# Patient Record
Sex: FEMALE | Race: WHITE | Employment: FULL TIME
[De-identification: names, ages, dates, MRNs, and addresses within clinical notes are randomized per-mention and may not be internally consistent; named-entity substitution may affect disease eponyms.]

---

## 2022-10-24 ENCOUNTER — TELEPHONE (OUTPATIENT)
Dept: FAMILY MEDICINE CLINIC | Facility: CLINIC | Age: 57
End: 2022-10-24

## 2022-10-25 ENCOUNTER — OFFICE VISIT (OUTPATIENT)
Dept: INTERNAL MEDICINE CLINIC | Facility: CLINIC | Age: 57
End: 2022-10-25
Payer: COMMERCIAL

## 2022-10-25 VITALS
DIASTOLIC BLOOD PRESSURE: 71 MMHG | WEIGHT: 235 LBS | RESPIRATION RATE: 18 BRPM | SYSTOLIC BLOOD PRESSURE: 134 MMHG | HEIGHT: 67 IN | HEART RATE: 86 BPM | BODY MASS INDEX: 36.88 KG/M2

## 2022-10-25 DIAGNOSIS — Z12.31 BREAST CANCER SCREENING BY MAMMOGRAM: ICD-10-CM

## 2022-10-25 DIAGNOSIS — E66.09 CLASS 2 OBESITY DUE TO EXCESS CALORIES WITHOUT SERIOUS COMORBIDITY WITH BODY MASS INDEX (BMI) OF 37.0 TO 37.9 IN ADULT: ICD-10-CM

## 2022-10-25 DIAGNOSIS — F17.200 SMOKER: ICD-10-CM

## 2022-10-25 DIAGNOSIS — N28.89 RIGHT RENAL MASS: Primary | ICD-10-CM

## 2022-10-25 LAB
APPEARANCE UR: CLEAR
BASOPHILS # BLD: 0 K/UL (ref 0–0.2)
BASOPHILS NFR BLD: 0 % (ref 0–2)
BILIRUB UR QL: NEGATIVE
COLOR UR: NORMAL
DIFFERENTIAL METHOD BLD: ABNORMAL
EOSINOPHIL # BLD: 0.2 K/UL (ref 0–0.8)
EOSINOPHIL NFR BLD: 1 % (ref 0.5–7.8)
ERYTHROCYTE [DISTWIDTH] IN BLOOD BY AUTOMATED COUNT: 14.7 % (ref 11.9–14.6)
GLUCOSE UR STRIP.AUTO-MCNC: NEGATIVE MG/DL
HCT VFR BLD AUTO: 49.8 % (ref 35.8–46.3)
HGB BLD-MCNC: 15.8 G/DL (ref 11.7–15.4)
HGB UR QL STRIP: NEGATIVE
IMM GRANULOCYTES # BLD AUTO: 0.1 K/UL (ref 0–0.5)
IMM GRANULOCYTES NFR BLD AUTO: 1 % (ref 0–5)
KETONES UR QL STRIP.AUTO: NEGATIVE MG/DL
LEUKOCYTE ESTERASE UR QL STRIP.AUTO: NEGATIVE
LYMPHOCYTES # BLD: 3.3 K/UL (ref 0.5–4.6)
LYMPHOCYTES NFR BLD: 25 % (ref 13–44)
MCH RBC QN AUTO: 27 PG (ref 26.1–32.9)
MCHC RBC AUTO-ENTMCNC: 31.7 G/DL (ref 31.4–35)
MCV RBC AUTO: 85.1 FL (ref 82–102)
MONOCYTES # BLD: 0.9 K/UL (ref 0.1–1.3)
MONOCYTES NFR BLD: 7 % (ref 4–12)
NEUTS SEG # BLD: 8.4 K/UL (ref 1.7–8.2)
NEUTS SEG NFR BLD: 66 % (ref 43–78)
NITRITE UR QL STRIP.AUTO: NEGATIVE
NRBC # BLD: 0 K/UL (ref 0–0.2)
PH UR STRIP: 5.5 [PH] (ref 5–9)
PLATELET # BLD AUTO: 296 K/UL (ref 150–450)
PMV BLD AUTO: 10.6 FL (ref 9.4–12.3)
PROT UR STRIP-MCNC: NEGATIVE MG/DL
RBC # BLD AUTO: 5.85 M/UL (ref 4.05–5.2)
SP GR UR REFRACTOMETRY: 1.02 (ref 1–1.02)
UROBILINOGEN UR QL STRIP.AUTO: 0.2 EU/DL (ref 0.2–1)
WBC # BLD AUTO: 12.8 K/UL (ref 4.3–11.1)

## 2022-10-25 PROCEDURE — 99203 OFFICE O/P NEW LOW 30 MIN: CPT | Performed by: INTERNAL MEDICINE

## 2022-10-25 RX ORDER — CYCLOBENZAPRINE HCL 10 MG
TABLET ORAL
COMMUNITY
Start: 2022-09-08

## 2022-10-25 RX ORDER — IBUPROFEN 600 MG/1
TABLET ORAL
COMMUNITY
Start: 2022-09-08

## 2022-10-25 SDOH — HEALTH STABILITY: PHYSICAL HEALTH
ON AVERAGE, HOW MANY DAYS PER WEEK DO YOU ENGAGE IN MODERATE TO STRENUOUS EXERCISE (LIKE A BRISK WALK)?: PATIENT DECLINED

## 2022-10-25 ASSESSMENT — ENCOUNTER SYMPTOMS
COUGH: 0
WHEEZING: 0
NAUSEA: 0
DIARRHEA: 0
VOMITING: 0
SHORTNESS OF BREATH: 0
BACK PAIN: 1
CONSTIPATION: 0
BLOOD IN STOOL: 0

## 2022-10-25 ASSESSMENT — SOCIAL DETERMINANTS OF HEALTH (SDOH)
WITHIN THE LAST YEAR, HAVE YOU BEEN HUMILIATED OR EMOTIONALLY ABUSED IN OTHER WAYS BY YOUR PARTNER OR EX-PARTNER?: NO
WITHIN THE LAST YEAR, HAVE YOU BEEN AFRAID OF YOUR PARTNER OR EX-PARTNER?: NO
WITHIN THE LAST YEAR, HAVE TO BEEN RAPED OR FORCED TO HAVE ANY KIND OF SEXUAL ACTIVITY BY YOUR PARTNER OR EX-PARTNER?: NO
WITHIN THE LAST YEAR, HAVE YOU BEEN KICKED, HIT, SLAPPED, OR OTHERWISE PHYSICALLY HURT BY YOUR PARTNER OR EX-PARTNER?: NO

## 2022-10-25 NOTE — PROGRESS NOTES
10/25/2022 9:08 PM  Location:[facility] [address] [facility]  [place]  Patient #:  [id number]  YOB: 1965            History of Present Illness     Chief Complaint   Patient presents with    New Patient     New Patient   Is out of work on workers comp due to back pain. Results     Had a MRI 10/15/2022 for her back pain - the MRI showed a mass on her right and left kidney. MRI report in chart. Ms. Vidhya Tang is a 61 y.o. female  who presents to establish primary care. Has a mass on her kidney that was discovered incidentally. Allergies   Allergen Reactions    Latex Hives and Itching    Bee Venom Shortness Of Breath    Penicillins Anaphylaxis    Sulfamethoxazole-Trimethoprim Hives    Sulfa Antibiotics Hives, Itching and Rash     Past Medical History:   Diagnosis Date    Anxiety 2000    Headache 1999    Sleep apnea 2002     Social History     Socioeconomic History    Marital status:      Spouse name: None    Number of children: None    Years of education: None    Highest education level: None   Occupational History    Occupation: CNA     Employer: [employer]   Tobacco Use    Smoking status: Every Day     Packs/day: 1.50     Years: 15.00     Pack years: 22.50     Types: Cigarettes    Smokeless tobacco: Never   Substance and Sexual Activity    Alcohol use:  Yes     Alcohol/week: 2.0 standard drinks     Types: 2 Shots of liquor per week    Drug use: Never    Sexual activity: Not Currently     Partners: Male     Social Determinants of Health     Physical Activity: Unknown    Days of Exercise per Week: Patient refused   Intimate Partner Violence: Not At Risk    Fear of Current or Ex-Partner: No    Emotionally Abused: No    Physically Abused: No    Sexually Abused: No     Past Surgical History:   Procedure Laterality Date    CHOLECYSTECTOMY  2003    TUBAL LIGATION  3/91     Current Outpatient Medications   Medication Sig Dispense Refill    EPINEPHrine (Yoli Isabel 2-MARY IJ) Inject as directed      cyclobenzaprine (FLEXERIL) 10 MG tablet TAKE 1 TABLET BY MOUTH AT BEDTIME FOR 7 DAYS AS NEEDED FOR SPASM      ibuprofen (ADVIL;MOTRIN) 600 MG tablet TAKE 1 TABLET BY MOUTH 3 TIMES A DAY FOR 7 DAYS       No current facility-administered medications for this visit. Health Maintenance   Topic Date Due    COVID-19 Vaccine (1) Never done    Pneumococcal 0-64 years Vaccine (1 - PCV) Never done    Depression Screen  Never done    HIV screen  Never done    Hepatitis C screen  Never done    DTaP/Tdap/Td vaccine (1 - Tdap) Never done    Cervical cancer screen  Never done    Diabetes screen  Never done    Lipids  Never done    Colorectal Cancer Screen  Never done    Breast cancer screen  Never done    Shingles vaccine (1 of 2) Never done    Low dose CT lung screening  Never done    Flu vaccine (1) Never done    Hepatitis A vaccine  Aged Out    Hib vaccine  Aged Out    Meningococcal (ACWY) vaccine  Aged Out     Family History   Problem Relation Age of Onset    Diabetes Mother     High Blood Pressure Mother     High Cholesterol Mother     COPD Mother     Cancer Father         prostate    Diabetes Sister     Stroke Sister     High Cholesterol Brother     Breast Cancer Paternal Aunt              Review of Systems  Review of Systems   Constitutional:  Negative for chills and fever. Respiratory:  Negative for cough, shortness of breath and wheezing. Cardiovascular:  Negative for chest pain, palpitations and leg swelling. Gastrointestinal:  Negative for blood in stool, constipation, diarrhea, nausea and vomiting. Genitourinary:  Negative for hematuria. Musculoskeletal:  Positive for back pain. /71 (Site: Left Upper Arm, Position: Sitting, Cuff Size: Large Adult)   Pulse 86   Resp 18   Ht 5' 6.5\" (1.689 m)   Wt 235 lb (106.6 kg)   LMP  (LMP Unknown)   BMI 37.36 kg/m²       Physical Exam    Physical Exam  Constitutional:       Appearance: Normal appearance. She is obese. She is not ill-appearing. HENT:      Head: Normocephalic. Neck:      Vascular: No carotid bruit. Cardiovascular:      Rate and Rhythm: Normal rate and regular rhythm. Pulmonary:      Effort: Pulmonary effort is normal.      Breath sounds: Normal breath sounds. No wheezing. Abdominal:      General: Abdomen is flat. Palpations: Abdomen is soft. Tenderness: There is no abdominal tenderness. Musculoskeletal:      Lumbar back: Tenderness present. Right lower leg: No edema. Left lower leg: No edema. Neurological:      Mental Status: She is alert and oriented to person, place, and time. Deep Tendon Reflexes:      Reflex Scores:       Patellar reflexes are 2+ on the right side and 2+ on the left side. Psychiatric:         Mood and Affect: Mood normal.         Behavior: Behavior normal.         Assessment & Plan    Current Outpatient Medications   Medication Sig Dispense Refill    EPINEPHrine (EPIPEN 2-MARY IJ) Inject as directed      cyclobenzaprine (FLEXERIL) 10 MG tablet TAKE 1 TABLET BY MOUTH AT BEDTIME FOR 7 DAYS AS NEEDED FOR SPASM      ibuprofen (ADVIL;MOTRIN) 600 MG tablet TAKE 1 TABLET BY MOUTH 3 TIMES A DAY FOR 7 DAYS       No current facility-administered medications for this visit.        Orders Placed This Encounter   Procedures    [facility] DIGITAL SCREEN BILATERAL     Standing Status:   Future     Standing Expiration Date:   12/25/2023     Scheduling Instructions:      Wants done in [facility]     Order Specific Question:   Reason for exam:     Answer:   screening    Urinalysis     Standing Status:   Future     Number of Occurrences:   1     Standing Expiration Date:   10/25/2023    Comprehensive Metabolic Panel     Standing Status:   Future     Number of Occurrences:   1     Standing Expiration Date:   10/25/2023    TSH with Reflex     Standing Status:   Future     Number of Occurrences:   1     Standing Expiration Date:   10/25/2023    Lipid Panel     Standing Status: Future     Number of Occurrences:   1     Standing Expiration Date:   10/25/2023    CBC with Auto Differential     Standing Status:   Future     Number of Occurrences:   1     Standing Expiration Date:   10/25/2023    [facility] - Cori Wooten MD, Urology, [facility]     Referral Priority:   Urgent     Referral Type:   Eval and Treat     Referral Reason:   Specialty Services Required     Referred to Provider:   Serenity Kapoor MD     Requested Specialty:   Urology     Number of Visits Requested:   1       No orders of the defined types were placed in this encounter. There are no discontinued medications. Diagnosis Orders   1. Right renal mass  [facility] - Cori Wooten MD, Urology, [facility]    Urinalysis    CBC with Auto Differential    CBC with Auto Differential    Urinalysis      2. Breast cancer screening by mammogram  [facility] DIGITAL SCREEN BILATERAL      3. Smoker  Lipid Panel    Lipid Panel      4. Class 2 obesity due to excess calories without serious comorbidity with body mass index (BMI) of 37.0 to 37.9 in adult  Comprehensive Metabolic Panel    TSH with Reflex    Lipid Panel    Lipid Panel    TSH with Reflex    Comprehensive Metabolic Panel         Reviewed MRI with the patient. Will discuss labs over the phone. Will refer as above. Advised the patient to quit smoking. Will maintain close follow up. Follow up as documented or earlier as needed. Return in about 3 months (around 1/25/2023).           Britany Trivedi MD

## 2022-10-26 ENCOUNTER — HOSPITAL ENCOUNTER (OUTPATIENT)
Dept: CT IMAGING | Age: 57
Discharge: HOME | End: 2022-10-29

## 2022-10-26 DIAGNOSIS — N28.89 RENAL MASS: Primary | ICD-10-CM

## 2022-10-26 DIAGNOSIS — N28.89 RENAL MASS: ICD-10-CM

## 2022-10-26 LAB
ALBUMIN SERPL-MCNC: 4.1 G/DL (ref 3.5–5)
ALBUMIN/GLOB SERPL: 1.3 {RATIO} (ref 0.4–1.6)
ALP SERPL-CCNC: 81 U/L (ref 50–136)
ALT SERPL-CCNC: 28 U/L (ref 12–65)
ANION GAP SERPL CALC-SCNC: 8 MMOL/L (ref 2–11)
AST SERPL-CCNC: 10 U/L (ref 15–37)
BILIRUB SERPL-MCNC: 0.3 MG/DL (ref 0.2–1.1)
BUN SERPL-MCNC: 13 MG/DL (ref 6–23)
CALCIUM SERPL-MCNC: 9.4 MG/DL (ref 8.3–10.4)
CHLORIDE SERPL-SCNC: 106 MMOL/L (ref 101–110)
CHOLEST SERPL-MCNC: 185 MG/DL
CO2 SERPL-SCNC: 26 MMOL/L (ref 21–32)
CREAT SERPL-MCNC: 0.8 MG/DL (ref 0.6–1)
GLOBULIN SER CALC-MCNC: 3.1 G/DL (ref 2.8–4.5)
GLUCOSE SERPL-MCNC: 145 MG/DL (ref 65–100)
HDLC SERPL-MCNC: 38 MG/DL (ref 40–60)
HDLC SERPL: 4.9 {RATIO}
LDLC SERPL CALC-MCNC: 101.2 MG/DL
POTASSIUM SERPL-SCNC: 4.1 MMOL/L (ref 3.5–5.1)
PROT SERPL-MCNC: 7.2 G/DL (ref 6.3–8.2)
SODIUM SERPL-SCNC: 140 MMOL/L (ref 133–143)
TRIGL SERPL-MCNC: 229 MG/DL (ref 35–150)
TSH W FREE THYROID IF ABNORMAL: 1.19 UIU/ML (ref 0.36–3.74)
VLDLC SERPL CALC-MCNC: 45.8 MG/DL (ref 6–23)

## 2022-10-26 PROCEDURE — 6360000004 HC RX CONTRAST MEDICATION: Performed by: UROLOGY

## 2022-10-26 PROCEDURE — 2580000003 HC RX 258: Performed by: UROLOGY

## 2022-10-26 PROCEDURE — 74178 CT ABD&PLV WO CNTR FLWD CNTR: CPT

## 2022-10-26 RX ORDER — SODIUM CHLORIDE 0.9 % (FLUSH) 0.9 %
10 SYRINGE (ML) INJECTION
Status: DISCONTINUED | OUTPATIENT
Start: 2022-10-26 | End: 2022-10-30 | Stop reason: HOSPADM

## 2022-10-26 RX ORDER — 0.9 % SODIUM CHLORIDE 0.9 %
100 INTRAVENOUS SOLUTION INTRAVENOUS
Status: COMPLETED | OUTPATIENT
Start: 2022-10-26 | End: 2022-10-26

## 2022-10-26 RX ADMIN — SODIUM CHLORIDE 100 ML: 9 INJECTION, SOLUTION INTRAVENOUS at 11:10

## 2022-10-26 RX ADMIN — IOPAMIDOL 100 ML: 755 INJECTION, SOLUTION INTRAVENOUS at 11:10

## 2022-10-27 NOTE — PROGRESS NOTES
New Patient Abstract (Uro/Onc)     MRN: [id number]     PATIENT'S NAME: Monico Beal     LAST SEEN UROLOGIST: N/A     PCP: Charlotte Hays MD     CHIEF COMPLAINT: Renal mass     PMH:  Tobacco use (current 1.5 pack per day smoker x 15+ years), anxiety, sleep apnea, cholecystectomy, I&D of breast abscess, and tubal ligation     FAMILY HX: Family history is significant for father with prostate cancer and paternal aunt with breast cancer. NARRATIVE WITH RECENT WITH RESULTS/PROCEDURES/BIOPSIES AND DATES COMPLETED: Ms. 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 Despite attending physical therapy since the accident, she continued to experience the same low back pain. She was seen at the [facility] on 10/4/22. MRI of the lumbar spine was ordered and performed on 10/15/22 which revealed mild degenerative disc disease at L3-4 through L5-S1 and a 5.7 x 6.1 x 7 cm lower pole solid left renal mass. Urgent referral was placed to [facility], who referred the patient to [facility] for uro/onc evaluation and treatment of renal mass. CT of the abdomen and pelvis with and without contrast was completed on 10/26/22 demonstrating bilateral enhancing renal masses, likely concerning for renal cell carcinoma with the left lower pole renal mass measuring up to 6.5 cm and extending along Gerota's fascia and the right midpole renal mass measuring up to 4.6 cm. Also noted was an enhancing 1.5 cm right adrenal nodule, concerning for a metastatic nodule.     PROCEDURES/PATHOLOGY: N/A     LABS:       10/25/22 14:58   Sodium 140   Potassium 4.1   Chloride 106   CO2 26   BUN,BUNPL 13   Creatinine 0.80   Anion Gap 8   Est, Glom Filt Rate >60   Glucose, Random 145 (H)   CALCIUM, SERUM, 869994 9.4   ALBUMIN/GLOBULIN RATIO 1.3   Total Protein 7.2 Chol/HDL Ratio 4.9   CHOLESTEROL, TOTAL, 620480 185   HDL Cholesterol 38 (L)   LDL Calculated 101.2 (H)   Triglycerides 229 (H)   VLDL Cholesterol Calculated 45.8 (H)   Albumin 4.1   Globulin 3.1   Alk Phosphatase 81   ALT 28   AST 10 (L)   Bilirubin 0.3   TSH w Free Thyroid if Abnormal 1.19      10/25/22 14:58   WBC 12.8 (H)   RBC 5.85 (H)   Hemoglobin Quant 15.8 (H)   Hematocrit 49.8 (H)   MCV 85.1   MCH 27.0   MCHC 31.7   MPV 10.6   RDW 14.7 (H)   Platelet Count 860   Absolute Mono # 0.9   Eosinophils % 1   Basophils Absolute 0.0   Differential Type AUTOMATED   Seg Neutrophils 66   Segs Absolute 8.4 (H)   Lymphocytes 25   Absolute Lymph # 3.3   Monocytes 7   Absolute Eos # 0.2   Basophils 0   Immature Granulocytes 1   Nucleated Red Blood Cells 0.00   Absolute Immature Granulocyte 0.1      10/25/22 14:58   Color, UA YELLOW/STRAW   Glucose, UA Negative   Bilirubin, Urine Negative   Ketones, Urine Negative   Specific Gravity, UA 1.021   Blood, Urine Negative   Protein, UA Negative   Urobilinogen, Urine 0.2   Nitrite, Urine Negative   Leukocyte Esterase, Urine Negative   Appearance CLEAR   pH, Urine 5.5     IMAGING:     MR LUMBAR SPINE WITHOUT CONTRAST 10/15/22        CT ABDOMEN PELVIS W WO CONTRAST 10/26/2022  FINDINGS:  LUNG BASES: No airspace consolidation within the lung bases. No sizable pleural effusion. The heart is not enlarged. LIVER: The liver contour is normal. No suspicious liver lesion. BILIARY TREE: The gallbladder surgically absent. No biliary dilation. SPLEEN: Normal.   PANCREAS: No pancreatic mass or ductal dilation. ADRENALS: Brightly enhancing right adrenal nodule measuring 1.5 x 1.5 cm. KIDNEYS/BLADDER: The kidneys are symmetric in size. No renal calculus or hydronephrosis. There are bilateral renal masses. The left renal mass measures 6.5 x 6.0 x 5.9 cm in axial dimension and extends from the lower pole along Gerota's fascia.  We right renal mass measures 2.9 x 2.7 x 4.6 cm within the midpole. The urinary bladder is unremarkable. BOWEL: The colon is unremarkable. The small bowel is normal in caliber. No bowel wall thickening. PERITONEUM/RETROPERITONEUM: No ascites or free air. No pelvic or retroperitoneal lymphadenopathy. VESSELS: No abdominal aortic aneurysm. No venous invasion. Scattered calcified atherosclerotic disease. ABDOMINAL WALL: No hernia or mass. REPRODUCTIVE: The uterus is unremarkable. BONES: No suspicious osseous lesion. IMPRESSION:  1. Bilateral enhancing renal masses, likely concerning for renal cell carcinoma. The left lower pole renal mass measures up to 6.5 cm and extends along Gerota's fascia. The right midpole renal mass measures up to 4.6 cm.  2.  Enhancing 1.5 cm right adrenal nodule, concerning for a metastatic nodule.

## 2022-10-27 NOTE — RESULT ENCOUNTER NOTE
Your blood sugar and triglycerides are mildly elevated. Your hemoglobin as well as white blood cell count are also elevated. I think this may be related to the mass on your kidney. I will forward these labs onto Dr. Tremaine Ruth for him to assess. When you return, we need to talk about the possibility of you having diabetes. I realize you were not fasting when we did these labs. Maintain a low fat high fiber diet and make sure you are getting 30 minutes of aerobic exercise at least 4-5 days weekly. Thanks.   Clayton Maynard

## 2022-10-27 NOTE — PROGRESS NOTES
[address] [facility]ology & Oncology  [address] [place] [address]  [phone]          Anamaria Whitlock  : 1965      INITIAL EVALUATION    Chief Complaint   Patient presents with    New Patient       HPI: Anamaria Whitlock is a 61 y.o. female with bilateral renal masses and right adrenal mass. Patient is here today with a friend of here hers. 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 She sustained an injury while at work and has been having back pain. This led to an MRI of her spine on 10/15/2022 which showed mild degenerative disc disease as well as a 6 to 7 cm solid-appearing left renal mass. She was then referred to me. We ordered a CT of the abdomen pelvis with and without contrast for 10/26/2022. It showed bilateral enhancing renal masses concerning for RCC. The left lower pole solid mass measured 6.5 cm and extended along Gerota's fascia. There was a central right renal mass measuring 4.6 cm. There was also an enhancing 1.5 cm right adrenal nodule concerning for metastatic disease. Her creatinine on 10/25/2022 was 0.8. Her hematocrit was 49.8. Her LFTs were normal.    She denies any flank or abdominal pain. She has not had any hematuria or dysuria. She denies any other systemic symptoms. She denies any weight loss. She is a current smoker and smokes about a half a pack a day for over 15 years. Past surgical history is significant for laparoscopic cholecystectomy, tubal ligation, open reversal of tubal ligation and an I&D of left breast abscess. She is allergic to penicillin and sulfa drugs. Her father has a history of prostate cancer and she has a paternal aunt with history of breast cancer. Patient has a history of cervical dysplasia but no carcinoma.       From abstract:  Ms. Kaylee Tom is a 48-year-old white female who initially presented to [address] [place] [address] on 22 reporting low back pain s/p injury sustained while pulling a heavy patient at work. Despite attending physical therapy since the accident, she continued to experience the same low back pain. She was seen at the [facility] on 10/4/22. MRI of the lumbar spine was ordered and performed on 10/15/22 which revealed mild degenerative disc disease at L3-4 through L5-S1 and a 5.7 x 6.1 x 7 cm lower pole solid left renal mass. Urgent referral was placed to [facility], who referred the patient to [facility] for uro/onc evaluation and treatment of renal mass. CT of the abdomen and pelvis with and without contrast was completed on 10/26/22 demonstrating bilateral enhancing renal masses, likely concerning for renal cell carcinoma with the left lower pole renal mass measuring up to 6.5 cm and extending along Gerota's fascia and the right midpole renal mass measuring up to 4.6 cm. Also noted was an enhancing 1.5 cm right adrenal nodule, concerning for a metastatic nodule. PMH:     Past Medical History:   Diagnosis Date    Anxiety 2000    Headache 1999    Sleep apnea 2002       PSH:    Past Surgical History:   Procedure Laterality Date    CHOLECYSTECTOMY  2003    TUBAL LIGATION  3/91       MEDs:    Current Outpatient Medications   Medication Sig Dispense Refill    Acetaminophen (TYLENOL) 325 MG CAPS As needed      EPINEPHrine (EPIPEN 2-MARY IJ) Inject as directed (Patient not taking: Reported on 10/28/2022)      cyclobenzaprine (FLEXERIL) 10 MG tablet TAKE 1 TABLET BY MOUTH AT BEDTIME FOR 7 DAYS AS NEEDED FOR SPASM (Patient not taking: Reported on 10/28/2022)      ibuprofen (ADVIL;MOTRIN) 600 MG tablet TAKE 1 TABLET BY MOUTH 3 TIMES A DAY FOR 7 DAYS (Patient not taking: Reported on 10/28/2022)       No current facility-administered medications for this visit.      Facility-Administered Medications Ordered in Other Visits   Medication Dose Route Frequency Provider Last Rate Last Admin    sodium chloride flush 0.9 % injection 10 mL  10 mL IntraVENous ONCE PRN Liv Tijerina MD           ALLERGIES:     Allergies   Allergen Reactions    Latex Hives, Itching and Shortness Of Breath    Bee Venom Shortness Of Breath and Hives    Penicillins Anaphylaxis    Sulfa Antibiotics Hives, Itching and Rash    Penicillin G     Sulfamethoxazole-Trimethoprim Hives       FH:     Family History   Problem Relation Age of Onset    Diabetes Mother     High Blood Pressure Mother     High Cholesterol Mother     COPD Mother     Cancer Father         prostate    Diabetes Sister     Stroke Sister     High Cholesterol Brother     Breast Cancer Paternal Aunt        SH:     Social History     Socioeconomic History    Marital status:      Spouse name: Not on file    Number of children: Not on file    Years of education: Not on file    Highest education level: Not on file   Occupational History    Occupation: CNA     Employer: [employer]   Tobacco Use    Smoking status: Every Day     Packs/day: 1.50     Years: 15.00     Pack years: 22.50     Types: Cigarettes    Smokeless tobacco: Never   Substance and Sexual Activity    Alcohol use: Yes     Alcohol/week: 2.0 standard drinks     Types: 2 Shots of liquor per week    Drug use: Never    Sexual activity: Not Currently     Partners: Male   Other Topics Concern    Not on file   Social History Narrative    Not on file     Social Determinants of Health     Financial Resource Strain: Not on file   Food Insecurity: Not on file   Transportation Needs: Not on file   Physical Activity: Unknown    Days of Exercise per Week: Patient refused    Minutes of Exercise per Session: Not on file   Stress: Not on file   Social Connections: Not on file   Intimate Partner Violence: Not At Risk    Fear of Current or Ex-Partner: No    Emotionally Abused: No    Physically Abused: No    Sexually Abused: No   Housing Stability: Not on file       ROS:   Review of Systems   Constitutional: Negative. Negative for chills, fatigue and fever. Respiratory: Negative. Cardiovascular: Negative. Gastrointestinal: Negative. Genitourinary: Negative. Negative for difficulty urinating, dysuria, flank pain, frequency, hematuria and urgency. Musculoskeletal: Negative. All other systems reviewed and are negative. PHYSICAL EXAM  GENERAL: Well-groomed, well-nourished, pleasant 61 y.o. female, in no acute distress. /79 (Site: Left Upper Arm, Position: Standing)   Pulse 84   Temp 97.8 °F (36.6 °C)   Resp 14   Ht 5' 6.5\" (1.689 m)   Wt 233 lb 8 oz (105.9 kg)   LMP  (LMP Unknown)   SpO2 96%   BMI 37.12 kg/m²   General: well dressed, well nourished, no acute distress  Skin: no rashes  HEENT: Sclera are clear,normocephalic, atraumatic. no external lesions  Cardiovascular: Reg. Normal perfusion  Respiratory: normal respiratory effort, no JVD, no audible wheezing. Musculoskeletal: unremarkable with normal function. No embolic signs or cyanosis. Neurologic exam: intact, no focal deficits, moves all 4 extremities  Psych: normal mood and affect, alert, oriented x 3  LE:  no edema  GI: soft, nontender, no masses, no CVA tenderness  Lymphatic: no axillary, inguinal, cervical or supraclavicular adenopathy  GENITOURINARY:     Labs:   See above    Imaging/Radiology:    XR Results (maximum last 3): No results found for this or any previous visit. CT Results (maximum last 3):  === 10/26/22 ===    CT ABDOMEN PELVIS W WO IV CONTRAST    - Narrative -  EXAMINATION: CT ABDOMEN PELVIS W WO CONTRAST 10/26/2022 11:17 AM    ACCESSION NUMBER: [id number]    COMPARISON: None available    INDICATION: Other specified disorders of kidney and ureter    TECHNIQUE: Contiguous axial computed tomographic images were obtained from the  domes of the diaphragm to the symphysis pubis following administration 100mL  Iso-mulu 370. Coronal reconstructions were also performed. Radiation dose reduction techniques were used for this study.  Our CT scanners  use one or all of the following: Automated exposure control, adjustment of the  mA and/or kV according to patient size, iterative reconstruction. FINDINGS:  LUNG BASES: No airspace consolidation within the lung bases. No sizable pleural  effusion. The heart is not enlarged. LIVER: The liver contour is normal. No suspicious liver lesion. BILIARY TREE: The gallbladder surgically absent. No biliary dilation. SPLEEN: Normal.    PANCREAS: No pancreatic mass or ductal dilation. ADRENALS: Brightly enhancing right adrenal nodule measuring 1.5 x 1.5 cm. KIDNEYS/BLADDER: The kidneys are symmetric in size. No renal calculus or  hydronephrosis. There are bilateral renal masses. The left renal mass measures  6.5 x 6.0 x 5.9 cm in axial dimension and extends from the lower pole along  Gerota's fascia. We right renal mass measures 2.9 x 2.7 x 4.6 cm within the  midpole. The urinary bladder is unremarkable. BOWEL: The colon is unremarkable. The small bowel is normal in caliber. No bowel  wall thickening. PERITONEUM/RETROPERITONEUM: No ascites or free air. No pelvic or retroperitoneal  lymphadenopathy. VESSELS: No abdominal aortic aneurysm. No venous invasion. Scattered calcified  atherosclerotic disease. ABDOMINAL WALL: No hernia or mass. REPRODUCTIVE: The uterus is unremarkable. BONES: No suspicious osseous lesion.    - Impression -  1. Bilateral enhancing renal masses, likely concerning for renal cell  carcinoma. The left lower pole renal mass measures up to 6.5 cm and extends  along Gerota's fascia. The right midpole renal mass measures up to 4.6 cm.  2.  Enhancing 1.5 cm right adrenal nodule, concerning for a metastatic nodule. ASSESSMENT: Tamiko Xie is a 61 y.o. female with bilateral solid enhancing renal masses. She also has a 1.5 cm right adrenal nodule. I discussed the differential diagnosis of these lesions.   I am concerned she has bilateral renal cell carcinoma with a metastatic lesion in the right adrenal gland. I recommended she move forward with a CT of her chest to ensure there is no evidence of metastatic disease there. If the CT of the chest is negative I have recommended moving forward with percutaneous biopsy of the right adrenal lesion. If that is nondiagnostic or not feasible then I would recommend biopsy of the left renal mass. I am going to check plasma metanephrines today to just ensure they are not elevated, although I think the likelihood of this being a pheochromocytoma is extremely small. I discussed the risk of bleeding, infection and damage to adjacent organs in terms of risks of the biopsy procedure. I then had a long discussion with the patient and her friend about possible treatment options. If she does not have distant metastatic disease we will likely move forward with attempted partial nephrectomy of the larger left renal mass. I would then move forward at a second setting with resection of the right adrenal gland and possible partial nephrectomy on the right side. I explained to her the location of this tumor would make partial nephrectomy very challenging and she would be at high risk of needing a nephrectomy. We discussed her chances of needing dialysis in the short-term and even possibly permanently. Should she be found to have distant metastatic disease then I will refer her to one of my medical oncology partners and she will get started on systemic therapy upfront. Also explained that even if there is no distant metastatic disease we may want to consider systemic therapy upfront with hopes of decreasing the sizes of these lesions and making partial nephrectomy even more feasible. We plan to present her at [facility] in the near future. She will see me back after the biopsy and CT chest are complete. ICD-10-CM    1. Renal mass  N28.89 Metanephrines Plasma Free     CT CHEST W CONTRAST      2.  Mass of right adrenal gland (HCC)  E27.8 CT GUIDED NEEDLE PLACEMENT            PLAN:   -review CT a/p w wo cont  -CT chest w cont in the next week  -plasma metanephrines today   -to IR for right adrenal gland biopsy  -RTC after to review     ________________________________________      I have seen and examined this patient. I have reviewed and edited the note started by the MA and agree with the outlined plan. Part of this note was written by using a voice dictation software. The note has been proof read but may still contain some grammatical/other typographical errors.       Rozetta Scheuermann, [address] Urology

## 2022-10-28 ENCOUNTER — HOSPITAL ENCOUNTER (OUTPATIENT)
Dept: LAB | Age: 57
Discharge: HOME | End: 2022-10-31

## 2022-10-28 ENCOUNTER — OFFICE VISIT (OUTPATIENT)
Dept: ONCOLOGY | Age: 57
End: 2022-10-28
Payer: COMMERCIAL

## 2022-10-28 VITALS
SYSTOLIC BLOOD PRESSURE: 114 MMHG | WEIGHT: 233.5 LBS | RESPIRATION RATE: 14 BRPM | HEART RATE: 84 BPM | TEMPERATURE: 97.8 F | BODY MASS INDEX: 36.65 KG/M2 | HEIGHT: 67 IN | DIASTOLIC BLOOD PRESSURE: 79 MMHG | OXYGEN SATURATION: 96 %

## 2022-10-28 DIAGNOSIS — E27.8 MASS OF RIGHT ADRENAL GLAND (HCC): ICD-10-CM

## 2022-10-28 DIAGNOSIS — N28.89 RENAL MASS: Primary | ICD-10-CM

## 2022-10-28 DIAGNOSIS — N28.89 RENAL MASS: ICD-10-CM

## 2022-10-28 PROCEDURE — 36415 COLL VENOUS BLD VENIPUNCTURE: CPT

## 2022-10-28 PROCEDURE — 99205 OFFICE O/P NEW HI 60 MIN: CPT | Performed by: UROLOGY

## 2022-10-28 PROCEDURE — 83835 ASSAY OF METANEPHRINES: CPT

## 2022-10-28 ASSESSMENT — PATIENT HEALTH QUESTIONNAIRE - PHQ9
2. FEELING DOWN, DEPRESSED OR HOPELESS: 0
SUM OF ALL RESPONSES TO PHQ QUESTIONS 1-9: 0

## 2022-10-28 ASSESSMENT — ENCOUNTER SYMPTOMS
GASTROINTESTINAL NEGATIVE: 1
RESPIRATORY NEGATIVE: 1

## 2022-10-28 NOTE — PATIENT INSTRUCTIONS
Patient Instructions from Today's Visit    Reason for Visit:  New Patient, renal mass    Diagnosis Information:  [email or web address]      Plan: We would like to check some additional labs  We would like you to have a CT of your chest In the next week or so    We would like to biopsy the mass on your adrenal gland. We will refer you to our interventional radiology dept. Follow Up: If the CT of your chest shows nodules in your lungs, this will change the potential course of treatment. We are going to refer you for the biopsy. Recent Lab Results:  N/a    Treatment Summary has been discussed and given to patient: n/a        -------------------------------------------------------------------------------------------------------------------    Patient does express an interest in My Chart. My Chart log in information explained on the after visit summary printout at the [facility] Steffanie Mclaughlin [address] desk.     JUAN Tobar

## 2022-11-01 ENCOUNTER — PATIENT MESSAGE (OUTPATIENT)
Dept: INTERNAL MEDICINE CLINIC | Facility: CLINIC | Age: 57
End: 2022-11-01

## 2022-11-01 DIAGNOSIS — F41.8 SITUATIONAL ANXIETY: Primary | ICD-10-CM

## 2022-11-01 RX ORDER — LORAZEPAM 0.5 MG/1
0.5 TABLET ORAL EVERY 8 HOURS PRN
Qty: 30 TABLET | Refills: 0 | Status: SHIPPED | OUTPATIENT
Start: 2022-11-01 | End: 2022-11-30

## 2022-11-01 NOTE — TELEPHONE ENCOUNTER
From: Mariaa July  To: Dr. Lees Lon2022 1:11 PM EDT  Subject: Anxiety     Due to the recent events with test and visit with oncologist on results . I'm having a lot of anxiety attacks and trouble resting. Are they anything I can get to help with this?

## 2022-11-04 LAB
METANEPH FREE SERPL-MCNC: 74.1 PG/ML (ref 0–88)
NORMETANEPHRINE SERPL-MCNC: 211.2 PG/ML (ref 0–244)

## 2022-11-07 ENCOUNTER — HOSPITAL ENCOUNTER (OUTPATIENT)
Dept: CT IMAGING | Age: 57
Discharge: HOME | End: 2022-11-10
Payer: COMMERCIAL

## 2022-11-07 DIAGNOSIS — N28.89 RENAL MASS: ICD-10-CM

## 2022-11-07 LAB — CREAT BLD-MCNC: 0.89 MG/DL (ref 0.8–1.5)

## 2022-11-07 PROCEDURE — 82565 ASSAY OF CREATININE: CPT

## 2022-11-07 PROCEDURE — 71260 CT THORAX DX C+: CPT

## 2022-11-07 PROCEDURE — 2580000003 HC RX 258: Performed by: UROLOGY

## 2022-11-07 PROCEDURE — 6360000004 HC RX CONTRAST MEDICATION: Performed by: UROLOGY

## 2022-11-07 RX ORDER — SODIUM CHLORIDE 0.9 % (FLUSH) 0.9 %
10 SYRINGE (ML) INJECTION
Status: COMPLETED | OUTPATIENT
Start: 2022-11-07 | End: 2022-11-07

## 2022-11-07 RX ORDER — 0.9 % SODIUM CHLORIDE 0.9 %
100 INTRAVENOUS SOLUTION INTRAVENOUS ONCE
Status: COMPLETED | OUTPATIENT
Start: 2022-11-07 | End: 2022-11-07

## 2022-11-07 RX ADMIN — IOPAMIDOL 70 ML: 755 INJECTION, SOLUTION INTRAVENOUS at 15:26

## 2022-11-07 RX ADMIN — SODIUM CHLORIDE 100 ML: 9 INJECTION, SOLUTION INTRAVENOUS at 15:27

## 2022-11-07 RX ADMIN — SODIUM CHLORIDE, PRESERVATIVE FREE 10 ML: 5 INJECTION INTRAVENOUS at 15:27

## 2022-11-10 ENCOUNTER — HOSPITAL ENCOUNTER (OUTPATIENT)
Dept: CT IMAGING | Age: 57
Discharge: HOME | End: 2022-11-13
Payer: COMMERCIAL

## 2022-11-10 VITALS
OXYGEN SATURATION: 97 % | SYSTOLIC BLOOD PRESSURE: 150 MMHG | HEART RATE: 65 BPM | RESPIRATION RATE: 16 BRPM | DIASTOLIC BLOOD PRESSURE: 74 MMHG | TEMPERATURE: 98.1 F

## 2022-11-10 DIAGNOSIS — E27.8 MASS OF RIGHT ADRENAL GLAND (HCC): ICD-10-CM

## 2022-11-10 PROCEDURE — 6360000002 HC RX W HCPCS: Performed by: RADIOLOGY

## 2022-11-10 PROCEDURE — 88305 TISSUE EXAM BY PATHOLOGIST: CPT

## 2022-11-10 PROCEDURE — 2709999900 CT GUIDED NEEDLE PLACEMENT

## 2022-11-10 RX ORDER — MIDAZOLAM HYDROCHLORIDE 2 MG/2ML
INJECTION, SOLUTION INTRAMUSCULAR; INTRAVENOUS
Status: COMPLETED | OUTPATIENT
Start: 2022-11-10 | End: 2022-11-10

## 2022-11-10 RX ORDER — FENTANYL CITRATE 50 UG/ML
INJECTION, SOLUTION INTRAMUSCULAR; INTRAVENOUS
Status: COMPLETED | OUTPATIENT
Start: 2022-11-10 | End: 2022-11-10

## 2022-11-10 RX ORDER — DIPHENHYDRAMINE HYDROCHLORIDE 50 MG/ML
INJECTION INTRAMUSCULAR; INTRAVENOUS
Status: COMPLETED | OUTPATIENT
Start: 2022-11-10 | End: 2022-11-10

## 2022-11-10 RX ADMIN — DIPHENHYDRAMINE HYDROCHLORIDE 50 MG: 50 INJECTION, SOLUTION INTRAMUSCULAR; INTRAVENOUS at 10:54

## 2022-11-10 RX ADMIN — FENTANYL CITRATE 50 MCG: 50 INJECTION, SOLUTION INTRAMUSCULAR; INTRAVENOUS at 11:04

## 2022-11-10 RX ADMIN — FENTANYL CITRATE 50 MCG: 50 INJECTION, SOLUTION INTRAMUSCULAR; INTRAVENOUS at 10:54

## 2022-11-10 RX ADMIN — MIDAZOLAM HYDROCHLORIDE 1 MG: 1 INJECTION, SOLUTION INTRAMUSCULAR; INTRAVENOUS at 10:54

## 2022-11-10 RX ADMIN — FENTANYL CITRATE 50 MCG: 50 INJECTION, SOLUTION INTRAMUSCULAR; INTRAVENOUS at 10:59

## 2022-11-10 RX ADMIN — MIDAZOLAM HYDROCHLORIDE 1 MG: 1 INJECTION, SOLUTION INTRAMUSCULAR; INTRAVENOUS at 10:59

## 2022-11-10 RX ADMIN — MIDAZOLAM HYDROCHLORIDE 1 MG: 1 INJECTION, SOLUTION INTRAMUSCULAR; INTRAVENOUS at 11:04

## 2022-11-10 ASSESSMENT — PAIN SCALES - GENERAL
PAINLEVEL_OUTOF10: 0

## 2022-11-10 NOTE — PROGRESS NOTES
TRANSFER - OUT REPORT:           Verbal report given to Neva Kayser, RN(name) on Rula Jones  being transferred to IR Recovery 4(unit) for routine post-op              Report consisted of patients Situation, Background, Assessment and      Recommendations(SBAR). Information from the following report(s) SBAR, Procedure Summary, and MAR was reviewed with the receiving nurse. Opportunity for questions and clarification was provided. Conscious Sedation:    150 Mcg of Fentanyl administered   3 Mg of Versed administered   50 Mg of Benadryl administered        Pt tolerated procedure well.          Peripheral Intravenous Line:   Peripheral IV 11/10/22 Left Hand (Active)   Site Assessment Clean, dry & intact 11/10/22 1034   Line Status Blood return noted;Normal saline locked 11/10/22 1034   Phlebitis Assessment No symptoms 11/10/22 1034   Infiltration Assessment 0 11/10/22 1034   Alcohol Cap Used No 11/10/22 1034   Dressing Status New dressing applied 11/10/22 1034   Dressing Type Transparent 11/10/22 1034   Dressing Intervention New 11/10/22 1034       VITALS:  BP (!) 142/73   Pulse 63   Temp 98.1 °F (36.7 °C) (Oral)   Resp 15   LMP  (LMP Unknown)   SpO2 98%

## 2022-11-10 NOTE — OR NURSING
Recovery period without difficulty. Pt alert and oriented and denies pain. Dressing is clean, dry, and intact. Reviewed discharge instructions with patient and sister, both verbalized understanding. [facility] Vital signs and Andrey score completed.

## 2022-11-10 NOTE — BRIEF OP NOTE
Department of Interventional Radiology  [phone]        Interventional Radiology Brief Procedure Note    Patient: Flor Lea MRN: [id number]  SSN: [id number]    YOB: 1965  Age: 61 y.o. Sex: female      Date of Procedure: 11/10/2022    Pre-Procedure Diagnosis: bilateral Renal masses and left adrenal mass. Post-Procedure Diagnosis: SAME    Procedure(s): Image Guided Biopsy    Brief Description of Procedure: CT guided biopsy of left lower pole renal mass. Performed By: Vinny Mcgregor MD     Assistants: None    Anesthesia:Moderate Sedation    Estimated Blood Loss: Less than 10ml    Specimens:  Pathology    Implants:  None    Findings: Successful CT guided biopsy of left lower pole renal mass. The small right adrenal mass was not very amenable for biopsy due to need to traverse either lung or liver to obtain a biopsy of the adrenal lesion. Complications: None    Recommendations: NA. Follow Up: FU pathology.      Signed By: Vinny Mcgregor MD     November 10, 2022

## 2022-11-10 NOTE — PRE SEDATION
Sedation Pre-Procedure Note    Patient Name: Aileen Car   YOB: 1965  Room/Bed: Room/bed info not found  Medical Record Number: [id number]  Date: 11/10/2022   Time: 10:28 AM       Indication: CT-guided biopsy for renal masses and adrenal mass    Consent: I have discussed with the patient and/or the patient representative the indication, alternatives, and the possible risks and/or complications of the planned procedure and the anesthesia methods. The patient and/or patient representative appear to understand and agree to proceed. Vital Signs: There were no vitals filed for this visit. Past Medical History:   has a past medical history of Anxiety, Headache, and Sleep apnea. Past Surgical History:   has a past surgical history that includes Tubal ligation (3/91) and Cholecystectomy (2003). Medications:   Scheduled Meds:   Continuous Infusions:   PRN Meds:   Home Meds:   Prior to Admission medications    Medication Sig Start Date End Date Taking? Authorizing Provider   sertraline (ZOLOFT) 50 MG tablet Take 0.5 tablets by mouth daily for 6 days, THEN 1 tablet daily for 14 days, THEN 2 tablets daily for 14 days. 11/1/22 12/5/22  Jhon Waldron MD   LORazepam (ATIVAN) 0.5 MG tablet Take 1 tablet by mouth every 8 hours as needed for Anxiety for up to 30 doses.  11/1/22 11/30/22  Jhon Waldron MD   Acetaminophen (TYLENOL) 325 MG CAPS As needed 12/19/14   Historical Provider, MD   EPINEPHrine (EPIPEN 2-MARY IJ) Inject as directed  Patient not taking: Reported on 10/28/2022    Historical Provider, MD   cyclobenzaprine (FLEXERIL) 10 MG tablet TAKE 1 TABLET BY MOUTH AT BEDTIME FOR 7 DAYS AS NEEDED FOR SPASM  Patient not taking: Reported on 10/28/2022 9/8/22   Historical Provider, MD   ibuprofen (ADVIL;MOTRIN) 600 MG tablet TAKE 1 TABLET BY MOUTH 3 TIMES A DAY FOR 7 DAYS  Patient not taking: Reported on 10/28/2022 9/8/22   Historical Provider, MD         Pre-Sedation Documentation and Exam:   I have personally completed a history, physical exam & review of systems for this patient (see notes). Vital signs have been reviewed (see flow sheet for vitals).     Mallampati Airway Assessment:  normal, dentition not prohibitive, Mallampati Class II - (soft palate, fauces & uvula are visible)  Patient is wearing dentures which can be removed    Prior History of Anesthesia Complications:   none    ASA Classification:  Class 2 - A normal healthy patient with mild systemic disease    Sedation/ Anesthesia Plan:   intravenous sedation    Medications Planned:   midazolam (Versed) intravenously and fentanyl intravenously    Patient is an appropriate candidate for plan of sedation: yes    Electronically signed by WERNER Grayson on 11/10/2022 at 10:28 AM

## 2022-11-10 NOTE — DISCHARGE INSTRUCTIONS
If you have any questions about your procedure, please call the Interventional Radiology department at [phone]. After business hours (5pm) and weekends, call the answering service at [phone] and ask for the Radiologist on call to be paged. Si tiene Preguntas acerca del procedimiento, por favor llame al departamento de Radiología Intervencional al [phone]. Después de horas de oficina (5 pm) y los fines de [place], llamar al Denia Fernandez al [phone] y pregunte por el Radiologo de [facility].

## 2022-11-10 NOTE — H&P
Department of Interventional Radiology  [phone]    History and Physical    Patient:  Neto Monique MRN:  [id number]  SSN:  [id number]    YOB: 1965  Age:  61 y.o. Sex:  female      Primary Care Provider:  Dary Cantrell MD  Referring Physician:  Kaitlynn Vasquez MD    Subjective:     Chief Complaint: Bilateral renal masses and right adrenal nodule    History of the Present Illness: The patient is a 61 y.o. female who presents for CT-guided renal biopsy. Patient sustained a work injury that led to her obtaining an MRI of her spine October 15 which incidentally showed a 6 to 7 cm solid-appearing left renal mass. CT scan of the abdomen pelvis was then obtained on 10/26/2022 and it showed bilateral enhancing renal masses concerning for renal cell carcinoma as well as an enhancing 1.5 cm right adrenal nodule concerning for metastatic disease  Patient is NPO. She denies taking any blood thinners      Past Medical History:   Diagnosis Date    Anxiety 2000    Headache 1999    Sleep apnea 2002     Past Surgical History:   Procedure Laterality Date    CHOLECYSTECTOMY  2003    TUBAL LIGATION  3/91        Review of Systems:    Pertinent items are noted in HPI. Prior to Admission medications    Medication Sig Start Date End Date Taking? Authorizing Provider   sertraline (ZOLOFT) 50 MG tablet Take 0.5 tablets by mouth daily for 6 days, THEN 1 tablet daily for 14 days, THEN 2 tablets daily for 14 days. 11/1/22 12/5/22  Dary Cantrell MD   LORazepam (ATIVAN) 0.5 MG tablet Take 1 tablet by mouth every 8 hours as needed for Anxiety for up to 30 doses.  11/1/22 11/30/22  Dary Cantrell MD   Acetaminophen (TYLENOL) 325 MG CAPS As needed 12/19/14   Historical Provider, MD   EPINEPHrine (EPIPEN 2-MARY IJ) Inject as directed  Patient not taking: Reported on 10/28/2022    Historical Provider, MD   cyclobenzaprine (FLEXERIL) 10 MG tablet TAKE 1 TABLET BY MOUTH AT BEDTIME FOR 7 DAYS AS NEEDED FOR SPASM  Patient not taking: Reported on 10/28/2022 9/8/22   Historical Provider, MD   ibuprofen (ADVIL;MOTRIN) 600 MG tablet TAKE 1 TABLET BY MOUTH 3 TIMES A DAY FOR 7 DAYS  Patient not taking: Reported on 10/28/2022 9/8/22   Historical Provider, MD        Allergies   Allergen Reactions    Latex Hives, Itching and Shortness Of Breath    Bee Venom Shortness Of Breath and Hives    Penicillins Anaphylaxis    Sulfa Antibiotics Hives, Itching and Rash    Penicillin G     Sulfamethoxazole-Trimethoprim Hives       Family History   Problem Relation Age of Onset    Diabetes Mother     High Blood Pressure Mother     High Cholesterol Mother     COPD Mother     Cancer Father         prostate    Diabetes Sister     Stroke Sister     High Cholesterol Brother     Breast Cancer Paternal Aunt      Social History     Tobacco Use    Smoking status: Every Day     Packs/day: 1.50     Years: 15.00     Pack years: 22.50     Types: Cigarettes    Smokeless tobacco: Never   Substance Use Topics    Alcohol use: Yes     Alcohol/week: 2.0 standard drinks     Types: 2 Shots of liquor per week        Not in a hospital admission. Objective:       Physical Examination:    There were no vitals filed for this visit.     Pain Assessment                           0                          HEART: regular rate and rhythm, S1, S2 normal, no murmur, click, rub or gallop  LUNG: clear to auscultation bilaterally  ABDOMEN: soft, non-tender; bowel sounds normal; no masses,  no organomegaly  EXTREMITIES: no pedal edema    Laboratory:     Lab Results   Component Value Date/Time     10/25/2022 02:58 PM    K 4.1 10/25/2022 02:58 PM     10/25/2022 02:58 PM    CO2 26 10/25/2022 02:58 PM    BUN 13 10/25/2022 02:58 PM    GLOB 3.1 10/25/2022 02:58 PM    ALT 28 10/25/2022 02:58 PM     Lab Results   Component Value Date/Time    WBC 12.8 10/25/2022 02:58 PM    HGB 15.8 10/25/2022 02:58 PM    HCT 49.8 10/25/2022 02:58 PM     10/25/2022 02:58 PM No results found for: APTT, INR    Assessment:     54-year-old female with recent imaging that shows bilateral renal masses and a right adrenal nodule concerning for metastatic disease        Plan:     Planned Procedure: CT-guided left renal biopsy under moderate sedation    Risks, benefits, and alternatives reviewed with patient and she agrees to proceed with the procedure.       Signed By: Bi Villarreal     November 10, 2022

## 2022-11-17 NOTE — PROGRESS NOTES
[address] [facility] On[facility]  [address] [address]  [phone]        Ms. Kati Nuñez is a 61 y.o. female with a diagnosis of RCC. S/p left renal biopsy on 11/10/22, Nidia grade 2 of 4     INTERVAL HISTORY: Patient is here today with 2 of her friends. She has bilateral enhancing solid renal masses as well as a 1.5 cm right enhancing adrenal mass. The adrenal mass was not able to be biopsied by interventional radiology because of its position. She instead underwent a biopsy of the larger left renal mass on 11/16/2022. It was consistent with conventional clear-cell renal cell carcinoma, Nidia grade 2 out of 4. She has no complaints today. She recovered well from the procedure. She also underwent a CT scan of the chest on 11/7/2022. It showed multiple nodules that appeared to be calcified primarily. It was thought to be most consistent with granulomatous disease although metastatic disease cannot be completely ruled out. Her creatinine on 11/7/2022 was 0.89 with a GFR of greater than 60. She is currently not on any anticoagulants. Her past surgical history includes a laparoscopic cholecystectomy and a tubal reversal through a Pfannenstiel incision. From previous note:  She sustained an injury while at work and has been having back pain. This led to an MRI of her spine on 10/15/2022 which showed mild degenerative disc disease as well as a 6 to 7 cm solid-appearing left renal mass. She was then referred to me. We ordered a CT of the abdomen pelvis with and without contrast for 10/26/2022. It showed bilateral enhancing renal masses concerning for RCC. The left lower pole solid mass measured 6.5 cm and extended along Gerota's fascia. There was a central right renal mass measuring 4.6 cm. There was also an enhancing 1.5 cm right adrenal nodule concerning for metastatic disease. Her creatinine on 10/25/2022 was 0.8.   Her hematocrit was 49.8. Her LFTs were normal.    She denies any flank or abdominal pain. She has not had any hematuria or dysuria. She denies any other systemic symptoms. She denies any weight loss. She is a current smoker and smokes about a half a pack a day for over 15 years. Past surgical history is significant for laparoscopic cholecystectomy, tubal ligation, open reversal of tubal ligation and an I&D of left breast abscess. She is allergic to penicillin and sulfa drugs. Her father has a history of prostate cancer and she has a paternal aunt with history of breast cancer. Patient has a history of cervical dysplasia but no carcinoma. From abstract:  Ms. 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 Despite attending physical therapy since the accident, she continued to experience the same low back pain. She was seen at the [facility] on 10/4/22. MRI of the lumbar spine was ordered and performed on 10/15/22 which revealed mild degenerative disc disease at L3-4 through L5-S1 and a 5.7 x 6.1 x 7 cm lower pole solid left renal mass. Urgent referral was placed to [facility], who referred the patient to [facility] for uro/onc evaluation and treatment of renal mass. CT of the abdomen and pelvis with and without contrast was completed on 10/26/22 demonstrating bilateral enhancing renal masses, likely concerning for renal cell carcinoma with the left lower pole renal mass measuring up to 6.5 cm and extending along Gerota's fascia and the right midpole renal mass measuring up to 4.6 cm. Also noted was an enhancing 1.5 cm right adrenal nodule, concerning for a metastatic nodule.     Past medical, family and social histories, as well as medications and allergies, were reviewed and updated in the medical record as appropriate. PMH:     Past Medical History:   Diagnosis Date    Anxiety 2000    Headache 1999    Sleep apnea 2002       MEDs:     cyclobenzaprine  EPIPEN 2-MARY IJ  ibuprofen  LORazepam  sertraline  Tylenol Caps     ALLERGIES:    Allergies   Allergen Reactions    Latex Hives, Itching and Shortness Of Breath    Bee Venom Shortness Of Breath and Hives    Penicillins Anaphylaxis    Sulfa Antibiotics Hives, Itching and Rash    Penicillin G     Sulfamethoxazole-Trimethoprim Hives       ROS:     Review of Systems   Constitutional: Negative. Negative for chills, fatigue and fever. Respiratory: Negative. Cardiovascular: Negative. Gastrointestinal: Negative. Genitourinary: Negative. Negative for difficulty urinating, dysuria, flank pain, frequency, hematuria and urgency. Musculoskeletal: Negative. All other systems reviewed and are negative. PHYSICAL EXAMINATION    BP (!) 140/76   Pulse 66   Temp 97.9 °F (36.6 °C) (Oral)   Resp 18   Ht 5' 6.5\" (1.689 m)   Wt 234 lb 1.6 oz (106.2 kg)   LMP  (LMP Unknown)   SpO2 97%   BMI 37.22 kg/m²   General: well dressed, well nourished, no acute distress  Skin: no rashes  HEENT: Sclera are clear,normocephalic, atraumatic. no external lesions   Cardiovascular: Reg. Normal perfusion  Respiratory: normal respiratory effort, no JVD, no audible wheezing. Musculoskeletal: unremarkable with normal function. No embolic signs or cyanosis. Neurologic exam: intact, no focal deficits, moves all 4 extremities  Psych: normal mood and affect, alert, oriented x 3  LE:  no edema  GI: soft, nontender, no masses, no CVA tenderness  : DEFERRED     IMAGING:      CT Results:    === 11/10/22 ===    CT GUIDED NEEDLE PLACEMENT    - Narrative -  Procedure:  Conscious sedation  CT-guided percutaneous core needle biopsy of left renal mass    HISTORY: 48-year-old female with bilateral renal masses with the largest mass  located within the lower pole of the left kidney.  These masses are most  consistent with renal cell carcinoma. There is also a 15 mm enhancing mass  within the right adrenal gland. This right adrenal mass is most consistent with  a metastatic lesion. We are asked to perform biopsy of the right adrenal mass if  possible. Otherwise, request for biopsy of the largest left renal mass. ATTENDING: Ronny Santos MD    ANESTHESIA: Local and Moderate Sedation - Sedation was provided by physician: An  independent trained observer, sedation nurse was present to assist in the  monitoring of the patients level of consciousness and physiologic status. . The  following intraservice physician time was performed during moderate sedation  (monitored intravenous conscious sedation). This moderate sedation was performed  under my direct supervision with administration of Versed and/or fentanyl via a  sedation nurse, an independent trained observer, who monitored the patients  level of consciousness and physiologic status. Intraservice Start Time: 1054  Intraservice End Time: 1119    TECHNIQUE/FINDINGS:  After the risks, and alternatives were discussed, informed consent was obtained. A time out was performed to verify the patient's identity and procedure. All CT  scans at this facility are performed using dose reduction/dose modulation  techniques, as appropriate the performed exam, including the following:  Automated Exposure Control; Adjustment of the mA and/or kV according to patient  size (this includes techniques or standardized protocols for targeted exams  where dose is matched to indication/reason for exam); and Use of Iterative  Reconstruction Technique. The patient was positioned in the Prone position on the CT table. Limited  noncontrast CT examination of the abdomen was performed. There was  redemonstration of the left greater than right bilateral renal masses.  There was  limited evaluation of the previously noted approximately 15 mm lesion within the  right adrenal gland. This right adrenal gland lesion was not very amenable for  biopsy due to the lung or liver needing to be traversed in order to biopsy this  lesion. There would also be a risk of nondiagnostic sample if attempts to biopsy  the right adrenal lesion were attempted due to the small size of the lesion. Therefore, there was a decision to perform biopsy of the left renal mass. The lower pole left renal mass was localized with CT guidance utilizing a grid. The skin was marked and the area was prepped and draped in a sterile fashion. An anesthetic needle was inserted through the skin into the subcutaneous soft  tissues in order to determine an appropriate angle for percutaneous access into  the left adrenal lesion with the needle position checked with  CT imaging. The  subcutaneous soft tissues were anesthetized with a 1% lidocaine solution. A  small dermatotomy was performed with a #11 blade scalpel. A 17 gauge coaxial  needle was advanced into the lower pole left renal mass with intermittent CT  imaging. Three core biopsy specimens were obtained by inserting the 18-gauge  biopsy device through the needle. 2 of the biopsy specimens were quite bloody  due to the very vascular nature of this mass. A Gelfoam and saline mixture was  prepared in normal sterile fashion. Through the needle, approximately 3 mL of  the embolic mixture was administered into the region of biopsy. The needle was  removed. Hemostasis was achieved. A sterile bandage was applied over the dermatotomy  site. Specimens were sent to pathology. Post-procedure limited CT abdomen examination demonstrated no significant  perinephric hematoma adjacent the region of biopsy. The patient tolerated the procedure without difficulty. CT Dose Radiation indices:  Dose Area Product (in mGy-cm): 1426.6    - Impression -  Successful CT guided core needle biopsy of the previously noted large lower pole  left renal mass, as above.  This mass as well as the previously noted smaller  right renal mass are most consistent with renal cell carcinoma. Limited evaluation of the previously noted approximately 15 mm right adrenal  lesion. Biopsy of this lesion was not attempted due to it being poorly amenable  for biopsy due to the need to traverse the lower right lung or the lower right  hepatic lobe in order to biopsy this lesion. There would have been risk of  nondiagnostic sample if this lesion was biopsied due to its small size and  location. ASSESSMENT:    Ms. Ruth Gallardo is a 61 y.o. female with a diagnosis of bilateral solid enhancing renal masses as well as a 1.5 cm right adrenal mass. She is status post left renal mass biopsy on 11/10/22, which showed clear cell RCC, Nidia grade 2 of 4. We again had a long discussion about her current situation. The mass in her left kidney takes up the lower half of the kidney and measures approximately 7 cm. Her other kidney has a very centrally located 4 to 5 cm mass. Both of these will be challenging partial nephrectomies as I do feel like the left side is more doable from an anterior approach either open or robotically. I also discussed the possibility of moving forward with systemic therapy upfront. We can see how the adrenal mass and both renal masses respond to either immunotherapy or tyrosine kinase inhibitors. She understands this is a challenging situation and there are various ways to approach her treatment. She also asked about observation as she is not certain she wants to move forward with any of these options. I have recommended she see Dr. Bria Stock with medical oncology to further discuss systemic treatment options. Were going to discuss her case at our treatment planning conference next week. If she would like for me to move forward with surgical resection I would recommend a robotic left partial nephrectomy initially.   I quoted her a 20 to 30% chance of needing a radical nephrectomy on that side. We talked about the risks of this procedure as outlined below. We would then have her recover and perform restaging imaging and would then potentially move forward with a right adrenalectomy and possible partial on the right side if she had no other demonstrated metastatic disease. We also discussed partial nephrectomy, which is what the patient is leaning towards. We discussed both open and robotic/laparoscopic approaches with the various risks and benefits. The risks include a very small risk of death due to intraoperative and perioperative complications, a 3% to 5% risk of major complications including but not limited to stroke, myocardial infarction, DVT/pulmonary embolus and take back to the operating room for bleeding. A 15% to 20% risk of minor complications includes; bleeding, infection, and damage to related structures, as well as delayed return of bowel function. We discussed the risk of urinary fistula and possibility of pneumothorax. We discussed that anytime a partial nephrectomy is considered, either for oncologic or surgical reasons, it may become necessary to remove the entire kidney. The patient understands and accepts the risks of this possibility. There is also a possibility of transient or permanent renal insufficiency anytime kidney surgery is performed. The risk of local recurrence in the surgical bed of the partial nephrectomy is estimated at 3% to 5% typically, but his higher for her considering the size of this tumor. The risk of forming a new tumor in the same kidney or in the contralateral (other) kidney is at least 3% to 5% as well. After a long discussion with the patient regarding the risks, benefits, and alternatives; the patient wants to move forward with a robotic partial nephrectomy. PLAN:   -review pathology  -rec'd tx options, chemotherapy/immunotherapy, partial nx.   -to OR for left partial nx. R/b's reviewed printout given.  Pt will call with decision  -referral to med onc   -to add on TPC     ________________________________________      I have seen and examined this patient. I have reviewed and edited the note started by the MA and agree with the outlined plan. Part of this note was written by using a voice dictation software. The note has been proof read but may still contain some grammatical/other typographical errors.       Paty Barrera [address] [facility]

## 2022-11-21 ENCOUNTER — OFFICE VISIT (OUTPATIENT)
Dept: ONCOLOGY | Age: 57
End: 2022-11-21
Payer: COMMERCIAL

## 2022-11-21 VITALS
BODY MASS INDEX: 36.74 KG/M2 | OXYGEN SATURATION: 97 % | HEIGHT: 67 IN | HEART RATE: 66 BPM | WEIGHT: 234.1 LBS | DIASTOLIC BLOOD PRESSURE: 76 MMHG | RESPIRATION RATE: 18 BRPM | TEMPERATURE: 97.9 F | SYSTOLIC BLOOD PRESSURE: 140 MMHG

## 2022-11-21 DIAGNOSIS — C64.2 RENAL CELL CARCINOMA OF LEFT KIDNEY (HCC): Primary | ICD-10-CM

## 2022-11-21 PROCEDURE — 99215 OFFICE O/P EST HI 40 MIN: CPT | Performed by: UROLOGY

## 2022-11-21 ASSESSMENT — PATIENT HEALTH QUESTIONNAIRE - PHQ9
2. FEELING DOWN, DEPRESSED OR HOPELESS: 0
SUM OF ALL RESPONSES TO PHQ QUESTIONS 1-9: 0
SUM OF ALL RESPONSES TO PHQ QUESTIONS 1-9: 0
1. LITTLE INTEREST OR PLEASURE IN DOING THINGS: 0
SUM OF ALL RESPONSES TO PHQ QUESTIONS 1-9: 0
SUM OF ALL RESPONSES TO PHQ QUESTIONS 1-9: 0
SUM OF ALL RESPONSES TO PHQ9 QUESTIONS 1 & 2: 0

## 2022-11-21 ASSESSMENT — ENCOUNTER SYMPTOMS
RESPIRATORY NEGATIVE: 1
GASTROINTESTINAL NEGATIVE: 1

## 2022-11-22 NOTE — PROGRESS NOTES
[facility] Hematology and Oncology: New Patient - Consultation    Chief Complaint   Patient presents with    New Patient     Dx; RCC - bilateral with likely met to adrenal +/- lungs     Fam hx: father - prostate cancer   Paternal aunt - hx of breast cancer     History of Present Illness:  Ms. Mirta Burkitt is a 61 y.o. female who presents today in referral from Dr. iVvi Rodrigues for consultation regarding [address]. The past medical history is significant for anxiety, HAs and sleep apnea, cervical dysplasia but no carcinoma, current smoker (~1/2PPD), lap liv and tubal reversal, I&D of left breast abscess. She initially presented to [facility] on 9/7/22 with low back pain s/p injury while pulling a heavy patient. Despite attending physical therapy since the accident, she continued to experience the same low back pain. She was seen at [facility] on 10/4/22. MRI of the lumbar spine on 10/15/22 showed mild degenerative disc disease at L3-4 through L5-S1 and a 5.7 x 6.1 x 7 cm lower pole solid left renal mass. Urgent referral was placed to [facility], who referred the patient to [facility] for uro/onc evaluation and treatment of renal mass. CT AP on 10/26/22 showed bilateral enhancing renal masses, concerning for renal cell carcinoma with the left lower pole renal mass measuring up to 6.5 cm and extending along Gerota's fascia and the right midpole renal mass measuring up to 4.6 cm. Also noted was an enhancing 1.5 cm right adrenal nodule, concerning for a metastatic nodule. No recent wt loss. S/p left renal biopsy on 11/2022, Nidia grade 2 of 4, clear cell RCC. CT chest on 11/7 showed multiple nodules that appeared to be calcified, most c/w granulomatous disease, but she's aware that metastatic disease cannot be ruled out. Not on AC. Cr 0.89. Today, pt is here for consultation regarding systemic therapy. She is here with her sister and friend.   Imaging with bilateral enhancing solid renal masses and 1.5cm right enhancing adrenal mass. Unable to bx adrenal mass due to position per IR. She was referred by Dr Ryan Helms for discussion of systemic therapy. We discussed staging of disease and next steps in management. Chronological Events:   11/23/22 heme/onc consult. Family History   Problem Relation Age of Onset    Diabetes Mother     High Blood Pressure Mother     High Cholesterol Mother     COPD Mother     Cancer Father         prostate    Diabetes Sister     Stroke Sister     High Cholesterol Brother     Breast Cancer Paternal Aunt       Social History     Socioeconomic History    Marital status:      Spouse name: None    Number of children: None    Years of education: None    Highest education level: None   Occupational History    Occupation: CNA     Employer: HOS[employer]   Tobacco Use    Smoking status: Every Day     Packs/day: 1.50     Years: 15.00     Pack years: 22.50     Types: Cigarettes    Smokeless tobacco: Never   Substance and Sexual Activity    Alcohol use: Yes     Alcohol/week: 2.0 standard drinks     Types: 2 Shots of liquor per week    Drug use: Never    Sexual activity: Not Currently     Partners: Male     Social Determinants of Health     Physical Activity: Unknown    Days of Exercise per Week: Patient refused   Intimate Partner Violence: Not At Risk    Fear of Current or Ex-Partner: No    Emotionally Abused: No    Physically Abused: No    Sexually Abused: No        Review of Systems   Constitutional:  Positive for fatigue. Negative for appetite change, chills, diaphoresis, fever and unexpected weight change. HENT:   Negative for hearing loss, mouth sores, nosebleeds, sore throat, trouble swallowing and voice change. Eyes:  Negative for icterus. Respiratory:  Negative for chest tightness, hemoptysis, shortness of breath and wheezing. Cardiovascular:  Negative for chest pain, leg swelling and palpitations. Gastrointestinal:  Negative for abdominal distention, abdominal pain, blood in stool, constipation, diarrhea, nausea and vomiting. Endocrine: Negative for hot flashes. Genitourinary:  Negative for difficulty urinating, frequency, vaginal bleeding and vaginal discharge. Musculoskeletal:  Positive for arthralgias and back pain. Negative for flank pain, gait problem and myalgias. Skin:  Negative for itching, rash and wound. Neurological:  Negative for dizziness, extremity weakness, gait problem, headaches and numbness. Psychiatric/Behavioral:  Positive for depression. Negative for confusion. The patient is nervous/anxious. Allergies   Allergen Reactions    Latex Hives, Itching and Shortness Of Breath    Bee Venom Shortness Of Breath and Hives    Penicillins Anaphylaxis    Sulfa Antibiotics Hives, Itching and Rash    Penicillin G     Sulfamethoxazole-Trimethoprim Hives     Past Medical History:   Diagnosis Date    Anxiety 2000    Headache 1999    Sleep apnea 2002     Past Surgical History:   Procedure Laterality Date    CHOLECYSTECTOMY  2003    IR PORT PLACEMENT EQUAL OR GREATER THAN 5 YEARS  11/30/2022    IR PORT PLACEMENT EQUAL OR GREATER THAN 5 YEARS 11/30/2022 [facility] RADIOLOGY SPECIALS    TUBAL LIGATION  3/91     Current Outpatient Medications   Medication Sig Dispense Refill    axitinib (INLYTA) 5 MG tablet Take 1 tablet by mouth in the morning and 1 tablet in the evening. (Patient not taking: Reported on 11/30/2022) 60 tablet 1    sertraline (ZOLOFT) 50 MG tablet Take 0.5 tablets by mouth daily for 6 days, THEN 1 tablet daily for 14 days, THEN 2 tablets daily for 14 days. 45 tablet 5    Acetaminophen (TYLENOL) 325 MG CAPS As needed      EPINEPHrine (EPIPEN 2-MARY IJ) Inject as directed (Patient not taking: Reported on 11/30/2022)      lidocaine-prilocaine (EMLA) 2.5-2.5 % cream Apply topically as needed.  30 g 0    ondansetron (ZOFRAN) 4 MG tablet Take 1 tablet by mouth 3 times daily as needed for Nausea or Vomiting 30 tablet 0    prochlorperazine (COMPAZINE) 10 MG tablet Take 1 tablet by mouth every 6 hours as needed (nausea/vomiting) 120 tablet 3    doxycycline hyclate (VIBRA-TABS) 100 MG tablet Take 1 tablet by mouth 2 times daily for 7 days 14 tablet 0    cyclobenzaprine (FLEXERIL) 10 MG tablet TAKE 1 TABLET BY MOUTH AT BEDTIME FOR 7 DAYS AS NEEDED FOR SPASM (Patient not taking: No sig reported)      ibuprofen (ADVIL;MOTRIN) 600 MG tablet TAKE 1 TABLET BY MOUTH 3 TIMES A DAY FOR 7 DAYS (Patient not taking: No sig reported)       No current facility-administered medications for this visit. No flowsheet data found. OBJECTIVE:  /80 (Site: Left Upper Arm, Position: Sitting, Cuff Size: Large Adult)   Pulse 79   Temp 98 °F (36.7 °C) (Oral)   Resp 19   Ht 5' 6.5\" (1.689 m)   Wt 234 lb 6.4 oz (106.3 kg)   LMP  (LMP Unknown)   SpO2 95%   BMI 37.27 kg/m²       ECOG PERFORMANCE STATUS - 0-Fully active, able to carry on all pre-disease performance without restriction. Pain - 0 - No pain/10. None/Minimal pain - not affecting QOL     Fatigue - No flowsheet data found. Distress - No flowsheet data found. Physical Exam  Vitals reviewed. Exam conducted with a chaperone present. Constitutional:       General: She is not in acute distress. Appearance: Normal appearance. She is not ill-appearing or toxic-appearing. HENT:      Head: Normocephalic and atraumatic. Nose: Nose normal.      Mouth/Throat:      Mouth: Mucous membranes are moist.   Eyes:      General: No scleral icterus. Extraocular Movements: Extraocular movements intact. Conjunctiva/sclera: Conjunctivae normal.      Pupils: Pupils are equal, round, and reactive to light. Cardiovascular:      Rate and Rhythm: Normal rate and regular rhythm. Heart sounds: No murmur heard. Pulmonary:      Effort: Pulmonary effort is normal. No respiratory distress. Breath sounds: Normal breath sounds.  No wheezing or rales. Abdominal:      General: There is no distension. Palpations: Abdomen is soft. There is no mass. Tenderness: There is no abdominal tenderness. There is no guarding. Musculoskeletal:         General: Normal range of motion. Cervical back: Normal range of motion. Right lower leg: No edema. Left lower leg: No edema. Lymphadenopathy:      Cervical: No cervical adenopathy. Upper Body:      Right upper body: No supraclavicular or axillary adenopathy. Left upper body: No supraclavicular or axillary adenopathy. Skin:     General: Skin is warm and dry. Coloration: Skin is not jaundiced or pale. Findings: No rash. Neurological:      General: No focal deficit present. Mental Status: She is alert and oriented to person, place, and time. Gait: Gait normal.   Psychiatric:         Behavior: Behavior normal.         Thought Content:  Thought content normal.        Labs:  Recent Results (from the past 168 hour(s))   CBC with Auto Differential    Collection Time: 12/02/22  2:21 PM   Result Value Ref Range    WBC 11.5 (H) 4.3 - 11.1 K/uL    RBC 5.68 (H) 4.05 - 5.2 M/uL    Hemoglobin 15.1 11.7 - 15.4 g/dL    Hematocrit 46.9 (H) 35.8 - 46.3 %    MCV 82.6 82.0 - 102.0 FL    MCH 26.6 26.1 - 32.9 PG    MCHC 32.2 31.4 - 35.0 g/dL    RDW 14.4 11.9 - 14.6 %    Platelets 439 039 - 177 K/uL    MPV 10.5 9.4 - 12.3 FL    nRBC 0.00 0.0 - 0.2 K/uL    Seg Neutrophils 64 43 - 78 %    Lymphocytes 26 13 - 44 %    Monocytes 7 4.0 - 12.0 %    Eosinophils % 3 0.5 - 7.8 %    Basophils 0 0.0 - 2.0 %    Immature Granulocytes 0 0.0 - 5.0 %    Segs Absolute 7.4 1.7 - 8.2 K/UL    Absolute Lymph # 2.9 0.5 - 4.6 K/UL    Absolute Mono # 0.8 0.1 - 1.3 K/UL    Absolute Eos # 0.3 0.0 - 0.8 K/UL    Basophils Absolute 0.0 0.0 - 0.2 K/UL    Absolute Immature Granulocyte 0.1 0.0 - 0.5 K/UL    Differential Type AUTOMATED     Comprehensive Metabolic Panel    Collection Time: 12/02/22  2:21 PM   Result Value Ref Range    Sodium 138 133 - 143 mmol/L    Potassium 4.1 3.5 - 5.1 mmol/L    Chloride 105 101 - 110 mmol/L    CO2 26 21 - 32 mmol/L    Anion Gap 7 2 - 11 mmol/L    Glucose 112 (H) 65 - 100 mg/dL    BUN 9 6 - 23 MG/DL    Creatinine 0.90 0.6 - 1.0 MG/DL    Est, Glom Filt Rate >60 >60 ml/min/1.73m2    Calcium 9.5 8.3 - 10.4 MG/DL    Total Bilirubin 0.2 0.2 - 1.1 MG/DL    ALT 22 12 - 65 U/L    AST 11 (L) 15 - 37 U/L    Alk Phosphatase 81 50 - 136 U/L    Total Protein 7.4 6.3 - 8.2 g/dL    Albumin 3.9 3.5 - 5.0 g/dL    Globulin 3.5 2.8 - 4.5 g/dL    Albumin/Globulin Ratio 1.1 0.4 - 1.6     TSH    Collection Time: 12/02/22  2:21 PM   Result Value Ref Range    TSH, 3RD GENERATION 0.577 0.358 - 3.740 uIU/mL       Imaging: reviewed     PATHOLOGY:       ASSESSMENT:     Diagnosis Orders   1. Renal cell carcinoma of left kidney (HCC)  Lactate Dehydrogenase    Hepatitis Panel, Acute    Hepatitis B Core Antibody, Total    Hepatitis B Surface Antibody    Protime-INR    IR PORT PLACEMENT > 5 YEARS    axitinib (INLYTA) 5 MG tablet      2. Mass of right adrenal gland (HCC)  Lactate Dehydrogenase    Hepatitis Panel, Acute    Hepatitis B Core Antibody, Total    Hepatitis B Surface Antibody    axitinib (INLYTA) 5 MG tablet      3. High risk medication use  Hepatitis Panel, Acute    Hepatitis B Core Antibody, Total    Hepatitis B Surface Antibody    Protime-INR      4. Pre-procedural examination  Protime-INR          Ms. Ana Medina is here for evaluation of RCC. Clear cell Renal cell carcinoma, Nidia grade 2 of 4 - left mass at 6.5 cm (s/p bx), right mid-pole mass at 4.5cm and right adrenal enhancing lesion (unable to bx due to location per IR); pt informed that if lung and adrenal nodule malignant - Stage IV with palliative intent in tx   - [facility] [place] [facility] - <12mo to tx - intermediate risk   - International Mets RCC Criteria (IMDC) - <12mo to tx, neutrophils ULN - intermediate risk grp.      - she is here for consultation.   We had a long discussion today about her case. We discussed the pathophysiology of malignancy, staging of RCC and current guidelines. We discussed tx options and she is interested in pursuing systemic therapy. She reviewed this with Dr Opal Cohn - it will let us see if the adrenal mass and both renal masses respond to tx. - per NCCN guidelines, pt's with intermediate risk advanced disease are candidates for immuno + TKI. We discussed options, will p/w axitinib and pembrolizumab. SE reviewed in great detail going over percentages of likelihood of occurrence. Printed info was given to pt for her reference. She was made aware of risk of heart failure, hemorrhage, HTN, Reversible posterior leukoencephalopathy syndrome, thrombosis (arterial/venous), thyroid d.o, wnd healing, diarrhea faisal in combination with pembro. - will plan to get an echo, port, labs, chemo Ed   - smoking cessation strongly encouraged - pt declined at this time accepting risks   - dose escalation q2wk of axitinb discussed with pt.       Axitinib: >10%:  Cardiovascular: Hypertension (40%; hypertensive crisis [<1%])  Dermatologic: Palmar-plantar erythrodysesthesia (27%), skin rash (13%)  Endocrine & metabolic: Decreased serum bicarbonate (44%), hyperglycemia (28%), hyperkalemia (15%), hypernatremia (17%), hypoalbuminemia (15%), hypocalcemia (39%), hypoglycemia (11%), hyponatremia (13%), hypophosphatemia (13%), hypothyroidism (19%), weight loss (25%)  Gastrointestinal: Abdominal pain (14%), constipation (20%), decreased appetite (34%), diarrhea (55%; grades 3/4: 11%), dysgeusia (11%), increased serum amylase (25%), increased serum lipase (3% to 27%), mucosal swelling (15%), nausea (32%; grades 3/4: 3%), stomatitis (15%; grades 3/4: 1%), vomiting (24%; grades 3/4: 3%)  Genitourinary: Proteinuria (11%)  Hematologic & oncologic: Decreased absolute lymphocyte count (33%; grades 3/4: 3%), decreased platelet count (14%; grades 3/4: <1%), decreased white blood cell count (11%), hemorrhage (16%; grades 3/4: 1%; including cerebral hemorrhage, gastrointestinal hemorrhage, hematuria [3%], hemoptysis [2%], and melena)  Hepatic: Increased serum alanine aminotransferase (22%), increased serum alkaline phosphatase (30%), increased serum aspartate aminotransferase (20%)  Nervous system: Asthenia (21%), fatigue (39%), headache (14%), voice disorder (31%)  Neuromuscular & skeletal: Arthralgia (15%), limb pain (13%)  Renal: Increased serum creatinine (55%)  Respiratory: Cough (15%), dyspnea (15%)  1% to 10%:  Cardiovascular: Arterial thrombosis (2%; including acute myocardial infarction, cerebrovascular accident and retinal artery occlusion), heart failure (2%), deep vein thrombosis (1%), pulmonary embolism (2%), retinal thrombosis (?1%), transient ischemic attacks (1%), venous thrombosis (3%; including retinal vein occlusion [?1%])  Dermatologic: Alopecia (4%), erythema of skin (2%), pruritus (7%), xeroderma (10%)  Endocrine & metabolic: Dehydration (6%), hypercalcemia (6%), hyperthyroidism (1%)  Gastrointestinal: Dyspepsia (10%), gastrointestinal fistula (1%), gastrointestinal perforation (?1%), glossalgia (3%), hemorrhoids (4%), rectal hemorrhage (2%), upper abdominal pain (8%)  Hematologic & oncologic: Anemia (4%), increased hemoglobin (9%), polycythemia (1%)  Nervous system: Dizziness (9%)  Neuromuscular & skeletal: Myalgia (7%)  Otic: Tinnitus (3%)  Respiratory: Epistaxis (6%)  <1%: Nervous system: Reversible posterior leukoencephalopathy syndrome  Postmarketing: Cardiovascular: Aneurysm (arterial), aortic aneurysm, aortic dissection, myocardial rupture (aortic rupture and arterial rupture), coronary artery dissection    Pembrolizumab;   >10%:  Cardiovascular: Cardiac arrhythmia (4% to 11%), peripheral edema (11% to 15%)  Dermatologic: Pruritus (11% to 28%), skin rash (13% to   Endocrine & metabolic: Decreased serum bicarbonate (22%), hypercalcemia (14% to 22%), hypercholesterolemia (20%), hyperglycemia (38% to 59%), hyperkalemia (13% to 28%), hyperthyroidism (3% to 12%) (table 3), hypertriglyceridemia (33% to 43%), hypoalbuminemia (16% to 44%), hypocalcemia (15% to 27%), hypoglycemia (13% to 19%), hypokalemia (15% to 20%), hypomagnesemia (16% to 25%), hyponatremia (10% to 46%), hypophosphatemia (19% to 31%), hypothyroidism (8% to 21%) (table 4), weight loss (10% to 15%)    Gastrointestinal: Abdominal pain (11% to 22%) (table 5), constipation (12% to 22%), decreased appetite (15% to 25%), diarrhea (12% to 28%) (table 6), nausea (11% to 22%), vomiting (11% to 19%)    Genitourinary: Hematuria (12% to 19%), urinary tract infection (2% to 19%)  Hematologic & oncologic: Anemia (17% to 54%; grades 3/4: ?24%) (table 7), hemorrhage (19%; grades 3/4: 5%; major hemorrhage: 4%), increased INR (19% to 27%; grade 3/4: ?2%), leukopenia (35%; grades 3/4: 9%), lymphocytopenia (24% to 54%; grades 3/4: 2% to 25%) (table 8), neutropenia (7% to 30%; grades 3/4: 1% to 11%) (table 9), prolonged partial thromboplastin time (14%), thrombocytopenia (12% to 34%; grades 3/4: 4% to 10%) (table 10)    Hepatic: Hyperbilirubinemia (10% to 16%) (table 11), increased serum alanine aminotransferase (20% to 34%) (table 12), increased serum alkaline phosphatase (17% to 42%), increased serum aspartate aminotransferase (20% to 39%) (table 13)    Infection: Infection (16%; serious infection: 4%)  Nervous system: Fatigue (20% to 43%), headache (11% to 15%), pain (22%), peripheral neuropathy (1% to 11%; grade 3/4: <1%) (table 14)  Neuromuscular & skeletal: Arthralgia (10% to 18%), asthenia (10% to 11%), back pain (11% to 12%), musculoskeletal pain (19% to 41%), myalgia (12%)  Renal: Acute kidney injury (2% to 13%), increased serum creatinine (11% to 40%) (table 15)  Respiratory: Cough (14% to 26%), dyspnea (10% to 23%), flu-like symptoms (11%), pneumonia (3% to 12%), pneumonitis (2% to 11%), upper respiratory tract infection (13% to 41%)  Miscellaneous: Fever (10% to 28%)  1% to 10%:  Cardiovascular: Acute myocardial infarction (2%), cardiac tamponade (2%), facial edema (10%), ischemic heart disease (2%), myocarditis (?1%), pericardial effusion (2%), pericarditis (2% to 4%), pulmonary embolism (2%)  Endocrine & metabolic: Adrenocortical insufficiency (1%), diabetic ketoacidosis (1%), thyroiditis (?2%)  Gastrointestinal: Colitis (2%), dysphagia (8%), stomatitis (3%)   Hematologic & oncologic: Febrile neutropenia (1%), tumor flare (1%)  Hepatic: Ascites (grades 3/4: 8%), hepatitis (?3%)  Immunologic: Antibody development (2%; neutralizing: <1%)  Infection: Herpes virus infection (9%), herpes zoster infection (?1%), sepsis (1% to 2%)  Nervous system:  Altered mental status (3%), confusion (?2%), dizziness (5%), insomnia (7%)  Neuromuscular & skeletal: Arthritis (2%), myositis (?1%), neck pain (6%)  Ophthalmic: Uveitis (?1%)  Respiratory: Nasopharyngitis (10%), oropharyngeal pain (8%), pleural effusion (2%), respiratory failure (?2%)  Miscellaneous: Fistula (4%), infusion related reaction (?9%; including severe infusion related reaction)  <1%:  Cardiovascular: Vasculitis  Endocrine & metabolic: Hypoparathyroidism, hypophysitis, type 1 diabetes mellitus  Gastrointestinal: Duodenitis, gastritis, increased serum amylase, increased serum lipase, pancreatitis  Hematologic & oncologic: Aplastic anemia, hemolytic anemia, immune thrombocytopenia, immunological signs and symptoms (hemophagocytic lymphohistiocytosis) (Chyrl Italo 2020), lymphadenitis (histiocytic necrotizing lymphadenitis [Kikuchi lymphadenitis]), sarcoidosis  Hypersensitivity: Anaphylaxis  Immunologic: Organ transplant rejection (solid)  Infection: Systemic inflammatory response syndrome  Nervous system: Demyelinating disease, encephalitis, Guillain-Henriette syndrome, meningitis, myasthenia (myasthenic syndrome) [place] 2019), myasthenia gravis (including exacerbation of myasthenia gravis), neuropathy (autoimmune), paresis (nerve)  Neuromuscular & skeletal: Myelitis, polymyalgia rheumatica, polymyositis, rhabdomyolysis  Ophthalmic: Iritis  Renal: Nephritis      RESUSCITATION DIRECTIVES/HOSPICE CARE: Full Support    RTC weekly for 1mo labs - if chooses to p/w tx - she wishes to think this over and is to let us know in a few days. MDM      Lab studies and imaging studies were personally reviewed. Pertinent old records were reviewed. All questions were asked and answered to the best of my ability. The patient verbalized understanding and agrees with the plan above.               [facility], [address] [place] [address] Hematology and Oncology  [address] [place] [address]  Office : [phone]  Fax : [phone]

## 2022-11-23 ENCOUNTER — OFFICE VISIT (OUTPATIENT)
Dept: ONCOLOGY | Age: 57
End: 2022-11-23
Payer: COMMERCIAL

## 2022-11-23 ENCOUNTER — HOSPITAL ENCOUNTER (OUTPATIENT)
Dept: LAB | Age: 57
Discharge: HOME | End: 2022-11-26
Payer: COMMERCIAL

## 2022-11-23 ENCOUNTER — TELEPHONE (OUTPATIENT)
Dept: ONCOLOGY | Age: 57
End: 2022-11-23

## 2022-11-23 VITALS
RESPIRATION RATE: 19 BRPM | SYSTOLIC BLOOD PRESSURE: 123 MMHG | TEMPERATURE: 98 F | OXYGEN SATURATION: 95 % | BODY MASS INDEX: 36.79 KG/M2 | DIASTOLIC BLOOD PRESSURE: 80 MMHG | HEART RATE: 79 BPM | WEIGHT: 234.4 LBS | HEIGHT: 67 IN

## 2022-11-23 DIAGNOSIS — Z79.899 HIGH RISK MEDICATION USE: ICD-10-CM

## 2022-11-23 DIAGNOSIS — E27.8 MASS OF RIGHT ADRENAL GLAND (HCC): ICD-10-CM

## 2022-11-23 DIAGNOSIS — Z01.818 PRE-PROCEDURAL EXAMINATION: ICD-10-CM

## 2022-11-23 DIAGNOSIS — C64.2 RENAL CELL CARCINOMA OF LEFT KIDNEY (HCC): ICD-10-CM

## 2022-11-23 DIAGNOSIS — C64.2 RENAL CELL CARCINOMA OF LEFT KIDNEY (HCC): Primary | ICD-10-CM

## 2022-11-23 LAB
HAV IGM SER QL: NONREACTIVE
HBV CORE IGM SER QL: NONREACTIVE
HBV SURFACE AB SERPL IA-ACNC: 28.38 MIU/ML
HBV SURFACE AG SER QL: NONREACTIVE
HCV AB SER QL: NONREACTIVE
INR PPP: 1
LDH SERPL L TO P-CCNC: 177 U/L (ref 100–190)
PROTHROMBIN TIME: 13.1 SEC (ref 12.6–14.3)

## 2022-11-23 PROCEDURE — 99205 OFFICE O/P NEW HI 60 MIN: CPT | Performed by: INTERNAL MEDICINE

## 2022-11-23 PROCEDURE — 80074 ACUTE HEPATITIS PANEL: CPT

## 2022-11-23 PROCEDURE — 86704 HEP B CORE ANTIBODY TOTAL: CPT

## 2022-11-23 PROCEDURE — 83615 LACTATE (LD) (LDH) ENZYME: CPT

## 2022-11-23 PROCEDURE — 85610 PROTHROMBIN TIME: CPT

## 2022-11-23 PROCEDURE — 36415 COLL VENOUS BLD VENIPUNCTURE: CPT

## 2022-11-23 PROCEDURE — 86706 HEP B SURFACE ANTIBODY: CPT

## 2022-11-23 ASSESSMENT — PATIENT HEALTH QUESTIONNAIRE - PHQ9
SUM OF ALL RESPONSES TO PHQ QUESTIONS 1-9: 2
1. LITTLE INTEREST OR PLEASURE IN DOING THINGS: 1
2. FEELING DOWN, DEPRESSED OR HOPELESS: 1
SUM OF ALL RESPONSES TO PHQ QUESTIONS 1-9: 2
SUM OF ALL RESPONSES TO PHQ9 QUESTIONS 1 & 2: 2

## 2022-11-23 NOTE — ONCOLOGY
START ON PATHWAY REGIMEN - Renal Cell    [id number]        Axitinib (Inlyta)       Pembrolizumab (Keytruda)     **Always confirm dose/schedule in your pharmacy ordering system**    Patient Characteristics:  Stage IV (Unresected T4M0 or Any T, M1)/Metastatic Disease, Clear Cell, First Line, Intermediate or Poor Risk  Therapeutic Status: Stage IV (Unresected T4M0 or Any T, M1)/Metastatic Disease  Histology: Clear Cell  Line of Therapy: First Line  Risk Status: Intermediate Risk  Intent of Therapy:  Non-Curative / Palliative Intent, Discussed with Patient

## 2022-11-23 NOTE — PATIENT INSTRUCTIONS
Patient Instructions from Today's Visit    Reason for Visit:  New patient - clear cell carcinoma. Diagnosis Information:  [email or web address] net/about-us/asco-answers-patient-education-materials/rqog-swznydk-eren-sheets  Patient was educated and given handouts published by ASCO entitled ASCO Answers Fact Sheets about their diagnosis of renal cancer during todays office visit. Plan:  Discussed recommendations for systemic therapy - immunotherapy and a pill. Immunotherapy - Pembrolizumab (Keytruda) - given every 3 weeks  Pill - axitinib - taken twice daily   [employer]. com drug info provided. Let us know how  you would like proceed and if you would like to get a port placed. Your Chemotherapy Plan      Diagnosis: clear cell carcinoma    Goal of Therapy: _x_ Palliative      _ _Curative         Name of Chemotherapy medications: keytruda (pembrolizumab), axitinib    Number of Cycles Planned: 2 years                  Length of Cycle:  pembrolizumab - every 3 weeks; axitinib - daily      Chemotherapy plan is subject to change at your provider's discretion    A copy of this plan has been discussed and given to patient by Bar Walker RN. Chemo Education:   Scheduled  will be scheduled if pt chooses to proceed. Previously reviewed n/a    Consent for therapy:   Completed on - will be completed at chemo ed if pt chooses to proceed with systemic treatment. Potential side effects:  fatigue, general aches, appetite changes, skin rash, etc.  The following are rare but can happen - any \"itis/inflammation\" of any organ pneumonitis (lungs), hepatitis (liver), colitis (intestines), hormone gland issues (thyroid and pancreas), etc.    Will have an education session prior to starting treatment.           Follow Up:      Recent Lab Results:  N/a    Treatment Summary has been discussed and given to patient: n/a        -------------------------------------------------------------------------------------------------------------------  Please call our office at [phone] if you have any  of the following symptoms:   Fever of 100.5 or greater  Chills  Shortness of breath  Swelling or pain in one leg    After office hours an answering service is available and will contact a provider for emergencies or if you are experiencing any of the above symptoms. Patient did express an interest in My Chart. My Chart log in information explained on the after visit summary printout at the [facility] desk. Tavon Marsh RN           ASCO ANSWERS is a collection of oncologist-approved patient education materials developed by the [employer] of Clinical Oncology (ASCO) for people with cancer and their caregivers. Kidney Cancer    What is kidney cancer? Kidney cancer begins when healthy cells in 1 or both kidneys change and grow out of control, forming a tumor. There are several types of kidney cancer, but renal cell carcinoma is the most common. There are also several types of kidney cancer cells. The most common are clear cells. Knowing which type of cell makes up a kidney tumor helps doctors plan treatment. What is the function of the kidneys? The kidneys are reddish-brown, bean-shaped organs located above the waist on both sides of the spine. They filter blood and remove impurities, excess minerals and salts, and extra water to produce urine. These organs also produce hormones that help control blood pressure, red blood cell production, and other functions. Because each kidney works independently, 1 kidney can be removed without affecting the function of the other. What does stage mean? The stage is a way of describing where the cancer is located, if or where it has spread, and whether it is affecting other parts of the body.  There are 5 stages for kidney cancer: stage 0 (zero, which is rare) and stages I through IV (1 through 4). Find descriptions and illustrations of these stages at www.cancer. net/kidney. How is kidney cancer treated? The treatment of kidney cancer depends on the type and stage of the cancer, whether the cancer has spread, and the persons overall health. Kidney cancer is most often treated with surgery, targeted therapy, immunotherapy, or a combination of these treatments. In some situations, especially when the tumor is small and the person has other medical conditions, the doctor may recommend active surveillance. During active surveillance, the tumor is closely monitored and treatment only starts if the disease shows signs of getting worse. If the cancer has not spread beyond the kidneys, surgery to remove the tumor, part or all of the kidney, and possibly nearby tissue and lymph nodes, may be the only necessary treatment. Radiation therapy and chemotherapy are occasionally used. People with kidney cancer that has spread to other parts of the body are most often treated with targeted therapy. When making treatment decisions, people may also consider a clinical trial. Talk with your doctor about all treatment options. The side effects of kidney cancer treatment can often be prevented or managed with the help of your health care team. This is called palliative care and is an important part of the overall treatment plan. How can I cope with kidney cancer? Absorbing the news of a cancer diagnosis and communicating with your health care team are key parts of the coping process. Seeking support, organizing your health information, making sure all of your questions are answered, and participating in the decision-making process are other steps. Talk with your health care team about any concerns. Understanding your emotions and those of people close to you can be helpful in managing the diagnosis, treatment, and healing process. [address].  © 2004 AMERICAN SOCIETY OF CLINICAL ONCOLOGY. MADE AVAILABLE [facility] OF CLINICAL ONCOLOGY   [place], [address], [address] [facility]  Toll Free: [phone]  Phone: [phone] www. Hardik Huertazarinabola. [employer]  [email or web address] org © 2016 American Society of Clinical Oncology. For permissions information, contact [email or web address].     Questions to ask the doctor   Regular communication is important in making informed decisions about your health care. Consider asking the following questions of your health care team:   What type of kidney cancer do I have? What type of cell makes up the tumor? Can you explain my pathology report (laboratory test results) to me? What stage is the kidney cancer? What does this mean? Would you explain my treatment options? What clinical trials are open to me? Where are they located, and how can I find                out more about them? What treatment plan do you recommend? Why? What is the goal of each treatment? Is it to eliminate the cancer, help me feel                   better, or both? Who will be part of my treatment team, and what does each member do? How will this treatment affect my daily life? Will I be able to work, exercise, and                perform my usual activities? Will this treatment affect my ability to become pregnant or have children? What long-term side effects may be associated with my cancer treatment? If Im worried about managing the costs of cancer care, who can help me? Where can I find emotional support for me and my family? Whom should I call with questions or problems? Is there anything else I should be asking? Additional questions to ask the doctor can be found at [facility]. [facility]/kidney. The ideas and opinions expressed here do not necessarily reflect the opinions of the American Society of Clinical Oncology (ASCO) or [employer].  The information in this fact sheet is not intended as medical or legal advice, or as a substitute for consultation with a physician or other licensed health care provider. Patients with health care-related questions should call or see their physician or other health care provider promptly and should not disregard professional medical advice, or delay seeking it, because of information encountered here. The mention of any product, service, or treatment in this fact sheet should not be construed as an ASCO endorsement. [facility] is not responsible for any injury or damage to persons or property arising out of or related to any use of ASCOs patient education materials, or to any errors or omissions. To order more printed copies, please call [phone] or visit www.cancer. net/estore. TERMS TO KNOW     Biopsy: Removal of a tissue sample that is then examined under a microscope to check for cancer cells     Chemotherapy: The use of drugs to destroy cancer cells     Immunotherapy: Treatment that uses the immune system to fight cancer     Lymph node: A tiny, bean-shaped organ that fights infection     Metastasis: The spread of cancer from where it began to another part of the body     Nephrectomy: Partial or total removal of a kidney     Oncologist: A doctor who specializes in treating cancer     Prognosis: Chance of recovery     Radiation therapy: The use of high-energy x-rays to destroy cancer cells     Targeted therapy: Treatment that targets specific genes or proteins that contribute to cancer growth     Tumor:  An abnormal growth of body tissue     Urologic oncologist: A doctor who specializes in treating cancers of the urinary tract   [id number]

## 2022-11-24 LAB — HBV CORE AB SERPL QL IA: NEGATIVE

## 2022-11-29 ENCOUNTER — ANESTHESIA EVENT (OUTPATIENT)
Dept: INTERVENTIONAL RADIOLOGY/VASCULAR | Age: 57
End: 2022-11-29

## 2022-11-29 RX ORDER — FENTANYL CITRATE 50 UG/ML
100 INJECTION, SOLUTION INTRAMUSCULAR; INTRAVENOUS
OUTPATIENT
Start: 2022-11-29 | End: 2022-11-30

## 2022-11-29 RX ORDER — OXYCODONE HYDROCHLORIDE 5 MG/1
5 TABLET ORAL
OUTPATIENT
Start: 2022-11-29 | End: 2022-11-30

## 2022-11-29 RX ORDER — HYDRALAZINE HYDROCHLORIDE 20 MG/ML
10 INJECTION INTRAMUSCULAR; INTRAVENOUS
OUTPATIENT
Start: 2022-11-29

## 2022-11-29 RX ORDER — ONDANSETRON 2 MG/ML
4 INJECTION INTRAMUSCULAR; INTRAVENOUS
OUTPATIENT
Start: 2022-11-29 | End: 2022-11-30

## 2022-11-29 RX ORDER — PROCHLORPERAZINE EDISYLATE 5 MG/ML
5 INJECTION INTRAMUSCULAR; INTRAVENOUS
OUTPATIENT
Start: 2022-11-29 | End: 2022-11-30

## 2022-11-29 RX ORDER — MIDAZOLAM HYDROCHLORIDE 2 MG/2ML
2 INJECTION, SOLUTION INTRAMUSCULAR; INTRAVENOUS
OUTPATIENT
Start: 2022-11-29 | End: 2022-11-30

## 2022-11-29 RX ORDER — HYDROMORPHONE HYDROCHLORIDE 2 MG/ML
0.25 INJECTION, SOLUTION INTRAMUSCULAR; INTRAVENOUS; SUBCUTANEOUS EVERY 5 MIN PRN
OUTPATIENT
Start: 2022-11-29

## 2022-11-29 RX ORDER — LIDOCAINE HYDROCHLORIDE 10 MG/ML
1 INJECTION, SOLUTION INFILTRATION; PERINEURAL
OUTPATIENT
Start: 2022-11-29 | End: 2022-11-30

## 2022-11-29 RX ORDER — SODIUM CHLORIDE, SODIUM LACTATE, POTASSIUM CHLORIDE, CALCIUM CHLORIDE 600; 310; 30; 20 MG/100ML; MG/100ML; MG/100ML; MG/100ML
100 INJECTION, SOLUTION INTRAVENOUS CONTINUOUS
OUTPATIENT
Start: 2022-11-29

## 2022-11-29 RX ORDER — LABETALOL HYDROCHLORIDE 5 MG/ML
10 INJECTION, SOLUTION INTRAVENOUS
OUTPATIENT
Start: 2022-11-29

## 2022-11-29 RX ORDER — HYDROMORPHONE HYDROCHLORIDE 2 MG/ML
0.5 INJECTION, SOLUTION INTRAMUSCULAR; INTRAVENOUS; SUBCUTANEOUS EVERY 5 MIN PRN
OUTPATIENT
Start: 2022-11-29

## 2022-11-30 ENCOUNTER — HOSPITAL ENCOUNTER (OUTPATIENT)
Dept: INTERVENTIONAL RADIOLOGY/VASCULAR | Age: 57
Discharge: HOME | End: 2022-12-03
Payer: COMMERCIAL

## 2022-11-30 ENCOUNTER — CLINICAL DOCUMENTATION (OUTPATIENT)
Dept: ONCOLOGY | Age: 57
End: 2022-11-30

## 2022-11-30 ENCOUNTER — ANESTHESIA (OUTPATIENT)
Dept: INTERVENTIONAL RADIOLOGY/VASCULAR | Age: 57
End: 2022-11-30

## 2022-11-30 VITALS
OXYGEN SATURATION: 95 % | BODY MASS INDEX: 36.73 KG/M2 | HEART RATE: 70 BPM | HEIGHT: 67 IN | DIASTOLIC BLOOD PRESSURE: 58 MMHG | TEMPERATURE: 97.1 F | RESPIRATION RATE: 18 BRPM | WEIGHT: 234 LBS | SYSTOLIC BLOOD PRESSURE: 118 MMHG

## 2022-11-30 DIAGNOSIS — Z79.899 HIGH RISK MEDICATION USE: ICD-10-CM

## 2022-11-30 DIAGNOSIS — C64.2 RENAL CELL CARCINOMA OF LEFT KIDNEY (HCC): Primary | ICD-10-CM

## 2022-11-30 DIAGNOSIS — C64.2 RENAL CELL CARCINOMA OF LEFT KIDNEY (HCC): ICD-10-CM

## 2022-11-30 PROCEDURE — 6360000002 HC RX W HCPCS

## 2022-11-30 PROCEDURE — 2580000003 HC RX 258

## 2022-11-30 PROCEDURE — 6360000002 HC RX W HCPCS: Performed by: PHYSICIAN ASSISTANT

## 2022-11-30 PROCEDURE — 2500000003 HC RX 250 WO HCPCS: Performed by: PHYSICIAN ASSISTANT

## 2022-11-30 PROCEDURE — 3700000000 HC ANESTHESIA ATTENDED CARE

## 2022-11-30 PROCEDURE — 2500000003 HC RX 250 WO HCPCS

## 2022-11-30 PROCEDURE — 77001 FLUOROGUIDE FOR VEIN DEVICE: CPT

## 2022-11-30 PROCEDURE — 3700000001 HC ADD 15 MINUTES (ANESTHESIA)

## 2022-11-30 RX ORDER — PROPOFOL 10 MG/ML
INJECTION, EMULSION INTRAVENOUS CONTINUOUS PRN
Status: DISCONTINUED | OUTPATIENT
Start: 2022-11-30 | End: 2022-11-30 | Stop reason: SDUPTHER

## 2022-11-30 RX ORDER — SODIUM CHLORIDE, SODIUM LACTATE, POTASSIUM CHLORIDE, CALCIUM CHLORIDE 600; 310; 30; 20 MG/100ML; MG/100ML; MG/100ML; MG/100ML
INJECTION, SOLUTION INTRAVENOUS CONTINUOUS PRN
Status: DISCONTINUED | OUTPATIENT
Start: 2022-11-30 | End: 2022-11-30 | Stop reason: SDUPTHER

## 2022-11-30 RX ORDER — PROPOFOL 10 MG/ML
INJECTION, EMULSION INTRAVENOUS PRN
Status: DISCONTINUED | OUTPATIENT
Start: 2022-11-30 | End: 2022-11-30 | Stop reason: SDUPTHER

## 2022-11-30 RX ORDER — LIDOCAINE HYDROCHLORIDE 20 MG/ML
INJECTION, SOLUTION EPIDURAL; INFILTRATION; INTRACAUDAL; PERINEURAL PRN
Status: DISCONTINUED | OUTPATIENT
Start: 2022-11-30 | End: 2022-11-30 | Stop reason: SDUPTHER

## 2022-11-30 RX ORDER — HEPARIN SODIUM (PORCINE) LOCK FLUSH IV SOLN 100 UNIT/ML 100 UNIT/ML
SOLUTION INTRAVENOUS
Status: COMPLETED | OUTPATIENT
Start: 2022-11-30 | End: 2022-11-30

## 2022-11-30 RX ORDER — CLINDAMYCIN PHOSPHATE 900 MG/50ML
INJECTION INTRAVENOUS PRN
Status: DISCONTINUED | OUTPATIENT
Start: 2022-11-30 | End: 2022-11-30 | Stop reason: SDUPTHER

## 2022-11-30 RX ORDER — LIDOCAINE HYDROCHLORIDE AND EPINEPHRINE BITARTRATE 20; .01 MG/ML; MG/ML
INJECTION, SOLUTION SUBCUTANEOUS
Status: COMPLETED | OUTPATIENT
Start: 2022-11-30 | End: 2022-11-30

## 2022-11-30 RX ADMIN — SODIUM CHLORIDE, SODIUM LACTATE, POTASSIUM CHLORIDE, AND CALCIUM CHLORIDE: 600; 310; 30; 20 INJECTION, SOLUTION INTRAVENOUS at 09:06

## 2022-11-30 RX ADMIN — HEPARIN 500 UNITS: 100 SYRINGE at 09:34

## 2022-11-30 RX ADMIN — LIDOCAINE HYDROCHLORIDE 40 MG: 20 INJECTION, SOLUTION EPIDURAL; INFILTRATION; INTRACAUDAL; PERINEURAL at 09:15

## 2022-11-30 RX ADMIN — CLINDAMYCIN PHOSPHATE 900 MG: 900 INJECTION, SOLUTION INTRAVENOUS at 09:23

## 2022-11-30 RX ADMIN — PROPOFOL 40 MG: 10 INJECTION, EMULSION INTRAVENOUS at 09:16

## 2022-11-30 RX ADMIN — PROPOFOL 140 MCG/KG/MIN: 10 INJECTION, EMULSION INTRAVENOUS at 09:16

## 2022-11-30 RX ADMIN — LIDOCAINE HYDROCHLORIDE,EPINEPHRINE BITARTRATE 14 ML: 20; .01 INJECTION, SOLUTION INFILTRATION; PERINEURAL at 09:35

## 2022-11-30 ASSESSMENT — LIFESTYLE VARIABLES: SMOKING_STATUS: 1

## 2022-11-30 ASSESSMENT — PAIN - FUNCTIONAL ASSESSMENT: PAIN_FUNCTIONAL_ASSESSMENT: 0-10

## 2022-11-30 NOTE — ANESTHESIA PRE PROCEDURE
Department of Anesthesiology  Preprocedure Note       Name:  Christie Lomax   Age:  61 y.o.  :  1965                                          MRN:  [id number]         Date:  2022      Surgeon: * No surgeons listed *    Procedure: * No procedures listed *    Medications prior to admission:   Prior to Admission medications    Medication Sig Start Date End Date Taking? Authorizing Provider   axitinib (INLYTA) 5 MG tablet Take 1 tablet by mouth in the morning and 1 tablet in the evening. Patient not taking: Reported on 2022   Jolynn Arechiga MD   sertraline (ZOLOFT) 50 MG tablet Take 0.5 tablets by mouth daily for 6 days, THEN 1 tablet daily for 14 days, THEN 2 tablets daily for 14 days. 22  Anatoliy Rodriguez MD   LORazepam (ATIVAN) 0.5 MG tablet Take 1 tablet by mouth every 8 hours as needed for Anxiety for up to 30 doses. 22  Anatoliy Rodriguez MD   Acetaminophen (TYLENOL) 325 MG CAPS As needed 14   Historical Provider, MD   EPINEPHrine (EPIPEN 2-MARY IJ) Inject as directed  Patient not taking: Reported on 2022    Historical Provider, MD   cyclobenzaprine (FLEXERIL) 10 MG tablet TAKE 1 TABLET BY MOUTH AT BEDTIME FOR 7 DAYS AS NEEDED FOR SPASM  Patient not taking: No sig reported 22   Historical Provider, MD   ibuprofen (ADVIL;MOTRIN) 600 MG tablet TAKE 1 TABLET BY MOUTH 3 TIMES A DAY FOR 7 DAYS  Patient not taking: No sig reported 22   Historical Provider, MD       Current medications:    Current Outpatient Medications   Medication Sig Dispense Refill    axitinib (INLYTA) 5 MG tablet Take 1 tablet by mouth in the morning and 1 tablet in the evening. (Patient not taking: Reported on 2022) 60 tablet 1    sertraline (ZOLOFT) 50 MG tablet Take 0.5 tablets by mouth daily for 6 days, THEN 1 tablet daily for 14 days, THEN 2 tablets daily for 14 days.  45 tablet 5    LORazepam (ATIVAN) 0.5 MG tablet Take 1 tablet by mouth every 8 hours as needed for Anxiety for up to 30 doses. 30 tablet 0    Acetaminophen (TYLENOL) 325 MG CAPS As needed      EPINEPHrine (EPIPEN 2-MARY IJ) Inject as directed (Patient not taking: Reported on 11/30/2022)      cyclobenzaprine (FLEXERIL) 10 MG tablet TAKE 1 TABLET BY MOUTH AT BEDTIME FOR 7 DAYS AS NEEDED FOR SPASM (Patient not taking: No sig reported)      ibuprofen (ADVIL;MOTRIN) 600 MG tablet TAKE 1 TABLET BY MOUTH 3 TIMES A DAY FOR 7 DAYS (Patient not taking: No sig reported)       No current facility-administered medications for this encounter. Allergies: Allergies   Allergen Reactions    Latex Hives, Itching and Shortness Of Breath    Bee Venom Shortness Of Breath and Hives    Penicillins Anaphylaxis    Sulfa Antibiotics Hives, Itching and Rash    Penicillin G     Sulfamethoxazole-Trimethoprim Hives       Problem List:    Patient Active Problem List   Diagnosis Code    Renal cell carcinoma of left kidney (HCC) C64.2    Mass of right adrenal gland ([facility] [address] E27.8       Past Medical History:        Diagnosis Date    Anxiety 2000    Headache 1999    Sleep apnea 2002       Past Surgical History:        Procedure Laterality Date    CHOLECYSTECTOMY  2003    TUBAL LIGATION  3/91       Social History:    Social History     Tobacco Use    Smoking status: Every Day     Packs/day: 1.50     Years: 15.00     Pack years: 22.50     Types: Cigarettes    Smokeless tobacco: Never   Substance Use Topics    Alcohol use:  Yes     Alcohol/week: 2.0 standard drinks     Types: 2 Shots of liquor per week                                Ready to quit: Not Answered  Counseling given: Not Answered      Vital Signs (Current):   Vitals:    11/30/22 0745 11/30/22 0748   BP: 135/63    Pulse: 75    Resp: 18    Temp: 97.8 °F (36.6 °C)    TempSrc: Oral    SpO2: 95%    Weight:  234 lb (106.1 kg)   Height:  5' 6.5\" (1.689 m)                                              BP Readings from Last 3 Encounters:   11/30/22 135/63 11/23/22 123/80   11/21/22 (!) 140/76       NPO Status:                                                   Date of last liquid consumption: 11/29/22                        Date of last solid food consumption: 11/29/22    BMI:   Wt Readings from Last 3 Encounters:   11/30/22 234 lb (106.1 kg)   11/23/22 234 lb 6.4 oz (106.3 kg)   11/21/22 234 lb 1.6 oz (106.2 kg)     Body mass index is 37.2 kg/m². CBC:   Lab Results   Component Value Date/Time    WBC 12.8 10/25/2022 02:58 PM    RBC 5.85 10/25/2022 02:58 PM    HGB 15.8 10/25/2022 02:58 PM    HCT 49.8 10/25/2022 02:58 PM    MCV 85.1 10/25/2022 02:58 PM    RDW 14.7 10/25/2022 02:58 PM     10/25/2022 02:58 PM       CMP:   Lab Results   Component Value Date/Time     10/25/2022 02:58 PM    K 4.1 10/25/2022 02:58 PM     10/25/2022 02:58 PM    CO2 26 10/25/2022 02:58 PM    BUN 13 10/25/2022 02:58 PM    CREATININE 0.89 11/07/2022 03:22 PM    CREATININE 0.80 10/25/2022 02:58 PM    LABGLOM >60 10/25/2022 02:58 PM    GLUCOSE 145 10/25/2022 02:58 PM    PROT 7.2 10/25/2022 02:58 PM    CALCIUM 9.4 10/25/2022 02:58 PM    BILITOT 0.3 10/25/2022 02:58 PM    ALKPHOS 81 10/25/2022 02:58 PM    AST 10 10/25/2022 02:58 PM    ALT 28 10/25/2022 02:58 PM       POC Tests: No results for input(s): POCGLU, POCNA, POCK, POCCL, POCBUN, POCHEMO, POCHCT in the last 72 hours.     Coags:   Lab Results   Component Value Date/Time    PROTIME 13.1 11/23/2022 02:04 PM    INR 1.0 11/23/2022 02:04 PM       HCG (If Applicable): No results found for: PREGTESTUR, PREGSERUM, HCG, HCGQUANT     ABGs: No results found for: PHART, PO2ART, LLL2KVA, RQW1BNA, BEART, L3IJABPM     Type & Screen (If Applicable):  No results found for: LABABO, LABRH    Drug/Infectious Status (If Applicable):  Lab Results   Component Value Date/Time    HEPCAB NONREACTIVE 11/23/2022 02:04 PM       COVID-19 Screening (If Applicable): No results found for: COVID19        Anesthesia Evaluation  Patient summary reviewed and Nursing notes reviewed  Airway: Mallampati: II          Dental:    (+) upper dentures and lower dentures      Pulmonary:normal exam    (+) sleep apnea: on noncompliant,  current smoker                           Cardiovascular:Negative CV ROS  Exercise tolerance: good (>4 METS),           Rhythm: regular  Rate: normal                    Neuro/Psych:   (+) headaches:, depression/anxiety             GI/Hepatic/Renal:            ROS comment: Renal Cell CA. Endo/Other: Negative Endo/Other ROS                    Abdominal:             Vascular: Other Findings:           Anesthesia Plan      TIVA     ASA 3       Induction: intravenous. Anesthetic plan and risks discussed with patient and sibling.                         Cammy Alicia DO   11/30/2022

## 2022-11-30 NOTE — ANESTHESIA POSTPROCEDURE EVALUATION
Department of Anesthesiology  Postprocedure Note    Patient: Rula Jones  MRN: [id number]  YOB: 1965  Date of evaluation: 11/30/2022      Procedure Summary     Date: 11/30/22 Room / Location: [facility]    Anesthesia Start: 9793 Anesthesia Stop: 3028    Procedure: IR PORT PLACEMENT EQUAL OR GREATER THAN 5 YEARS Diagnosis: Renal cell carcinoma of left kidney ([facility] [address]    Scheduled Providers: Grazyna Haskins DO; SPIKE Woods - CRNA Responsible Provider: Grazyna Haskins DO    Anesthesia Type: TIVA ASA Status: 3          Anesthesia Type: No value filed.     Andrey Phase I: Andrey Score: 10    Andrey Phase II: Andrey Score: 10      Anesthesia Post Evaluation    Patient location during evaluation: PACU  Level of consciousness: awake and alert  Airway patency: patent  Nausea & Vomiting: no nausea  Complications: no  Cardiovascular status: hemodynamically stable  Respiratory status: acceptable  Hydration status: euvolemic

## 2022-11-30 NOTE — H&P
Department of Interventional Radiology  [phone]    History and Physical    Patient:  Jose A Rojas MRN:  [id number]  SSN:  [id number]    YOB: 1965  Age:  61 y.o. Sex:  female      Primary Care Provider:  Eunice Fontanez MD  Referring Physician:  Homa Mcbride MD    Subjective:     Chief Complaint: port    History of the Present Illness: The patient is a 61 y.o. female with metastatic RCC who presents for venous chest port placement. NPO. Past Medical History:   Diagnosis Date    Anxiety 2000    Headache 1999    Sleep apnea 2002     Past Surgical History:   Procedure Laterality Date    CHOLECYSTECTOMY  2003    TUBAL LIGATION  3/91        Review of Systems:    Pertinent items are noted in HPI. Prior to Admission medications    Medication Sig Start Date End Date Taking? Authorizing Provider   axitinib (INLYTA) 5 MG tablet Take 1 tablet by mouth in the morning and 1 tablet in the evening. Patient not taking: Reported on 11/30/2022 11/29/22   Blayne Barnes MD   sertraline (ZOLOFT) 50 MG tablet Take 0.5 tablets by mouth daily for 6 days, THEN 1 tablet daily for 14 days, THEN 2 tablets daily for 14 days. 11/1/22 12/5/22  Eunice Fontanez MD   LORazepam (ATIVAN) 0.5 MG tablet Take 1 tablet by mouth every 8 hours as needed for Anxiety for up to 30 doses.  11/1/22 11/30/22  Eunice Fontanez MD   Acetaminophen (TYLENOL) 325 MG CAPS As needed 12/19/14   Historical Provider, MD   EPINEPHrine (EPIPEN 2-MARY IJ) Inject as directed  Patient not taking: Reported on 11/30/2022    Historical Provider, MD   cyclobenzaprine (FLEXERIL) 10 MG tablet TAKE 1 TABLET BY MOUTH AT BEDTIME FOR 7 DAYS AS NEEDED FOR SPASM  Patient not taking: No sig reported 9/8/22   Historical Provider, MD   ibuprofen (ADVIL;MOTRIN) 600 MG tablet TAKE 1 TABLET BY MOUTH 3 TIMES A DAY FOR 7 DAYS  Patient not taking: No sig reported 9/8/22   Historical Provider, MD        Allergies   Allergen Reactions    Latex Hives, Itching and Shortness Of Breath    Bee Venom Shortness Of Breath and Hives    Penicillins Anaphylaxis    Sulfa Antibiotics Hives, Itching and Rash    Penicillin G     Sulfamethoxazole-Trimethoprim Hives       Family History   Problem Relation Age of Onset    Diabetes Mother     High Blood Pressure Mother     High Cholesterol Mother     COPD Mother     Cancer Father         prostate    Diabetes Sister     Stroke Sister     High Cholesterol Brother     Breast Cancer Paternal Aunt      Social History     Tobacco Use    Smoking status: Every Day     Packs/day: 1.50     Years: 15.00     Pack years: 22.50     Types: Cigarettes    Smokeless tobacco: Never   Substance Use Topics    Alcohol use: Yes     Alcohol/week: 2.0 standard drinks     Types: 2 Shots of liquor per week        Not in a hospital admission.     Objective:       Physical Examination:    Vitals:    11/30/22 0745 11/30/22 0748   BP: 135/63    Pulse: 75    Resp: 18    Temp: 97.8 °F (36.6 °C)    TempSrc: Oral    SpO2: 95%    Weight:  234 lb (106.1 kg)   Height:  5' 6.5\" (1.689 m)       Pain Assessment  Pain Level: 3  Pain Location: Back  Pain Orientation: Lower          Pain Level: 3       Pain Location: Back   Pain Orientation: Lower         Goal 0  Interven: per primary                HEART: regular rate and rhythm  LUNG: clear to auscultation bilaterally  ABDOMEN: normal findings: soft, non-tender  EXTREMITIES: warm, no edema    Laboratory:     Lab Results   Component Value Date/Time     10/25/2022 02:58 PM    K 4.1 10/25/2022 02:58 PM     10/25/2022 02:58 PM    CO2 26 10/25/2022 02:58 PM    BUN 13 10/25/2022 02:58 PM    GLOB 3.1 10/25/2022 02:58 PM    ALT 28 10/25/2022 02:58 PM     Lab Results   Component Value Date/Time    WBC 12.8 10/25/2022 02:58 PM    HGB 15.8 10/25/2022 02:58 PM    HCT 49.8 10/25/2022 02:58 PM     10/25/2022 02:58 PM     Lab Results   Component Value Date/Time    INR 1.0 11/23/2022 02:04 PM       Assessment: Metastatic RCC    [unfilled]    Plan:     Planned Procedure:  port placement    Risks, benefits, and alternatives reviewed with patient and she agrees to proceed with the procedure.       Signed By: Mel Tapia PA-C     November 30, 2022

## 2022-11-30 NOTE — DISCHARGE INSTRUCTIONS
If you have any questions about your procedure, please call the Interventional Radiology department at [phone]. After business hours (5pm) and weekends, call the answering service at [phone] and ask for the Radiologist on call to be paged. Si tiene Preguntas acerca del procedimiento, por favor llame al departamento de Radiología Intervencional al [phone]. Después de horas de oficina (5 pm) y los fines de [place], llamar al Felipe Fernandez al [phone] y pregunte por el Radiologo de [place]. Interventional Radiology General Nurse Discharge    After general anesthesia or intravenous sedation, for 24 hours or while taking prescription Narcotics:  Limit your activities  Do not drive and operate hazardous machinery  Do not make important personal or business decisions  Do  not drink alcoholic beverages  If you have not urinated within 8 hours after discharge, please contact your surgeon on call. * Please give a list of your current medications to your Primary Care Provider. * Please update this list whenever your medications are discontinued, doses are     changed, or new medications (including over-the-counter products) are added. * Please carry medication information at all times in case of emergency situations. These are general instructions for a healthy lifestyle:    No smoking/ No tobacco products/ Avoid exposure to second hand smoke  Surgeon General's Warning:  Quitting smoking now greatly reduces serious risk to your health.     Obesity, smoking, and sedentary lifestyle greatly increases your risk for illness  A healthy diet, regular physical exercise & weight monitoring are important for maintaining a healthy lifestyle    You may be retaining fluid if you have a history of heart failure or if you experience any of the following symptoms:  Weight gain of 3 pounds or more overnight or 5 pounds in a week, increased swelling in our hands or feet or shortness of breath while lying flat in bed. Please call your doctor as soon as you notice any of these symptoms; do not wait until your next office visit. Recognize signs and symptoms of STROKE:  F-face looks uneven    A-arms unable to move or move unevenly    S-speech slurred or non-existent    T-time-call 911 as soon as signs and symptoms begin-DO NOT go       Back to bed or wait to see if you get better-TIME IS BRAIN.

## 2022-12-02 ENCOUNTER — OFFICE VISIT (OUTPATIENT)
Dept: ONCOLOGY | Age: 57
End: 2022-12-02
Payer: COMMERCIAL

## 2022-12-02 ENCOUNTER — RESEARCH ENCOUNTER (OUTPATIENT)
Dept: RESEARCH | Age: 57
End: 2022-12-02

## 2022-12-02 ENCOUNTER — HOSPITAL ENCOUNTER (OUTPATIENT)
Dept: LAB | Age: 57
Discharge: HOME | End: 2022-12-02
Payer: COMMERCIAL

## 2022-12-02 VITALS
WEIGHT: 232.4 LBS | SYSTOLIC BLOOD PRESSURE: 139 MMHG | HEIGHT: 67 IN | TEMPERATURE: 98.6 F | HEART RATE: 71 BPM | RESPIRATION RATE: 16 BRPM | BODY MASS INDEX: 36.47 KG/M2 | OXYGEN SATURATION: 94 % | DIASTOLIC BLOOD PRESSURE: 71 MMHG

## 2022-12-02 DIAGNOSIS — E27.8 MASS OF RIGHT ADRENAL GLAND (HCC): ICD-10-CM

## 2022-12-02 DIAGNOSIS — Z79.899 HIGH RISK MEDICATION USE: ICD-10-CM

## 2022-12-02 DIAGNOSIS — Z95.828 PORT-A-CATH IN PLACE: ICD-10-CM

## 2022-12-02 DIAGNOSIS — Z79.899 ENCOUNTER FOR MONITORING CARDIOTOXIC DRUG THERAPY: ICD-10-CM

## 2022-12-02 DIAGNOSIS — Z51.81 ENCOUNTER FOR MONITORING CARDIOTOXIC DRUG THERAPY: ICD-10-CM

## 2022-12-02 DIAGNOSIS — L08.9 SKIN INFECTION: ICD-10-CM

## 2022-12-02 DIAGNOSIS — C64.2 RENAL CELL CARCINOMA OF LEFT KIDNEY (HCC): Primary | ICD-10-CM

## 2022-12-02 DIAGNOSIS — C64.2 RENAL CELL CARCINOMA OF LEFT KIDNEY (HCC): ICD-10-CM

## 2022-12-02 LAB
ALBUMIN SERPL-MCNC: 3.9 G/DL (ref 3.5–5)
ALBUMIN/GLOB SERPL: 1.1 {RATIO} (ref 0.4–1.6)
ALP SERPL-CCNC: 81 U/L (ref 50–136)
ALT SERPL-CCNC: 22 U/L (ref 12–65)
ANION GAP SERPL CALC-SCNC: 7 MMOL/L (ref 2–11)
AST SERPL-CCNC: 11 U/L (ref 15–37)
BASOPHILS # BLD: 0 K/UL (ref 0–0.2)
BASOPHILS NFR BLD: 0 % (ref 0–2)
BILIRUB SERPL-MCNC: 0.2 MG/DL (ref 0.2–1.1)
BUN SERPL-MCNC: 9 MG/DL (ref 6–23)
CALCIUM SERPL-MCNC: 9.5 MG/DL (ref 8.3–10.4)
CHLORIDE SERPL-SCNC: 105 MMOL/L (ref 101–110)
CO2 SERPL-SCNC: 26 MMOL/L (ref 21–32)
CREAT SERPL-MCNC: 0.9 MG/DL (ref 0.6–1)
DIFFERENTIAL METHOD BLD: ABNORMAL
EOSINOPHIL # BLD: 0.3 K/UL (ref 0–0.8)
EOSINOPHIL NFR BLD: 3 % (ref 0.5–7.8)
ERYTHROCYTE [DISTWIDTH] IN BLOOD BY AUTOMATED COUNT: 14.4 % (ref 11.9–14.6)
GLOBULIN SER CALC-MCNC: 3.5 G/DL (ref 2.8–4.5)
GLUCOSE SERPL-MCNC: 112 MG/DL (ref 65–100)
HCT VFR BLD AUTO: 46.9 % (ref 35.8–46.3)
HGB BLD-MCNC: 15.1 G/DL (ref 11.7–15.4)
IMM GRANULOCYTES # BLD AUTO: 0.1 K/UL (ref 0–0.5)
IMM GRANULOCYTES NFR BLD AUTO: 0 % (ref 0–5)
LYMPHOCYTES # BLD: 2.9 K/UL (ref 0.5–4.6)
LYMPHOCYTES NFR BLD: 26 % (ref 13–44)
MCH RBC QN AUTO: 26.6 PG (ref 26.1–32.9)
MCHC RBC AUTO-ENTMCNC: 32.2 G/DL (ref 31.4–35)
MCV RBC AUTO: 82.6 FL (ref 82–102)
MONOCYTES # BLD: 0.8 K/UL (ref 0.1–1.3)
MONOCYTES NFR BLD: 7 % (ref 4–12)
NEUTS SEG # BLD: 7.4 K/UL (ref 1.7–8.2)
NEUTS SEG NFR BLD: 64 % (ref 43–78)
NRBC # BLD: 0 K/UL (ref 0–0.2)
PLATELET # BLD AUTO: 281 K/UL (ref 150–450)
PMV BLD AUTO: 10.5 FL (ref 9.4–12.3)
POTASSIUM SERPL-SCNC: 4.1 MMOL/L (ref 3.5–5.1)
PROT SERPL-MCNC: 7.4 G/DL (ref 6.3–8.2)
RBC # BLD AUTO: 5.68 M/UL (ref 4.05–5.2)
SODIUM SERPL-SCNC: 138 MMOL/L (ref 133–143)
TSH, 3RD GENERATION: 0.58 UIU/ML (ref 0.36–3.74)
WBC # BLD AUTO: 11.5 K/UL (ref 4.3–11.1)

## 2022-12-02 PROCEDURE — 99215 OFFICE O/P EST HI 40 MIN: CPT | Performed by: INTERNAL MEDICINE

## 2022-12-02 PROCEDURE — 80053 COMPREHEN METABOLIC PANEL: CPT

## 2022-12-02 PROCEDURE — 36415 COLL VENOUS BLD VENIPUNCTURE: CPT

## 2022-12-02 PROCEDURE — 84443 ASSAY THYROID STIM HORMONE: CPT

## 2022-12-02 PROCEDURE — 85025 COMPLETE CBC W/AUTO DIFF WBC: CPT

## 2022-12-02 RX ORDER — ACETAMINOPHEN 325 MG/1
650 TABLET ORAL
Status: CANCELLED | OUTPATIENT
Start: 2022-12-05

## 2022-12-02 RX ORDER — SODIUM CHLORIDE 9 MG/ML
5-250 INJECTION, SOLUTION INTRAVENOUS PRN
Status: CANCELLED | OUTPATIENT
Start: 2022-12-05

## 2022-12-02 RX ORDER — SODIUM CHLORIDE 9 MG/ML
INJECTION, SOLUTION INTRAVENOUS CONTINUOUS
Status: CANCELLED | OUTPATIENT
Start: 2022-12-05

## 2022-12-02 RX ORDER — HEPARIN SODIUM (PORCINE) LOCK FLUSH IV SOLN 100 UNIT/ML 100 UNIT/ML
500 SOLUTION INTRAVENOUS PRN
Status: CANCELLED | OUTPATIENT
Start: 2022-12-05

## 2022-12-02 RX ORDER — DIPHENHYDRAMINE HYDROCHLORIDE 50 MG/ML
50 INJECTION INTRAMUSCULAR; INTRAVENOUS
Status: CANCELLED | OUTPATIENT
Start: 2022-12-05

## 2022-12-02 RX ORDER — SODIUM CHLORIDE 9 MG/ML
5-40 INJECTION INTRAVENOUS PRN
Status: CANCELLED | OUTPATIENT
Start: 2022-12-05

## 2022-12-02 RX ORDER — ONDANSETRON 4 MG/1
4 TABLET, FILM COATED ORAL 3 TIMES DAILY PRN
Qty: 30 TABLET | Refills: 0 | Status: SHIPPED | OUTPATIENT
Start: 2022-12-02

## 2022-12-02 RX ORDER — EPINEPHRINE 1 MG/ML
0.3 INJECTION, SOLUTION, CONCENTRATE INTRAVENOUS PRN
Status: CANCELLED | OUTPATIENT
Start: 2022-12-05

## 2022-12-02 RX ORDER — LIDOCAINE AND PRILOCAINE 25; 25 MG/G; MG/G
CREAM TOPICAL
Qty: 30 G | Refills: 0 | Status: SHIPPED | OUTPATIENT
Start: 2022-12-02

## 2022-12-02 RX ORDER — MEPERIDINE HYDROCHLORIDE 50 MG/ML
12.5 INJECTION INTRAMUSCULAR; INTRAVENOUS; SUBCUTANEOUS PRN
Status: CANCELLED | OUTPATIENT
Start: 2022-12-05

## 2022-12-02 RX ORDER — ALBUTEROL SULFATE 90 UG/1
4 AEROSOL, METERED RESPIRATORY (INHALATION) PRN
Status: CANCELLED | OUTPATIENT
Start: 2022-12-05

## 2022-12-02 RX ORDER — PROCHLORPERAZINE MALEATE 10 MG
10 TABLET ORAL EVERY 6 HOURS PRN
Qty: 120 TABLET | Refills: 3 | Status: SHIPPED | OUTPATIENT
Start: 2022-12-02

## 2022-12-02 RX ORDER — ONDANSETRON 2 MG/ML
8 INJECTION INTRAMUSCULAR; INTRAVENOUS
Status: CANCELLED | OUTPATIENT
Start: 2022-12-05

## 2022-12-02 RX ORDER — FAMOTIDINE 10 MG/ML
20 INJECTION, SOLUTION INTRAVENOUS
Status: CANCELLED | OUTPATIENT
Start: 2022-12-05

## 2022-12-02 RX ORDER — DOXYCYCLINE HYCLATE 100 MG
100 TABLET ORAL 2 TIMES DAILY
Qty: 14 TABLET | Refills: 0 | Status: SHIPPED | OUTPATIENT
Start: 2022-12-02 | End: 2022-12-09

## 2022-12-02 ASSESSMENT — ENCOUNTER SYMPTOMS
VOICE CHANGE: 0
HEMOPTYSIS: 0
SCLERAL ICTERUS: 0
BLOOD IN STOOL: 0
SHORTNESS OF BREATH: 0
DIARRHEA: 0
TROUBLE SWALLOWING: 0
ABDOMINAL PAIN: 0
CHEST TIGHTNESS: 0
WHEEZING: 0
VOMITING: 0
CONSTIPATION: 0
NAUSEA: 0
ABDOMINAL DISTENTION: 0
BACK PAIN: 1
SORE THROAT: 0

## 2022-12-02 ASSESSMENT — PATIENT HEALTH QUESTIONNAIRE - PHQ9
SUM OF ALL RESPONSES TO PHQ9 QUESTIONS 1 & 2: 0
SUM OF ALL RESPONSES TO PHQ QUESTIONS 1-9: 0
SUM OF ALL RESPONSES TO PHQ QUESTIONS 1-9: 0
1. LITTLE INTEREST OR PLEASURE IN DOING THINGS: 0
SUM OF ALL RESPONSES TO PHQ QUESTIONS 1-9: 0
2. FEELING DOWN, DEPRESSED OR HOPELESS: 0
SUM OF ALL RESPONSES TO PHQ QUESTIONS 1-9: 0

## 2022-12-02 NOTE — RESEARCH
Patient meets all inclusion/exclusion criteria for the Paradigm clinical trial at initial review. The trial was thoroughly discussed with patient. The study's purpose, design, risks/benefits, financial aspects and alternatives were reviewed. There are no requirements for birth control with this non-interventional trial aside from [facility]. Patient is aware that study participation is voluntary and that she may withdraw consent at any time should she choose to participate. Patient was given time to read the informed consent in detail. Patient signed consent and was given a copy along with contact information for the research coordinator. Reviewed stool collection kit instructions with patient. Patient aware she will receive an email link to an online survey as soon as a unique URL is provided from study. Coordinator to email link to specimen collection video after visit today as well. Patient aware to collect stool at home before starting ICI treatment and to call [employer] for pickup. Patient will also have blood specimen collection and additional surveys at Baseline visit on Monday 12/5/22. Patient consents to remain on trial and research will continue to follow.

## 2022-12-02 NOTE — PATIENT INSTRUCTIONS
Patient Instructions from Today's Visit    Reason for Visit:  Follow up - renal cell. Diagnosis Information:  [email or web address] net/about-us/[email or web address]-answers-patient-education-materials/qzji-oirwxen-[email or web address]-sheets      Plan:  Start [facility] Monday. Start axitinib pills now. Chemo Ed performed today, consent signed. Let us know if you start having diarrhea multiples times a day. Use of nausea medications  Rest in a chair after meals; don't lie flat for at least 2 hours  Avoid fatty, spicy, fried and highly sweet foods  Eat bland foods and drink plenty of fluids  Eat smaller, frequent snack size meals  Avoid food aromas and strong smells that trigger nausea  It is important to take anti-nausea medications as prescribed   Take zofran every 8 hours around the clock for several days after chemo to help control nausea (morning, mid-day, and bedtime). This is good at preventing nausea from starting. If you have additional nausea, compazine and/or ativan can be taken in between zofran doses  Do not stop zofran, just take compazine in addition to zofran  If nausea is still not controlled please call, there are other medications we can try  Please don't think you should have nausea because you are receiving chemo. There are other medications we can give you to help. Diarrhea  Loose or watery bowel movements more than 5 times a day  Make sure you are drinking plenty of fluids to help prevent you from getting dehydrated. Normally need at least 64 ounces of fluids each day. Now you need more to replace all fluids lost with diarrhea.   Avoid foods containing roughage and bulk (bran, whole grain cereals, dried fruit, and vegetables)  Avoid milk and milk products if they make diarrhea worse  Eat BRAT diet - bananas, rice, applesauce and toast  Eat foods high in potassium (bananas, oranges, baked potatoes, broccoli, mushrooms and apricots)   Eat small frequent snack size meals  Take anti-diarrhea medications as prescribed. Over the counter imodium/loperamide, take 1 - 2 tablets after each loose stool. No more than 8 tablets in a day  If imodium is not controlling diarrhea please call, there is a prescription medication that can be taken with imodium. Follow Up:  As scheduled.       Recent Lab Results:  Hospital Outpatient Visit on 12/02/2022   Component Date Value Ref Range Status    WBC 12/02/2022 11.5 (A)  4.3 - 11.1 K/uL Final    RBC 12/02/2022 5.68 (A)  4.05 - 5.2 M/uL Final    Hemoglobin 12/02/2022 15.1  11.7 - 15.4 g/dL Final    Hematocrit 12/02/2022 46.9 (A)  35.8 - 46.3 % Final    MCV 12/02/2022 82.6  82.0 - 102.0 FL Final    MCH 12/02/2022 26.6  26.1 - 32.9 PG Final    MCHC 12/02/2022 32.2  31.4 - 35.0 g/dL Final    RDW 12/02/2022 14.4  11.9 - 14.6 % Final    Platelets 39/69/7509 281  150 - 450 K/uL Final    MPV 12/02/2022 10.5  9.4 - 12.3 FL Final    nRBC 12/02/2022 0.00  0.0 - 0.2 K/uL Final    **Note: Absolute NRBC parameter is now reported with Hemogram**    Seg Neutrophils 12/02/2022 64  43 - 78 % Final    Lymphocytes 12/02/2022 26  13 - 44 % Final    Monocytes 12/02/2022 7  4.0 - 12.0 % Final    Eosinophils % 12/02/2022 3  0.5 - 7.8 % Final    Basophils 12/02/2022 0  0.0 - 2.0 % Final    Immature Granulocytes 12/02/2022 0  0.0 - 5.0 % Final    Segs Absolute 12/02/2022 7.4  1.7 - 8.2 K/UL Final    Absolute Lymph # 12/02/2022 2.9  0.5 - 4.6 K/UL Final    Absolute Mono # 12/02/2022 0.8  0.1 - 1.3 K/UL Final    Absolute Eos # 12/02/2022 0.3  0.0 - 0.8 K/UL Final    Basophils Absolute 12/02/2022 0.0  0.0 - 0.2 K/UL Final    Absolute Immature Granulocyte 12/02/2022 0.1  0.0 - 0.5 K/UL Final    Differential Type 12/02/2022 AUTOMATED    Final    Sodium 12/02/2022 138  133 - 143 mmol/L Final    Potassium 12/02/2022 4.1  3.5 - 5.1 mmol/L Final    Chloride 12/02/2022 105  101 - 110 mmol/L Final    CO2 12/02/2022 26  21 - 32 mmol/L Final    Anion Gap 12/02/2022 7  2 - 11 mmol/L Final    Glucose 12/02/2022 112 (A) 65 - 100 mg/dL Final    BUN 12/02/2022 9  6 - 23 MG/DL Final    Creatinine 12/02/2022 0.90  0.6 - 1.0 MG/DL Final    EstLubna Filt Rate 12/02/2022 >60  >60 ml/min/1.73m2 Final    Comment:      Pediatric calculator link: [employer].[email or web address] org/professionals/kdoqi/[email or web address]_[email or web address]       Effective Oct 3, 2022       These results are not intended for use in patients <25years of age. eGFR results are calculated without a race factor using  the 2021 CKD-EPI equation. Careful clinical correlation is recommended, particularly when comparing to results calculated using previous equations. The CKD-EPI equation is less accurate in patients with extremes of muscle mass, extra-renal metabolism of creatinine, excessive creatine ingestion, or following therapy that affects renal tubular secretion. Calcium 12/02/2022 9.5  8.3 - 10.4 MG/DL Final    Total Bilirubin 12/02/2022 0.2  0.2 - 1.1 MG/DL Final    ALT 12/02/2022 22  12 - 65 U/L Final    AST 12/02/2022 11 (A)  15 - 37 U/L Final    Alk Phosphatase 12/02/2022 81  50 - 136 U/L Final    Total Protein 12/02/2022 7.4  6.3 - 8.2 g/dL Final    Albumin 12/02/2022 3.9  3.5 - 5.0 g/dL Final    Globulin 12/02/2022 3.5  2.8 - 4.5 g/dL Final    Albumin/Globulin Ratio 12/02/2022 1.1  0.4 - 1.6   Final    TSH, 3RD GENERATION 12/02/2022 0.577  0.358 - 3.740 uIU/mL Final         Treatment Summary has been discussed and given to patient: n/a        -------------------------------------------------------------------------------------------------------------------  Please call our office at [phone] if you have any  of the following symptoms:   Fever of 100.5 or greater  Chills  Shortness of breath  Swelling or pain in one leg    After office hours an answering service is available and will contact a provider for emergencies or if you are experiencing any of the above symptoms. Patient did express an interest in My Chart.   My Chart log in information explained on the after visit summary printout at the . [facility] [address] desk.     Brock Caraballo, EARNEST

## 2022-12-05 ENCOUNTER — RESEARCH ENCOUNTER (OUTPATIENT)
Dept: RESEARCH | Age: 57
End: 2022-12-05

## 2022-12-05 ENCOUNTER — HOSPITAL ENCOUNTER (OUTPATIENT)
Dept: INFUSION THERAPY | Age: 57
Discharge: HOME | End: 2022-12-05
Payer: COMMERCIAL

## 2022-12-05 VITALS
OXYGEN SATURATION: 94 % | DIASTOLIC BLOOD PRESSURE: 76 MMHG | RESPIRATION RATE: 18 BRPM | TEMPERATURE: 97.4 F | BODY MASS INDEX: 36.98 KG/M2 | WEIGHT: 232.6 LBS | SYSTOLIC BLOOD PRESSURE: 144 MMHG | HEART RATE: 68 BPM

## 2022-12-05 DIAGNOSIS — C64.2 RENAL CELL CARCINOMA OF LEFT KIDNEY (HCC): ICD-10-CM

## 2022-12-05 DIAGNOSIS — E27.8 MASS OF RIGHT ADRENAL GLAND (HCC): Primary | ICD-10-CM

## 2022-12-05 LAB
ALBUMIN SERPL-MCNC: 3.6 G/DL (ref 3.5–5)
ALBUMIN/GLOB SERPL: 1.3 {RATIO} (ref 0.4–1.6)
ALP SERPL-CCNC: 74 U/L (ref 50–136)
ALT SERPL-CCNC: 21 U/L (ref 12–65)
ANION GAP SERPL CALC-SCNC: 8 MMOL/L (ref 2–11)
APPEARANCE UR: CLEAR
AST SERPL-CCNC: 15 U/L (ref 15–37)
BILIRUB SERPL-MCNC: 0.2 MG/DL (ref 0.2–1.1)
BILIRUB UR QL: NEGATIVE
BUN SERPL-MCNC: 10 MG/DL (ref 6–23)
CALCIUM SERPL-MCNC: 8.7 MG/DL (ref 8.3–10.4)
CHLORIDE SERPL-SCNC: 107 MMOL/L (ref 101–110)
CO2 SERPL-SCNC: 24 MMOL/L (ref 21–32)
COLOR UR: YELLOW
CORTIS SERPL-MCNC: 11.1 UG/DL
CREAT SERPL-MCNC: 0.8 MG/DL (ref 0.6–1)
GLOBULIN SER CALC-MCNC: 2.7 G/DL (ref 2.8–4.5)
GLUCOSE SERPL-MCNC: 147 MG/DL (ref 65–100)
GLUCOSE UR STRIP.AUTO-MCNC: NEGATIVE MG/DL
HGB UR QL STRIP: NEGATIVE
KETONES UR QL STRIP.AUTO: NEGATIVE MG/DL
LEUKOCYTE ESTERASE UR QL STRIP.AUTO: NEGATIVE
NITRITE UR QL STRIP.AUTO: NEGATIVE
PH UR STRIP: 6 [PH] (ref 5–9)
POTASSIUM SERPL-SCNC: 4 MMOL/L (ref 3.5–5.1)
PROT SERPL-MCNC: 6.3 G/DL (ref 6.3–8.2)
PROT UR STRIP-MCNC: NEGATIVE MG/DL
SODIUM SERPL-SCNC: 139 MMOL/L (ref 133–143)
SP GR UR REFRACTOMETRY: 1.02 (ref 1–1.02)
TSH, 3RD GENERATION: 0.82 UIU/ML (ref 0.36–3.74)
UROBILINOGEN UR QL STRIP.AUTO: 0.2 EU/DL (ref 0.2–1)

## 2022-12-05 PROCEDURE — 81003 URINALYSIS AUTO W/O SCOPE: CPT

## 2022-12-05 PROCEDURE — 80053 COMPREHEN METABOLIC PANEL: CPT

## 2022-12-05 PROCEDURE — 6360000002 HC RX W HCPCS: Performed by: INTERNAL MEDICINE

## 2022-12-05 PROCEDURE — 82533 TOTAL CORTISOL: CPT

## 2022-12-05 PROCEDURE — 96413 CHEMO IV INFUSION 1 HR: CPT

## 2022-12-05 PROCEDURE — 2580000003 HC RX 258: Performed by: INTERNAL MEDICINE

## 2022-12-05 PROCEDURE — 84443 ASSAY THYROID STIM HORMONE: CPT

## 2022-12-05 RX ORDER — SODIUM CHLORIDE 9 MG/ML
5-250 INJECTION, SOLUTION INTRAVENOUS PRN
Status: DISCONTINUED | OUTPATIENT
Start: 2022-12-05 | End: 2022-12-06 | Stop reason: HOSPADM

## 2022-12-05 RX ORDER — SODIUM CHLORIDE 9 MG/ML
5-40 INJECTION INTRAVENOUS PRN
Status: DISCONTINUED | OUTPATIENT
Start: 2022-12-05 | End: 2022-12-06 | Stop reason: HOSPADM

## 2022-12-05 RX ORDER — DIPHENHYDRAMINE HYDROCHLORIDE 50 MG/ML
50 INJECTION INTRAMUSCULAR; INTRAVENOUS
Status: DISCONTINUED | OUTPATIENT
Start: 2022-12-05 | End: 2022-12-06 | Stop reason: HOSPADM

## 2022-12-05 RX ADMIN — SODIUM CHLORIDE, PRESERVATIVE FREE 10 ML: 5 INJECTION INTRAVENOUS at 08:45

## 2022-12-05 RX ADMIN — SODIUM CHLORIDE 200 MG: 9 INJECTION, SOLUTION INTRAVENOUS at 08:50

## 2022-12-05 RX ADMIN — SODIUM CHLORIDE 25 ML/HR: 9 INJECTION, SOLUTION INTRAVENOUS at 08:40

## 2022-12-05 NOTE — RESEARCH
To infusion to see research patient on trial Paradigm VB-001 for Baseline visit. Patient is accompanied by her son. ECOG = 0. Today is C1D1 of pembrolizumab. Patient brought 1 completed stool specimen kit to today's visit. Specimen was collected yesterday and will be shipped from clinic via [employer] today. Patient was only allotted 1 specimen kit as she signed consent to participate in the trial 3 days before first infusion. This was approved with the study sponsor. Patient returned stool collection log to research RN per protocol. Patient was given $100 stipend. Per patient, she completed [address] version III questionnaire at home. Patient completed General Questionnaire (full version) and [facility] EATS Dietary Screener Questionnaire on tablet during visit. Lab kit collected and shipped today. Provided patient with new stool kit to be collected ~3 days before next scheduled infusion C2D1 on 12/26/22. Patient consents to remain on trial. Research will continue to follow.

## 2022-12-05 NOTE — PROGRESS NOTES
Arrived to the [facility]. Keytruda and labs completed. Patient tolerated well. Any issues or concerns during appointment: none. Patient aware of next infusion appointment on 12/26/22 (date) at 56 (time). Patient aware of next lab and [facility] office visit on 12/9/22 (date) at [phone] (time). Patient instructed to call provider with temperature of 100.4 or greater or nausea/vomiting/ diarrhea or pain not controlled by medications  Discharged ambulatory.

## 2022-12-08 DIAGNOSIS — C64.2 RENAL CELL CARCINOMA OF LEFT KIDNEY (HCC): Primary | ICD-10-CM

## 2022-12-09 ENCOUNTER — HOSPITAL ENCOUNTER (OUTPATIENT)
Dept: LAB | Age: 57
Discharge: HOME | End: 2022-12-09
Payer: COMMERCIAL

## 2022-12-09 DIAGNOSIS — C64.2 RENAL CELL CARCINOMA OF LEFT KIDNEY (HCC): ICD-10-CM

## 2022-12-09 LAB
ALBUMIN SERPL-MCNC: 3.9 G/DL (ref 3.5–5)
ALBUMIN/GLOB SERPL: 1.1 {RATIO} (ref 0.4–1.6)
ALP SERPL-CCNC: 87 U/L (ref 50–136)
ALT SERPL-CCNC: 21 U/L (ref 12–65)
ANION GAP SERPL CALC-SCNC: 3 MMOL/L (ref 2–11)
AST SERPL-CCNC: 14 U/L (ref 15–37)
BASOPHILS # BLD: 0 K/UL (ref 0–0.2)
BASOPHILS NFR BLD: 0 % (ref 0–2)
BILIRUB SERPL-MCNC: 0.3 MG/DL (ref 0.2–1.1)
BUN SERPL-MCNC: 14 MG/DL (ref 6–23)
CALCIUM SERPL-MCNC: 9.2 MG/DL (ref 8.3–10.4)
CHLORIDE SERPL-SCNC: 107 MMOL/L (ref 101–110)
CO2 SERPL-SCNC: 29 MMOL/L (ref 21–32)
CREAT SERPL-MCNC: 0.9 MG/DL (ref 0.6–1)
DIFFERENTIAL METHOD BLD: ABNORMAL
EOSINOPHIL # BLD: 0.2 K/UL (ref 0–0.8)
EOSINOPHIL NFR BLD: 2 % (ref 0.5–7.8)
ERYTHROCYTE [DISTWIDTH] IN BLOOD BY AUTOMATED COUNT: 14.2 % (ref 11.9–14.6)
GLOBULIN SER CALC-MCNC: 3.4 G/DL (ref 2.8–4.5)
GLUCOSE SERPL-MCNC: 127 MG/DL (ref 65–100)
HCT VFR BLD AUTO: 48.5 % (ref 35.8–46.3)
HGB BLD-MCNC: 15.7 G/DL (ref 11.7–15.4)
IMM GRANULOCYTES # BLD AUTO: 0 K/UL (ref 0–0.5)
IMM GRANULOCYTES NFR BLD AUTO: 0 % (ref 0–5)
LYMPHOCYTES # BLD: 2.7 K/UL (ref 0.5–4.6)
LYMPHOCYTES NFR BLD: 29 % (ref 13–44)
MCH RBC QN AUTO: 26.5 PG (ref 26.1–32.9)
MCHC RBC AUTO-ENTMCNC: 32.4 G/DL (ref 31.4–35)
MCV RBC AUTO: 81.8 FL (ref 82–102)
MONOCYTES # BLD: 0.6 K/UL (ref 0.1–1.3)
MONOCYTES NFR BLD: 6 % (ref 4–12)
NEUTS SEG # BLD: 5.8 K/UL (ref 1.7–8.2)
NEUTS SEG NFR BLD: 62 % (ref 43–78)
NRBC # BLD: 0 K/UL (ref 0–0.2)
PLATELET # BLD AUTO: 272 K/UL (ref 150–450)
PMV BLD AUTO: 10.2 FL (ref 9.4–12.3)
POTASSIUM SERPL-SCNC: 4 MMOL/L (ref 3.5–5.1)
PROT SERPL-MCNC: 7.3 G/DL (ref 6.3–8.2)
RBC # BLD AUTO: 5.93 M/UL (ref 4.05–5.2)
SODIUM SERPL-SCNC: 139 MMOL/L (ref 133–143)
WBC # BLD AUTO: 9.4 K/UL (ref 4.3–11.1)

## 2022-12-09 PROCEDURE — 36415 COLL VENOUS BLD VENIPUNCTURE: CPT

## 2022-12-09 PROCEDURE — 85025 COMPLETE CBC W/AUTO DIFF WBC: CPT

## 2022-12-09 PROCEDURE — 80053 COMPREHEN METABOLIC PANEL: CPT

## 2022-12-15 ENCOUNTER — RESEARCH ENCOUNTER (OUTPATIENT)
Dept: RESEARCH | Age: 57
End: 2022-12-15

## 2022-12-15 DIAGNOSIS — Z00.6 RESEARCH EXAM: ICD-10-CM

## 2022-12-15 DIAGNOSIS — C64.2 RENAL CELL CARCINOMA OF LEFT KIDNEY (HCC): Primary | ICD-10-CM

## 2022-12-15 NOTE — RESEARCH
Patient is due for pre-Cycle 2 peripheral lab draw for  trial on 12/23/22. However, visit falls on a holiday weekend which will affect shipping. Per study, patient can have labs drawn on Friday 12/16/22 at same time as existing lab appt. Changed patient appt from port lab draw to peripheral lab to comply with study requirements. Confirmed patient is not receiving any infusion same day, so peripheral lab draw is adequate for all labs due that day. Called patient to confirm. Patient agrees with plan of care. Research labs will be collected and shipped same day per study protocol. Patient consents to remain on trial. Research will continue to follow.

## 2022-12-16 ENCOUNTER — HOSPITAL ENCOUNTER (OUTPATIENT)
Dept: LAB | Age: 57
Discharge: HOME | End: 2022-12-16
Payer: COMMERCIAL

## 2022-12-16 DIAGNOSIS — C64.2 RENAL CELL CARCINOMA OF LEFT KIDNEY (HCC): ICD-10-CM

## 2022-12-16 DIAGNOSIS — Z00.6 RESEARCH EXAM: ICD-10-CM

## 2022-12-16 LAB
ALBUMIN SERPL-MCNC: 3.7 G/DL (ref 3.5–5)
ALBUMIN/GLOB SERPL: 1 {RATIO} (ref 0.4–1.6)
ALP SERPL-CCNC: 95 U/L (ref 50–136)
ALT SERPL-CCNC: 28 U/L (ref 12–65)
ANION GAP SERPL CALC-SCNC: 6 MMOL/L (ref 2–11)
AST SERPL-CCNC: 17 U/L (ref 15–37)
BASOPHILS # BLD: 0 K/UL (ref 0–0.2)
BASOPHILS NFR BLD: 0 % (ref 0–2)
BILIRUB SERPL-MCNC: 0.4 MG/DL (ref 0.2–1.1)
BUN SERPL-MCNC: 8 MG/DL (ref 6–23)
CALCIUM SERPL-MCNC: 9.2 MG/DL (ref 8.3–10.4)
CHLORIDE SERPL-SCNC: 102 MMOL/L (ref 101–110)
CO2 SERPL-SCNC: 28 MMOL/L (ref 21–32)
CREAT SERPL-MCNC: 0.9 MG/DL (ref 0.6–1)
DIFFERENTIAL METHOD BLD: ABNORMAL
EOSINOPHIL # BLD: 0.3 K/UL (ref 0–0.8)
EOSINOPHIL NFR BLD: 3 % (ref 0.5–7.8)
ERYTHROCYTE [DISTWIDTH] IN BLOOD BY AUTOMATED COUNT: 14.4 % (ref 11.9–14.6)
GLOBULIN SER CALC-MCNC: 3.8 G/DL (ref 2.8–4.5)
GLUCOSE SERPL-MCNC: 149 MG/DL (ref 65–100)
HCT VFR BLD AUTO: 49.6 % (ref 35.8–46.3)
HGB BLD-MCNC: 16 G/DL (ref 11.7–15.4)
IMM GRANULOCYTES # BLD AUTO: 0 K/UL (ref 0–0.5)
IMM GRANULOCYTES NFR BLD AUTO: 0 % (ref 0–5)
LYMPHOCYTES # BLD: 2.6 K/UL (ref 0.5–4.6)
LYMPHOCYTES NFR BLD: 25 % (ref 13–44)
Lab: NORMAL
Lab: NORMAL
MCH RBC QN AUTO: 26.5 PG (ref 26.1–32.9)
MCHC RBC AUTO-ENTMCNC: 32.3 G/DL (ref 31.4–35)
MCV RBC AUTO: 82.1 FL (ref 82–102)
MONOCYTES # BLD: 0.8 K/UL (ref 0.1–1.3)
MONOCYTES NFR BLD: 8 % (ref 4–12)
NEUTS SEG # BLD: 6.5 K/UL (ref 1.7–8.2)
NEUTS SEG NFR BLD: 64 % (ref 43–78)
NRBC # BLD: 0 K/UL (ref 0–0.2)
PLATELET # BLD AUTO: 285 K/UL (ref 150–450)
PMV BLD AUTO: 10.4 FL (ref 9.4–12.3)
POTASSIUM SERPL-SCNC: 4.1 MMOL/L (ref 3.5–5.1)
PROT SERPL-MCNC: 7.5 G/DL (ref 6.3–8.2)
RBC # BLD AUTO: 6.04 M/UL (ref 4.05–5.2)
REFERENCE LAB: NORMAL
SODIUM SERPL-SCNC: 136 MMOL/L (ref 133–143)
WBC # BLD AUTO: 10.3 K/UL (ref 4.3–11.1)

## 2022-12-16 PROCEDURE — 36415 COLL VENOUS BLD VENIPUNCTURE: CPT

## 2022-12-16 PROCEDURE — 80053 COMPREHEN METABOLIC PANEL: CPT

## 2022-12-16 PROCEDURE — 85025 COMPLETE CBC W/AUTO DIFF WBC: CPT

## 2022-12-18 PROBLEM — Z51.81 ENCOUNTER FOR MONITORING CARDIOTOXIC DRUG THERAPY: Status: ACTIVE | Noted: 2022-12-18

## 2022-12-18 PROBLEM — Z95.828 PORT-A-CATH IN PLACE: Status: ACTIVE | Noted: 2022-12-18

## 2022-12-18 PROBLEM — Z79.899 ENCOUNTER FOR MONITORING CARDIOTOXIC DRUG THERAPY: Status: ACTIVE | Noted: 2022-12-18

## 2022-12-18 ASSESSMENT — ENCOUNTER SYMPTOMS
VOICE CHANGE: 0
TROUBLE SWALLOWING: 0
SCLERAL ICTERUS: 0
VOMITING: 0
SHORTNESS OF BREATH: 0
CHEST TIGHTNESS: 0
DIARRHEA: 0
ABDOMINAL PAIN: 0
BACK PAIN: 1
ABDOMINAL DISTENTION: 0
WHEEZING: 0
CONSTIPATION: 0
HEMOPTYSIS: 0
BLOOD IN STOOL: 0
NAUSEA: 0
SORE THROAT: 0

## 2022-12-18 NOTE — PROGRESS NOTES
[facility] Hematology and Oncology: Established patient - follow up     Chief Complaint   Patient presents with    Follow-up     Dx; RCC - bilateral with likely met to adrenal +/- lungs     Fam hx: father - prostate cancer   Paternal aunt - hx of breast cancer     History of Present Illness:  Ms. Reynaldo Cole is a 61 y.o. female who presents today for FU regarding RCC. The past medical history is significant for anxiety, HAs and sleep apnea, cervical dysplasia but no carcinoma, current smoker (~1/2PPD), lap liv and tubal reversal, I&D of left breast abscess. 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 Despite attending physical therapy since the accident, she continued to experience the same low back pain. She was seen at [facility] on 10/4/22. MRI of the lumbar spine on 10/15/22 showed mild degenerative disc disease at L3-4 through L5-S1 and a 5.7 x 6.1 x 7 cm lower pole solid left renal mass. Urgent referral was placed to [facility], who referred the patient to [facility] for uro/onc evaluation and treatment of renal mass. CT AP on 10/26/22 showed bilateral enhancing renal masses, concerning for renal cell carcinoma with the left lower pole renal mass measuring up to 6.5 cm and extending along Gerota's fascia and the right midpole renal mass measuring up to 4.6 cm. Also noted was an enhancing 1.5 cm right adrenal nodule, concerning for a metastatic nodule. No recent wt loss. S/p left renal biopsy on 11/2022, Nidia grade 2 of 4, clear cell RCC. CT chest on 11/7 showed multiple nodules that appeared to be calcified, most c/w granulomatous disease, but she's aware that metastatic disease cannot be ruled out. Not on AC. Cr 0.89. Pt presented for consultation regarding systemic therapy. She was here with her sister and friend.   Imaging with bilateral enhancing solid renal masses and 1.5cm right enhancing adrenal mass. Unable to bx adrenal mass due to position per IR. She was referred by Dr Freddie Vela for discussion of systemic therapy. We discussed staging of disease and next steps in management. Today, she is here for FU. She made a decision to p/w systemic therapy of axitinib and pembrolizumab. We have reviewed SE by % from package insert. She wishes to proceed. Chemo ED. We will p/w weekly labs for first month. Chronological Events:   11/23/22 heme/onc consult  11/30/22 port placed   12/2/22 FU - pt elected to p/w axitinib/pebrolizumab - discussed SE/MOA       Family History   Problem Relation Age of Onset    Diabetes Mother     High Blood Pressure Mother     High Cholesterol Mother     COPD Mother     Cancer Father         prostate    Diabetes Sister     Stroke Sister     High Cholesterol Brother     Breast Cancer Paternal Aunt       Social History     Socioeconomic History    Marital status:      Spouse name: None    Number of children: None    Years of education: None    Highest education level: None   Occupational History    Occupation: CNA     Employer: [employer]OS[employer]   Tobacco Use    Smoking status: Every Day     Packs/day: 1.50     Years: 15.00     Pack years: 22.50     Types: Cigarettes    Smokeless tobacco: Never   Substance and Sexual Activity    Alcohol use: Yes     Alcohol/week: 2.0 standard drinks     Types: 2 Shots of liquor per week    Drug use: Never    Sexual activity: Not Currently     Partners: Male     Social Determinants of Health     Physical Activity: Unknown    Days of Exercise per Week: Patient refused   Intimate Partner Violence: Not At Risk    Fear of Current or Ex-Partner: No    Emotionally Abused: No    Physically Abused: No    Sexually Abused: No        Review of Systems   Constitutional:  Positive for fatigue. Negative for appetite change, chills, diaphoresis, fever and unexpected weight change.    HENT:   Negative for hearing loss, mouth sores, nosebleeds, sore throat, trouble swallowing and voice change. Eyes:  Negative for icterus. Respiratory:  Negative for chest tightness, hemoptysis, shortness of breath and wheezing. Cardiovascular:  Negative for chest pain, leg swelling and palpitations. Gastrointestinal:  Negative for abdominal distention, abdominal pain, blood in stool, constipation, diarrhea, nausea and vomiting. Endocrine: Negative for hot flashes. Genitourinary:  Negative for difficulty urinating, frequency, vaginal bleeding and vaginal discharge. Musculoskeletal:  Positive for arthralgias and back pain. Negative for flank pain, gait problem and myalgias. Skin:  Negative for itching, rash and wound. Neurological:  Negative for dizziness, extremity weakness, gait problem, headaches and numbness. Psychiatric/Behavioral:  Positive for depression. Negative for confusion. The patient is nervous/anxious. Allergies   Allergen Reactions    Latex Hives, Itching and Shortness Of Breath    Bee Venom Shortness Of Breath and Hives    Penicillins Anaphylaxis    Sulfa Antibiotics Hives, Itching and Rash    Penicillin G     Sulfamethoxazole-Trimethoprim Hives     Past Medical History:   Diagnosis Date    Anxiety 2000    Headache 1999    Sleep apnea 2002     Past Surgical History:   Procedure Laterality Date    CHOLECYSTECTOMY  2003    IR PORT PLACEMENT EQUAL OR GREATER THAN 5 YEARS  11/30/2022    IR PORT PLACEMENT EQUAL OR GREATER THAN 5 YEARS 11/30/2022 [facility] RADIOLOGY SPECIALS    TUBAL LIGATION  3/91     Current Outpatient Medications   Medication Sig Dispense Refill    lidocaine-prilocaine (EMLA) 2.5-2.5 % cream Apply topically as needed.  30 g 0    ondansetron (ZOFRAN) 4 MG tablet Take 1 tablet by mouth 3 times daily as needed for Nausea or Vomiting 30 tablet 0    prochlorperazine (COMPAZINE) 10 MG tablet Take 1 tablet by mouth every 6 hours as needed (nausea/vomiting) 120 tablet 3    sertraline (ZOLOFT) 50 MG tablet Take 0.5 tablets by mouth daily for 6 days, THEN 1 tablet daily for 14 days, THEN 2 tablets daily for 14 days. 45 tablet 5    Acetaminophen (TYLENOL) 325 MG CAPS As needed      axitinib (INLYTA) 5 MG tablet Take 1 tablet by mouth in the morning and 1 tablet in the evening. (Patient not taking: Reported on 11/30/2022) 60 tablet 1    EPINEPHrine (EPIPEN 2-MARY IJ) Inject as directed (Patient not taking: Reported on 11/30/2022)       No current facility-administered medications for this visit. No flowsheet data found. OBJECTIVE:  /71   Pulse 71   Temp 98.6 °F (37 °C)   Resp 16   Ht 5' 6.5\" (1.689 m)   Wt 232 lb 6.4 oz (105.4 kg)   LMP  (LMP Unknown)   SpO2 94%   BMI 36.95 kg/m²       ECOG PERFORMANCE STATUS - 0-Fully active, able to carry on all pre-disease performance without restriction. Pain - 0 - No pain/10. None/Minimal pain - not affecting QOL     Fatigue - No flowsheet data found. Distress - No flowsheet data found. Physical Exam  Vitals reviewed. Exam conducted with a chaperone present. Constitutional:       General: She is not in acute distress. Appearance: Normal appearance. She is not ill-appearing or toxic-appearing. HENT:      Head: Normocephalic and atraumatic. Nose: Nose normal.      Mouth/Throat:      Mouth: Mucous membranes are moist.   Eyes:      General: No scleral icterus. Extraocular Movements: Extraocular movements intact. Conjunctiva/sclera: Conjunctivae normal.      Pupils: Pupils are equal, round, and reactive to light. Cardiovascular:      Rate and Rhythm: Normal rate and regular rhythm. Heart sounds: No murmur heard. Pulmonary:      Effort: Pulmonary effort is normal. No respiratory distress. Breath sounds: Normal breath sounds. No wheezing or rales. Abdominal:      General: There is no distension. Palpations: Abdomen is soft. There is no mass. Tenderness: There is no abdominal tenderness. There is no guarding. Musculoskeletal:         General: Normal range of motion. Cervical back: Normal range of motion. Right lower leg: No edema. Left lower leg: No edema. Lymphadenopathy:      Cervical: No cervical adenopathy. Upper Body:      Right upper body: No supraclavicular or axillary adenopathy. Left upper body: No supraclavicular or axillary adenopathy. Skin:     General: Skin is warm and dry. Coloration: Skin is not jaundiced or pale. Findings: No rash. Neurological:      General: No focal deficit present. Mental Status: She is alert and oriented to person, place, and time. Gait: Gait normal.   Psychiatric:         Behavior: Behavior normal.         Thought Content:  Thought content normal.        Labs:  Recent Results (from the past 168 hour(s))   Transthoracic echocardiogram (TTE) complete with contrast, bubble, strain, and 3D PRN    Collection Time: 12/12/22  4:00 PM   Result Value Ref Range    IVSd 1.2 (A) 0.6 - 0.9 cm    LVIDd 5.0 3.9 - 5.3 cm    LVIDs 3.0 cm    LVPWd 1.2 (A) 0.6 - 0.9 cm    Global Longitudinal Strain -18.1 %    EF BP 58 55 - 100 %    LV EDV A2C 92 mL    LV EDV A4C 99 mL    LV EDV BP 97 56 - 104 mL    LV ESV A2C 36 mL    LV ESV A4C 45 mL    LV ESV BP 41 19 - 49 mL    LVOT Peak Gradient 5 mmHg    LVOT Peak Velocity 1.1 m/s    RV Mid Dimension 2.9 cm    RV Basal Dimension 3.2 cm    RVIDd 3.5 cm    LA Diameter 4.1 cm    LA Volume A/L 56 mL    LA Volume 2C 54 (A) 22 - 52 mL    LA Volume 4C 51 22 - 52 mL    LA Volume BP 53 (A) 22 - 52 mL    AV Peak Gradient 14 mmHg    AV Peak Velocity 1.9 m/s    MV A Velocity 1.14 m/s    MV E Wave Deceleration Time 205.5 ms    MV E Velocity 1.07 m/s    LV E' Lateral Velocity 7 cm/s    LV E' Septal Velocity 5 cm/s    TAPSE 2.0 1.7 cm    Aortic Root 2.5 cm    Fractional Shortening 2D 40 28 - 44 %    LV RWT Ratio 0.48     LV Mass 2D 233.7 (A) 67 - 162 g    MV E/A 0.94     E/E' Ratio (Averaged) 18.34     E/E' Lateral 15.29 E/E' Septal 21.40     LA/AO Root Ratio 1.64     AV Velocity Ratio 0.58     Body Surface Area 2.21 m2    LV ESV Index BP 19 mL/m2    LV EDV Index BP 46 mL/m2    LV ESV Index A4C 21 mL/m2    LV EDV Index A4C 46 mL/m2    LV ESV Index A2C 17 mL/m2    LV EDV Index A2C 43 mL/m2    LVIDd Index 2.35 cm/m2    LVIDs Index 1.41 cm/m2    LV Mass 2D Index 109.7 43 - 95 g/m2    LA Volume Index BP 25 16 - 34 ml/m2    LA Volume Index A/L 26 16 - 34 mL/m2    LA Volume Index 2C 25 16 - 34 mL/m2    LA Volume Index 4C 24 16 - 34 mL/m2    LA Size Index 1.92 cm/m2    Ao Root Index 1.17 cm/m2   CBC with Auto Differential    Collection Time: 12/16/22  7:18 AM   Result Value Ref Range    WBC 10.3 4.3 - 11.1 K/uL    RBC 6.04 (H) 4.05 - 5.2 M/uL    Hemoglobin 16.0 (H) 11.7 - 15.4 g/dL    Hematocrit 49.6 (H) 35.8 - 46.3 %    MCV 82.1 82.0 - 102.0 FL    MCH 26.5 26.1 - 32.9 PG    MCHC 32.3 31.4 - 35.0 g/dL    RDW 14.4 11.9 - 14.6 %    Platelets 652 749 - 178 K/uL    MPV 10.4 9.4 - 12.3 FL    nRBC 0.00 0.0 - 0.2 K/uL    Differential Type AUTOMATED      Seg Neutrophils 64 43 - 78 %    Lymphocytes 25 13 - 44 %    Monocytes 8 4.0 - 12.0 %    Eosinophils % 3 0.5 - 7.8 %    Basophils 0 0.0 - 2.0 %    Immature Granulocytes 0 0.0 - 5.0 %    Segs Absolute 6.5 1.7 - 8.2 K/UL    Absolute Lymph # 2.6 0.5 - 4.6 K/UL    Absolute Mono # 0.8 0.1 - 1.3 K/UL    Absolute Eos # 0.3 0.0 - 0.8 K/UL    Basophils Absolute 0.0 0.0 - 0.2 K/UL    Absolute Immature Granulocyte 0.0 0.0 - 0.5 K/UL   Comprehensive Metabolic Panel    Collection Time: 12/16/22  7:18 AM   Result Value Ref Range    Sodium 136 133 - 143 mmol/L    Potassium 4.1 3.5 - 5.1 mmol/L    Chloride 102 101 - 110 mmol/L    CO2 28 21 - 32 mmol/L    Anion Gap 6 2 - 11 mmol/L    Glucose 149 (H) 65 - 100 mg/dL    BUN 8 6 - 23 MG/DL    Creatinine 0.90 0.6 - 1.0 MG/DL    Est, Glom Filt Rate >60 >60 ml/min/1.73m2    Calcium 9.2 8.3 - 10.4 MG/DL    Total Bilirubin 0.4 0.2 - 1.1 MG/DL    ALT 28 12 - 65 U/L    AST 17 Oral Chemotherapy Adherence:     Current Regimen:  Drug Name: axitinib   Dose: 5mg twice daily - on dose rev up per below     Barriers to care identified including (financial, physical, psychosocial) : starting     Missed doses reported - N/A starting tx     Patient verbalizes understanding of what to do in the event of a missed dose: Yes     Adverse reactions/toxicities reported:NA - starting tx     -------------------------------------------------------------------------------------------------------------------------    Ms. Kaylee Tom is here for evaluation of RCC. Clear cell Renal cell carcinoma, Nidia grade 2 of 4 - left mass at 6.5 cm (s/p bx), right mid-pole mass at 4.5cm and right adrenal enhancing lesion (unable to bx due to location per IR); pt informed that if lung and adrenal nodule malignant - Stage IV with palliative intent in tx   - [facility] prog - <12mo to tx - intermediate risk   - International Mets RCC Criteria (IMDC) - <12mo to tx, neutrophils ULN - intermediate risk grp. - today, she is here for FU - she elected to pw pembrolizumab/axitinib; we reviewed the SE again/chemo Ed provided ; port placed   - plan for weekly labs first month and q2wks second cycle if all labs stable; can dose escalate per package insert recs per below as tolerated   - echo pending at time of this visit, port placed  - smoking cessation strongly encouraged - pt declined at this time accepting risks   - pt asking for doxy refill   - dose escalation q2wk of axitinb discussed with pt.       Axitinib: >10%:  Cardiovascular: Hypertension (40%; hypertensive crisis [<1%])  Dermatologic: Palmar-plantar erythrodysesthesia (27%), skin rash (13%)  Endocrine & metabolic: Decreased serum bicarbonate (44%), hyperglycemia (28%), hyperkalemia (15%), hypernatremia (17%), hypoalbuminemia (15%), hypocalcemia (39%), hypoglycemia (11%), hyponatremia (13%), hypophosphatemia (13%), hypothyroidism (19%), weight loss (25%)  Gastrointestinal: Abdominal pain (14%), constipation (20%), decreased appetite (34%), diarrhea (55%; grades 3/4: 11%), dysgeusia (11%), increased serum amylase (25%), increased serum lipase (3% to 27%), mucosal swelling (15%), nausea (32%; grades 3/4: 3%), stomatitis (15%; grades 3/4: 1%), vomiting (24%; grades 3/4: 3%)  Genitourinary: Proteinuria (11%)  Hematologic & oncologic: Decreased absolute lymphocyte count (33%; grades 3/4: 3%), decreased platelet count (02%; grades 3/4: <1%), decreased white blood cell count (11%), hemorrhage (16%; grades 3/4: 1%; including cerebral hemorrhage, gastrointestinal hemorrhage, hematuria [3%], hemoptysis [2%], and melena)  Hepatic: Increased serum alanine aminotransferase (22%), increased serum alkaline phosphatase (30%), increased serum aspartate aminotransferase (20%)  Nervous system: Asthenia (21%), fatigue (39%), headache (14%), voice disorder (31%)  Neuromuscular & skeletal: Arthralgia (15%), limb pain (13%)  Renal: Increased serum creatinine (55%)  Respiratory: Cough (15%), dyspnea (15%)  1% to 10%:  Cardiovascular: Arterial thrombosis (2%; including acute myocardial infarction, cerebrovascular accident and retinal artery occlusion), heart failure (2%), deep vein thrombosis (1%), pulmonary embolism (2%), retinal thrombosis (?1%), transient ischemic attacks (1%), venous thrombosis (3%; including retinal vein occlusion [?1%])  Dermatologic: Alopecia (4%), erythema of skin (2%), pruritus (7%), xeroderma (10%)  Endocrine & metabolic: Dehydration (6%), hypercalcemia (6%), hyperthyroidism (1%)  Gastrointestinal: Dyspepsia (10%), gastrointestinal fistula (1%), gastrointestinal perforation (?1%), glossalgia (3%), hemorrhoids (4%), rectal hemorrhage (2%), upper abdominal pain (8%)  Hematologic & oncologic: Anemia (4%), increased hemoglobin (9%), polycythemia (1%)  Nervous system: Dizziness (9%)  Neuromuscular & skeletal: Myalgia (7%)  Otic: Tinnitus (3%)  Respiratory: Epistaxis (6%)  <1%: Nervous system: Reversible posterior leukoencephalopathy syndrome  Postmarketing: Cardiovascular: Aneurysm (arterial), aortic aneurysm, aortic dissection, myocardial rupture (aortic rupture and arterial rupture), coronary artery dissection    Pembrolizumab;   >10%:  Cardiovascular: Cardiac arrhythmia (4% to 11%), peripheral edema (11% to 15%)  Dermatologic: Pruritus (11% to 28%), skin rash (13% to   Endocrine & metabolic: Decreased serum bicarbonate (22%), hypercalcemia (14% to 22%), hypercholesterolemia (20%), hyperglycemia (38% to 59%), hyperkalemia (13% to 28%), hyperthyroidism (3% to 12%) (table 3), hypertriglyceridemia (33% to 43%), hypoalbuminemia (16% to 44%), hypocalcemia (15% to 27%), hypoglycemia (13% to 19%), hypokalemia (15% to 20%), hypomagnesemia (16% to 25%), hyponatremia (10% to 46%), hypophosphatemia (19% to 31%), hypothyroidism (8% to 21%) (table 4), weight loss (10% to 15%)    Gastrointestinal: Abdominal pain (11% to 22%) (table 5), constipation (12% to 22%), decreased appetite (15% to 25%), diarrhea (12% to 28%) (table 6), nausea (11% to 22%), vomiting (11% to 19%)    Genitourinary: Hematuria (12% to 19%), urinary tract infection (2% to 19%)  Hematologic & oncologic: Anemia (17% to 54%; grades 3/4: ?24%) (table 7), hemorrhage (19%; grades 3/4: 5%; major hemorrhage: 4%), increased INR (19% to 27%; grade 3/4: ?2%), leukopenia (35%; grades 3/4: 9%), lymphocytopenia (24% to 54%; grades 3/4: 2% to 25%) (table 8), neutropenia (7% to 30%; grades 3/4: 1% to 11%) (table 9), prolonged partial thromboplastin time (14%), thrombocytopenia (12% to 34%; grades 3/4: 4% to 10%) (table 10)    Hepatic: Hyperbilirubinemia (10% to 16%) (table 11), increased serum alanine aminotransferase (20% to 34%) (table 12), increased serum alkaline phosphatase (17% to 42%), increased serum aspartate aminotransferase (20% to 39%) (table 13)    Infection: Infection (16%; serious infection: 4%)  Nervous system: Fatigue (20% to 43%), headache (11% to 15%), pain (22%), peripheral neuropathy (1% to 11%; grade 3/4: <1%) (table 14)  Neuromuscular & skeletal: Arthralgia (10% to 18%), asthenia (10% to 11%), back pain (11% to 12%), musculoskeletal pain (19% to 41%), myalgia (12%)  Renal: Acute kidney injury (2% to 13%), increased serum creatinine (11% to 40%) (table 15)  Respiratory: Cough (14% to 26%), dyspnea (10% to 23%), flu-like symptoms (11%), pneumonia (3% to 12%), pneumonitis (2% to 11%), upper respiratory tract infection (13% to 41%)  Miscellaneous: Fever (10% to 28%)  1% to 10%:  Cardiovascular: Acute myocardial infarction (2%), cardiac tamponade (2%), facial edema (10%), ischemic heart disease (2%), myocarditis (?1%), pericardial effusion (2%), pericarditis (2% to 4%), pulmonary embolism (2%)  Endocrine & metabolic: Adrenocortical insufficiency (1%), diabetic ketoacidosis (1%), thyroiditis (?2%)  Gastrointestinal: Colitis (2%), dysphagia (8%), stomatitis (3%)   Hematologic & oncologic: Febrile neutropenia (1%), tumor flare (1%)  Hepatic: Ascites (grades 3/4: 8%), hepatitis (?3%)  Immunologic: Antibody development (2%; neutralizing: <1%)  Infection: Herpes virus infection (9%), herpes zoster infection (?1%), sepsis (1% to 2%)  Nervous system:  Altered mental status (3%), confusion (?2%), dizziness (5%), insomnia (7%)  Neuromuscular & skeletal: Arthritis (2%), myositis (?1%), neck pain (6%)  Ophthalmic: Uveitis (?1%)  Respiratory: Nasopharyngitis (10%), oropharyngeal pain (8%), pleural effusion (2%), respiratory failure (?2%)  Miscellaneous: Fistula (4%), infusion related reaction (?9%; including severe infusion related reaction)  <1%:  Cardiovascular: Vasculitis  Endocrine & metabolic: Hypoparathyroidism, hypophysitis, type 1 diabetes mellitus  Gastrointestinal: Duodenitis, gastritis, increased serum amylase, increased serum lipase, pancreatitis  Hematologic & oncologic: Aplastic anemia, hemolytic anemia, immune thrombocytopenia, immunological signs and symptoms (hemophagocytic lymphohistiocytosis) (Greg Pickard 2020), lymphadenitis (histiocytic necrotizing lymphadenitis [Kikuchi lymphadenitis]), sarcoidosis  Hypersensitivity: Anaphylaxis  Immunologic: Organ transplant rejection (solid)  Infection: Systemic inflammatory response syndrome  Nervous system: Demyelinating disease, encephalitis, Guillain-[place] syndrome, meningitis, myasthenia (myasthenic syndrome) (Mali 2019), myasthenia gravis (including exacerbation of myasthenia gravis), neuropathy (autoimmune), paresis (nerve)  Neuromuscular & skeletal: Myelitis, polymyalgia rheumatica, polymyositis, rhabdomyolysis  Ophthalmic: Iritis  Renal: Nephritis      RESUSCITATION DIRECTIVES/HOSPICE CARE: Full Support    RTC weekly for 1mo labs  [40min - chart review, review of results and discussion of options, next steps, coordination of care and charting ]      MDM  Number of Diagnoses or Management Options  Encounter for monitoring cardiotoxic drug therapy: new, needed workup  Mass of right adrenal gland ([facility] [address]): new, needed workup  Port-A-Cath in place: new, no workup  Renal cell carcinoma of left kidney ([facility] [address]): new, needed workup  Skin infection: new, no workup     Amount and/or Complexity of Data Reviewed  Clinical lab tests: ordered and reviewed  Tests in the radiology section of CPT®: ordered and reviewed  Tests in the medicine section of CPT®: ordered  Review and summarize past medical records: yes  Independent visualization of images, tracings, or specimens: yes    Risk of Complications, Morbidity, and/or Mortality  Presenting problems: moderate  Diagnostic procedures: moderate  Management options: high        Lab studies and imaging studies were personally reviewed. Pertinent old records were reviewed. Historical:   - here for consultation. We had a long discussion today about her case. We discussed the pathophysiology of malignancy, staging of RCC and current guidelines. We discussed tx options and she is interested in pursuing systemic therapy. She reviewed this with Dr Mirna Perez - it will let us see if the adrenal mass and both renal masses respond to tx. - per NCCN guidelines, pt's with intermediate risk advanced disease are candidates for immuno + TKI. We discussed options, will p/w axitinib and pembrolizumab. SE reviewed in great detail going over percentages of likelihood of occurrence. Printed info was given to pt for her reference. She was made aware of risk of heart failure, hemorrhage, HTN, Reversible posterior leukoencephalopathy syndrome, thrombosis (arterial/venous), thyroid d.o, wnd healing, diarrhea faisal in combination with pembro. All questions were asked and answered to the best of my ability. The patient verbalized understanding and agrees with the plan above.               Fiona Bangura, [address] [place] [address] Hematology and Oncology   Delfina Eastman, [address] [facility]  Office : [phone]  Fax : [phone]

## 2022-12-22 NOTE — PROGRESS NOTES
Per Iris with [employer]     \"Good morning,     Quick update: the patient is now enrolled in a copay card for a max of $25,000 per year. The 30-day voucher that was applied was for the first shipment (delivered on 12/02) and this copay card will apply to this refill and any fill after. The patients copay is now $0 and i will be calling her to set up shipment and will update you once shipment has been set up ? ? .  Please let me know if you have any question

## 2022-12-23 ENCOUNTER — RESEARCH ENCOUNTER (OUTPATIENT)
Dept: RESEARCH | Age: 57
End: 2022-12-23

## 2022-12-23 ENCOUNTER — OFFICE VISIT (OUTPATIENT)
Dept: ONCOLOGY | Age: 57
End: 2022-12-23
Payer: COMMERCIAL

## 2022-12-23 ENCOUNTER — HOSPITAL ENCOUNTER (OUTPATIENT)
Dept: LAB | Age: 57
Discharge: HOME | End: 2022-12-23
Payer: COMMERCIAL

## 2022-12-23 VITALS
DIASTOLIC BLOOD PRESSURE: 84 MMHG | SYSTOLIC BLOOD PRESSURE: 145 MMHG | OXYGEN SATURATION: 97 % | TEMPERATURE: 97.7 F | BODY MASS INDEX: 35.75 KG/M2 | RESPIRATION RATE: 16 BRPM | WEIGHT: 227.8 LBS | HEART RATE: 72 BPM | HEIGHT: 67 IN

## 2022-12-23 DIAGNOSIS — C64.2 RENAL CELL CARCINOMA OF LEFT KIDNEY (HCC): Primary | ICD-10-CM

## 2022-12-23 DIAGNOSIS — E27.8 MASS OF RIGHT ADRENAL GLAND (HCC): ICD-10-CM

## 2022-12-23 DIAGNOSIS — C64.2 RENAL CELL CARCINOMA OF LEFT KIDNEY (HCC): ICD-10-CM

## 2022-12-23 DIAGNOSIS — R06.09 DYSPNEA ON EXERTION: ICD-10-CM

## 2022-12-23 DIAGNOSIS — R53.83 FATIGUE DUE TO TREATMENT: ICD-10-CM

## 2022-12-23 LAB
ALBUMIN SERPL-MCNC: 3.8 G/DL (ref 3.5–5)
ALBUMIN/GLOB SERPL: 1 {RATIO} (ref 0.4–1.6)
ALP SERPL-CCNC: 88 U/L (ref 50–136)
ALT SERPL-CCNC: 29 U/L (ref 12–65)
ANION GAP SERPL CALC-SCNC: 3 MMOL/L (ref 2–11)
AST SERPL-CCNC: 16 U/L (ref 15–37)
BASOPHILS # BLD: 0 K/UL (ref 0–0.2)
BASOPHILS NFR BLD: 0 % (ref 0–2)
BILIRUB SERPL-MCNC: 0.4 MG/DL (ref 0.2–1.1)
BUN SERPL-MCNC: 7 MG/DL (ref 6–23)
CALCIUM SERPL-MCNC: 9.4 MG/DL (ref 8.3–10.4)
CHLORIDE SERPL-SCNC: 103 MMOL/L (ref 101–110)
CO2 SERPL-SCNC: 29 MMOL/L (ref 21–32)
CREAT SERPL-MCNC: 0.9 MG/DL (ref 0.6–1)
DIFFERENTIAL METHOD BLD: ABNORMAL
EOSINOPHIL # BLD: 0.2 K/UL (ref 0–0.8)
EOSINOPHIL NFR BLD: 2 % (ref 0.5–7.8)
ERYTHROCYTE [DISTWIDTH] IN BLOOD BY AUTOMATED COUNT: 14.3 % (ref 11.9–14.6)
GLOBULIN SER CALC-MCNC: 3.8 G/DL (ref 2.8–4.5)
GLUCOSE SERPL-MCNC: 149 MG/DL (ref 65–100)
HCT VFR BLD AUTO: 49.8 % (ref 35.8–46.3)
HGB BLD-MCNC: 15.9 G/DL (ref 11.7–15.4)
IMM GRANULOCYTES # BLD AUTO: 0 K/UL (ref 0–0.5)
IMM GRANULOCYTES NFR BLD AUTO: 0 % (ref 0–5)
LYMPHOCYTES # BLD: 2.8 K/UL (ref 0.5–4.6)
LYMPHOCYTES NFR BLD: 29 % (ref 13–44)
MCH RBC QN AUTO: 26.4 PG (ref 26.1–32.9)
MCHC RBC AUTO-ENTMCNC: 31.9 G/DL (ref 31.4–35)
MCV RBC AUTO: 82.7 FL (ref 82–102)
MONOCYTES # BLD: 0.6 K/UL (ref 0.1–1.3)
MONOCYTES NFR BLD: 7 % (ref 4–12)
NEUTS SEG # BLD: 5.9 K/UL (ref 1.7–8.2)
NEUTS SEG NFR BLD: 62 % (ref 43–78)
NRBC # BLD: 0 K/UL (ref 0–0.2)
PLATELET # BLD AUTO: 289 K/UL (ref 150–450)
PMV BLD AUTO: 10.6 FL (ref 9.4–12.3)
POTASSIUM SERPL-SCNC: 4.3 MMOL/L (ref 3.5–5.1)
PROT SERPL-MCNC: 7.6 G/DL (ref 6.3–8.2)
RBC # BLD AUTO: 6.02 M/UL (ref 4.05–5.2)
SODIUM SERPL-SCNC: 135 MMOL/L (ref 133–143)
TSH, 3RD GENERATION: 0.67 UIU/ML (ref 0.36–3)
WBC # BLD AUTO: 9.6 K/UL (ref 4.3–11.1)

## 2022-12-23 PROCEDURE — 85025 COMPLETE CBC W/AUTO DIFF WBC: CPT

## 2022-12-23 PROCEDURE — 99214 OFFICE O/P EST MOD 30 MIN: CPT | Performed by: NURSE PRACTITIONER

## 2022-12-23 PROCEDURE — 36415 COLL VENOUS BLD VENIPUNCTURE: CPT

## 2022-12-23 PROCEDURE — 84443 ASSAY THYROID STIM HORMONE: CPT

## 2022-12-23 PROCEDURE — 80053 COMPREHEN METABOLIC PANEL: CPT

## 2022-12-23 RX ORDER — ACETAMINOPHEN 325 MG/1
650 TABLET ORAL
Status: CANCELLED | OUTPATIENT
Start: 2022-12-26

## 2022-12-23 RX ORDER — AXITINIB 1 MG/1
2 TABLET, FILM COATED ORAL 2 TIMES DAILY
Qty: 120 TABLET | Refills: 1 | Status: SHIPPED | OUTPATIENT
Start: 2022-12-23 | End: 2023-01-22

## 2022-12-23 RX ORDER — SODIUM CHLORIDE 9 MG/ML
5-250 INJECTION, SOLUTION INTRAVENOUS PRN
Status: CANCELLED | OUTPATIENT
Start: 2022-12-26

## 2022-12-23 RX ORDER — EPINEPHRINE 1 MG/ML
0.3 INJECTION, SOLUTION, CONCENTRATE INTRAVENOUS PRN
Status: CANCELLED | OUTPATIENT
Start: 2022-12-26

## 2022-12-23 RX ORDER — ALBUTEROL SULFATE 90 UG/1
4 AEROSOL, METERED RESPIRATORY (INHALATION) PRN
Status: CANCELLED | OUTPATIENT
Start: 2022-12-26

## 2022-12-23 RX ORDER — DIPHENHYDRAMINE HYDROCHLORIDE 50 MG/ML
50 INJECTION INTRAMUSCULAR; INTRAVENOUS
Status: CANCELLED | OUTPATIENT
Start: 2022-12-26

## 2022-12-23 RX ORDER — ONDANSETRON 2 MG/ML
8 INJECTION INTRAMUSCULAR; INTRAVENOUS
Status: CANCELLED | OUTPATIENT
Start: 2022-12-26

## 2022-12-23 RX ORDER — MEPERIDINE HYDROCHLORIDE 25 MG/ML
12.5 INJECTION INTRAMUSCULAR; INTRAVENOUS; SUBCUTANEOUS PRN
Status: CANCELLED | OUTPATIENT
Start: 2022-12-26

## 2022-12-23 RX ORDER — HEPARIN SODIUM (PORCINE) LOCK FLUSH IV SOLN 100 UNIT/ML 100 UNIT/ML
500 SOLUTION INTRAVENOUS PRN
Status: CANCELLED | OUTPATIENT
Start: 2022-12-26

## 2022-12-23 RX ORDER — SODIUM CHLORIDE 9 MG/ML
INJECTION, SOLUTION INTRAVENOUS CONTINUOUS
Status: CANCELLED | OUTPATIENT
Start: 2022-12-26

## 2022-12-23 ASSESSMENT — ENCOUNTER SYMPTOMS
CONSTIPATION: 0
VOMITING: 0
WHEEZING: 0
CHEST TIGHTNESS: 0
BACK PAIN: 1
NAUSEA: 0
HEMOPTYSIS: 0
SHORTNESS OF BREATH: 1
SORE THROAT: 0
TROUBLE SWALLOWING: 0
SCLERAL ICTERUS: 0
VOICE CHANGE: 0
BLOOD IN STOOL: 0
ABDOMINAL PAIN: 0
DIARRHEA: 0
ABDOMINAL DISTENTION: 0

## 2022-12-23 ASSESSMENT — PATIENT HEALTH QUESTIONNAIRE - PHQ9
SUM OF ALL RESPONSES TO PHQ9 QUESTIONS 1 & 2: 0
SUM OF ALL RESPONSES TO PHQ QUESTIONS 1-9: 0
2. FEELING DOWN, DEPRESSED OR HOPELESS: 0
SUM OF ALL RESPONSES TO PHQ QUESTIONS 1-9: 0
1. LITTLE INTEREST OR PLEASURE IN DOING THINGS: 0

## 2022-12-23 NOTE — PROGRESS NOTES
[facility] Hematology and Oncology: Established patient - follow up     Chief Complaint   Patient presents with    Follow-up     Dx; RCC - bilateral with likely met to adrenal +/- lungs     Fam hx: father - prostate cancer   Paternal aunt - hx of breast cancer     History of Present Illness:  Ms. Audrey Sheppard is a 61 y.o. female who presents today for FU regarding RCC. The past medical history is significant for anxiety, HAs and sleep apnea, cervical dysplasia but no carcinoma, current smoker (~1/2PPD), lap liv and tubal reversal, I&D of left breast abscess. She initially presented to 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 Despite attending physical therapy since the accident, she continued to experience the same low back pain. She was seen at [facility] on 10/4/22. MRI of the lumbar spine on 10/15/22 showed mild degenerative disc disease at L3-4 through L5-S1 and a 5.7 x 6.1 x 7 cm lower pole solid left renal mass. Urgent referral was placed to [facility], who referred the patient to [facility] for uro/onc evaluation and treatment of renal mass. CT AP on 10/26/22 showed bilateral enhancing renal masses, concerning for renal cell carcinoma with the left lower pole renal mass measuring up to 6.5 cm and extending along Gerota's fascia and the right midpole renal mass measuring up to 4.6 cm. Also noted was an enhancing 1.5 cm right adrenal nodule, concerning for a metastatic nodule. No recent wt loss. S/p left renal biopsy on 11/2022, Nidia grade 2 of 4, clear cell RCC. CT chest on 11/7 showed multiple nodules that appeared to be calcified, most c/w granulomatous disease, but she's aware that metastatic disease cannot be ruled out. Not on AC. Cr 0.89. Pt presented for consultation regarding systemic therapy. She was here with her sister and friend.   Imaging with bilateral enhancing solid renal masses and 1.5cm right enhancing adrenal mass. Unable to bx adrenal mass due to position per IR. She was referred by Dr Kym Vargas for discussion of systemic therapy. We discussed staging of disease and next steps in management. She returns today for follow up on axitinib 5mg BID and cycle 2 keytruda. Overall, she is tolerating treatment well. There are no GI or bowel complaints. Appetite is unchanged, reports only eats 1 meal a day, weight down 5#. Fatigue is her biggest complaint, however she continues to work ([employer]). Exertional shortness of breath is stable, no cough or edema. Intermittent low back pain is ongoing. She reports mild irritation and dry/cracks of a few fingers. She uses hand  and frequently washes her hands with her work. She did get a second opinion at [facility] and reports that it was agreed to continue her current treatment, however the [place] MD feels that she would be a good surgical candidate and wants to review restaging scans after 3 cycles. He also suggested a generics referral that we will place.            Chronological Events:   11/23/22 heme/onc consult  11/30/22 port placed   12/2/22 FU - pt elected to p/w axitinib/pebrolizumab - discussed SE/MOA   12/23/22 FU on axitinib 5 mg BID and keytruda-tolerating well      Family History   Problem Relation Age of Onset    Diabetes Mother     High Blood Pressure Mother     High Cholesterol Mother     COPD Mother     Cancer Father         prostate    Diabetes Sister     Stroke Sister     High Cholesterol Brother     Breast Cancer Paternal Aunt       Social History     Socioeconomic History    Marital status:      Spouse name: None    Number of children: None    Years of education: None    Highest education level: None   Occupational History    Occupation: CNA     Employer: [employer]   Tobacco Use    Smoking status: Every Day     Packs/day: 1.50     Years: 15.00     Pack years: 22.50     Types: Cigarettes Smokeless tobacco: Never   Substance and Sexual Activity    Alcohol use: Yes     Alcohol/week: 2.0 standard drinks     Types: 2 Shots of liquor per week    Drug use: Never    Sexual activity: Not Currently     Partners: Male     Social Determinants of Health     Physical Activity: Unknown    Days of Exercise per Week: Patient refused   Intimate Partner Violence: Not At Risk    Fear of Current or Ex-Partner: No    Emotionally Abused: No    Physically Abused: No    Sexually Abused: No        Review of Systems   Constitutional:  Positive for fatigue. Negative for appetite change, chills, diaphoresis, fever and unexpected weight change. HENT:   Negative for hearing loss, mouth sores, nosebleeds, sore throat, trouble swallowing and voice change. Eyes:  Negative for icterus. Respiratory:  Positive for shortness of breath (exertional). Negative for chest tightness, hemoptysis and wheezing. Cardiovascular:  Negative for chest pain, leg swelling and palpitations. Gastrointestinal:  Negative for abdominal distention, abdominal pain, blood in stool, constipation, diarrhea, nausea and vomiting. Endocrine: Negative for hot flashes. Genitourinary:  Negative for difficulty urinating, frequency, vaginal bleeding and vaginal discharge. Musculoskeletal:  Positive for arthralgias and back pain. Negative for flank pain, gait problem and myalgias. Skin:  Negative for itching, rash and wound. Neurological:  Negative for dizziness, extremity weakness, gait problem, headaches and numbness. Psychiatric/Behavioral:  Positive for depression. Negative for confusion. The patient is nervous/anxious.          Allergies   Allergen Reactions    Latex Hives, Itching and Shortness Of Breath    Bee Venom Shortness Of Breath and Hives    Penicillins Anaphylaxis    Sulfa Antibiotics Hives, Itching and Rash    Penicillin G     Sulfamethoxazole-Trimethoprim Hives     Past Medical History:   Diagnosis Date    Anxiety 2000 Headache 1999    Sleep apnea 2002     Past Surgical History:   Procedure Laterality Date    CHOLECYSTECTOMY  2003    IR PORT PLACEMENT EQUAL OR GREATER THAN 5 YEARS  11/30/2022    IR PORT PLACEMENT EQUAL OR GREATER THAN 5 YEARS 11/30/2022 [facility] RADIOLOGY SPECIALS    TUBAL LIGATION  3/91     Current Outpatient Medications   Medication Sig Dispense Refill    lidocaine-prilocaine (EMLA) 2.5-2.5 % cream Apply topically as needed. 30 g 0    ondansetron (ZOFRAN) 4 MG tablet Take 1 tablet by mouth 3 times daily as needed for Nausea or Vomiting 30 tablet 0    prochlorperazine (COMPAZINE) 10 MG tablet Take 1 tablet by mouth every 6 hours as needed (nausea/vomiting) 120 tablet 3    axitinib (INLYTA) 5 MG tablet Take 1 tablet by mouth in the morning and 1 tablet in the evening. 60 tablet 1    sertraline (ZOLOFT) 50 MG tablet Take 0.5 tablets by mouth daily for 6 days, THEN 1 tablet daily for 14 days, THEN 2 tablets daily for 14 days. 45 tablet 5    Acetaminophen (TYLENOL) 325 MG CAPS As needed      EPINEPHrine (EPIPEN 2-MARY IJ) Inject as directed       No current facility-administered medications for this visit. No flowsheet data found. OBJECTIVE:  BP (!) 145/84 Comment: sitting  Pulse 72   Temp 97.7 °F (36.5 °C) (Oral)   Resp 16   Ht 5' 6.5\" (1.689 m)   Wt 227 lb 12.8 oz (103.3 kg)   LMP  (LMP Unknown)   SpO2 97%   BMI 36.22 kg/m²       ECOG PERFORMANCE STATUS - 0-Fully active, able to carry on all pre-disease performance without restriction. Pain - 3/10. None/Minimal pain - not affecting QOL     Fatigue - No flowsheet data found. Distress - No flowsheet data found. Physical Exam  Vitals reviewed. Exam conducted with a chaperone present. Constitutional:       General: She is not in acute distress. Appearance: Normal appearance. She is not ill-appearing or toxic-appearing. HENT:      Head: Normocephalic and atraumatic.       Nose: Nose normal.      Mouth/Throat:      Mouth: Mucous membranes are moist.   Eyes:      General: No scleral icterus. Conjunctiva/sclera: Conjunctivae normal.   Cardiovascular:      Rate and Rhythm: Normal rate and regular rhythm. Heart sounds: No murmur heard. Pulmonary:      Effort: Pulmonary effort is normal. No respiratory distress. Breath sounds: Normal breath sounds. No wheezing or rales. Abdominal:      General: Bowel sounds are normal. There is no distension. Palpations: Abdomen is soft. There is no mass. Tenderness: There is no abdominal tenderness. There is no guarding. Musculoskeletal:         General: Normal range of motion. Cervical back: Normal range of motion. Right lower leg: No edema. Left lower leg: No edema. Skin:     General: Skin is warm and dry. Coloration: Skin is not jaundiced or pale. Findings: No rash. Neurological:      General: No focal deficit present. Mental Status: She is alert and oriented to person, place, and time. Gait: Gait normal.   Psychiatric:         Behavior: Behavior normal.         Thought Content:  Thought content normal.        Labs:  Recent Results (from the past 168 hour(s))   Comprehensive Metabolic Panel    Collection Time: 12/23/22  9:54 AM   Result Value Ref Range    Sodium 135 133 - 143 mmol/L    Potassium 4.3 3.5 - 5.1 mmol/L    Chloride 103 101 - 110 mmol/L    CO2 29 21 - 32 mmol/L    Anion Gap 3 2 - 11 mmol/L    Glucose 149 (H) 65 - 100 mg/dL    BUN 7 6 - 23 MG/DL    Creatinine 0.90 0.6 - 1.0 MG/DL    Est, Glom Filt Rate >60 >60 ml/min/1.73m2    Calcium 9.4 8.3 - 10.4 MG/DL    Total Bilirubin 0.4 0.2 - 1.1 MG/DL    ALT 29 12 - 65 U/L    AST 16 15 - 37 U/L    Alk Phosphatase 88 50 - 136 U/L    Total Protein 7.6 6.3 - 8.2 g/dL    Albumin 3.8 3.5 - 5.0 g/dL    Globulin 3.8 2.8 - 4.5 g/dL    Albumin/Globulin Ratio 1.0 0.4 - 1.6     CBC with Auto Differential    Collection Time: 12/23/22  9:54 AM   Result Value Ref Range    WBC 9.6 4.3 - 11.1 K/uL    RBC 6.02 (H) 4.05 - 5.2 M/uL    Hemoglobin 15.9 (H) 11.7 - 15.4 g/dL    Hematocrit 49.8 (H) 35.8 - 46.3 %    MCV 82.7 82.0 - 102.0 FL    MCH 26.4 26.1 - 32.9 PG    MCHC 31.9 31.4 - 35.0 g/dL    RDW 14.3 11.9 - 14.6 %    Platelets 381 623 - 112 K/uL    MPV 10.6 9.4 - 12.3 FL    nRBC 0.00 0.0 - 0.2 K/uL    Seg Neutrophils 62 43 - 78 %    Lymphocytes 29 13 - 44 %    Monocytes 7 4.0 - 12.0 %    Eosinophils % 2 0.5 - 7.8 %    Basophils 0 0.0 - 2.0 %    Immature Granulocytes 0 0.0 - 5.0 %    Segs Absolute 5.9 1.7 - 8.2 K/UL    Absolute Lymph # 2.8 0.5 - 4.6 K/UL    Absolute Mono # 0.6 0.1 - 1.3 K/UL    Absolute Eos # 0.2 0.0 - 0.8 K/UL    Basophils Absolute 0.0 0.0 - 0.2 K/UL    Absolute Immature Granulocyte 0.0 0.0 - 0.5 K/UL    Differential Type AUTOMATED     TSH    Collection Time: 12/23/22  9:54 AM   Result Value Ref Range    TSH, 3RD GENERATION 0.669 0.358 - 3 uIU/mL       Imaging: reviewed     PATHOLOGY:       ASSESSMENT:     Diagnosis Orders   1. Renal cell carcinoma of left kidney (HCC)  CT CHEST ABDOMEN PELVIS W CONTRAST Additional Contrast? Oral      2. Mass of right adrenal gland ([facility] [address]        3. Fatigue due to treatment        4. Dyspnea on exertion          Oral Chemotherapy Adherence:     Current Regimen:  Drug Name: axitinib   Dose: 5mg twice daily - on dose rev up per below     Barriers to care identified including (financial, physical, psychosocial) : starting     Missed doses reported - no    Patient verbalizes understanding of what to do in the event of a missed dose: Yes     Adverse reactions/toxicities reported:NA - see HPI    -------------------------------------------------------------------------------------------------------------------------    Ms. Murray Pantojawicho is here for evaluation of RCC.       Clear cell Renal cell carcinoma, Nidia grade 2 of 4 - left mass at 6.5 cm (s/p bx), right mid-pole mass at 4.5cm and right adrenal enhancing lesion (unable to bx due to location per IR); pt informed that if lung and adrenal nodule malignant - Stage IV with palliative intent in tx   - [facility] prog - <12mo to tx - intermediate risk   - International Mets RCC Criteria (IMDC) - <12mo to tx, neutrophils ULN - intermediate risk grp. - today, she is here for FU-doing well; will RX 1 mg axitinib with plan to increase to 7 mg BID on arrival   -HFS instructed her to limit friction and try to use gloves more to decrease /hand washing, frequent use of udder cream/bag balm/etc   - echo with normal EF, port placed  - smoking cessation strongly encouraged - pt declined at this time accepting risks   - dose escalation q2wk of axitinb discussed with pt. RESUSCITATION DIRECTIVES/HOSPICE CARE: Full Support    RTC in 3 weeks for follow up and C3 Keytruda       MDM      Lab studies and imaging studies were personally reviewed. Pertinent old records were reviewed. Historical:   - here for consultation. We had a long discussion today about her case. We discussed the pathophysiology of malignancy, staging of RCC and current guidelines. We discussed tx options and she is interested in pursuing systemic therapy. She reviewed this with Dr Freddie Vela - it will let us see if the adrenal mass and both renal masses respond to tx. - per NCCN guidelines, pt's with intermediate risk advanced disease are candidates for immuno + TKI. We discussed options, will p/w axitinib and pembrolizumab. SE reviewed in great detail going over percentages of likelihood of occurrence. Printed info was given to pt for her reference. She was made aware of risk of heart failure, hemorrhage, HTN, Reversible posterior leukoencephalopathy syndrome, thrombosis (arterial/venous), thyroid d.o, wnd healing, diarrhea faisal in combination with pembro. All questions were asked and answered to the best of my ability. The patient verbalized understanding and agrees with the plan above.               Johnna Tijerina ([id number] [facility]) SAMI ALAS-BC  [facility]  [address] [place] [address]  Office : [phone]  Fax : [phone]

## 2022-12-23 NOTE — RESEARCH
To the clinic with Steve Lee RN, to see research participant on [employer] VB-001 trial for post-Cycle 1 visit. Patient to see Hever Aldana NP today and have C2D1 infusion on 12/26/22. Patient completed General Questionnaire (abbreviated version) and [employer] E[employer] Dietary Screener Questionnaire on tablet during visit. Lab kit was previously collected on 12/16/22 to avoid shipping over the holidays. Patient aware to collect her stool specimen for this visit on 12/27/22 and to ship via [employer] drop off same day. Specimen collection will be delayed one day due to the holiday schedule. This was previously approved with study sponsor via email. Provided patient with new stool kit to be collected ~3 days before next scheduled infusion C3D1 on 1/16/23. Patient will be given stipends for both stool kits at next visit if both specimens are collected and shipped as scheduled. Patient consents to remain on trial. Research will continue to follow.

## 2022-12-26 ENCOUNTER — HOSPITAL ENCOUNTER (OUTPATIENT)
Dept: INFUSION THERAPY | Age: 57
Discharge: HOME | End: 2022-12-26
Payer: COMMERCIAL

## 2022-12-26 VITALS
DIASTOLIC BLOOD PRESSURE: 87 MMHG | OXYGEN SATURATION: 95 % | HEART RATE: 83 BPM | BODY MASS INDEX: 36.41 KG/M2 | WEIGHT: 229 LBS | TEMPERATURE: 97.8 F | SYSTOLIC BLOOD PRESSURE: 117 MMHG | RESPIRATION RATE: 18 BRPM

## 2022-12-26 DIAGNOSIS — E27.8 MASS OF RIGHT ADRENAL GLAND (HCC): ICD-10-CM

## 2022-12-26 DIAGNOSIS — C64.2 RENAL CELL CARCINOMA OF LEFT KIDNEY (HCC): Primary | ICD-10-CM

## 2022-12-26 PROCEDURE — 6360000002 HC RX W HCPCS: Performed by: INTERNAL MEDICINE

## 2022-12-26 PROCEDURE — 2580000003 HC RX 258: Performed by: INTERNAL MEDICINE

## 2022-12-26 PROCEDURE — 96413 CHEMO IV INFUSION 1 HR: CPT

## 2022-12-26 RX ORDER — SODIUM CHLORIDE 9 MG/ML
5-40 INJECTION INTRAVENOUS PRN
Status: DISCONTINUED | OUTPATIENT
Start: 2022-12-26 | End: 2022-12-27 | Stop reason: HOSPADM

## 2022-12-26 RX ORDER — SODIUM CHLORIDE 9 MG/ML
5-250 INJECTION, SOLUTION INTRAVENOUS PRN
Status: DISCONTINUED | OUTPATIENT
Start: 2022-12-26 | End: 2022-12-27 | Stop reason: HOSPADM

## 2022-12-26 RX ADMIN — SODIUM CHLORIDE, PRESERVATIVE FREE 10 ML: 5 INJECTION INTRAVENOUS at 10:49

## 2022-12-26 RX ADMIN — SODIUM CHLORIDE 125 ML/HR: 9 INJECTION, SOLUTION INTRAVENOUS at 10:49

## 2022-12-26 RX ADMIN — SODIUM CHLORIDE 200 MG: 9 INJECTION, SOLUTION INTRAVENOUS at 10:56

## 2022-12-26 NOTE — ADDENDUM NOTE
Encounter addended by: Karolina Mora, [address] [facility] on: 12/26/2022 10:21 AM   Actions taken: [employer]ent created or edited

## 2022-12-26 NOTE — PROGRESS NOTES
Patient arrived to [facility]. Assessment completed. No needs voiced at this time. Patient tolerated infusion well and is aware of next appointment on 1/16/2023 @1010. Patient discharged ambulatory.

## 2023-01-05 DIAGNOSIS — K13.79 MOUTH PAIN: Primary | ICD-10-CM

## 2023-01-05 DIAGNOSIS — C64.2 RENAL CELL CARCINOMA OF LEFT KIDNEY (HCC): ICD-10-CM

## 2023-01-06 ENCOUNTER — HOSPITAL ENCOUNTER (OUTPATIENT)
Dept: LAB | Age: 58
Discharge: HOME | End: 2023-01-06
Payer: COMMERCIAL

## 2023-01-06 ENCOUNTER — TELEPHONE (OUTPATIENT)
Dept: ONCOLOGY | Age: 58
End: 2023-01-06

## 2023-01-06 ENCOUNTER — PATIENT MESSAGE (OUTPATIENT)
Dept: ONCOLOGY | Age: 58
End: 2023-01-06

## 2023-01-06 DIAGNOSIS — K13.79 MOUTH PAIN: Primary | ICD-10-CM

## 2023-01-06 DIAGNOSIS — C64.2 RENAL CELL CARCINOMA OF LEFT KIDNEY (HCC): ICD-10-CM

## 2023-01-06 LAB
ALBUMIN SERPL-MCNC: 3.8 G/DL (ref 3.5–5)
ALBUMIN/GLOB SERPL: 1.1 (ref 0.4–1.6)
ALP SERPL-CCNC: 94 U/L (ref 50–136)
ALT SERPL-CCNC: 26 U/L (ref 12–65)
ANION GAP SERPL CALC-SCNC: 7 MMOL/L (ref 2–11)
AST SERPL-CCNC: 12 U/L (ref 15–37)
BASOPHILS # BLD: 0 K/UL (ref 0–0.2)
BASOPHILS NFR BLD: 0 % (ref 0–2)
BILIRUB SERPL-MCNC: 0.5 MG/DL (ref 0.2–1.1)
BUN SERPL-MCNC: 7 MG/DL (ref 6–23)
CALCIUM SERPL-MCNC: 9.3 MG/DL (ref 8.3–10.4)
CHLORIDE SERPL-SCNC: 104 MMOL/L (ref 101–110)
CO2 SERPL-SCNC: 25 MMOL/L (ref 21–32)
CREAT SERPL-MCNC: 0.8 MG/DL (ref 0.6–1)
DIFFERENTIAL METHOD BLD: ABNORMAL
EOSINOPHIL # BLD: 0.2 K/UL (ref 0–0.8)
EOSINOPHIL NFR BLD: 2 % (ref 0.5–7.8)
ERYTHROCYTE [DISTWIDTH] IN BLOOD BY AUTOMATED COUNT: 14.3 % (ref 11.9–14.6)
GLOBULIN SER CALC-MCNC: 3.6 G/DL (ref 2.8–4.5)
GLUCOSE SERPL-MCNC: 158 MG/DL (ref 65–100)
HCT VFR BLD AUTO: 50.2 % (ref 35.8–46.3)
HGB BLD-MCNC: 16.2 G/DL (ref 11.7–15.4)
IMM GRANULOCYTES # BLD AUTO: 0 K/UL (ref 0–0.5)
IMM GRANULOCYTES NFR BLD AUTO: 0 % (ref 0–5)
LYMPHOCYTES # BLD: 2.6 K/UL (ref 0.5–4.6)
LYMPHOCYTES NFR BLD: 23 % (ref 13–44)
MCH RBC QN AUTO: 26.3 PG (ref 26.1–32.9)
MCHC RBC AUTO-ENTMCNC: 32.3 G/DL (ref 31.4–35)
MCV RBC AUTO: 81.5 FL (ref 82–102)
MONOCYTES # BLD: 0.7 K/UL (ref 0.1–1.3)
MONOCYTES NFR BLD: 6 % (ref 4–12)
NEUTS SEG # BLD: 7.8 K/UL (ref 1.7–8.2)
NEUTS SEG NFR BLD: 69 % (ref 43–78)
NRBC # BLD: 0 K/UL (ref 0–0.2)
PLATELET # BLD AUTO: 251 K/UL (ref 150–450)
PMV BLD AUTO: 10.4 FL (ref 9.4–12.3)
POTASSIUM SERPL-SCNC: 3.9 MMOL/L (ref 3.5–5.1)
PROT SERPL-MCNC: 7.4 G/DL (ref 6.3–8.2)
RBC # BLD AUTO: 6.16 M/UL (ref 4.05–5.2)
SODIUM SERPL-SCNC: 136 MMOL/L (ref 133–143)
WBC # BLD AUTO: 11.3 K/UL (ref 4.3–11.1)

## 2023-01-06 PROCEDURE — 80053 COMPREHEN METABOLIC PANEL: CPT

## 2023-01-06 PROCEDURE — 36415 COLL VENOUS BLD VENIPUNCTURE: CPT

## 2023-01-06 PROCEDURE — 85025 COMPLETE CBC W/AUTO DIFF WBC: CPT

## 2023-01-06 NOTE — TELEPHONE ENCOUNTER
Reviewed today's labs with Dr. Wendy Whatley. Sore gums reported by pt via [employer] message. MMW sent. No neutropenia, but sore gums can be side effect of axitinib. LVM for pt to return call and also sent detailed [employer] message with further instructions and asked pt to either call office or respond to [employer].

## 2023-01-06 NOTE — TELEPHONE ENCOUNTER
From: EARNEST EDWARDS  To: [facility] Room  Sent: 1/6/2023 10:30 AM EST  Subject: 1/6 update    Dr. Sujatha Lofton reviewed your labs. The gum soreness is a side effect of the axitinib pills. Dr. Sujatha Lofton recommends you stop taking the axitinib today and restart it on Monday 1/9, but decrease the dosage back to 5mg twice a day. Let me know that you got this and if you have any questions. I left you a voicemail too so if you read this first then can disregard the voicemail. Keep your appointments on 1/16. Thank you!     Kj Laws RN

## 2023-01-09 ENCOUNTER — APPOINTMENT (OUTPATIENT)
Dept: CT IMAGING | Age: 58
DRG: 300 | End: 2023-01-09
Payer: COMMERCIAL

## 2023-01-09 ENCOUNTER — HOSPITAL ENCOUNTER (INPATIENT)
Age: 58
LOS: 2 days | Discharge: HOME | DRG: 300 | End: 2023-01-12
Attending: EMERGENCY MEDICINE | Admitting: HOSPITALIST
Payer: COMMERCIAL

## 2023-01-09 ENCOUNTER — APPOINTMENT (OUTPATIENT)
Dept: GENERAL RADIOLOGY | Age: 58
DRG: 300 | End: 2023-01-09
Payer: COMMERCIAL

## 2023-01-09 DIAGNOSIS — I82.C11 ACUTE INTERNAL JUGULAR VEIN THROMBOSIS, RIGHT (HCC): Primary | ICD-10-CM

## 2023-01-09 DIAGNOSIS — D72.829 LEUKOCYTOSIS, UNSPECIFIED TYPE: ICD-10-CM

## 2023-01-09 LAB
ALBUMIN SERPL-MCNC: 3.6 G/DL (ref 3.5–5)
ALBUMIN/GLOB SERPL: 1 (ref 0.4–1.6)
ALP SERPL-CCNC: 85 U/L (ref 50–136)
ALT SERPL-CCNC: 21 U/L (ref 12–65)
ANION GAP SERPL CALC-SCNC: 3 MMOL/L (ref 2–11)
AST SERPL-CCNC: 13 U/L (ref 15–37)
BASOPHILS # BLD: 0 K/UL (ref 0–0.2)
BASOPHILS NFR BLD: 0 % (ref 0–2)
BILIRUB SERPL-MCNC: 0.3 MG/DL (ref 0.2–1.1)
BUN SERPL-MCNC: 8 MG/DL (ref 6–23)
CALCIUM SERPL-MCNC: 9.3 MG/DL (ref 8.3–10.4)
CHLORIDE SERPL-SCNC: 109 MMOL/L (ref 101–110)
CO2 SERPL-SCNC: 26 MMOL/L (ref 21–32)
CREAT SERPL-MCNC: 0.8 MG/DL (ref 0.6–1)
CRP SERPL-MCNC: 4.9 MG/DL (ref 0–0.9)
DIFFERENTIAL METHOD BLD: ABNORMAL
EOSINOPHIL # BLD: 0.2 K/UL (ref 0–0.8)
EOSINOPHIL NFR BLD: 2 % (ref 0.5–7.8)
ERYTHROCYTE [DISTWIDTH] IN BLOOD BY AUTOMATED COUNT: 14.4 % (ref 11.9–14.6)
GLOBULIN SER CALC-MCNC: 3.6 G/DL (ref 2.8–4.5)
GLUCOSE SERPL-MCNC: 136 MG/DL (ref 65–100)
HCT VFR BLD AUTO: 46.3 % (ref 35.8–46.3)
HGB BLD-MCNC: 14.9 G/DL (ref 11.7–15.4)
IMM GRANULOCYTES # BLD AUTO: 0 K/UL (ref 0–0.5)
IMM GRANULOCYTES NFR BLD AUTO: 0 % (ref 0–5)
LYMPHOCYTES # BLD: 3.2 K/UL (ref 0.5–4.6)
LYMPHOCYTES NFR BLD: 24 % (ref 13–44)
MCH RBC QN AUTO: 27 PG (ref 26.1–32.9)
MCHC RBC AUTO-ENTMCNC: 32.2 G/DL (ref 31.4–35)
MCV RBC AUTO: 84 FL (ref 82–102)
MONOCYTES # BLD: 1.1 K/UL (ref 0.1–1.3)
MONOCYTES NFR BLD: 8 % (ref 4–12)
NEUTS SEG # BLD: 9 K/UL (ref 1.7–8.2)
NEUTS SEG NFR BLD: 66 % (ref 43–78)
NRBC # BLD: 0 K/UL (ref 0–0.2)
PLATELET # BLD AUTO: 242 K/UL (ref 150–450)
PMV BLD AUTO: 10.4 FL (ref 9.4–12.3)
POTASSIUM SERPL-SCNC: 3.8 MMOL/L (ref 3.5–5.1)
PROT SERPL-MCNC: 7.2 G/DL (ref 6.3–8.2)
RBC # BLD AUTO: 5.51 M/UL (ref 4.05–5.2)
SODIUM SERPL-SCNC: 138 MMOL/L (ref 133–143)
WBC # BLD AUTO: 13.5 K/UL (ref 4.3–11.1)

## 2023-01-09 PROCEDURE — 96374 THER/PROPH/DIAG INJ IV PUSH: CPT

## 2023-01-09 PROCEDURE — 99285 EMERGENCY DEPT VISIT HI MDM: CPT

## 2023-01-09 PROCEDURE — 71046 X-RAY EXAM CHEST 2 VIEWS: CPT

## 2023-01-09 PROCEDURE — 6360000004 HC RX CONTRAST MEDICATION: Performed by: EMERGENCY MEDICINE

## 2023-01-09 PROCEDURE — 96375 TX/PRO/DX INJ NEW DRUG ADDON: CPT

## 2023-01-09 PROCEDURE — 80053 COMPREHEN METABOLIC PANEL: CPT

## 2023-01-09 PROCEDURE — 96365 THER/PROPH/DIAG IV INF INIT: CPT

## 2023-01-09 PROCEDURE — 86140 C-REACTIVE PROTEIN: CPT

## 2023-01-09 PROCEDURE — 70491 CT SOFT TISSUE NECK W/DYE: CPT

## 2023-01-09 PROCEDURE — 85025 COMPLETE CBC W/AUTO DIFF WBC: CPT

## 2023-01-09 RX ORDER — 0.9 % SODIUM CHLORIDE 0.9 %
1000 INTRAVENOUS SOLUTION INTRAVENOUS ONCE
Status: COMPLETED | OUTPATIENT
Start: 2023-01-09 | End: 2023-01-10

## 2023-01-09 RX ORDER — 0.9 % SODIUM CHLORIDE 0.9 %
100 INTRAVENOUS SOLUTION INTRAVENOUS
Status: DISCONTINUED | OUTPATIENT
Start: 2023-01-09 | End: 2023-01-12 | Stop reason: HOSPADM

## 2023-01-09 RX ORDER — SODIUM CHLORIDE 0.9 % (FLUSH) 0.9 %
10 SYRINGE (ML) INJECTION
Status: DISCONTINUED | OUTPATIENT
Start: 2023-01-09 | End: 2023-01-12 | Stop reason: HOSPADM

## 2023-01-09 RX ADMIN — IOPAMIDOL 100 ML: 755 INJECTION, SOLUTION INTRAVENOUS at 23:19

## 2023-01-09 ASSESSMENT — PAIN SCALES - GENERAL: PAINLEVEL_OUTOF10: 7

## 2023-01-09 ASSESSMENT — PAIN - FUNCTIONAL ASSESSMENT: PAIN_FUNCTIONAL_ASSESSMENT: 0-10

## 2023-01-10 PROBLEM — I82.90 OCCLUSIVE THROMBUS: Status: ACTIVE | Noted: 2023-01-10

## 2023-01-10 LAB
APTT PPP: 30.7 SEC (ref 24.5–34.2)
INR PPP: 1
LACTATE SERPL-SCNC: 0.6 MMOL/L (ref 0.4–2)
LACTATE SERPL-SCNC: 0.7 MMOL/L (ref 0.4–2)
PROCALCITONIN SERPL-MCNC: <0.05 NG/ML (ref 0–0.49)
PROTHROMBIN TIME: 14 SEC (ref 12.6–14.3)
UFH PPP CHRO-ACNC: 0.68 IU/ML (ref 0.3–0.7)
UFH PPP CHRO-ACNC: 0.8 IU/ML (ref 0.3–0.7)

## 2023-01-10 PROCEDURE — 2580000003 HC RX 258: Performed by: EMERGENCY MEDICINE

## 2023-01-10 PROCEDURE — 84145 PROCALCITONIN (PCT): CPT

## 2023-01-10 PROCEDURE — 36415 COLL VENOUS BLD VENIPUNCTURE: CPT

## 2023-01-10 PROCEDURE — 87040 BLOOD CULTURE FOR BACTERIA: CPT

## 2023-01-10 PROCEDURE — 85610 PROTHROMBIN TIME: CPT

## 2023-01-10 PROCEDURE — 85730 THROMBOPLASTIN TIME PARTIAL: CPT

## 2023-01-10 PROCEDURE — 1100000000 HC RM PRIVATE

## 2023-01-10 PROCEDURE — 83605 ASSAY OF LACTIC ACID: CPT

## 2023-01-10 PROCEDURE — 6360000002 HC RX W HCPCS: Performed by: EMERGENCY MEDICINE

## 2023-01-10 PROCEDURE — 85520 HEPARIN ASSAY: CPT

## 2023-01-10 PROCEDURE — 2580000003 HC RX 258: Performed by: HOSPITALIST

## 2023-01-10 PROCEDURE — 6370000000 HC RX 637 (ALT 250 FOR IP): Performed by: INTERNAL MEDICINE

## 2023-01-10 PROCEDURE — 6360000002 HC RX W HCPCS: Performed by: HOSPITALIST

## 2023-01-10 PROCEDURE — 6370000000 HC RX 637 (ALT 250 FOR IP): Performed by: HOSPITALIST

## 2023-01-10 RX ORDER — ACETAMINOPHEN 650 MG/1
650 SUPPOSITORY RECTAL EVERY 6 HOURS PRN
Status: DISCONTINUED | OUTPATIENT
Start: 2023-01-10 | End: 2023-01-12 | Stop reason: HOSPADM

## 2023-01-10 RX ORDER — HEPARIN SODIUM 10000 [USP'U]/100ML
5-30 INJECTION, SOLUTION INTRAVENOUS CONTINUOUS
Status: DISCONTINUED | OUTPATIENT
Start: 2023-01-10 | End: 2023-01-11

## 2023-01-10 RX ORDER — MAGNESIUM HYDROXIDE/ALUMINUM HYDROXICE/SIMETHICONE 120; 1200; 1200 MG/30ML; MG/30ML; MG/30ML
30 SUSPENSION ORAL EVERY 6 HOURS PRN
Status: DISCONTINUED | OUTPATIENT
Start: 2023-01-10 | End: 2023-01-12 | Stop reason: HOSPADM

## 2023-01-10 RX ORDER — SODIUM CHLORIDE 0.9 % (FLUSH) 0.9 %
5-40 SYRINGE (ML) INJECTION PRN
Status: DISCONTINUED | OUTPATIENT
Start: 2023-01-10 | End: 2023-01-12 | Stop reason: HOSPADM

## 2023-01-10 RX ORDER — SODIUM CHLORIDE 0.9 % (FLUSH) 0.9 %
5-40 SYRINGE (ML) INJECTION EVERY 12 HOURS SCHEDULED
Status: DISCONTINUED | OUTPATIENT
Start: 2023-01-10 | End: 2023-01-12 | Stop reason: HOSPADM

## 2023-01-10 RX ORDER — HEPARIN SODIUM 1000 [USP'U]/ML
80 INJECTION, SOLUTION INTRAVENOUS; SUBCUTANEOUS ONCE
Status: COMPLETED | OUTPATIENT
Start: 2023-01-10 | End: 2023-01-10

## 2023-01-10 RX ORDER — MORPHINE SULFATE 4 MG/ML
4 INJECTION, SOLUTION INTRAMUSCULAR; INTRAVENOUS
Status: COMPLETED | OUTPATIENT
Start: 2023-01-10 | End: 2023-01-10

## 2023-01-10 RX ORDER — ONDANSETRON 4 MG/1
4 TABLET, ORALLY DISINTEGRATING ORAL EVERY 8 HOURS PRN
Status: DISCONTINUED | OUTPATIENT
Start: 2023-01-10 | End: 2023-01-12 | Stop reason: HOSPADM

## 2023-01-10 RX ORDER — HEPARIN SODIUM 1000 [USP'U]/ML
80 INJECTION, SOLUTION INTRAVENOUS; SUBCUTANEOUS PRN
Status: DISCONTINUED | OUTPATIENT
Start: 2023-01-10 | End: 2023-01-11

## 2023-01-10 RX ORDER — ONDANSETRON 2 MG/ML
4 INJECTION INTRAMUSCULAR; INTRAVENOUS
Status: COMPLETED | OUTPATIENT
Start: 2023-01-10 | End: 2023-01-10

## 2023-01-10 RX ORDER — ACETAMINOPHEN 325 MG/1
650 TABLET ORAL EVERY 6 HOURS PRN
Status: DISCONTINUED | OUTPATIENT
Start: 2023-01-10 | End: 2023-01-12 | Stop reason: HOSPADM

## 2023-01-10 RX ORDER — ONDANSETRON 2 MG/ML
4 INJECTION INTRAMUSCULAR; INTRAVENOUS EVERY 6 HOURS PRN
Status: DISCONTINUED | OUTPATIENT
Start: 2023-01-10 | End: 2023-01-12 | Stop reason: HOSPADM

## 2023-01-10 RX ORDER — NICOTINE 21 MG/24HR
1 PATCH, TRANSDERMAL 24 HOURS TRANSDERMAL DAILY
Status: DISCONTINUED | OUTPATIENT
Start: 2023-01-10 | End: 2023-01-12 | Stop reason: HOSPADM

## 2023-01-10 RX ORDER — SERTRALINE HYDROCHLORIDE 100 MG/1
100 TABLET, FILM COATED ORAL NIGHTLY
COMMUNITY

## 2023-01-10 RX ORDER — POLYETHYLENE GLYCOL 3350 17 G/17G
17 POWDER, FOR SOLUTION ORAL DAILY PRN
Status: DISCONTINUED | OUTPATIENT
Start: 2023-01-10 | End: 2023-01-12 | Stop reason: HOSPADM

## 2023-01-10 RX ORDER — HEPARIN SODIUM 1000 [USP'U]/ML
40 INJECTION, SOLUTION INTRAVENOUS; SUBCUTANEOUS PRN
Status: DISCONTINUED | OUTPATIENT
Start: 2023-01-10 | End: 2023-01-11

## 2023-01-10 RX ORDER — SODIUM CHLORIDE 9 MG/ML
INJECTION, SOLUTION INTRAVENOUS PRN
Status: DISCONTINUED | OUTPATIENT
Start: 2023-01-10 | End: 2023-01-12 | Stop reason: HOSPADM

## 2023-01-10 RX ADMIN — HEPARIN SODIUM 17.5 UNITS/KG/HR: 10000 INJECTION, SOLUTION INTRAVENOUS at 15:01

## 2023-01-10 RX ADMIN — SERTRALINE 50 MG: 50 TABLET, FILM COATED ORAL at 09:25

## 2023-01-10 RX ADMIN — SODIUM CHLORIDE, PRESERVATIVE FREE 5 ML: 5 INJECTION INTRAVENOUS at 10:28

## 2023-01-10 RX ADMIN — MORPHINE SULFATE 4 MG: 4 INJECTION INTRAVENOUS at 01:32

## 2023-01-10 RX ADMIN — CEFEPIME 2000 MG: 2 INJECTION, POWDER, FOR SOLUTION INTRAVENOUS at 01:32

## 2023-01-10 RX ADMIN — HEPARIN SODIUM 18 UNITS/KG/HR: 10000 INJECTION, SOLUTION INTRAVENOUS at 01:40

## 2023-01-10 RX ADMIN — ONDANSETRON 4 MG: 2 INJECTION INTRAMUSCULAR; INTRAVENOUS at 01:33

## 2023-01-10 RX ADMIN — Medication 2500 MG: at 03:24

## 2023-01-10 RX ADMIN — SODIUM CHLORIDE 1000 ML: 9 INJECTION, SOLUTION INTRAVENOUS at 01:31

## 2023-01-10 RX ADMIN — HEPARIN SODIUM 8240 UNITS: 1000 INJECTION INTRAVENOUS; SUBCUTANEOUS at 01:40

## 2023-01-10 RX ADMIN — SODIUM CHLORIDE, PRESERVATIVE FREE 10 ML: 5 INJECTION INTRAVENOUS at 21:39

## 2023-01-10 ASSESSMENT — ENCOUNTER SYMPTOMS
VOMITING: 0
NAUSEA: 0
RHINORRHEA: 0
SHORTNESS OF BREATH: 0
DIARRHEA: 0
COUGH: 0
ABDOMINAL PAIN: 0
COLOR CHANGE: 0

## 2023-01-10 ASSESSMENT — PAIN SCALES - GENERAL
PAINLEVEL_OUTOF10: 0
PAINLEVEL_OUTOF10: 0

## 2023-01-10 NOTE — CONSULTS
Department of Interventional Radiology  [phone]        Consult Note     Patient: Jose A Rojas MRN: [id number]  SSN: [id number]    YOB: 1965  Age: 61 y.o. Sex: female      Referring Physician: Dr. Mingo Sparks Date: 1/10/2023     Subjective:     Chief Complaint: right neck pain    History of Present Illness: Jose A Rojas is a 61 y.o. female who is seen in consultation for evaluation of right IJ DVT. She presented to the ED yesterday with acute onset of right neck pain. CT scan reveals occlusive thrombus in the right IJ, the EJ is patent, no soft tissue edema. Hx significant for renal cell carcinoma, right IJ chest port placed 11/30/22, tobacco abuse. Her chest port was successfully used on 12/26 for infusion. Heparin is infusing. She denies facial or neck swelling. No prior DVT. Past Medical History:   Diagnosis Date    Anxiety 2000    Headache 1999    Sleep apnea 2002     Past Surgical History:   Procedure Laterality Date    CHOLECYSTECTOMY  2003    IR PORT PLACEMENT EQUAL OR GREATER THAN 5 YEARS  11/30/2022    IR PORT PLACEMENT EQUAL OR GREATER THAN 5 YEARS 11/30/2022 [facility] RADIOLOGY SPECIALS    TUBAL LIGATION  3/91      Family History   Problem Relation Age of Onset    Diabetes Mother     High Blood Pressure Mother     High Cholesterol Mother     COPD Mother     Cancer Father         prostate    Diabetes Sister     Stroke Sister     High Cholesterol Brother     Breast Cancer Paternal Aunt      Social History     Tobacco Use    Smoking status: Every Day     Packs/day: 1.50     Years: 15.00     Pack years: 22.50     Types: Cigarettes    Smokeless tobacco: Never   Substance Use Topics    Alcohol use:  Yes     Alcohol/week: 2.0 standard drinks     Types: 2 Shots of liquor per week      Allergies   Allergen Reactions    Latex Hives, Itching and Shortness Of Breath    Bee Venom Shortness Of Breath and Hives    Penicillins Anaphylaxis    Sulfa Antibiotics Hives, Itching and Rash Penicillin G     Sulfamethoxazole-Trimethoprim Hives     Current Facility-Administered Medications   Medication Dose Route Frequency Provider Last Rate Last Admin    heparin (porcine) injection 8,240 Units  80 Units/kg IntraVENous PRN Samina Ontiveros MD        heparin (porcine) injection 4,120 Units  40 Units/kg IntraVENous PRN Samina Ontiveros MD        heparin 25,000 units in dextrose 5% 250 mL (premix) infusion  5-30 Units/kg/hr IntraVENous Continuous Samina Ontiveros MD 18.5 mL/hr at 01/10/23 0140 18 Units/kg/hr at 01/10/23 0140    sertraline (ZOLOFT) tablet 50 mg  50 mg Oral Daily Samina Ontiveros MD        sodium chloride flush 0.9 % injection 5-40 mL  5-40 mL IntraVENous 2 times per day Samina Ontiveros MD        sodium chloride flush 0.9 % injection 5-40 mL  5-40 mL IntraVENous PRN Samina Ontiveros MD        0.9 % sodium chloride infusion   IntraVENous PRN Samina Ontiveros MD        ondansetron (ZOFRAN-ODT) disintegrating tablet 4 mg  4 mg Oral Q8H PRN Samina Ontiveros MD        Or    ondansetron [facility]) injection 4 mg  4 mg IntraVENous Q6H PRN Samina Ontiveros MD        polyethylene glycol (GLYCOLAX) packet 17 g  17 g Oral Daily PRN Samina Ontiveros MD        aluminum & magnesium hydroxide-simethicone (MAALOX) 200-200-20 MG/5ML suspension 30 mL  30 mL Oral Q6H PRN Samina Ontiveros MD        acetaminophen (TYLENOL) tablet 650 mg  650 mg Oral Q6H PRN Samina Ontiveros MD        Or    acetaminophen (TYLENOL) suppository 650 mg  650 mg Rectal Q6H PRN Samina Ontiveros MD        0.9 % sodium chloride bolus  100 mL IntraVENous ONCE PRN Maurizio Concepcion MD        sodium chloride flush 0.9 % injection 10 mL  10 mL IntraVENous ONCE PRN Samina Ontiveros MD         Current Outpatient Medications   Medication Sig Dispense Refill    Magic Mouthwash (MIRACLE MOUTHWASH) Swish and spit 5 mLs 4 times daily as needed for Irritation or Pain 240 mL 0    Axitinib (INLYTA) 1 MG TABS Take 2 mg by mouth 2 times daily Add to current 5 mg BID to make total of 7 mg BID. 120 tablet 1    lidocaine-prilocaine (EMLA) 2.5-2.5 % cream Apply topically as needed. 30 g 0    ondansetron (ZOFRAN) 4 MG tablet Take 1 tablet by mouth 3 times daily as needed for Nausea or Vomiting 30 tablet 0    prochlorperazine (COMPAZINE) 10 MG tablet Take 1 tablet by mouth every 6 hours as needed (nausea/vomiting) 120 tablet 3    axitinib (INLYTA) 5 MG tablet Take 1 tablet by mouth in the morning and 1 tablet in the evening. 60 tablet 1    sertraline (ZOLOFT) 50 MG tablet Take 0.5 tablets by mouth daily for 6 days, THEN 1 tablet daily for 14 days, THEN 2 tablets daily for 14 days. 45 tablet 5    Acetaminophen (TYLENOL) 325 MG CAPS As needed      EPINEPHrine (EPIPEN 2-MARY IJ) Inject as directed           Not in a hospital admission. Allergies   Allergen Reactions    Latex Hives, Itching and Shortness Of Breath    Bee Venom Shortness Of Breath and Hives    Penicillins Anaphylaxis    Sulfa Antibiotics Hives, Itching and Rash    Penicillin G     Sulfamethoxazole-Trimethoprim Hives       Review of Systems:  Pertinent items are noted in HPI. Objective:     Physical Exam:  Vitals:    01/09/23 2319 01/10/23 0132 01/10/23 0617 01/10/23 0637   BP:   (!) 157/74 130/77   Pulse:       Resp:  20     Temp:       TempSrc:       SpO2:   96%    Weight: 227 lb (103 kg)           Pain Assessment                    Pain Level: 7     Right neck  Interv: MAR  Goal 0                              Gen: pt was sleeping on my arrival, but easily aroused, no facial edema  Right chest port site benign, well healed incision.     HEART: regular rate and rhythm  LUNG: clear to auscultation bilaterally  ABDOMEN: normal findings: soft, non-tender  EXTREMITIES: warm, no edema    Lab/Data Review:    CBC:   Lab Results   Component Value Date/Time    WBC 13.5 01/09/2023 09:37 PM    RBC 5.51 01/09/2023 09:37 PM    HGB 14.9 01/09/2023 09:37 PM    HCT 46.3 01/09/2023 09:37 PM    MCV 84.0 01/09/2023 09:37 PM    RDW 14.4 01/09/2023 09:37 PM  01/09/2023 09:37 PM     BMP:    Lab Results   Component Value Date/Time     01/09/2023 09:37 PM    K 3.8 01/09/2023 09:37 PM     01/09/2023 09:37 PM    CO2 26 01/09/2023 09:37 PM    BUN 8 01/09/2023 09:37 PM     PT/INR:  No results found for: PTINR  PTT:    Lab Results   Component Value Date/Time    APTT 30.7 01/10/2023 01:28 AM         Assessment/Plan:     RCC, right IJ occlusive thrombus, right IJ chest port placed 11/30/22. Localized right neck pain, no swelling. Recommend anticoagulation and leave port in place until it is either no longer necessary or she developed facial/neck swelling. The catheter will likely reduce itself once the thrombus resolved. D/w with the patient and DChina Hernandez MD.    Rudi Hanna PA-C

## 2023-01-10 NOTE — ACP (ADVANCE CARE PLANNING)
Advance Care Planning   Healthcare Decision Maker:    Primary Decision Maker: Romana Rohanslava - [phone]    Click here to complete Healthcare Decision Makers including selection of the Healthcare Decision Maker Relationship (ie \"Primary\"). Today we documented Decision Maker(s) consistent with ACP documents on file.

## 2023-01-10 NOTE — ED NOTES
Pt arrives via POV coming from home c/o possible blood clot on R side of neck. Pt recently had port placed and states her neck is no red and tender. Pt states the pain has gotten worse over the last day. No other complaints at time of triage.       Torrey Miller RN  01/09/23 3197

## 2023-01-10 NOTE — ED PROVIDER NOTES
Emergency Department Provider Note                   PCP:                Josafat Mason MD               Age: 61 y.o. Sex: female       ICD-10-CM    1. Acute internal jugular vein thrombosis, right ([address]  I82. C11       2. Leukocytosis, unspecified type  D72.829           DISPOSITION Decision To Admit 01/10/2023 12:26:53 AM        Medical Decision Making  74-year-old female w/ hx of RCC presents with complaint of worsening right-sided neck pain over the past several days localized on site where Port-A-Cath was placed. Patient reports fever of 101 F (oral) yesterday. Afebrile here. Chest x-ray negative for any consolidation. Patient noted to have leukocytosis with white blood cell count of 13.5. CRP elevated at 4.9. Blood cultures and lactic acid added on. Vancomycin & Cefepime ordered. CT soft tissue neck ultimately with the tip terminating in the SVC. There is looping of the port catheter in the internal jugular vein, prior to the tip extending into the SVC. There is large occlusive thrombus in the right internal jugular vein, just superior to the area where the chest port catheter is looped/twisted. CT reviewed by myself. Given CT findings, interventional radiology consulted as they were the team that placed catheter. Orders placed for heparin bolus and infusion. Discussed case with Dr. Britt Garrett (Interventional Radiologist) on call who is in agreement with Heparin and Hospitalist admission. States Port will need to be pulled. Case discussed with hospitalist, Dr. Demetrius Marin, in regards to admission. Problems Addressed:  Acute internal jugular vein thrombosis, right ([facility] [address]): acute illness or injury  Leukocytosis, unspecified type: acute illness or injury    Amount and/or Complexity of Data Reviewed  Labs: ordered. Decision-making details documented in ED Course. Radiology: ordered and independent interpretation performed. Decision-making details documented in ED Course.     Risk  Prescription drug management. Decision regarding hospitalization. Critical Care  Total time providing critical care: 30-74 minutes     Complexity of Problem:1 acute or chronic illness that poses a threat to life or bodily function. (5)    I have conducted an independent ordering and review of Labs. I have conducted an independent ordering and review of X-rays. I have conducted an independent ordering and review of CT Scan. I discussed disposition with another provider. Patient was admitted I have communication with admitting physician. I have had a discussion with external consultants. ED Course as of 01/10/23 0050   Mon Jan 09, 2023   2301 WBC(!): 13.5 [DF]   2316 CRP(!): 4.9 [DF]   2317 2 view Chest X-ray FINDINGS:  The heart size is upper limits of normal.  A right-sided chest port  is present with catheter tip in the SVC. Multiple bilateral pulmonary nodules  are present compatible with metastatic disease. There is no pneumothorax,  consolidation or pleural effusions. Pulmonary vascularity is normal.     IMPRESSION: No consolidation [DF]   Tue Abelardo 10, 2023   0022 CT soft tissue neck w/ IV contrast 1. Right chest port is present through the right internal jugular approach and  the tip terminating in the SVC. There is looping of the port catheter in the  internal jugular vein, prior to the tip extending into the SVC. There is large  occlusive thrombus in the right internal jugular vein, just superior to the area  where the chest port catheter is looped/twisted.     [DF]      ED Course User Index  [DF] Maurizio Milligan MD        Orders Placed This Encounter   Procedures    Critical Care    Blood Culture 1    Blood Culture 2    XR CHEST (2 VW)    CT SOFT TISSUE NECK W CONTRAST    CMP    CBC with Auto Differential    C-Reactive Protein    Lactate, Sepsis    Procalcitonin    CBC    APTT    Protime-INR    Anti-Xa, Unfractionated Heparin    Saline lock IV        Medications   0.9 % sodium chloride bolus (has no administration in time range)   sodium chloride flush 0.9 % injection 10 mL (has no administration in time range)   0.9 % sodium chloride bolus (has no administration in time range)   vancomycin (VANCOCIN) 2500 mg in sodium chloride 0.9 % 500 mL IVPB (has no administration in time range)   cefepime (MAXIPIME) 2,000 mg in sodium chloride 0.9 % 50 mL IVPB mini-bag (has no administration in time range)   morphine sulfate (PF) injection 4 mg (has no administration in time range)   ondansetron (ZOFRAN) injection 4 mg (has no administration in time range)   heparin (porcine) injection 8,240 Units (has no administration in time range)   heparin (porcine) injection 8,240 Units (has no administration in time range)   heparin (porcine) injection 4,120 Units (has no administration in time range)   heparin 25,000 units in dextrose 5% 250 mL (premix) infusion (has no administration in time range)   iopamidol (ISOVUE-370) 76 % injection 100 mL (100 mLs IntraVENous Given 1/9/23 4244)       New Prescriptions    No medications on file        Pamela Barclay is a 61 y.o. female who presents to the Emergency Department with chief complaint of R sided neck pain. Chief Complaint   Patient presents with    Neck Swelling      62-year-old female with history of RCC of L kidney, mass of right adrenal gland, anxiety, status post Port-A-Cath placement on 11/30/22 presents with complaint of worsening tenderness and swelling noted to right neck region that is worsened over the past several days. Reports subjective fever and chills. Denies any significant swelling noted to right side of her neck. Denies any significant pain with swallowing. Denies voice change, nausea, vomiting, abdominal pain, chest pain. Patient states that her Port-A-Cath is only accessed 1 time; 12/26 for initial treatment. States that her oncologist is Dr. Rika Kemp. The history is provided by the patient. No  was used.        Review of Systems   Constitutional:  Positive for chills and fever. Negative for diaphoresis and fatigue. HENT:  Negative for congestion and rhinorrhea. Respiratory:  Negative for cough and shortness of breath. Cardiovascular:  Negative for chest pain, palpitations and leg swelling. Gastrointestinal:  Negative for abdominal pain, diarrhea, nausea and vomiting. Genitourinary:  Negative for dysuria and flank pain. Musculoskeletal:  Positive for myalgias and neck pain. Negative for neck stiffness. Skin:  Positive for rash. Negative for color change. Neurological:  Negative for dizziness, weakness, light-headedness and headaches. Hematological:  Does not bruise/bleed easily. Psychiatric/Behavioral:  Negative for confusion. Past Medical History:   Diagnosis Date    Anxiety 2000    Headache 1999    Sleep apnea 2002        Past Surgical History:   Procedure Laterality Date    CHOLECYSTECTOMY  2003    IR PORT PLACEMENT EQUAL OR GREATER THAN 5 YEARS  11/30/2022    IR PORT PLACEMENT EQUAL OR GREATER THAN 5 YEARS 11/30/2022 [facility]    TUBAL LIGATION  3/91        Family History   Problem Relation Age of Onset    Diabetes Mother     High Blood Pressure Mother     High Cholesterol Mother     COPD Mother     Cancer Father         prostate    Diabetes Sister     Stroke Sister     High Cholesterol Brother     Breast Cancer Paternal Aunt         Social History     Socioeconomic History    Marital status:    Occupational History    Occupation: CNA     Employer: [employer]   Tobacco Use    Smoking status: Every Day     Packs/day: 1.50     Years: 15.00     Pack years: 22.50     Types: Cigarettes    Smokeless tobacco: Never   Substance and Sexual Activity    Alcohol use:  Yes     Alcohol/week: 2.0 standard drinks     Types: 2 Shots of liquor per week    Drug use: Never    Sexual activity: Not Currently     Partners: Male     Social Determinants of Health     Physical Activity: Unknown    Days of Exercise per Week: Patient refused   Intimate Partner Violence: Not At Risk    Fear of Current or Ex-Partner: No    Emotionally Abused: No    Physically Abused: No    Sexually Abused: No         Latex, Bee venom, Penicillins, Sulfa antibiotics, Penicillin g, and Sulfamethoxazole-trimethoprim     Previous Medications    ACETAMINOPHEN (TYLENOL) 325 MG CAPS    As needed    AXITINIB (INLYTA) 1 MG TABS    Take 2 mg by mouth 2 times daily Add to current 5 mg BID to make total of 7 mg BID. AXITINIB (INLYTA) 5 MG TABLET    Take 1 tablet by mouth in the morning and 1 tablet in the evening. EPINEPHRINE (EPIPEN 2-MARY IJ)    Inject as directed    LIDOCAINE-PRILOCAINE (EMLA) 2.5-2.5 % CREAM    Apply topically as needed. MAGIC MOUTHWASH (MIRACLE MOUTHWASH)    Swish and spit 5 mLs 4 times daily as needed for Irritation or Pain    ONDANSETRON (ZOFRAN) 4 MG TABLET    Take 1 tablet by mouth 3 times daily as needed for Nausea or Vomiting    PROCHLORPERAZINE (COMPAZINE) 10 MG TABLET    Take 1 tablet by mouth every 6 hours as needed (nausea/vomiting)    SERTRALINE (ZOLOFT) 50 MG TABLET    Take 0.5 tablets by mouth daily for 6 days, THEN 1 tablet daily for 14 days, THEN 2 tablets daily for 14 days. Vitals signs and nursing note reviewed. Patient Vitals for the past 4 hrs:   Temp Pulse Resp BP SpO2   01/09/23 2210 98.2 °F (36.8 °C) 71 18 (!) 150/86 96 %   01/09/23 2107 98.4 °F (36.9 °C) 75 16 (!) 163/89 97 %          Physical Exam  Vitals and nursing note reviewed. Constitutional:       Appearance: Normal appearance. HENT:      Head: Normocephalic. Nose: Nose normal.      Mouth/Throat:      Mouth: Mucous membranes are moist.      Pharynx: No oropharyngeal exudate or posterior oropharyngeal erythema. Eyes:      Extraocular Movements: Extraocular movements intact. Conjunctiva/sclera: Conjunctivae normal.      Pupils: Pupils are equal, round, and reactive to light.    Neck:        Comments: Tenderness noted to right anterior neck with mild erythema present. No fluctuance present. Cardiovascular:      Rate and Rhythm: Normal rate. Pulses: Normal pulses. Heart sounds: Normal heart sounds. Comments: Pulses 2+ and equal throughout. Pulmonary:      Effort: Pulmonary effort is normal.      Breath sounds: Normal breath sounds. Chest:          Comments: Port noted to right chest wall with minimal edema. No significant overlying erythema or purulent drainage noted. Abdominal:      Palpations: Abdomen is soft. Tenderness: There is no abdominal tenderness. There is no guarding or rebound. Musculoskeletal:         General: No deformity. Normal range of motion. Cervical back: Normal range of motion. No rigidity. Skin:     General: Skin is warm. Findings: Erythema and rash present. Neurological:      General: No focal deficit present. Mental Status: She is alert and oriented to person, place, and time. Cranial Nerves: No cranial nerve deficit. Sensory: No sensory deficit. Motor: No weakness. Psychiatric:         Mood and Affect: Mood normal.         Behavior: Behavior normal.        Critical Care  Performed by: Jose Medrano MD  Authorized by: Jose Medrano MD     Critical care provider statement:     Critical care time (minutes):  40    Critical care time was exclusive of:  Separately billable procedures and treating other patients    Critical care was necessary to treat or prevent imminent or life-threatening deterioration of the following conditions: Acute occlussive thrombus in RIJ vein.     Critical care was time spent personally by me on the following activities:  Discussions with consultants, discussions with primary provider, evaluation of patient's response to treatment, examination of patient, obtaining history from patient or surrogate, review of old charts, re-evaluation of patient's condition, pulse oximetry, ordering and review of radiographic studies, ordering and review of laboratory studies, ordering and performing treatments and interventions and development of treatment plan with patient or surrogate    I assumed direction of critical care for this patient from another provider in my specialty: no      Care discussed with: admitting provider      Results for orders placed or performed during the hospital encounter of 01/09/23   XR CHEST (2 VW)    Narrative    PA LATERAL CHEST  1/9/2023 9:27 PM     HISTORY:  R port issue  ;    COMPARISON: Chest CT 11/7/2022    FINDINGS:  The heart size is upper limits of normal.  A right-sided chest port  is present with catheter tip in the SVC. Multiple bilateral pulmonary nodules  are present compatible with metastatic disease. There is no pneumothorax,  consolidation or pleural effusions. Pulmonary vascularity is normal.      Impression    No consolidation. CT SOFT TISSUE NECK W CONTRAST    Narrative    CT of the neck     COMPARISON: None    INDICATION: Right-sided neck pain and erythema. Status post right port placement    TECHNIQUE: Multiple sequential axial images from the lung apices to the level of  the orbits obtained with 100 mL of Isovue 370. Coronal and sagittal reformats  were obtained. Radiation dose reduction techniques were used for this study:   Our CT scanners use one or all of the following: Automated exposure control,  adjustment of the mA and/or kVp according to patient's size, iterative  reconstruction. FINDINGS:    There is a right-sided chest port with the tip in the superior vena cava. There  is looping of the catheter in the internal jugular vein before the tip extends  into the superior vena cava. There is large occlusive thrombus in the right  internal jugular vein. No thrombus extension into the subclavian vein,  brachiocephalic vein or the SVC. There is no soft tissue mass in the neck. No fluid collection.     The nasopharynx, oropharynx, and hypopharynx are unremarkable. No  retropharyngeal or peritonsillar fluid collection/abscess. Epiglottis is normal.    The thyroid, submandibular, and the parotid glands are unremarkable. No  abnormally enlarged lymph nodes are identified in neck by size criterion    Included globes appear intact. The paranasal sinuses and the mastoid air cells  are aerated. The visualized lung apices are unremarkable. The cervical spine is unremarkable. Impression    1. Right chest port is present through the right internal jugular approach and  the tip terminating in the SVC. There is looping of the port catheter in the  internal jugular vein, prior to the tip extending into the SVC. There is large  occlusive thrombus in the right internal jugular vein, just superior to the area  where the chest port catheter is looped/twisted.        CMP   Result Value Ref Range    Sodium 138 133 - 143 mmol/L    Potassium 3.8 3.5 - 5.1 mmol/L    Chloride 109 101 - 110 mmol/L    CO2 26 21 - 32 mmol/L    Anion Gap 3 2 - 11 mmol/L    Glucose 136 (H) 65 - 100 mg/dL    BUN 8 6 - 23 MG/DL    Creatinine 0.80 0.6 - 1.0 MG/DL    Est, Glom Filt Rate >60 >60 ml/min/1.73m2    Calcium 9.3 8.3 - 10.4 MG/DL    Total Bilirubin 0.3 0.2 - 1.1 MG/DL    ALT 21 12 - 65 U/L    AST 13 (L) 15 - 37 U/L    Alk Phosphatase 85 50 - 136 U/L    Total Protein 7.2 6.3 - 8.2 g/dL    Albumin 3.6 3.5 - 5.0 g/dL    Globulin 3.6 2.8 - 4.5 g/dL    Albumin/Globulin Ratio 1.0 0.4 - 1.6     CBC with Auto Differential   Result Value Ref Range    WBC 13.5 (H) 4.3 - 11.1 K/uL    RBC 5.51 (H) 4.05 - 5.2 M/uL    Hemoglobin 14.9 11.7 - 15.4 g/dL    Hematocrit 46.3 35.8 - 46.3 %    MCV 84.0 82 - 102 FL    MCH 27.0 26.1 - 32.9 PG    MCHC 32.2 31.4 - 35.0 g/dL    RDW 14.4 11.9 - 14.6 %    Platelets 244 699 - 676 K/uL    MPV 10.4 9.4 - 12.3 FL    nRBC 0.00 0.0 - 0.2 K/uL    Differential Type AUTOMATED      Seg Neutrophils 66 43 - 78 %    Lymphocytes 24 13 - 44 %    Monocytes 8 4.0 - 12.0 % Eosinophils % 2 0.5 - 7.8 %    Basophils 0 0.0 - 2.0 %    Immature Granulocytes 0 0.0 - 5.0 %    Segs Absolute 9.0 (H) 1.7 - 8.2 K/UL    Absolute Lymph # 3.2 0.5 - 4.6 K/UL    Absolute Mono # 1.1 0.1 - 1.3 K/UL    Absolute Eos # 0.2 0.0 - 0.8 K/UL    Basophils Absolute 0.0 0.0 - 0.2 K/UL    Absolute Immature Granulocyte 0.0 0.0 - 0.5 K/UL   C-Reactive Protein   Result Value Ref Range    CRP 4.9 (H) 0.0 - 0.9 mg/dL        CT SOFT TISSUE NECK W CONTRAST   Final Result      1. Right chest port is present through the right internal jugular approach and   the tip terminating in the SVC. There is looping of the port catheter in the   internal jugular vein, prior to the tip extending into the SVC. There is large   occlusive thrombus in the right internal jugular vein, just superior to the area   where the chest port catheter is looped/twisted. XR CHEST (2 VW)   Final Result   No consolidation. Voice dictation software was used during the making of this note. This software is not perfect and grammatical and other typographical errors may be present. This note has not been completely proofread for errors.      Suzie Atkinson MD  01/10/23 [id number]

## 2023-01-10 NOTE — CARE COORDINATION
Chart review complete, CM met with pt at bedside, pt found sitting on side of bed, sister at bedside, pt is pending inpt room assignment. NO anticipated DC needs noted at this time. Demographics, insurance and PCP comfirmed. NO anticipated DC needs noted, CM staff will remain available to assist as needed. 01/10/23 1016   Service Assessment   Patient Orientation Alert and Oriented   Cognition Alert   History Provided By Patient   Primary Caregiver Self   Accompanied By/Relationship sister [address] Members   Patient's Healthcare Decision Maker is: Legal Next of Mary Alice Bragg   PCP Verified by CM Yes  (Dr James Bautista last visit October 2022)   Last Visit to PCP Within last 6 months   Prior Functional Level Independent in ADLs/IADLs   Current Functional Level Independent in ADLs/IADLs   Can patient return to prior living arrangement Yes   Ability to make needs known: Good   Family able to assist with home care needs: Yes   Would you like for me to discuss the discharge plan with any other family members/significant others, and if so, who? No   Financial Resources Other (Comment)  ([employer] [place])   Community Resources None   CM/SW Referral Other (see comment)  (NA)   Social/Functional History   Lives With Alone   Type of Home Mobile home   Home Layout One level   Home Access Stairs to enter with rails   Entrance Stairs - Number of Steps 3   Bathroom Shower/Tub Tub/Shower unit   Bathroom Toilet Standard   Bathroom Accessibility Accessible   Home Equipment None   Receives Help From Family   ADL Assistance Independent   Ambulation Assistance Independent   Transfer Assistance Independent   Active  Yes   Mode of Transportation Car   Occupation Full time employment; 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 No   Potential Assistance Purchasing Medications No   Type of Home Care Services None   Patient expects to be discharged to: Trailer/mobile home   One/Two Story Residence One story   History of falls?  0   Services At/After Discharge   Transition of Care Consult (CM Consult) N/A   Services At/After Discharge None

## 2023-01-10 NOTE — ED NOTES
Report given to 5th floor RN. Transfer of care at this time.       Earl Mcclellan, EARNEST  01/10/23 [id number]

## 2023-01-10 NOTE — H&P
Hospitalist History and Physical   Admit Date:  2023  9:58 PM   Name:  Christie Lomax   Age:  61 y.o. Sex:  female  :  1965   MRN:  [id number]   Room:  [place] [address]    Presenting Complaint: Neck Swelling     Reason(s) for Admission: Occlusive thrombus [I82.90]       Assessment & Plan:     Principal Problem:    Occlusive thrombus Right Internal Jugular Vein -43-year-old woman with history of renal cell carcinoma of the kidney, recent port placement for chemotherapy, now with occlusive thrombus right internal jugular vein. ER MD has contacted interventional radiology and patient will be admitted for IV heparin and port removal.    Plan:   Patient will be admitted to a regular medical bed inpatient status. Will continue IV heparin that was started in the emergency room. Interventional radiology consult to Dr. Venecia Cali. Resume home medications. Scheduling of port removal per Dr. Venecia Cali. Active Problems:    Renal cell carcinoma of left kidney (HCC)  Plan:       Anticipated discharge needs:     NONE    Diet: regular  VTE ppx: lovenox  Code status: FULL      History of Present Illness:   Christie Lomax is a 61 y.o. female with medical history of with history of RCC of  kidney, mass of right adrenal gland, anxiety, status post Port-A-Cath placement on 22 presents with complaint of worsening tenderness and swelling noted to right neck region that is worsened over the past several days. Reports subjective fever and chills. Denies any significant swelling noted to right side of her neck. Denies any significant pain with swallowing. Denies voice change, nausea, vomiting, abdominal pain, chest pain. Patient states that her Port-A-Cath is only accessed 1 time;  for initial treatment. States that her oncologist is Dr. Cele Marquez.      Work-up in the emergency room included a CT scan soft tissue neck with contrast.  This showed a large occlusive thrombus in the right internal jugular vein just superior to the area where the chest port catheter is looped and twisted. Laboratory data is otherwise benign. White blood cell count slightly elevated 13.5 . She did receive an empiric dose of IV vancomycin and cefepime in the emergency room. No fever or tachycardia. Lactic acid normal. CXR NEG    ER MD called interventional radiology Dr. Mary Calderón who is in agreement with heparin. He states that port will have to be pulled. Hospitalist asked to admit. Review of Systems:  10 systems reviewed and negative except as noted in HPI. Past History:     Past Medical History:   Diagnosis Date    Anxiety 2000    Headache 1999    Sleep apnea 2002       Past Surgical History:   Procedure Laterality Date    CHOLECYSTECTOMY  2003    IR PORT PLACEMENT EQUAL OR GREATER THAN 5 YEARS  11/30/2022    IR PORT PLACEMENT EQUAL OR GREATER THAN 5 YEARS 11/30/2022 [facility]    TUBAL LIGATION  3/91        Social History     Tobacco Use    Smoking status: Every Day     Packs/day: 1.50     Years: 15.00     Pack years: 22.50     Types: Cigarettes    Smokeless tobacco: Never   Substance Use Topics    Alcohol use: Yes     Alcohol/week: 2.0 standard drinks     Types: 2 Shots of liquor per week      Social History     Substance and Sexual Activity   Drug Use Never       Family History   Problem Relation Age of Onset    Diabetes Mother     High Blood Pressure Mother     High Cholesterol Mother     COPD Mother     Cancer Father         prostate    Diabetes Sister     Stroke Sister     High Cholesterol Brother     Breast Cancer Paternal Aunt           There is no immunization history on file for this patient.   Allergies   Allergen Reactions    Latex Hives, Itching and Shortness Of Breath    Bee Venom Shortness Of Breath and Hives    Penicillins Anaphylaxis    Sulfa Antibiotics Hives, Itching and Rash    Penicillin G     Sulfamethoxazole-Trimethoprim Hives     Prior to Admit Medications:  Current Outpatient Medications Medication Instructions    Acetaminophen (TYLENOL) 325 MG CAPS As needed    axitinib (INLYTA) 5 mg, Oral, EVERY 12 HOURS    EPINEPHrine (EPIPEN 2-MARY IJ) Injection    Inlyta 2 mg, Oral, 2 TIMES DAILY, Add to current 5 mg BID to make total of 7 mg BID.    lidocaine-prilocaine (EMLA) 2.5-2.5 % cream Apply topically as needed. Magic Mouthwash (MIRACLE MOUTHWASH) 5 mLs, Swish & Spit, 4 TIMES DAILY PRN    ondansetron (ZOFRAN) 4 mg, Oral, 3 TIMES DAILY PRN    prochlorperazine (COMPAZINE) 10 mg, Oral, EVERY 6 HOURS PRN    sertraline (ZOLOFT) 50 MG tablet Take 0.5 tablets by mouth daily for 6 days, THEN 1 tablet daily for 14 days, THEN 2 tablets daily for 14 days. Objective:   Patient Vitals for the past 24 hrs:   Temp Pulse Resp BP SpO2   01/10/23 0132 -- -- 20 -- --   01/09/23 2210 98.2 °F (36.8 °C) 71 18 (!) 150/86 96 %   01/09/23 2107 98.4 °F (36.9 °C) 75 16 (!) 163/89 97 %       Oxygen Therapy  SpO2: 96 %  O2 Device: None (Room air)    Estimated body mass index is 36.09 kg/m² as calculated from the following:    Height as of 12/23/22: 5' 6.5\" (1.689 m). Weight as of this encounter: 227 lb (103 kg). No intake or output data in the 24 hours ending 01/10/23 0152      Physical Exam:    Blood pressure (!) 150/86, pulse 71, temperature 98.2 °F (36.8 °C), resp. rate 20, weight 227 lb (103 kg), SpO2 96 %. General:    Well nourished. Alert and oriented x3, no acute distress. Head:  Normocephalic, atraumatic  Eyes:  Sclerae appear normal.  Pupils equally round. ENT:  Nares appear normal, no drainage. Moist oral mucosa  Neck:  No restricted ROM. Trachea midline . Tenderness noted to right anterior neck with mild erythema present. No fluctuance present  CV:   RRR. No m/r/g. Lungs:   CTAB. No wheezing, rhonchi, or rales. Symmetric expansion. Abdomen: Bowel sounds present. Soft, nontender, nondistended. Extremities: No cyanosis or clubbing. No edema  Skin:     No rashes and normal coloration. Warm and dry. Neuro:  CN II-XII grossly intact. Sensation intact. A&Ox3  Psych:  Normal mood and affect.       I have personally reviewed labs and tests showing:  Recent Labs:  Recent Results (from the past 24 hour(s))   CMP    Collection Time: 01/09/23  9:37 PM   Result Value Ref Range    Sodium 138 133 - 143 mmol/L    Potassium 3.8 3.5 - 5.1 mmol/L    Chloride 109 101 - 110 mmol/L    CO2 26 21 - 32 mmol/L    Anion Gap 3 2 - 11 mmol/L    Glucose 136 (H) 65 - 100 mg/dL    BUN 8 6 - 23 MG/DL    Creatinine 0.80 0.6 - 1.0 MG/DL    Est, Glom Filt Rate >60 >60 ml/min/1.73m2    Calcium 9.3 8.3 - 10.4 MG/DL    Total Bilirubin 0.3 0.2 - 1.1 MG/DL    ALT 21 12 - 65 U/L    AST 13 (L) 15 - 37 U/L    Alk Phosphatase 85 50 - 136 U/L    Total Protein 7.2 6.3 - 8.2 g/dL    Albumin 3.6 3.5 - 5.0 g/dL    Globulin 3.6 2.8 - 4.5 g/dL    Albumin/Globulin Ratio 1.0 0.4 - 1.6     CBC with Auto Differential    Collection Time: 01/09/23  9:37 PM   Result Value Ref Range    WBC 13.5 (H) 4.3 - 11.1 K/uL    RBC 5.51 (H) 4.05 - 5.2 M/uL    Hemoglobin 14.9 11.7 - 15.4 g/dL    Hematocrit 46.3 35.8 - 46.3 %    MCV 84.0 82 - 102 FL    MCH 27.0 26.1 - 32.9 PG    MCHC 32.2 31.4 - 35.0 g/dL    RDW 14.4 11.9 - 14.6 %    Platelets 993 784 - 370 K/uL    MPV 10.4 9.4 - 12.3 FL    nRBC 0.00 0.0 - 0.2 K/uL    Differential Type AUTOMATED      Seg Neutrophils 66 43 - 78 %    Lymphocytes 24 13 - 44 %    Monocytes 8 4.0 - 12.0 %    Eosinophils % 2 0.5 - 7.8 %    Basophils 0 0.0 - 2.0 %    Immature Granulocytes 0 0.0 - 5.0 %    Segs Absolute 9.0 (H) 1.7 - 8.2 K/UL    Absolute Lymph # 3.2 0.5 - 4.6 K/UL    Absolute Mono # 1.1 0.1 - 1.3 K/UL    Absolute Eos # 0.2 0.0 - 0.8 K/UL    Basophils Absolute 0.0 0.0 - 0.2 K/UL    Absolute Immature Granulocyte 0.0 0.0 - 0.5 K/UL   C-Reactive Protein    Collection Time: 01/09/23  9:37 PM   Result Value Ref Range    CRP 4.9 (H) 0.0 - 0.9 mg/dL   Lactate, Sepsis    Collection Time: 01/10/23 12:29 AM   Result Value Ref Range    Lactic Acid, Sepsis 0.6 0.4 - 2.0 MMOL/L       I have personally reviewed imaging studies showing:  XR CHEST (2 VW)    Result Date: 1/9/2023  PA LATERAL CHEST  1/9/2023 9:27 PM HISTORY:  R port issue  ; COMPARISON: Chest CT 11/7/2022 FINDINGS:  The heart size is upper limits of normal.  A right-sided chest port is present with catheter tip in the SVC. Multiple bilateral pulmonary nodules are present compatible with metastatic disease. There is no pneumothorax, consolidation or pleural effusions. Pulmonary vascularity is normal.     No consolidation. CT SOFT TISSUE NECK W CONTRAST    Result Date: 1/10/2023  CT of the neck COMPARISON: None INDICATION: Right-sided neck pain and erythema. Status post right port placement TECHNIQUE: Multiple sequential axial images from the lung apices to the level of the orbits obtained with 100 mL of Isovue 370. Coronal and sagittal reformats were obtained. Radiation dose reduction techniques were used for this study: Our CT scanners use one or all of the following: Automated exposure control, adjustment of the mA and/or kVp according to patient's size, iterative reconstruction. FINDINGS: There is a right-sided chest port with the tip in the superior vena cava. There is looping of the catheter in the internal jugular vein before the tip extends into the superior vena cava. There is large occlusive thrombus in the right internal jugular vein. No thrombus extension into the subclavian vein, brachiocephalic vein or the SVC. There is no soft tissue mass in the neck. No fluid collection. The nasopharynx, oropharynx, and hypopharynx are unremarkable. No retropharyngeal or peritonsillar fluid collection/abscess. Epiglottis is normal. The thyroid, submandibular, and the parotid glands are unremarkable. No abnormally enlarged lymph nodes are identified in neck by size criterion Included globes appear intact. The paranasal sinuses and the mastoid air cells are aerated.  The visualized lung apices are unremarkable. The cervical spine is unremarkable. 1. Right chest port is present through the right internal jugular approach and the tip terminating in the SVC. There is looping of the port catheter in the internal jugular vein, prior to the tip extending into the SVC. There is large occlusive thrombus in the right internal jugular vein, just superior to the area where the chest port catheter is looped/twisted. Echocardiogram:  12/12/22    TRANSTHORACIC ECHOCARDIOGRAM (TTE) COMPLETE (CONTRAST/BUBBLE/3D PRN) 12/12/2022  5:57 PM (Final)    Interpretation Summary    Left Ventricle: Normal left ventricular systolic function with a visually estimated EF of 55 - 60%. Left ventricle size is normal. Mildly increased wall thickness. Global longitudinal strain is normal with a value of -18.1%. Signed by: Daya Mckay MD on 12/12/2022  5:57 PM        Orders Placed This Encounter   Medications    0.9 % sodium chloride bolus    sodium chloride flush 0.9 % injection 10 mL    iopamidol (ISOVUE-370) 76 % injection 100 mL    0.9 % sodium chloride bolus    vancomycin (VANCOCIN) 2500 mg in sodium chloride 0.9 % 500 mL IVPB     Order Specific Question:   Antimicrobial Indications     Answer: Other     Order Specific Question:   Other Abx Indication     Answer: Suspected Sepsis of Skin or Soft Tissue Origin    cefepime (MAXIPIME) 2,000 mg in sodium chloride 0.9 % 50 mL IVPB mini-bag     Order Specific Question:   Antimicrobial Indications     Answer:   Sepsis of Unknown Etiology    morphine sulfate (PF) injection 4 mg    ondansetron (ZOFRAN) injection 4 mg    heparin (porcine) injection 8,240 Units    heparin (porcine) injection 8,240 Units    heparin (porcine) injection 4,120 Units    heparin 25,000 units in dextrose 5% 250 mL (premix) infusion         Signed:  Alan Oneal MD    Part of this note may have been written by using a voice dictation software.   The note has been proof read but may still contain some grammatical/other typographical errors.

## 2023-01-11 PROBLEM — I82.C11 ACUTE INTERNAL JUGULAR VEIN THROMBOSIS, RIGHT (HCC): Status: ACTIVE | Noted: 2023-01-11

## 2023-01-11 LAB
ANION GAP SERPL CALC-SCNC: 6 MMOL/L (ref 2–11)
BASOPHILS # BLD: 0 K/UL (ref 0–0.2)
BASOPHILS NFR BLD: 0 % (ref 0–2)
BUN SERPL-MCNC: 7 MG/DL (ref 6–23)
CALCIUM SERPL-MCNC: 8.8 MG/DL (ref 8.3–10.4)
CHLORIDE SERPL-SCNC: 110 MMOL/L (ref 101–110)
CO2 SERPL-SCNC: 25 MMOL/L (ref 21–32)
CREAT SERPL-MCNC: 0.6 MG/DL (ref 0.6–1)
DIFFERENTIAL METHOD BLD: ABNORMAL
EOSINOPHIL # BLD: 0.3 K/UL (ref 0–0.8)
EOSINOPHIL NFR BLD: 3 % (ref 0.5–7.8)
ERYTHROCYTE [DISTWIDTH] IN BLOOD BY AUTOMATED COUNT: 14.3 % (ref 11.9–14.6)
GLUCOSE SERPL-MCNC: 116 MG/DL (ref 65–100)
HCT VFR BLD AUTO: 46.9 % (ref 35.8–46.3)
HGB BLD-MCNC: 15 G/DL (ref 11.7–15.4)
IMM GRANULOCYTES # BLD AUTO: 0 K/UL (ref 0–0.5)
IMM GRANULOCYTES NFR BLD AUTO: 0 % (ref 0–5)
LYMPHOCYTES # BLD: 2.7 K/UL (ref 0.5–4.6)
LYMPHOCYTES NFR BLD: 28 % (ref 13–44)
MCH RBC QN AUTO: 26.9 PG (ref 26.1–32.9)
MCHC RBC AUTO-ENTMCNC: 32 G/DL (ref 31.4–35)
MCV RBC AUTO: 84.1 FL (ref 82–102)
MONOCYTES # BLD: 0.8 K/UL (ref 0.1–1.3)
MONOCYTES NFR BLD: 8 % (ref 4–12)
NEUTS SEG # BLD: 5.9 K/UL (ref 1.7–8.2)
NEUTS SEG NFR BLD: 61 % (ref 43–78)
NRBC # BLD: 0 K/UL (ref 0–0.2)
PLATELET # BLD AUTO: 249 K/UL (ref 150–450)
PMV BLD AUTO: 10.8 FL (ref 9.4–12.3)
POTASSIUM SERPL-SCNC: 4 MMOL/L (ref 3.5–5.1)
RBC # BLD AUTO: 5.58 M/UL (ref 4.05–5.2)
SODIUM SERPL-SCNC: 141 MMOL/L (ref 133–143)
UFH PPP CHRO-ACNC: 0.51 IU/ML (ref 0.3–0.7)
UFH PPP CHRO-ACNC: 0.81 IU/ML (ref 0.3–0.7)
WBC # BLD AUTO: 9.8 K/UL (ref 4.3–11.1)

## 2023-01-11 PROCEDURE — 6360000002 HC RX W HCPCS: Performed by: HOSPITALIST

## 2023-01-11 PROCEDURE — 2580000003 HC RX 258: Performed by: HOSPITALIST

## 2023-01-11 PROCEDURE — 6370000000 HC RX 637 (ALT 250 FOR IP): Performed by: STUDENT IN AN ORGANIZED HEALTH CARE EDUCATION/TRAINING PROGRAM

## 2023-01-11 PROCEDURE — 36415 COLL VENOUS BLD VENIPUNCTURE: CPT

## 2023-01-11 PROCEDURE — 1100000000 HC RM PRIVATE

## 2023-01-11 PROCEDURE — 6370000000 HC RX 637 (ALT 250 FOR IP): Performed by: INTERNAL MEDICINE

## 2023-01-11 PROCEDURE — 6370000000 HC RX 637 (ALT 250 FOR IP): Performed by: HOSPITALIST

## 2023-01-11 PROCEDURE — APPSS45 APP SPLIT SHARED TIME 31-45 MINUTES: Performed by: NURSE PRACTITIONER

## 2023-01-11 PROCEDURE — 80048 BASIC METABOLIC PNL TOTAL CA: CPT

## 2023-01-11 PROCEDURE — 85520 HEPARIN ASSAY: CPT

## 2023-01-11 PROCEDURE — 85025 COMPLETE CBC W/AUTO DIFF WBC: CPT

## 2023-01-11 PROCEDURE — 99222 1ST HOSP IP/OBS MODERATE 55: CPT | Performed by: INTERNAL MEDICINE

## 2023-01-11 RX ORDER — HYDRALAZINE HYDROCHLORIDE 20 MG/ML
10 INJECTION INTRAMUSCULAR; INTRAVENOUS EVERY 6 HOURS PRN
Status: DISCONTINUED | OUTPATIENT
Start: 2023-01-11 | End: 2023-01-12 | Stop reason: HOSPADM

## 2023-01-11 RX ADMIN — APIXABAN 10 MG: 5 TABLET, FILM COATED ORAL at 13:36

## 2023-01-11 RX ADMIN — APIXABAN 10 MG: 5 TABLET, FILM COATED ORAL at 20:42

## 2023-01-11 RX ADMIN — ACETAMINOPHEN 650 MG: 325 TABLET ORAL at 13:36

## 2023-01-11 RX ADMIN — SODIUM CHLORIDE, PRESERVATIVE FREE 10 ML: 5 INJECTION INTRAVENOUS at 20:40

## 2023-01-11 RX ADMIN — SERTRALINE 100 MG: 50 TABLET, FILM COATED ORAL at 20:42

## 2023-01-11 RX ADMIN — HEPARIN SODIUM 17 UNITS/KG/HR: 10000 INJECTION, SOLUTION INTRAVENOUS at 06:03

## 2023-01-11 ASSESSMENT — PAIN SCALES - GENERAL
PAINLEVEL_OUTOF10: 0

## 2023-01-11 NOTE — PROGRESS NOTES
Hospitalist Progress Note   Admit Date:  2023  9:58 PM   Name:  Alcira Jackman   Age:  61 y.o. Sex:  female  :  1965   MRN:  [id number]   Room:  [facility]    Presenting Complaint: Neck Swelling     Reason(s) for Admission: Occlusive thrombus [I82.90]  Acute internal jugular vein thrombosis, right ([address] [O73. C11]  Leukocytosis, unspecified type [facility] Hospital Course:   Patient is a 60 y/o female with medical history of RCC left kidney, R adrenal gland mass, anxiety, tobacco dependence who presented to ED with cc tenderness and swelling to R neck region worsening over past few days with subjective fever and chills. Patient had port placement 22 which was accessed 1 time () for initial treatment; follows Dr. [facility]. ED workup: vitals stable, BP elevated 163/89  Labs notable for WBC 13.5 otherwise unremarkable  Patient received empiric IV Vancomycin and Cefepime in ED although remains afebrile, without tachycardia, normal LA, blood cultures were obtained, CXR negative  CT soft tissue neck w contrast showed large occlusive thrombus in right internal jugular vein superior to where port catheter is looped and twisted. ED MD spoke with IR Dr. Kyra Hayden who agreed with initiation of Heparin gtt and also recommended port removal. Hospitalist consulted for admission. IR consultation inpatient with recommendations to NOT remove port. Continue anticoagulation, catheter will reduce itself when thrombus is resolved, leave port in place until either port is no longer needed or patient develops facial/neck swelling. Oncology consultation for port and anticoagulation recommendations. Anticipate transition to [facility] today with possible discharge home tomorrow. Next Oncology infusion is . Subjective & 24hr Events (23): Patient alert and oriented, ambulating independently in room without difficulty. Denies chest pain. Endorses mild SOB at baseline unchanged.  Endorses improvement in R neck pain and swelling which has almost completely resolved, still some residual pain with movement of neck. Assessment & Plan:     Right IJ occlusive thrombus with present of R IJ chest port placed 11/30/22  -IR consultation as above - leave port in place, continue anticoagulation, monitor for development of facial/neck swelling  -Continue Heparin gtt, anticipate transition to [address] [place] [address] today    Renal cell carcinoma of left kidney, stage IV ([address]  -Follows Dr. Yeison Del Angel outpatient, palliative intent with treatment  -Consult Oncology inpatient for recommendations on port need (per IR) and further [facility] recommendations    Tobacco dependence  -Cont Nicotine patch, encourage cessation    Elevated blood pressure  -Not on home anti-hypertensives, prn hydralazine ordered with parameters, will need PCP follow-up    Leukocytosis, resolved    Discharge planning: home likely tomorrow    Diet:  ADULT DIET; Regular  DVT PPx: Heparin gtt  Code status: Full Code    Hospital Problems:  Principal Problem:    Occlusive thrombus  Active Problems:    Renal cell carcinoma of left kidney (HCC)  Resolved Problems:    * No resolved hospital problems. *      Objective:   Patient Vitals for the past 24 hrs:   Temp Pulse Resp BP SpO2   01/11/23 0301 97.5 °F (36.4 °C) 61 17 (!) 164/84 94 %   01/10/23 2323 97.5 °F (36.4 °C) 64 18 (!) 141/61 92 %   01/10/23 2009 97.7 °F (36.5 °C) 59 16 (!) 163/81 94 %   01/10/23 1500 97.4 °F (36.3 °C) 58 16 (!) 146/68 95 %   01/10/23 1350 -- 59 15 -- 92 %   01/10/23 1030 -- 64 18 (!) 152/79 91 %   01/10/23 1027 -- -- -- (!) 150/90 --   01/10/23 0830 -- 62 21 124/67 93 %   01/10/23 0815 -- 57 19 (!) 141/59 94 %       Oxygen Therapy  SpO2: 94 %  O2 Device: None (Room air)    Estimated body mass index is 36.56 kg/m² as calculated from the following:    Height as of this encounter: 5' 6\" (1.676 m). Weight as of this encounter: 226 lb 8 oz (102.7 kg).     Intake/Output Summary (Last 24 hours) at 1/11/2023 130 Pending sale to [facility] filed at 1/11/2023 0301  Gross per 24 hour   Intake 240 ml   Output 1800 ml   Net -1560 ml         Physical Exam:     Blood pressure (!) 164/84, pulse 61, temperature 97.5 °F (36.4 °C), temperature source Oral, resp. rate 17, height 5' 6\" (1.676 m), weight 226 lb 8 oz (102.7 kg), SpO2 94 %. General:    Well nourished. Alert. No acute distress  Head:  Normocephalic, atraumatic  Eyes:  Sclerae appear normal.  Pupils equally round. Glasses intact. ENT:  R neck with no notable swelling, remains mildly tender to palpation  Neck:  No restricted ROM. Trachea midline   CV:   RRR. No m/r/g. No jugular venous distension. Lungs:   Diminished airflow throughout, no wheezing, no rhonchi, respirations even and unlabored on RA. Abdomen:   Soft, nontender, nondistended. Extremities: No cyanosis or clubbing. No edema  Skin:     No rashes and normal coloration. Warm and dry. Neuro:  CN II-XII grossly intact. Sensation intact. A&O x 4  Psych:  Normal mood and affect.       I have personally reviewed labs and tests showing:  Recent Labs:  Recent Results (from the past 48 hour(s))   CMP    Collection Time: 01/09/23  9:37 PM   Result Value Ref Range    Sodium 138 133 - 143 mmol/L    Potassium 3.8 3.5 - 5.1 mmol/L    Chloride 109 101 - 110 mmol/L    CO2 26 21 - 32 mmol/L    Anion Gap 3 2 - 11 mmol/L    Glucose 136 (H) 65 - 100 mg/dL    BUN 8 6 - 23 MG/DL    Creatinine 0.80 0.6 - 1.0 MG/DL    Est, Glom Filt Rate >60 >60 ml/min/1.73m2    Calcium 9.3 8.3 - 10.4 MG/DL    Total Bilirubin 0.3 0.2 - 1.1 MG/DL    ALT 21 12 - 65 U/L    AST 13 (L) 15 - 37 U/L    Alk Phosphatase 85 50 - 136 U/L    Total Protein 7.2 6.3 - 8.2 g/dL    Albumin 3.6 3.5 - 5.0 g/dL    Globulin 3.6 2.8 - 4.5 g/dL    Albumin/Globulin Ratio 1.0 0.4 - 1.6     CBC with Auto Differential    Collection Time: 01/09/23  9:37 PM   Result Value Ref Range    WBC 13.5 (H) 4.3 - 11.1 K/uL    RBC 5.51 (H) 4.05 - 5.2 M/uL    Hemoglobin 14.9 11.7 - 15.4 g/dL Hematocrit 46.3 35.8 - 46.3 %    MCV 84.0 82 - 102 FL    MCH 27.0 26.1 - 32.9 PG    MCHC 32.2 31.4 - 35.0 g/dL    RDW 14.4 11.9 - 14.6 %    Platelets 973 571 - 463 K/uL    MPV 10.4 9.4 - 12.3 FL    nRBC 0.00 0.0 - 0.2 K/uL    Differential Type AUTOMATED      Seg Neutrophils 66 43 - 78 %    Lymphocytes 24 13 - 44 %    Monocytes 8 4.0 - 12.0 %    Eosinophils % 2 0.5 - 7.8 %    Basophils 0 0.0 - 2.0 %    Immature Granulocytes 0 0.0 - 5.0 %    Segs Absolute 9.0 (H) 1.7 - 8.2 K/UL    Absolute Lymph # 3.2 0.5 - 4.6 K/UL    Absolute Mono # 1.1 0.1 - 1.3 K/UL    Absolute Eos # 0.2 0.0 - 0.8 K/UL    Basophils Absolute 0.0 0.0 - 0.2 K/UL    Absolute Immature Granulocyte 0.0 0.0 - 0.5 K/UL   C-Reactive Protein    Collection Time: 01/09/23  9:37 PM   Result Value Ref Range    CRP 4.9 (H) 0.0 - 0.9 mg/dL   Lactate, Sepsis    Collection Time: 01/10/23 12:29 AM   Result Value Ref Range    Lactic Acid, Sepsis 0.6 0.4 - 2.0 MMOL/L   Procalcitonin    Collection Time: 01/10/23 12:29 AM   Result Value Ref Range    Procalcitonin <0.05 0.00 - 0.49 ng/mL   APTT    Collection Time: 01/10/23  1:28 AM   Result Value Ref Range    PTT 30.7 24.5 - 34.2 SEC   Protime-INR    Collection Time: 01/10/23  1:28 AM   Result Value Ref Range    Protime 14.0 12.6 - 14.3 sec    INR 1.0     Lactate, Sepsis    Collection Time: 01/10/23  3:57 AM   Result Value Ref Range    Lactic Acid, Sepsis 0.7 0.4 - 2.0 MMOL/L   Anti-Xa, Unfractionated Heparin    Collection Time: 01/10/23  7:55 AM   Result Value Ref Range    Anti-XA Unfrac Heparin 0.80 (H) 0.3 - 0.7 IU/mL   Anti-Xa, Unfractionated Heparin    Collection Time: 01/10/23  9:43 PM   Result Value Ref Range    Anti-XA Unfrac Heparin 0.68 0.3 - 0.7 IU/mL   CBC with Auto Differential    Collection Time: 01/11/23  4:49 AM   Result Value Ref Range    WBC 9.8 4.3 - 11.1 K/uL    RBC 5.58 (H) 4.05 - 5.2 M/uL    Hemoglobin 15.0 11.7 - 15.4 g/dL    Hematocrit 46.9 (H) 35.8 - 46.3 %    MCV 84.1 82 - 102 FL    MCH 26.9 26.1 - 32.9 PG    MCHC 32.0 31.4 - 35.0 g/dL    RDW 14.3 11.9 - 14.6 %    Platelets 496 045 - 626 K/uL    MPV 10.8 9.4 - 12.3 FL    nRBC 0.00 0.0 - 0.2 K/uL    Differential Type AUTOMATED      Seg Neutrophils 61 43 - 78 %    Lymphocytes 28 13 - 44 %    Monocytes 8 4.0 - 12.0 %    Eosinophils % 3 0.5 - 7.8 %    Basophils 0 0.0 - 2.0 %    Immature Granulocytes 0 0.0 - 5.0 %    Segs Absolute 5.9 1.7 - 8.2 K/UL    Absolute Lymph # 2.7 0.5 - 4.6 K/UL    Absolute Mono # 0.8 0.1 - 1.3 K/UL    Absolute Eos # 0.3 0.0 - 0.8 K/UL    Basophils Absolute 0.0 0.0 - 0.2 K/UL    Absolute Immature Granulocyte 0.0 0.0 - 0.5 K/UL   Basic Metabolic Panel w/ Reflex to MG    Collection Time: 01/11/23  4:49 AM   Result Value Ref Range    Sodium 141 133 - 143 mmol/L    Potassium 4.0 3.5 - 5.1 mmol/L    Chloride 110 101 - 110 mmol/L    CO2 25 21 - 32 mmol/L    Anion Gap 6 2 - 11 mmol/L    Glucose 116 (H) 65 - 100 mg/dL    BUN 7 6 - 23 MG/DL    Creatinine 0.60 0.6 - 1.0 MG/DL    Est, Glom Filt Rate >60 >60 ml/min/1.73m2    Calcium 8.8 8.3 - 10.4 MG/DL   Anti-Xa, Unfractionated Heparin    Collection Time: 01/11/23  4:49 AM   Result Value Ref Range    Anti-XA Unfrac Heparin 0.81 (H) 0.3 - 0.7 IU/mL       I have personally reviewed imaging studies showing: Other Studies:  CT SOFT TISSUE NECK W CONTRAST   Final Result      1. Right chest port is present through the right internal jugular approach and   the tip terminating in the SVC. There is looping of the port catheter in the   internal jugular vein, prior to the tip extending into the SVC. There is large   occlusive thrombus in the right internal jugular vein, just superior to the area   where the chest port catheter is looped/twisted. XR CHEST (2 VW)   Final Result   No consolidation.              Current Meds:  Current Facility-Administered Medications   Medication Dose Route Frequency    hydrALAZINE (APRESOLINE) injection 10 mg  10 mg IntraVENous Q6H PRN    heparin (porcine) injection 8,240 Units  80 Units/kg IntraVENous PRN    heparin (porcine) injection 4,120 Units  40 Units/kg IntraVENous PRN    heparin 25,000 units in dextrose 5% 250 mL (premix) infusion  5-30 Units/kg/hr IntraVENous Continuous    sodium chloride flush 0.9 % injection 5-40 mL  5-40 mL IntraVENous 2 times per day    sodium chloride flush 0.9 % injection 5-40 mL  5-40 mL IntraVENous PRN    0.9 % sodium chloride infusion   IntraVENous PRN    ondansetron (ZOFRAN-ODT) disintegrating tablet 4 mg  4 mg Oral Q8H PRN    Or    ondansetron (ZOFRAN) injection 4 mg  4 mg IntraVENous Q6H PRN    polyethylene glycol (GLYCOLAX) packet 17 g  17 g Oral Daily PRN    aluminum & magnesium hydroxide-simethicone (MAALOX) 200-200-20 MG/5ML suspension 30 mL  30 mL Oral Q6H PRN    acetaminophen (TYLENOL) tablet 650 mg  650 mg Oral Q6H PRN    Or    acetaminophen (TYLENOL) suppository 650 mg  650 mg Rectal Q6H PRN    nicotine (NICODERM CQ) 21 MG/24HR 1 patch  1 patch TransDERmal Daily    sertraline (ZOLOFT) tablet 100 mg  100 mg Oral Daily    axitinib (INLYTA) 5 MG tablet TABS 5 mg (Patient Supplied)  5 mg Oral Q12H    0.9 % sodium chloride bolus  100 mL IntraVENous ONCE PRN    sodium chloride flush 0.9 % injection 10 mL  10 mL IntraVENous ONCE PRN     Signed: Ary Ramos AGACNP-BC

## 2023-01-11 NOTE — CONSULTS
[facility] Hematology and Oncology: Inpatient Hematology / Oncology Consult Note    Reason for Consult:  port-associated thrombosis  Referring Physician:  Marilyn Parish MD    History of Present Illness:  Ms. Cynthia Mary is a 61 y.o. female admitted on 1/9/2023. The primary encounter diagnosis was Acute internal jugular vein thrombosis, right ([address] A diagnosis of Leukocytosis, unspecified type was also pertinent to this visit. She is a patient of Dr. Harlan Asher with unresectable RCC, she is currently on axitinib and pembrolizumab with cycle 2 given on 12/26. She presented to the ED with fever and right neck pain on 1/9/23, she had CT which showed looping of the catheter tip of the port looping in the R IJ, with a large occlusive thrombus just superior to the loop. IR evaluated and recommended that the port remain in place as long as she does not develop facial/neck swelling. She was started on heparin and oncology is consulted for recommendations. She reports some improvement of her neck pain after starting anticoagulation. 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 Review of Systems:  ROS:  Constitutional: Negative for fever, chills, weakness, malaise, fatigue. CV: Negative for chest pain, palpitations, edema. Respiratory: Negative for dyspnea, cough, wheezing. GI: Negative for nausea, abdominal pain, diarrhea.      Allergies   Allergen Reactions    Latex Hives, Itching and Shortness Of Breath    Bee Venom Shortness Of Breath and Hives    Penicillins Anaphylaxis    Sulfa Antibiotics Hives, Itching and Rash    Penicillin G     Sulfamethoxazole-Trimethoprim Hives     Past Medical History:   Diagnosis Date    Anxiety 2000    Headache 1999    Sleep apnea 2002     Past Surgical History:   Procedure Laterality Date    CHOLECYSTECTOMY  2003    IR PORT PLACEMENT EQUAL OR GREATER THAN 5 YEARS  11/30/2022    IR PORT PLACEMENT EQUAL OR GREATER THAN 5 YEARS 11/30/2022 [facility] SPECIALS    TUBAL LIGATION  3/91     Family History   Problem Relation Age of Onset    Diabetes Mother     High Blood Pressure Mother     High Cholesterol Mother     COPD Mother     Cancer Father         prostate    Diabetes Sister     Stroke Sister     High Cholesterol Brother     Breast Cancer Paternal Aunt      Social History     Socioeconomic History    Marital status:      Spouse name: Not on file    Number of children: Not on file    Years of education: Not on file    Highest education level: Not on file   Occupational History    Occupation: CNA     Employer: [employer]   Tobacco Use    Smoking status: Every Day     Packs/day: 1.50     Years: 15.00     Pack years: 22.50     Types: Cigarettes    Smokeless tobacco: Never   Substance and Sexual Activity    Alcohol use:  Yes     Alcohol/week: 2.0 standard drinks     Types: 2 Shots of liquor per week    Drug use: Never    Sexual activity: Not Currently     Partners: Male   Other Topics Concern    Not on file   Social History Narrative    Not on file     Social Determinants of Health     Financial Resource Strain: Not on file   Food Insecurity: Not on file   Transportation Needs: Not on file   Physical Activity: Unknown    Days of Exercise per Week: Patient refused    Minutes of Exercise per Session: Not on file   Stress: Not on file   Social Connections: Not on file   Intimate Partner Violence: Not At Risk    Fear of Current or Ex-Partner: No    Emotionally Abused: No    Physically Abused: No    Sexually Abused: No   Housing Stability: Not on file     Current Facility-Administered Medications   Medication Dose Route Frequency Provider Last Rate Last Admin    hydrALAZINE (APRESOLINE) injection 10 mg  10 mg IntraVENous Q6H PRN SPIKE Velazquez CNP        apixaban (ELIQUIS) tablet 10 mg  10 mg Oral BID SPIKE Velazquez CNP   10 mg at 01/11/23 1336    Followed by    Orin Melendrez ON 1/18/2023] apixaban (ELIQUIS) tablet 5 mg  5 mg Oral BID Jerral Mónicay, APRN - CNP        sodium chloride flush 0.9 % injection 5-40 mL  5-40 mL IntraVENous 2 times per day Mariela Ho MD   10 mL at 01/10/23 2139    sodium chloride flush 0.9 % injection 5-40 mL  5-40 mL IntraVENous PRN Mariela Ho MD        0.9 % sodium chloride infusion   IntraVENous PRN Mariela Ho MD        ondansetron (ZOFRAN-ODT) disintegrating tablet 4 mg  4 mg Oral Q8H PRN Mariela Ho MD        Or    ondansetron [facility]) injection 4 mg  4 mg IntraVENous Q6H PRN Mariela Ho MD        polyethylene glycol (GLYCOLAX) packet 17 g  17 g Oral Daily PRN Mariela Ho MD        aluminum & magnesium hydroxide-simethicone (MAALOX) 200-200-20 MG/5ML suspension 30 mL  30 mL Oral Q6H PRN Mariela Ho MD        acetaminophen (TYLENOL) tablet 650 mg  650 mg Oral Q6H PRN Mariela Ho MD   650 mg at 23 1336    Or    acetaminophen (TYLENOL) suppository 650 mg  650 mg Rectal Q6H PRN Mariela Ho MD        nicotine (NICODERM CQ) 21 MG/24HR 1 patch  1 patch TransDERmal Daily Kevon Mena MD   1 patch at 23 0843    sertraline (ZOLOFT) tablet 100 mg  100 mg Oral Daily Elroy Love DO        axitinib (INLYTA) 5 MG tablet TABS 5 mg (Patient Supplied)  5 mg Oral Q12H Lesia Ji DO        0.9 % sodium chloride bolus  100 mL IntraVENous ONCE PRN Maurizio Mendiola MD        sodium chloride flush 0.9 % injection 10 mL  10 mL IntraVENous ONCE PRN Mariela Ho MD           OBJECTIVE:  Patient Vitals for the past 8 hrs:   BP Temp Temp src Pulse Resp SpO2   23 1512 (!) 152/100 97.6 °F (36.4 °C) Oral 75 20 96 %   23 1219 (!) 171/76 98.1 °F (36.7 °C) Oral 62 20 97 %     Temp (24hrs), Av.7 °F (36.5 °C), Min:97.5 °F (36.4 °C), Max:98.1 °F (36.7 °C)    701 -  1900  In: 12 [P.O.:960]  Out: 2450 [Urine:2450]    Physical Exam:  Constitutional: Well developed, well nourished female in no acute distress, sitting comfortably in the hospital bed. HEENT: Normocephalic and atraumatic. Sclerae anicteric. Neck supple without JVD. No thyromegaly present. Lymph node   No palpable submandibular, cervical, supraclavicular lymph nodes. Skin Warm and dry. No bruising and no rash noted. No erythema. No pallor. Respiratory Lungs are clear to auscultation bilaterally without wheezes, rales or rhonchi, normal air exchange without accessory muscle use. CVS Normal rate, regular rhythm and normal S1 and S2. No murmurs, gallops, or rubs. Abdomen Soft, nontender and nondistended, normoactive bowel sounds. No palpable mass. No hepatosplenomegaly. Neuro Grossly nonfocal with no obvious sensory or motor deficits. MSK Normal range of motion in general.  No edema and no tenderness. Psych Appropriate mood and affect.       Labs:    Recent Results (from the past 24 hour(s))   Anti-Xa, Unfractionated Heparin    Collection Time: 01/10/23  9:43 PM   Result Value Ref Range    Anti-XA Unfrac Heparin 0.68 0.3 - 0.7 IU/mL   CBC with Auto Differential    Collection Time: 01/11/23  4:49 AM   Result Value Ref Range    WBC 9.8 4.3 - 11.1 K/uL    RBC 5.58 (H) 4.05 - 5.2 M/uL    Hemoglobin 15.0 11.7 - 15.4 g/dL    Hematocrit 46.9 (H) 35.8 - 46.3 %    MCV 84.1 82 - 102 FL    MCH 26.9 26.1 - 32.9 PG    MCHC 32.0 31.4 - 35.0 g/dL    RDW 14.3 11.9 - 14.6 %    Platelets 821 654 - 141 K/uL    MPV 10.8 9.4 - 12.3 FL    nRBC 0.00 0.0 - 0.2 K/uL    Differential Type AUTOMATED      Seg Neutrophils 61 43 - 78 %    Lymphocytes 28 13 - 44 %    Monocytes 8 4.0 - 12.0 %    Eosinophils % 3 0.5 - 7.8 %    Basophils 0 0.0 - 2.0 %    Immature Granulocytes 0 0.0 - 5.0 %    Segs Absolute 5.9 1.7 - 8.2 K/UL    Absolute Lymph # 2.7 0.5 - 4.6 K/UL    Absolute Mono # 0.8 0.1 - 1.3 K/UL    Absolute Eos # 0.3 0.0 - 0.8 K/UL    Basophils Absolute 0.0 0.0 - 0.2 K/UL    Absolute Immature Granulocyte 0.0 0.0 - 0.5 K/UL   Basic Metabolic Panel w/ Reflex to MG    Collection Time: 01/11/23 4:49 AM   Result Value Ref Range    Sodium 141 133 - 143 mmol/L    Potassium 4.0 3.5 - 5.1 mmol/L    Chloride 110 101 - 110 mmol/L    CO2 25 21 - 32 mmol/L    Anion Gap 6 2 - 11 mmol/L    Glucose 116 (H) 65 - 100 mg/dL    BUN 7 6 - 23 MG/DL    Creatinine 0.60 0.6 - 1.0 MG/DL    Est, Glom Filt Rate >60 >60 ml/min/1.73m2    Calcium 8.8 8.3 - 10.4 MG/DL   Anti-Xa, Unfractionated Heparin    Collection Time: 01/11/23  4:49 AM   Result Value Ref Range    Anti-XA Unfrac Heparin 0.81 (H) 0.3 - 0.7 IU/mL   Anti-Xa, Unfractionated Heparin    Collection Time: 01/11/23 12:19 PM   Result Value Ref Range    Anti-XA Unfrac Heparin 0.51 0.3 - 0.7 IU/mL       Imaging:  XR CHEST (2 VW)    Result Date: 1/9/2023  PA LATERAL CHEST  1/9/2023 9:27 PM HISTORY:  R port issue  ; COMPARISON: Chest CT 11/7/2022 FINDINGS:  The heart size is upper limits of normal.  A right-sided chest port is present with catheter tip in the SVC. Multiple bilateral pulmonary nodules are present compatible with metastatic disease. There is no pneumothorax, consolidation or pleural effusions. Pulmonary vascularity is normal.     No consolidation. CT SOFT TISSUE NECK W CONTRAST    Result Date: 1/10/2023  CT of the neck COMPARISON: None INDICATION: Right-sided neck pain and erythema. Status post right port placement TECHNIQUE: Multiple sequential axial images from the lung apices to the level of the orbits obtained with 100 mL of Isovue 370. Coronal and sagittal reformats were obtained. Radiation dose reduction techniques were used for this study: Our CT scanners use one or all of the following: Automated exposure control, adjustment of the mA and/or kVp according to patient's size, iterative reconstruction. FINDINGS: There is a right-sided chest port with the tip in the superior vena cava. There is looping of the catheter in the internal jugular vein before the tip extends into the superior vena cava.  There is large occlusive thrombus in the right internal jugular vein. No thrombus extension into the subclavian vein, brachiocephalic vein or the SVC. There is no soft tissue mass in the neck. No fluid collection. The nasopharynx, oropharynx, and hypopharynx are unremarkable. No retropharyngeal or peritonsillar fluid collection/abscess. Epiglottis is normal. The thyroid, submandibular, and the parotid glands are unremarkable. No abnormally enlarged lymph nodes are identified in neck by size criterion Included globes appear intact. The paranasal sinuses and the mastoid air cells are aerated. The visualized lung apices are unremarkable. The cervical spine is unremarkable. 1. Right chest port is present through the right internal jugular approach and the tip terminating in the SVC. There is looping of the port catheter in the internal jugular vein, prior to the tip extending into the SVC. There is large occlusive thrombus in the right internal jugular vein, just superior to the area where the chest port catheter is looped/twisted. ASSESSMENT:    Principal Problem:    Occlusive thrombus  Active Problems:    Renal cell carcinoma of left kidney (HCC)  Resolved Problems:    * No resolved hospital problems. *      PLAN / RECOMMENDATIONS:  R IJ thrombus  Due to looping of port-a-cath catheter, IR has evaluated and recommends leaving the port in place  She needs a course of full anticoagulation, OK to transition to oral anticoagulation with DOAC    RCC  Resume Keytruda on 1/16 as scheduled and continue axitinib daily    Lab studies were personally reviewed. Pertinent old records were reviewed. Thank you for allowing me to participate in the care of Ms. Reina Blount. We will sign off, please call with other oncologic questions.         Tanisha Smiley MD, MD  [facility]ematology [facility] Oncology  [address], [address] [place] [address]  Office : [phone]  Fax : [phone]

## 2023-01-11 NOTE — PROGRESS NOTES
[facility] Hematology & Oncology        Inpatient Hematology / Oncology Plan of Care    Reason for Consult:  Occlusive thrombus [I82.90]  Acute internal jugular vein thrombosis, right ([address] [Q20. C11]  Leukocytosis, unspecified type [M59.733]  Referring Physician:  Milad Nelson MD    History of Present Illness:  Ms. Corazon Franklin is a 61 y.o. female admitted on 1/9/2023. The primary encounter diagnosis was Acute internal jugular vein thrombosis, right ([address] A diagnosis of Leukocytosis, unspecified type was also pertinent to this visit. Ms Corazon Franklin has a PMH of  anxiety, sleep apnea. She is a patient of Dr Valdo Arnett and recently diagnosed with RCC after imaging incidentally noted BL renal masses and R adrenal nodule (inaccessible to biopsy). Biopsy of renal mass confirmed RCC - clear cell pathology. CT chest noted multiple pulmonary nodules felt to be granulomatous disease but unable to exclude metastatic disease. She was evaluated by Dr Charlie Rousseau as well and given locations of tumor, resection would be difficult and after discussion she agreed to move forward with Axitinib/Keytruda followed by likely partial nephrectomy. She received C2 Keytruda on 12/26. Ms Corazon Franklin presented to ED on 1/9/23 with R neck pain and Tmax 101. Procal and LA unremarkable and no source of infection. She received a dose of antibiotics in ED. CT soft tissue of the neck showed R chest port in R IJ with looping within IJ and a large occlusive thrombus in R IJ just superior to looping. IR consulted and recommended leaving port in place and proceeding with AC. Per IR, port should remain in place until it is not needed or patient develops face/neck swelling and that the catheter will reduce itself once thrombus resolved. She is on a heparin gtt. Oncology consulted for recommendations. Review of Systems:  Constitutional Denies fever, chills, weight loss, appetite changes, fatigue, night sweats. HEENT +neck pain (better).  Denies trauma, blurry vision, hearing loss, ear pain, nosebleeds, sore throat. Skin Denies lesions or rashes. Lungs Denies dyspnea, cough, sputum production or hemoptysis. Cardiovascular Denies chest pain, palpitations, or lower extremity edema. Gastrointestinal Denies nausea, vomiting, changes in bowel habits, bloody or black stools, abdominal pain.  Denies dysuria, frequency or hesitancy of urination. Neuro Denies headaches, visual changes or ataxia. Denies dizziness, tingling, tremors, sensory change, speech change, focal weakness or headaches. Hematology Denies easy bruising or bleeding, denies gingival bleeding or epistaxis. Endo Denies heat/cold intolerance, denies diabetes or thyroid abnormalities. MSK Denies back pain, arthralgias, myalgias or frequent falls. Psychiatric/Behavioral Denies depression and substance abuse. The patient is not nervous/anxious.          Allergies   Allergen Reactions    Latex Hives, Itching and Shortness Of Breath    Bee Venom Shortness Of Breath and Hives    Penicillins Anaphylaxis    Sulfa Antibiotics Hives, Itching and Rash    Penicillin G     Sulfamethoxazole-Trimethoprim Hives     Past Medical History:   Diagnosis Date    Anxiety 2000    Headache 1999    Sleep apnea 2002     Past Surgical History:   Procedure Laterality Date    CHOLECYSTECTOMY  2003    IR PORT PLACEMENT EQUAL OR GREATER THAN 5 YEARS  11/30/2022    IR PORT PLACEMENT EQUAL OR GREATER THAN 5 YEARS 11/30/2022 [facility] SPECI[facility]    TUBAL LIGATION  3/91     Family History   Problem Relation Age of Onset    Diabetes Mother     High Blood Pressure Mother     High Cholesterol Mother     COPD Mother     Cancer Father         prostate    Diabetes Sister     Stroke Sister     High Cholesterol Brother     Breast Cancer Paternal Aunt      Social History     Socioeconomic History    Marital status:      Spouse name: Not on file    Number of children: Not on file    Years of education: Not on file Highest education level: Not on file   Occupational History    Occupation: CNA     Employer: [employer]   Tobacco Use    Smoking status: Every Day     Packs/day: 1.50     Years: 15.00     Pack years: 22.50     Types: Cigarettes    Smokeless tobacco: Never   Substance and Sexual Activity    Alcohol use:  Yes     Alcohol/week: 2.0 standard drinks     Types: 2 Shots of liquor per week    Drug use: Never    Sexual activity: Not Currently     Partners: Male   Other Topics Concern    Not on file   Social History Narrative    Not on file     Social Determinants of Health     Financial Resource Strain: Not on file   Food Insecurity: Not on file   Transportation Needs: Not on file   Physical Activity: Unknown    Days of Exercise per Week: Patient refused    Minutes of Exercise per Session: Not on file   Stress: Not on file   Social Connections: Not on file   Intimate Partner Violence: Not At Risk    Fear of Current or Ex-Partner: No    Emotionally Abused: No    Physically Abused: No    Sexually Abused: No   Housing Stability: Not on file     Current Facility-Administered Medications   Medication Dose Route Frequency Provider Last Rate Last Admin    heparin (porcine) injection 8,240 Units  80 Units/kg IntraVENous PRN Maria Del Carmen Bustillos MD        heparin (porcine) injection 4,120 Units  40 Units/kg IntraVENous PRN Maria Del Carmen Bustillos MD        heparin 25,000 units in dextrose 5% 250 mL (premix) infusion  5-30 Units/kg/hr IntraVENous Continuous Maria Del Carmen Bustillos MD 15.5 mL/hr at 01/11/23 0710 15 Units/kg/hr at 01/11/23 0710    sodium chloride flush 0.9 % injection 5-40 mL  5-40 mL IntraVENous 2 times per day Maria Del Carmen Bustillos MD   10 mL at 01/10/23 2139    sodium chloride flush 0.9 % injection 5-40 mL  5-40 mL IntraVENous PRN Maria Del Carmen Bustillos MD        0.9 % sodium chloride infusion   IntraVENous PRN Maria Del Carmen Bustillos MD        ondansetron (ZOFRAN-ODT) disintegrating tablet 4 mg  4 mg Oral Q8H PRN Maria Del Carmen Bustillos MD        Or    ondansetron [facility]) injection 4 mg  4 mg IntraVENous Q6H PRN Yumiko Arellano MD        polyethylene glycol (GLYCOLAX) packet 17 g  17 g Oral Daily PRN Yumiko Arellano MD        aluminum & magnesium hydroxide-simethicone (MAALOX) 200-200-20 MG/5ML suspension 30 mL  30 mL Oral Q6H PRN Yumiko Arellano MD        acetaminophen (TYLENOL) tablet 650 mg  650 mg Oral Q6H PRN Yumiko Arellano MD        Or    acetaminophen (TYLENOL) suppository 650 mg  650 mg Rectal Q6H PRN Yumiko Arellano MD        nicotine (NICODERM CQ) 21 MG/24HR 1 patch  1 patch TransDERmal Daily Kiki Gallegos MD   1 patch at 23 0843    sertraline (ZOLOFT) tablet 100 mg  100 mg Oral Daily Mariza Garcia DO        axitinib (INLYTA) 5 MG tablet TABS 5 mg (Patient Supplied)  5 mg Oral Q12H Lesia Ji DO        0.9 % sodium chloride bolus  100 mL IntraVENous ONCE PRN Maurizio Nelson MD        sodium chloride flush 0.9 % injection 10 mL  10 mL IntraVENous ONCE PRN Yumiko Arellano MD           OBJECTIVE:  Patient Vitals for the past 8 hrs:   BP Temp Temp src Pulse Resp SpO2   23 0801 (!) 168/89 97.9 °F (36.6 °C) Oral 62 18 92 %   23 0301 (!) 164/84 97.5 °F (36.4 °C) Oral 61 17 94 %     Temp (24hrs), Av.6 °F (36.4 °C), Min:97.4 °F (36.3 °C), Max:97.9 °F (36.6 °C)     0701 -  1900  In: 480 [P.O.:480]  Out: -     Physical Exam:  Constitutional: Well developed, well nourished female in no acute distress, sitting comfortably in the hospital bed. HEENT: Normocephalic and atraumatic. Oropharynx is clear, mucous membranes are moist.  Extraocular muscles are intact. Sclerae anicteric. Neck supple without JVD. No thyromegaly present. Skin Warm and dry. No bruising and no rash noted. No erythema. No pallor. Respiratory Lungs are clear to auscultation bilaterally without wheezes, rales or rhonchi, normal air exchange without accessory muscle use. CVS Normal rate, regular rhythm and normal S1 and S2. No murmurs, gallops, or rubs.    Abdomen Soft, nontender and nondistended, normoactive bowel sounds. No palpable mass. No hepatosplenomegaly. Neuro Grossly nonfocal with no obvious sensory or motor deficits. MSK Normal range of motion in general.  No edema and no tenderness. Psych Appropriate mood and affect.         Labs:    Recent Results (from the past 24 hour(s))   Anti-Xa, Unfractionated Heparin    Collection Time: 01/10/23  9:43 PM   Result Value Ref Range    Anti-XA Unfrac Heparin 0.68 0.3 - 0.7 IU/mL   CBC with Auto Differential    Collection Time: 01/11/23  4:49 AM   Result Value Ref Range    WBC 9.8 4.3 - 11.1 K/uL    RBC 5.58 (H) 4.05 - 5.2 M/uL    Hemoglobin 15.0 11.7 - 15.4 g/dL    Hematocrit 46.9 (H) 35.8 - 46.3 %    MCV 84.1 82 - 102 FL    MCH 26.9 26.1 - 32.9 PG    MCHC 32.0 31.4 - 35.0 g/dL    RDW 14.3 11.9 - 14.6 %    Platelets 735 365 - 049 K/uL    MPV 10.8 9.4 - 12.3 FL    nRBC 0.00 0.0 - 0.2 K/uL    Differential Type AUTOMATED      Seg Neutrophils 61 43 - 78 %    Lymphocytes 28 13 - 44 %    Monocytes 8 4.0 - 12.0 %    Eosinophils % 3 0.5 - 7.8 %    Basophils 0 0.0 - 2.0 %    Immature Granulocytes 0 0.0 - 5.0 %    Segs Absolute 5.9 1.7 - 8.2 K/UL    Absolute Lymph # 2.7 0.5 - 4.6 K/UL    Absolute Mono # 0.8 0.1 - 1.3 K/UL    Absolute Eos # 0.3 0.0 - 0.8 K/UL    Basophils Absolute 0.0 0.0 - 0.2 K/UL    Absolute Immature Granulocyte 0.0 0.0 - 0.5 K/UL   Basic Metabolic Panel w/ Reflex to MG    Collection Time: 01/11/23  4:49 AM   Result Value Ref Range    Sodium 141 133 - 143 mmol/L    Potassium 4.0 3.5 - 5.1 mmol/L    Chloride 110 101 - 110 mmol/L    CO2 25 21 - 32 mmol/L    Anion Gap 6 2 - 11 mmol/L    Glucose 116 (H) 65 - 100 mg/dL    BUN 7 6 - 23 MG/DL    Creatinine 0.60 0.6 - 1.0 MG/DL    Est, Glom Filt Rate >60 >60 ml/min/1.73m2    Calcium 8.8 8.3 - 10.4 MG/DL   Anti-Xa, Unfractionated Heparin    Collection Time: 01/11/23  4:49 AM   Result Value Ref Range    Anti-XA Unfrac Heparin 0.81 (H) 0.3 - 0.7 IU/mL       Imaging:    CT Result (most recent):  CT SOFT TISSUE NECK W CONTRAST 01/09/2023    Narrative  CT of the neck    COMPARISON: None    INDICATION: Right-sided neck pain and erythema. Status post right port placement    TECHNIQUE: Multiple sequential axial images from the lung apices to the level of  the orbits obtained with 100 mL of Isovue 370. Coronal and sagittal reformats  were obtained. Radiation dose reduction techniques were used for this study:  Our CT scanners use one or all of the following: Automated exposure control,  adjustment of the mA and/or kVp according to patient's size, iterative  reconstruction. FINDINGS:    There is a right-sided chest port with the tip in the superior vena cava. There  is looping of the catheter in the internal jugular vein before the tip extends  into the superior vena cava. There is large occlusive thrombus in the right  internal jugular vein. No thrombus extension into the subclavian vein,  brachiocephalic vein or the SVC. There is no soft tissue mass in the neck. No fluid collection. The nasopharynx, oropharynx, and hypopharynx are unremarkable. No  retropharyngeal or peritonsillar fluid collection/abscess. Epiglottis is normal.    The thyroid, submandibular, and the parotid glands are unremarkable. No  abnormally enlarged lymph nodes are identified in neck by size criterion    Included globes appear intact. The paranasal sinuses and the mastoid air cells  are aerated. The visualized lung apices are unremarkable. The cervical spine is unremarkable. Impression  1. Right chest port is present through the right internal jugular approach and  the tip terminating in the SVC. There is looping of the port catheter in the  internal jugular vein, prior to the tip extending into the SVC. There is large  occlusive thrombus in the right internal jugular vein, just superior to the area  where the chest port catheter is looped/twisted. ASSESSMENT:      ICD-10-CM    1.  Acute internal jugular vein thrombosis, right ([facility] [address]  I82. C11       2. Leukocytosis, unspecified type  D72.829             RECOMMENDATIONS:    RCC  - On Axitinib and Keytruda - C3 Keytruda due 1/16. Continue Axitinib and keep appt for Keytruda infusion. Port-associated IJ thrombus  - On heparin gtt. IR recommends leaving port in place.   - Agree with IR. Recommend leaving port in place and continue AC. Ok to transition to [employer] or Xarelto. Goals and plan of care reviewed with the patient. All questions answered to the best of our ability. Lab studies and imaging studies were personally reviewed. Thank you for allowing us to participate in the care of Ms. Cassandra Gates. Formal consult note by Dr. Jimbo Henderson to follow. Ms Cassandra Gates should keep her previously scheduled appt with NP/infusion on 1/16.          SPI[facility] [address] Hematology & Oncology  [address] [address]  Office : [phone]  Fax : [phone]

## 2023-01-12 ENCOUNTER — RESEARCH ENCOUNTER (OUTPATIENT)
Dept: RESEARCH | Age: 58
End: 2023-01-12

## 2023-01-12 ENCOUNTER — TELEPHONE (OUTPATIENT)
Dept: INTERNAL MEDICINE CLINIC | Facility: CLINIC | Age: 58
End: 2023-01-12

## 2023-01-12 VITALS
HEART RATE: 74 BPM | SYSTOLIC BLOOD PRESSURE: 151 MMHG | HEIGHT: 66 IN | BODY MASS INDEX: 36.13 KG/M2 | OXYGEN SATURATION: 92 % | RESPIRATION RATE: 20 BRPM | WEIGHT: 224.8 LBS | DIASTOLIC BLOOD PRESSURE: 84 MMHG | TEMPERATURE: 97.9 F

## 2023-01-12 DIAGNOSIS — C64.2 RENAL CELL CARCINOMA OF LEFT KIDNEY (HCC): Primary | ICD-10-CM

## 2023-01-12 PROCEDURE — 6370000000 HC RX 637 (ALT 250 FOR IP): Performed by: STUDENT IN AN ORGANIZED HEALTH CARE EDUCATION/TRAINING PROGRAM

## 2023-01-12 RX ORDER — NICOTINE 21 MG/24HR
1 PATCH, TRANSDERMAL 24 HOURS TRANSDERMAL DAILY
Qty: 30 PATCH | Refills: 0 | Status: SHIPPED | OUTPATIENT
Start: 2023-01-12

## 2023-01-12 RX ORDER — AMLODIPINE BESYLATE 5 MG/1
2.5 TABLET ORAL DAILY
Status: DISCONTINUED | OUTPATIENT
Start: 2023-01-12 | End: 2023-01-12 | Stop reason: HOSPADM

## 2023-01-12 RX ORDER — HEPARIN SODIUM (PORCINE) LOCK FLUSH IV SOLN 100 UNIT/ML 100 UNIT/ML
500 SOLUTION INTRAVENOUS PRN
Status: DISCONTINUED | OUTPATIENT
Start: 2023-01-12 | End: 2023-01-12 | Stop reason: HOSPADM

## 2023-01-12 RX ORDER — AMLODIPINE BESYLATE 2.5 MG/1
2.5 TABLET ORAL DAILY
Qty: 30 TABLET | Refills: 0 | Status: SHIPPED | OUTPATIENT
Start: 2023-01-13 | End: 2023-02-12

## 2023-01-12 RX ADMIN — APIXABAN 10 MG: 5 TABLET, FILM COATED ORAL at 09:26

## 2023-01-12 RX ADMIN — AMLODIPINE BESYLATE 2.5 MG: 5 TABLET ORAL at 09:26

## 2023-01-12 ASSESSMENT — PAIN SCALES - GENERAL
PAINLEVEL_OUTOF10: 0
PAINLEVEL_OUTOF10: 0

## 2023-01-12 NOTE — TELEPHONE ENCOUNTER
D/C DATE: 1/12/23    D/C FROM: [facility]    D/C TO: home     PHONE NUMBER TO REACH PATIENT: [phone]    F/U APPT DATE & TIME: 1/16 at 3:30pm      Dx : blood clot, started blood thinners and bp medication

## 2023-01-12 NOTE — RESEARCH
Called research participant on clinical trial [employer] [id number]. Patient confirmed she shipped stool specimen at time of last infusion on 12/26/22. Patient also has stool kit to ship this week in preparation for post-Cycle 2 visit on 1/16/23. Patient will bring both completed stool log forms to this visit. Patient denies any other questions/concerns at this time. Research will continue to follow.

## 2023-01-12 NOTE — CARE COORDINATION
Pt to d/c home today. Family will provide transportation. There were no PT/OT/SLP consults ordered during this hospitalization. Pt is independent with ADLs at baseline. No supportive care needs identified. Pt agrees with d/c plan. Milestones met.       01/10/23 1016   Service Assessment   Patient Orientation Alert and Oriented;Person;Place; Self   Cognition Alert   History Provided By Patient;Medical Record   Primary Caregiver Self   Accompanied By/Relationship sister [address] Members   Patient's Healthcare Decision Maker is: Legal Next of Mary Alice 68   PCP Verified by CM Yes  (Dr Charlotte Hays last visit October 2022)   Last Visit to PCP Within last 6 months   Prior Functional Level Independent in ADLs/IADLs   Current Functional Level Independent in ADLs/IADLs   Can patient return to prior living arrangement Yes   Ability to make needs known: Good   Family able to assist with home care needs: Yes   Would you like for me to discuss the discharge plan with any other family members/significant others, and if so, who? No   Financial Resources Other (Comment)  ([employer] [place])   Community Resources None   CM/SW Referral Other (see comment)  (NA)   Social/Functional History   Lives With Alone   Type of Home Mobile home   Home Layout One level   Home Access Stairs to enter with rails   Entrance Stairs - Number of Steps 3   Bathroom Shower/Tub Tub/Shower unit   Bathroom Toilet Standard   Bathroom Accessibility Accessible   Home Equipment None   Receives Help From Family   ADL Assistance Independent   Homemaking Assistance Independent   Ambulation Assistance Independent   Transfer Assistance Independent   Active  Yes   Mode of Transportation Car   Occupation Full time employment   Type of Occupation CNA with [address]   Discharge Planning   Type of Residence Trailer/Mobile [address] Prior To Admission None   Potential Assistance Needed N/A   DME Ordered? No   Potential Assistance Purchasing Medications No   Type of Home Care Services None   Patient expects to be discharged to: Trailer/mobile home   One/Two Story Residence One story   History of falls? 0   Services At/After Discharge   Transition of Care Consult (CM Consult) N/A   Services At/After Discharge None   [employer] Information Provided? No   Mode of Transport at Discharge Other (see comment)  (Family)   Confirm Follow Up Transport Family   Condition of Participation: Discharge Planning   The Plan for Transition of Care is related to the following treatment goals: Pt to obtain care to become medically stable and to return with a safe transition. The Patient and/or Patient Representative was provided with a Choice of Provider? Patient   The Patient and/Or Patient Representative agree with the Discharge Plan? Yes   Freedom of Choice list was provided with basic dialogue that supports the patient's individualized plan of care/goals, treatment preferences, and shares the quality data associated with the providers?   Yes

## 2023-01-12 NOTE — DISCHARGE SUMMARY
Hospitalist Discharge Summary   Admit Date:  2023  9:58 PM   DC Note date: 2023  Name:  Alcira Jackman   Age:  61 y.o. Sex:  female  :  1965   MRN:  [id number]   Room:  [facility]/  PCP:  Mitesh Jon MD    Presenting Complaint: Neck Swelling     Initial Admission Diagnosis: Occlusive thrombus [I82.90]  Acute internal jugular vein thrombosis, right ([address] [B47. C11]  Leukocytosis, unspecified type [D72.829]     Problem List for this Hospitalization (present on admission):    Principal Problem:    Occlusive thrombus  Active Problems:    Renal cell carcinoma of left kidney (HCC)    Acute internal jugular vein thrombosis, right (HCC)  Resolved Problems:    * No resolved hospital problems. [facility] Course:  Patient is a 62 y/o female with medical history of RCC left kidney, R adrenal gland mass, anxiety, tobacco dependence who presented to ED with cc tenderness and swelling to R neck region worsening over past few days with subjective fever and chills. Patient had port placement 22 which was accessed 1 time () for initial treatment; follows Dr. Geiger [facility]. ED workup: vitals stable, BP elevated 163/89  Labs notable for WBC 13.5 otherwise unremarkable  Patient received empiric IV Vancomycin and Cefepime in ED although remains afebrile, without tachycardia, normal LA, blood cultures were obtained, CXR negative  CT soft tissue neck w contrast showed large occlusive thrombus in right internal jugular vein superior to where port catheter is looped and twisted. ED MD spoke with IR Dr. Kyra Hayden who agreed with initiation of Heparin gtt and also recommended port removal. Hospitalist consulted for admission. IR consultation inpatient with recommendations to NOT remove port. Continue anticoagulation, catheter will reduce itself when thrombus is resolved, leave port in place until either port is no longer needed or patient develops facial/neck swelling. Oncology consultation.  Okay to leave port in place. Next Oncology infusion is Monday 1/16, continue axitinib daily. Okay to transition to [facility]. Patient was initiated on treatment dose Eliquis 1/11/23. Continue Eliquis on discharge; discussed dosage with patient in depth. Patient's neck swelling has resolved, some mild residual pain with neck movement but this has greatly improved as well. Patient with no history of hypertension however reports strong family history and elevated blood pressures recently. Patient's BP has remained elevated while hospitalized. Initiated amlodipine and will continue this on discharge with parameters. Discussed in depth with patient regarding medication and BP parameters. Patient will monitor her BP. PCP follow-up is recommended in 1 week. Patient is medically stable to discharge home today with PCP and Oncology follow-up. Discussed discharge plan with patient, care team, Dr. Samuel Rocha. All questions answered. Instructions given to call a physician or return if any concerns. Patient may return to work tomorrow. Disposition: Home  Diet: ADULT DIET; Regular  Code Status: Full Code    Follow Ups:   Follow-up Information     Meng Landon MD Follow up in 1 week(s). Specialty: Internal Medicine  Why: hospital follow-up  Contact information:  [address] [place] [address]  [phone]                       Time spent in patient discharge and coordination 35 minutes. Follow up labs/diagnostics: Defer to PCP    Current Discharge Medication List        START taking these medications    Details   !! apixaban (ELIQUIS) 5 MG TABS tablet Take 2 tablets by mouth 2 times daily for 11 doses Last dose 1/17/23 evening. Qty: 22 tablet, Refills: 0      !! apixaban (ELIQUIS) 5 MG TABS tablet Take 1 tablet by mouth 2 times daily To initiate 1/18/23.   Qty: 60 tablet, Refills: 0      nicotine (NICODERM CQ) 21 MG/24HR Place 1 patch onto the skin daily  Qty: 30 patch, Refills: 0      amLODIPine (NORVASC) 2.5 MG tablet Take 1 tablet by mouth daily Hold if SBP < 140  Qty: 30 tablet, Refills: 0       !! - Potential duplicate medications found. Please discuss with provider. CONTINUE these medications which have NOT CHANGED    Details   sertraline (ZOLOFT) 100 MG tablet Take 100 mg by mouth at bedtime At nights      Magic Mouthwash (MIRACLE MOUTHWASH) Swish and spit 5 mLs 4 times daily as needed for Irritation or Pain  Qty: 240 mL, Refills: 0    Comments: Mix equal parts diphenhydramine, maalox, and lidocaine. Associated Diagnoses: Mouth pain      ondansetron (ZOFRAN) 4 MG tablet Take 1 tablet by mouth 3 times daily as needed for Nausea or Vomiting  Qty: 30 tablet, Refills: 0    Associated Diagnoses: Renal cell carcinoma of left kidney (HCC)      prochlorperazine (COMPAZINE) 10 MG tablet Take 1 tablet by mouth every 6 hours as needed (nausea/vomiting)  Qty: 120 tablet, Refills: 3    Associated Diagnoses: Renal cell carcinoma of left kidney (HCC)      axitinib (INLYTA) 5 MG tablet Take 1 tablet by mouth in the morning and 1 tablet in the evening. Qty: 60 tablet, Refills: 1    Associated Diagnoses: Renal cell carcinoma of left kidney ([address] Mass of right adrenal gland (HCC)      Acetaminophen (TYLENOL) 325 MG CAPS As needed      EPINEPHrine (EPIPEN 2-MARY IJ) Inject as directed           STOP taking these medications       lidocaine-prilocaine (EMLA) 2.5-2.5 % cream Comments:   Reason for Stopping:               Procedures done this admission:  * No surgery found *    Consults this admission:  IP CONSULT TO INTERVENTIONAL RADIOLOGY  IP CONSULT TO ONCOLOGY    Echocardiogram results:  12/12/22    TRANSTHORACIC ECHOCARDIOGRAM (TTE) COMPLETE (CONTRAST/BUBBLE/3D PRN) 12/12/2022  5:57 PM, 12/12/2022 12:00 AM (Final)    Interpretation Summary    Left Ventricle: Normal left ventricular systolic function with a visually estimated EF of 55 - 60%. Left ventricle size is normal. Mildly increased wall thickness.  Global longitudinal strain is normal with a value of -18.1%. Signed by: Demetrius Husain MD on 12/12/2022  5:57 PM, Signed by: Unknown Provider Result on 12/12/2022 12:00 AM      Diagnostic Imaging/Tests:   XR CHEST (2 VW)    Result Date: 1/9/2023  No consolidation. CT SOFT TISSUE NECK W CONTRAST    Result Date: 1/10/2023  1. Right chest port is present through the right internal jugular approach and the tip terminating in the SVC. There is looping of the port catheter in the internal jugular vein, prior to the tip extending into the SVC. There is large occlusive thrombus in the right internal jugular vein, just superior to the area where the chest port catheter is looped/twisted.         Labs: Results:       BMP, Mg, Phos Recent Labs     01/09/23 2137 01/11/23 0449    141   K 3.8 4.0    110   CO2 26 25   ANIONGAP 3 6   BUN 8 7   CREATININE 0.80 0.60   LABGLOM >60 >60   CALCIUM 9.3 8.8   GLUCOSE 136* 116*      CBC Recent Labs     01/09/23 2137 01/11/23 0449   WBC 13.5* 9.8   RBC 5.51* 5.58*   HGB 14.9 15.0   HCT 46.3 46.9*   MCV 84.0 84.1   MCH 27.0 26.9   MCHC 32.2 32.0   RDW 14.4 14.3    249   MPV 10.4 10.8   NRBC 0.00 0.00   SEGS 66 61   LYMPHOPCT 24 28   EOSRELPCT 2 3   MONOPCT 8 8   BASOPCT 0 0   IMMGRAN 0 0   SEGSABS 9.0* 5.9   LYMPHSABS 3.2 2.7   EOSABS 0.2 0.3   MONOSABS 1.1 0.8   BASOSABS 0.0 0.0   ABSIMMGRAN 0.0 0.0      LFT Recent Labs     01/09/23 2137   BILITOT 0.3   ALKPHOS 85   AST 13*   ALT 21   PROT 7.2   LABALBU 3.6   GLOB 3.6      Cardiac  No results found for: NTPROBNP, TROPHS   Coags Lab Results   Component Value Date/Time    PROTIME 14.0 01/10/2023 01:28 AM    PROTIME 13.1 11/23/2022 02:04 PM    INR 1.0 01/10/2023 01:28 AM    INR 1.0 11/23/2022 02:04 PM    APTT 30.7 01/10/2023 01:28 AM      A1c No results found for: LABA1C, EAG   Lipids Lab Results   Component Value Date/Time    CHOL 185 10/25/2022 02:58 PM    LDLCALC 101.2 10/25/2022 02:58 PM    LABVLDL 45.8 10/25/2022 02:58 PM HDL 38 10/25/2022 02:58 PM    CHOLHDLRATIO 4.9 10/25/2022 02:58 PM    TRIG 229 10/25/2022 02:58 PM      Thyroid  Lab Results   Component Value Date/Time    TSHELE 1.19 10/25/2022 02:58 PM    VZH9YFD 0.669 12/23/2022 09:54 AM        Most Recent UA Lab Results   Component Value Date/Time    COLORU YELLOW 12/05/2022 08:21 AM    APPEARANCE CLEAR 12/05/2022 08:21 AM    SPECGRAV 1.020 12/05/2022 08:21 AM    LABPH 6.0 12/05/2022 08:21 AM    PROTEINU Negative 12/05/2022 08:21 AM    GLUCOSEU Negative 12/05/2022 08:21 AM    KETUA Negative 12/05/2022 08:21 AM    BILIRUBINUR Negative 12/05/2022 08:21 AM    BLOODU Negative 12/05/2022 08:21 AM    UROBILINOGEN 0.2 12/05/2022 08:21 AM    NITRU Negative 12/05/2022 08:21 AM    LEUKOCYTESUR Negative 12/05/2022 08:21 AM        Recent Labs     01/10/23  0029   CULTURE NO GROWTH 2 DAYS  NO GROWTH 2 DAYS       All Labs from Last 24 Hrs:  Recent Results (from the past 24 hour(s))   Anti-Xa, Unfractionated Heparin    Collection Time: 01/11/23 12:19 PM   Result Value Ref Range    Anti-XA Unfrac Heparin 0.51 0.3 - 0.7 IU/mL       Allergies   Allergen Reactions    Latex Hives, Itching and Shortness Of Breath    Bee Venom Shortness Of Breath and Hives    Penicillins Anaphylaxis    Sulfa Antibiotics Hives, Itching and Rash    Penicillin G     Sulfamethoxazole-Trimethoprim Hives       There is no immunization history on file for this patient.     Recent Vital Data:  Patient Vitals for the past 24 hrs:   Temp Pulse Resp BP SpO2   01/12/23 0808 97.9 °F (36.6 °C) 74 20 (!) 157/99 92 %   01/12/23 0236 97.5 °F (36.4 °C) 58 18 (!) 151/94 91 %   01/11/23 2305 97.9 °F (36.6 °C) 71 18 (!) 153/75 92 %   01/11/23 1913 97.9 °F (36.6 °C) 80 18 (!) 150/95 98 %   01/11/23 1512 97.6 °F (36.4 °C) 75 20 (!) 152/100 96 %   01/11/23 1219 98.1 °F (36.7 °C) 62 20 (!) 171/76 97 %       Oxygen Therapy  SpO2: 92 %  O2 Device: None (Room air)    Estimated body mass index is 36.28 kg/m² as calculated from the following: Height as of this encounter: 5' 6\" (1.676 m). Weight as of this encounter: 224 lb 12.8 oz (102 kg). Intake/Output Summary (Last 24 hours) at 1/12/2023 0932  Last data filed at 1/12/2023 2093  Gross per 24 hour   Intake 1680 ml   Output 3200 ml   Net -1520 ml         Physical Exam:  General:          Well nourished. Alert. No acute distress  Head:               Normocephalic, atraumatic  Eyes:               Sclerae appear normal.  Pupils equally round. Glasses intact. ENT:                R neck with no notable swelling, remains mildly tender to palpation  Neck:               No restricted ROM. Trachea midline   CV:                  RRR. No m/r/g. No jugular venous distension. Lungs:             Diminished airflow throughout, no wheezing, no rhonchi, respirations even and unlabored on RA. Abdomen:        Soft, nontender, nondistended. Extremities:     No cyanosis or clubbing. No edema  Skin:                No rashes and normal coloration. Warm and dry. Neuro:             CN II-XII grossly intact. Sensation intact. A&O x 4  Psych:             Normal mood and affect.       Signed:  Raul Morton AGACNP-BC

## 2023-01-12 NOTE — DISCHARGE INSTRUCTIONS
DISCHARGE SUMMARY from Nurse    PATIENT INSTRUCTIONS:  What to do at Home:  Recommended activity: activity as tolerated    If you experience any of the following symptoms fever, pain/nausea/vomiting/not relieved by home medications, or any questions or concerns please follow up with your oncologist, primary care physician, or local ED. *  Please give a list of your current medications to your Primary Care Provider. *  Please update this list whenever your medications are discontinued, doses are      changed, or new medications (including over-the-counter products) are added. *  Please carry medication information at all times in case of emergency situations. These are general instructions for a healthy lifestyle:    No smoking/ No tobacco products/ Avoid exposure to second hand smoke  Surgeon General's Warning:  Quitting smoking now greatly reduces serious risk to your health. Obesity, smoking, and sedentary lifestyle greatly increases your risk for illness    A healthy diet, regular physical exercise & weight monitoring are important for maintaining a healthy lifestyle    You may be retaining fluid if you have a history of heart failure or if you experience any of the following symptoms:  Weight gain of 3 pounds or more overnight or 5 pounds in a week, increased swelling in our hands or feet or shortness of breath while lying flat in bed. Please call your doctor as soon as you notice any of these symptoms; do not wait until your next office visit. The discharge information has been reviewed with the patient. The patient verbalized understanding. Discharge medications reviewed with the patient and appropriate educational materials and side effects teaching were provided.   ___________________________________________________________________________________________________________________________________

## 2023-01-12 NOTE — PROGRESS NOTES
To Whom It May Concern:    Alysha Morris was seen at  [address] [place] [address] from 1/9/2023 to 1/12/2023. She may return to work 1/13/2023.     Thank you  Tang Gonzalez RN  [phone]

## 2023-01-14 DIAGNOSIS — E27.8 MASS OF RIGHT ADRENAL GLAND (HCC): ICD-10-CM

## 2023-01-14 DIAGNOSIS — C64.2 RENAL CELL CARCINOMA OF LEFT KIDNEY (HCC): ICD-10-CM

## 2023-01-15 LAB
BACTERIA SPEC CULT: NORMAL
BACTERIA SPEC CULT: NORMAL
SERVICE CMNT-IMP: NORMAL
SERVICE CMNT-IMP: NORMAL

## 2023-01-16 ENCOUNTER — HOSPITAL ENCOUNTER (OUTPATIENT)
Dept: INFUSION THERAPY | Age: 58
Discharge: HOME | End: 2023-01-16
Payer: COMMERCIAL

## 2023-01-16 ENCOUNTER — RESEARCH ENCOUNTER (OUTPATIENT)
Dept: RESEARCH | Age: 58
End: 2023-01-16

## 2023-01-16 ENCOUNTER — OFFICE VISIT (OUTPATIENT)
Dept: INTERNAL MEDICINE CLINIC | Facility: CLINIC | Age: 58
End: 2023-01-16

## 2023-01-16 ENCOUNTER — OFFICE VISIT (OUTPATIENT)
Dept: ONCOLOGY | Age: 58
End: 2023-01-16
Payer: COMMERCIAL

## 2023-01-16 VITALS
HEART RATE: 77 BPM | SYSTOLIC BLOOD PRESSURE: 137 MMHG | WEIGHT: 223.6 LBS | OXYGEN SATURATION: 95 % | DIASTOLIC BLOOD PRESSURE: 85 MMHG | BODY MASS INDEX: 35.09 KG/M2 | HEIGHT: 67 IN | RESPIRATION RATE: 22 BRPM | TEMPERATURE: 97.6 F

## 2023-01-16 VITALS
BODY MASS INDEX: 35.16 KG/M2 | RESPIRATION RATE: 18 BRPM | WEIGHT: 224 LBS | DIASTOLIC BLOOD PRESSURE: 63 MMHG | SYSTOLIC BLOOD PRESSURE: 124 MMHG | HEIGHT: 67 IN | HEART RATE: 81 BPM

## 2023-01-16 DIAGNOSIS — I82.C11 ACUTE INTERNAL JUGULAR VEIN THROMBOSIS, RIGHT (HCC): ICD-10-CM

## 2023-01-16 DIAGNOSIS — E27.8 MASS OF RIGHT ADRENAL GLAND (HCC): ICD-10-CM

## 2023-01-16 DIAGNOSIS — F41.8 SITUATIONAL ANXIETY: ICD-10-CM

## 2023-01-16 DIAGNOSIS — C64.2 RENAL CELL CARCINOMA OF LEFT KIDNEY (HCC): ICD-10-CM

## 2023-01-16 DIAGNOSIS — Z09 HOSPITAL DISCHARGE FOLLOW-UP: ICD-10-CM

## 2023-01-16 DIAGNOSIS — I82.90 OCCLUSIVE THROMBUS: Primary | ICD-10-CM

## 2023-01-16 DIAGNOSIS — R53.83 FATIGUE DUE TO TREATMENT: ICD-10-CM

## 2023-01-16 DIAGNOSIS — C64.2 RENAL CELL CARCINOMA OF LEFT KIDNEY (HCC): Primary | ICD-10-CM

## 2023-01-16 PROBLEM — N28.89 BILATERAL RENAL MASSES: Status: ACTIVE | Noted: 2022-12-19

## 2023-01-16 PROBLEM — E66.9 OBESITY: Status: ACTIVE | Noted: 2023-01-16

## 2023-01-16 LAB
ALBUMIN SERPL-MCNC: 3.5 G/DL (ref 3.5–5)
ALBUMIN/GLOB SERPL: 0.9 (ref 0.4–1.6)
ALP SERPL-CCNC: 79 U/L (ref 50–136)
ALT SERPL-CCNC: 25 U/L (ref 12–65)
ANION GAP SERPL CALC-SCNC: 8 MMOL/L (ref 2–11)
AST SERPL-CCNC: 17 U/L (ref 15–37)
BASOPHILS # BLD: 0 K/UL (ref 0–0.2)
BASOPHILS NFR BLD: 0 % (ref 0–2)
BILIRUB SERPL-MCNC: 0.6 MG/DL (ref 0.2–1.1)
BUN SERPL-MCNC: 10 MG/DL (ref 6–23)
CALCIUM SERPL-MCNC: 9.3 MG/DL (ref 8.3–10.4)
CHLORIDE SERPL-SCNC: 103 MMOL/L (ref 101–110)
CO2 SERPL-SCNC: 27 MMOL/L (ref 21–32)
CREAT SERPL-MCNC: 0.8 MG/DL (ref 0.6–1)
DIFFERENTIAL METHOD BLD: ABNORMAL
EOSINOPHIL # BLD: 0.2 K/UL (ref 0–0.8)
EOSINOPHIL NFR BLD: 1 % (ref 0.5–7.8)
ERYTHROCYTE [DISTWIDTH] IN BLOOD BY AUTOMATED COUNT: 14.1 % (ref 11.9–14.6)
GLOBULIN SER CALC-MCNC: 3.8 G/DL (ref 2.8–4.5)
GLUCOSE SERPL-MCNC: 192 MG/DL (ref 65–100)
HCT VFR BLD AUTO: 46.1 % (ref 35.8–46.3)
HGB BLD-MCNC: 14.8 G/DL (ref 11.7–15.4)
IMM GRANULOCYTES # BLD AUTO: 0 K/UL (ref 0–0.5)
IMM GRANULOCYTES NFR BLD AUTO: 0 % (ref 0–5)
LYMPHOCYTES # BLD: 2.6 K/UL (ref 0.5–4.6)
LYMPHOCYTES NFR BLD: 20 % (ref 13–44)
MCH RBC QN AUTO: 26.2 PG (ref 26.1–32.9)
MCHC RBC AUTO-ENTMCNC: 32.1 G/DL (ref 31.4–35)
MCV RBC AUTO: 81.7 FL (ref 82–102)
MONOCYTES # BLD: 0.8 K/UL (ref 0.1–1.3)
MONOCYTES NFR BLD: 6 % (ref 4–12)
NEUTS SEG # BLD: 9 K/UL (ref 1.7–8.2)
NEUTS SEG NFR BLD: 73 % (ref 43–78)
NRBC # BLD: 0 K/UL (ref 0–0.2)
PLATELET # BLD AUTO: 323 K/UL (ref 150–450)
PMV BLD AUTO: 10.3 FL (ref 9.4–12.3)
POTASSIUM SERPL-SCNC: 3.9 MMOL/L (ref 3.5–5.1)
PROT SERPL-MCNC: 7.3 G/DL (ref 6.3–8.2)
RBC # BLD AUTO: 5.64 M/UL (ref 4.05–5.2)
SODIUM SERPL-SCNC: 138 MMOL/L (ref 133–143)
WBC # BLD AUTO: 12.6 K/UL (ref 4.3–11.1)

## 2023-01-16 PROCEDURE — 80053 COMPREHEN METABOLIC PANEL: CPT

## 2023-01-16 PROCEDURE — 99214 OFFICE O/P EST MOD 30 MIN: CPT | Performed by: NURSE PRACTITIONER

## 2023-01-16 PROCEDURE — 2580000003 HC RX 258: Performed by: NURSE PRACTITIONER

## 2023-01-16 PROCEDURE — 96413 CHEMO IV INFUSION 1 HR: CPT

## 2023-01-16 PROCEDURE — 6360000002 HC RX W HCPCS: Performed by: NURSE PRACTITIONER

## 2023-01-16 PROCEDURE — 2580000003 HC RX 258: Performed by: INTERNAL MEDICINE

## 2023-01-16 PROCEDURE — 85025 COMPLETE CBC W/AUTO DIFF WBC: CPT

## 2023-01-16 PROCEDURE — 36591 DRAW BLOOD OFF VENOUS DEVICE: CPT

## 2023-01-16 RX ORDER — SODIUM CHLORIDE 9 MG/ML
5-250 INJECTION, SOLUTION INTRAVENOUS PRN
OUTPATIENT
Start: 2023-01-16

## 2023-01-16 RX ORDER — ALBUTEROL SULFATE 90 UG/1
4 AEROSOL, METERED RESPIRATORY (INHALATION) PRN
OUTPATIENT
Start: 2023-01-16

## 2023-01-16 RX ORDER — FAMOTIDINE 10 MG/ML
20 INJECTION, SOLUTION INTRAVENOUS
OUTPATIENT
Start: 2023-01-16

## 2023-01-16 RX ORDER — SODIUM CHLORIDE 0.9 % (FLUSH) 0.9 %
10 SYRINGE (ML) INJECTION PRN
Status: DISCONTINUED | OUTPATIENT
Start: 2023-01-16 | End: 2023-01-17 | Stop reason: HOSPADM

## 2023-01-16 RX ORDER — SERTRALINE HYDROCHLORIDE 100 MG/1
100 TABLET, FILM COATED ORAL NIGHTLY
Qty: 30 TABLET | Refills: 11 | Status: SHIPPED | OUTPATIENT
Start: 2023-01-16

## 2023-01-16 RX ORDER — HEPARIN SODIUM (PORCINE) LOCK FLUSH IV SOLN 100 UNIT/ML 100 UNIT/ML
500 SOLUTION INTRAVENOUS PRN
OUTPATIENT
Start: 2023-01-16

## 2023-01-16 RX ORDER — SODIUM CHLORIDE 9 MG/ML
5-250 INJECTION, SOLUTION INTRAVENOUS PRN
Status: CANCELLED | OUTPATIENT
Start: 2023-01-16

## 2023-01-16 RX ORDER — ONDANSETRON 2 MG/ML
8 INJECTION INTRAMUSCULAR; INTRAVENOUS
OUTPATIENT
Start: 2023-01-16

## 2023-01-16 RX ORDER — DIPHENHYDRAMINE HYDROCHLORIDE 50 MG/ML
50 INJECTION INTRAMUSCULAR; INTRAVENOUS
OUTPATIENT
Start: 2023-01-16

## 2023-01-16 RX ORDER — SODIUM CHLORIDE 9 MG/ML
INJECTION, SOLUTION INTRAVENOUS CONTINUOUS
OUTPATIENT
Start: 2023-01-16

## 2023-01-16 RX ORDER — SODIUM CHLORIDE 9 MG/ML
5-250 INJECTION, SOLUTION INTRAVENOUS PRN
Status: DISCONTINUED | OUTPATIENT
Start: 2023-01-16 | End: 2023-01-17 | Stop reason: HOSPADM

## 2023-01-16 RX ORDER — ACETAMINOPHEN 325 MG/1
650 TABLET ORAL
OUTPATIENT
Start: 2023-01-16

## 2023-01-16 RX ORDER — SODIUM CHLORIDE 9 MG/ML
5-40 INJECTION INTRAVENOUS PRN
Status: CANCELLED | OUTPATIENT
Start: 2023-01-16

## 2023-01-16 RX ORDER — MEPERIDINE HYDROCHLORIDE 50 MG/ML
12.5 INJECTION INTRAMUSCULAR; INTRAVENOUS; SUBCUTANEOUS PRN
OUTPATIENT
Start: 2023-01-16

## 2023-01-16 RX ORDER — LORAZEPAM 0.5 MG/1
0.5 TABLET ORAL EVERY 8 HOURS PRN
COMMUNITY
Start: 2022-11-01 | End: 2023-01-16 | Stop reason: SDUPTHER

## 2023-01-16 RX ORDER — AMLODIPINE BESYLATE 2.5 MG/1
2.5 TABLET ORAL DAILY
Qty: 30 TABLET | Refills: 11 | Status: SHIPPED | OUTPATIENT
Start: 2023-01-16 | End: 2023-02-15

## 2023-01-16 RX ORDER — EPINEPHRINE 1 MG/ML
0.3 INJECTION, SOLUTION, CONCENTRATE INTRAVENOUS PRN
OUTPATIENT
Start: 2023-01-16

## 2023-01-16 RX ORDER — SODIUM CHLORIDE 9 MG/ML
5-40 INJECTION INTRAVENOUS PRN
Status: DISCONTINUED | OUTPATIENT
Start: 2023-01-16 | End: 2023-01-17 | Stop reason: HOSPADM

## 2023-01-16 RX ORDER — LORAZEPAM 0.5 MG/1
0.5 TABLET ORAL EVERY 8 HOURS PRN
Qty: 30 TABLET | Refills: 1 | Status: SHIPPED | OUTPATIENT
Start: 2023-01-16 | End: 2023-03-17

## 2023-01-16 RX ADMIN — SODIUM CHLORIDE 200 MG: 9 INJECTION, SOLUTION INTRAVENOUS at 11:48

## 2023-01-16 RX ADMIN — SODIUM CHLORIDE, PRESERVATIVE FREE 10 ML: 5 INJECTION INTRAVENOUS at 12:30

## 2023-01-16 RX ADMIN — SODIUM CHLORIDE, PRESERVATIVE FREE 10 ML: 5 INJECTION INTRAVENOUS at 11:38

## 2023-01-16 RX ADMIN — SODIUM CHLORIDE, PRESERVATIVE FREE 10 ML: 5 INJECTION INTRAVENOUS at 10:14

## 2023-01-16 RX ADMIN — SODIUM CHLORIDE 100 ML/HR: 9 INJECTION, SOLUTION INTRAVENOUS at 11:38

## 2023-01-16 ASSESSMENT — ENCOUNTER SYMPTOMS
VOMITING: 0
SCLERAL ICTERUS: 0
VOICE CHANGE: 0
SORE THROAT: 0
DIARRHEA: 0
TROUBLE SWALLOWING: 0
SHORTNESS OF BREATH: 1
ABDOMINAL PAIN: 0
ABDOMINAL DISTENTION: 0
WHEEZING: 0
NAUSEA: 0
NAUSEA: 0
BLOOD IN STOOL: 0
CHEST TIGHTNESS: 0
WHEEZING: 0
SHORTNESS OF BREATH: 1
BACK PAIN: 1
CONSTIPATION: 0
HEMOPTYSIS: 0
TROUBLE SWALLOWING: 0
VOMITING: 0
COUGH: 0

## 2023-01-16 ASSESSMENT — PATIENT HEALTH QUESTIONNAIRE - PHQ9
SUM OF ALL RESPONSES TO PHQ QUESTIONS 1-9: 0
SUM OF ALL RESPONSES TO PHQ9 QUESTIONS 1 & 2: 0
SUM OF ALL RESPONSES TO PHQ QUESTIONS 1-9: 0
SUM OF ALL RESPONSES TO PHQ QUESTIONS 1-9: 0
1. LITTLE INTEREST OR PLEASURE IN DOING THINGS: 0
2. FEELING DOWN, DEPRESSED OR HOPELESS: 0
SUM OF ALL RESPONSES TO PHQ QUESTIONS 1-9: 0

## 2023-01-16 ASSESSMENT — PAIN SCALES - GENERAL: PAINLEVEL_OUTOF10: 4

## 2023-01-16 ASSESSMENT — PAIN DESCRIPTION - ORIENTATION: ORIENTATION: RIGHT;LOWER

## 2023-01-16 ASSESSMENT — PAIN DESCRIPTION - LOCATION: LOCATION: BACK

## 2023-01-16 NOTE — PATIENT INSTRUCTIONS
Patient Education        Deciding About Using Medicines To Quit Smoking  How can you decide about using medicines to quit smoking? What are the medicines you can use? Your doctor may prescribe varenicline (Chantix) or bupropion SR. These medicines can help you cope with cravings for tobacco. They are pills that don't contain nicotine. You also can use nicotine replacement products. These do contain nicotine. There are many types. Gum and lozenges slowly release nicotine into your mouth. Patches stick to your skin. They slowly release nicotine into your bloodstream.  An inhaler has a curran that contains nicotine. You breathe in a puff of nicotine vapor through your mouth and throat. Nasal spray releases a mist that contains nicotine. What are key points about this decision? Using medicines can increase your chances of quitting smoking. They can ease cravings and withdrawal symptoms. Getting counseling along with using medicine can raise your chances of quitting even more. These nicotine replacement products have less nicotine than cigarettes. And by itself, nicotine is not nearly as harmful as smoking. The tars, carbon monoxide, and other toxic chemicals in tobacco cause the harmful effects. The side effects of nicotine replacement products depend on the type of product. For example, a patch can make your skin red and itchy. Medicines in pill form can make you sick to your stomach. They can also cause dry mouth and trouble sleeping. For most people, the side effects are not bad enough to make them stop using the products. Why might you choose to use medicines to quit smoking? You have tried on your own to stop smoking, but you were not able to stop. You want to increase your chances of quitting smoking. You want to reduce your cravings and withdrawal symptoms. You feel the benefits of medicine outweigh the side effects. Why might you choose not to use medicine?   You want to try quitting on your own by stopping all at once (\"cold turkey\"). You want to cut back slowly on the number of cigarettes you smoke. You do not like using medicine. You feel the side effects of medicines outweigh the benefits. You are worried about the cost of medicines. Your decision  Thinking about the facts and your feelings can help you make a decision that is right for you. Be sure you understand the benefits and risks of your options, and think about what else you need to do before you make the decision. Where can you learn more? Go to http://www.woods.com/ and enter O707 to learn more about \"Deciding About Using Medicines To Quit Smoking. \"  Current as of: August 2, 2022               Content Version: 13.5  © 2006-2022 [employer]. Care instructions adapted under license by [facility] ([facility]). If you have questions about a medical condition or this instruction, always ask your healthcare professional. [place] [address] any warranty or liability for your use of this information.

## 2023-01-16 NOTE — RESEARCH
To infusion to see research participant on [employer] VB-001 trial for post-Cycle 2 visit. ECOG = 0. Today is C3D1 infusion of pembrolizumab. Patient brought 2 completed stool specimen logs to today's visit. First log is from post-Cycle 1 visit which was collected and shipped after study visit due to holidays. Second log is from this study visit. Both specimens were collected and shipped within window. Patient is owed 2 stipends. Clinic site is waiting on new shipment of stipend cards, which will be distributed to patient at next clinic visit. Patient completed General Questionnaire (abbreviated version) and [employer] Dietary Screener Questionnaire on tablet during visit. Lab kit collected in infusion via venous draw pre-infusion. Kit will be shipped today. Patient will now move to follow-up with visits in year 2 and 5. Patient verbalized understanding to plan of care. Patient consents to remain on trial. Research will continue to follow.   Authored by Resident/PA/NP

## 2023-01-16 NOTE — PROGRESS NOTES
Patient arrived to port lab for port access and lab draw   Tiffanie Gomez 45 accessed and labs drawn per protocol   *Port remains accessed   Patient discharged from port lab ambualtory*

## 2023-01-16 NOTE — PROGRESS NOTES
1/16/2023 5:35 PM  Location:[facility] [address] [facility]  [place]  Patient #:  [id number]  YOB: 1965            History of Present Illness     Chief Complaint   Patient presents with    Follow-Up from [facility] D/C - 1/12/2023 - was admitted for a blood clot - Right jugular vein. Is on Eliquis now. Neck Pain       Ms. EDWIN VENTURA is a 61 y.o. female  who presents for the above. Had a second opinion related bilateral renal surgery due to bilateral renal cell cancer with mets to adrenals and lungs. Is doing chemo for 3 months. Allergies   Allergen Reactions    Latex Hives, Itching and Shortness Of Breath    Bee Venom Shortness Of Breath and Hives    Penicillins Anaphylaxis    Sulfa Antibiotics Hives, Itching and Rash    Penicillin G     Sulfamethoxazole-Trimethoprim Hives     Past Medical History:   Diagnosis Date    Anxiety 2000    Headache 1999    Sleep apnea 2002     Social History     Socioeconomic History    Marital status:      Spouse name: None    Number of children: None    Years of education: None    Highest education level: None   Occupational History    Occupation: CNA     Employer: [employer]   Tobacco Use    Smoking status: Every Day     Packs/day: 1.50     Years: 15.00     Pack years: 22.50     Types: Cigarettes    Smokeless tobacco: Never   Substance and Sexual Activity    Alcohol use:  Yes     Alcohol/week: 2.0 standard drinks     Types: 2 Shots of liquor per week    Drug use: Never    Sexual activity: Not Currently     Partners: Male     Social Determinants of Health     Physical Activity: Unknown    Days of Exercise per Week: Patient refused   Intimate Partner Violence: Not At Risk    Fear of Current or Ex-Partner: No    Emotionally Abused: No    Physically Abused: No    Sexually Abused: No     Past Surgical History:   Procedure Laterality Date    CHOLECYSTECTOMY  2003    IR PORT PLACEMENT EQUAL OR GREATER THAN 5 YEARS  11/30/2022    IR PORT PLACEMENT EQUAL OR GREATER THAN 5 YEARS 11/30/2022 [facility]    TUBAL LIGATION  3/91     Current Outpatient Medications   Medication Sig Dispense Refill    sertraline (ZOLOFT) 100 MG tablet Take 1 tablet by mouth at bedtime At nights 30 tablet 11    LORazepam (ATIVAN) 0.5 MG tablet Take 1 tablet by mouth every 8 hours as needed for Anxiety for up to 60 days. Max Daily Amount: 1.5 mg 30 tablet 1    amLODIPine (NORVASC) 2.5 MG tablet Take 1 tablet by mouth daily Hold if SBP < 140 30 tablet 11    apixaban (ELIQUIS) 5 MG TABS tablet Take 2 tablets by mouth 2 times daily for 11 doses Last dose 1/17/23 evening. 22 tablet 0    ondansetron (ZOFRAN) 4 MG tablet Take 1 tablet by mouth 3 times daily as needed for Nausea or Vomiting 30 tablet 0    prochlorperazine (COMPAZINE) 10 MG tablet Take 1 tablet by mouth every 6 hours as needed (nausea/vomiting) 120 tablet 3    axitinib (INLYTA) 5 MG tablet Take 1 tablet by mouth in the morning and 1 tablet in the evening. 60 tablet 1    Acetaminophen (TYLENOL) 325 MG CAPS As needed      EPINEPHrine (EPIPEN 2-MARY IJ) Inject as directed       No current facility-administered medications for this visit.      Facility-Administered Medications Ordered in Other Visits   Medication Dose Route Frequency Provider Last Rate Last Admin    sodium chloride flush 0.9 % injection 10 mL  10 mL IntraVENous PRN Maritza Kwan MD   10 mL at 01/16/23 1014    0.9 % sodium chloride infusion  5-250 mL/hr IntraVENous PRN Jose Alejandro Langston NP-C   Stopped at 01/16/23 1230    sodium chloride (PF) 0.9 % injection 5-40 mL  5-40 mL IntraVENous PRN Jose Alejandro Langston NP-C   10 mL at 01/16/23 1230     Health Maintenance   Topic Date Due    COVID-19 Vaccine (1) Never done    Pneumococcal 0-64 years Vaccine (1 - PCV) Never done    HIV screen  Never done    DTaP/Tdap/Td vaccine (1 - Tdap) Never done    Cervical cancer screen  Never done    Diabetes screen  Never done Colorectal Cancer Screen  Never done    Breast cancer screen  Never done    Shingles vaccine (1 of 2) Never done    Low dose CT lung screening  Never done    Flu vaccine (1) Never done    Depression Screen  01/16/2024    Lipids  10/25/2027    Hepatitis C screen  Completed    Hepatitis A vaccine  Aged Out    Hib vaccine  Aged Out    Meningococcal (ACWY) vaccine  Aged Out     Family History   Problem Relation Age of Onset    Diabetes Mother     High Blood Pressure Mother     High Cholesterol Mother     COPD Mother     Cancer Father         prostate    Diabetes Sister     Stroke Sister     High Cholesterol Brother     Breast Cancer Paternal Aunt              Review of Systems  Review of Systems   Constitutional:  Positive for fatigue. Negative for chills and fever. HENT:  Negative for trouble swallowing. Respiratory:  Positive for shortness of breath. Negative for cough and wheezing. Cardiovascular:  Negative for chest pain and leg swelling (no arm edema either). Gastrointestinal:  Negative for nausea and vomiting. Musculoskeletal:  Positive for neck pain. Neurological:  Positive for weakness (generalized). Negative for headaches. /63 (Site: Left Upper Arm, Position: Sitting, Cuff Size: Large Adult)   Pulse 81   Resp 18   Ht 5' 6.5\" (1.689 m)   Wt 224 lb (101.6 kg)   LMP  (LMP Unknown)   BMI 35.61 kg/m²       Physical Exam    Physical Exam  Constitutional:       Appearance: Normal appearance. She is not ill-appearing. HENT:      Head: Normocephalic. Right Ear: Tympanic membrane normal.      Left Ear: Tympanic membrane normal.   Neck:      Thyroid: Thyroid mass (tender swelling over right side of neck; no fluctuance or warmth) present. Vascular: No carotid bruit. Cardiovascular:      Rate and Rhythm: Normal rate and regular rhythm. Pulmonary:      Effort: Pulmonary effort is normal.      Breath sounds: Wheezing (faint and scattered) present.    Abdominal:      General: Abdomen is flat. Palpations: Abdomen is soft. Tenderness: There is no abdominal tenderness. Musculoskeletal:      Right lower leg: No edema. Left lower leg: No edema. Neurological:      Mental Status: She is alert and oriented to person, place, and time. Deep Tendon Reflexes:      Reflex Scores:       Patellar reflexes are 2+ on the right side and 2+ on the left side. Psychiatric:         Mood and Affect: Mood normal.         Behavior: Behavior normal.         Assessment & Plan    Current Outpatient Medications   Medication Sig Dispense Refill    sertraline (ZOLOFT) 100 MG tablet Take 1 tablet by mouth at bedtime At nights 30 tablet 11    LORazepam (ATIVAN) 0.5 MG tablet Take 1 tablet by mouth every 8 hours as needed for Anxiety for up to 60 days. Max Daily Amount: 1.5 mg 30 tablet 1    amLODIPine (NORVASC) 2.5 MG tablet Take 1 tablet by mouth daily Hold if SBP < 140 30 tablet 11    apixaban (ELIQUIS) 5 MG TABS tablet Take 2 tablets by mouth 2 times daily for 11 doses Last dose 1/17/23 evening. 22 tablet 0    ondansetron (ZOFRAN) 4 MG tablet Take 1 tablet by mouth 3 times daily as needed for Nausea or Vomiting 30 tablet 0    prochlorperazine (COMPAZINE) 10 MG tablet Take 1 tablet by mouth every 6 hours as needed (nausea/vomiting) 120 tablet 3    axitinib (INLYTA) 5 MG tablet Take 1 tablet by mouth in the morning and 1 tablet in the evening. 60 tablet 1    Acetaminophen (TYLENOL) 325 MG CAPS As needed      EPINEPHrine (EPIPEN 2-MARY IJ) Inject as directed       No current facility-administered medications for this visit.      Facility-Administered Medications Ordered in Other Visits   Medication Dose Route Frequency Provider Last Rate Last Admin    sodium chloride flush 0.9 % injection 10 mL  10 mL IntraVENous PRN Maritza Kwan MD   10 mL at 01/16/23 1014    0.9 % sodium chloride infusion  5-250 mL/hr IntraVENous PRN JAMAL Roberto   Stopped at 01/16/23 1230    sodium chloride (PF) 0.9 % injection 5-40 mL  5-40 mL IntraVENous PRN JAMAL Brady   10 mL at 01/16/23 1230       Orders Placed This Encounter   Procedures    [facility] DISCHARGE MEDS RECONCILED W/ CURRENT OUTPATIENT MED LIST       Orders Placed This Encounter   Medications    sertraline (ZOLOFT) 100 MG tablet     Sig: Take 1 tablet by mouth at bedtime At nights     Dispense:  30 tablet     Refill:  11    LORazepam (ATIVAN) 0.5 MG tablet     Sig: Take 1 tablet by mouth every 8 hours as needed for Anxiety for up to 60 days. Max Daily Amount: 1.5 mg     Dispense:  30 tablet     Refill:  1    amLODIPine (NORVASC) 2.5 MG tablet     Sig: Take 1 tablet by mouth daily Hold if SBP < 140     Dispense:  30 tablet     Refill:  11       Medications Discontinued During This Encounter   Medication Reason    apixaban (ELIQUIS) 5 MG TABS tablet LIST CLEANUP    Magic Mouthwash (MIRACLE MOUTHWASH) LIST CLEANUP    nicotine (NICODERM CQ) 21 MG/24HR LIST CLEANUP    sertraline (ZOLOFT) 100 MG tablet REORDER    LORazepam (ATIVAN) 0.5 MG tablet REORDER    amLODIPine (NORVASC) 2.5 MG tablet REORDER        Diagnosis Orders   1. Occlusive thrombus        2. Renal cell carcinoma of left kidney (HCC)        3. Situational anxiety  LORazepam (ATIVAN) 0.5 MG tablet      4. Hospital discharge follow-up  [facility] DISCHARGE MEDS RECONCILED W/ CURRENT OUTPATIENT MED LIST         Reviewed the patient's medications as well as hospital records. She is doing fairly well both physically and emotionally. I have reviewed the patients controlled substance prescription history, as maintained in the [place] prescription monitoring program, so that the prescription(s) for a  controlled substance can be given. Advised the patient to quit smoking. Urged her to listen to her body as well related to her fatigue. Has close follow-up scheduled with her oncologist.  Reviewed records from the [facility] urologist she provided a second opinion.   Patient has enough Eliquis on hand to take as prescribed. She will switch from 10 mg twice daily dosing to 5 mg twice daily dosing starting tomorrow. Has an appointment in the future. Follow up as previously scheduled or earlier as needed. Knows to keep a low threshold for contacting the office with worsening symptoms. No follow-ups on file.           Nirav Cote MD

## 2023-01-16 NOTE — PROGRESS NOTES
[facility] Hematology and Oncology: Established patient - follow up     Chief Complaint   Patient presents with    Follow-up     Dx; RCC - bilateral with likely met to adrenal +/- lungs     Fam hx: father - prostate cancer   Paternal aunt - hx of breast cancer     History of Present Illness:  Ms. Kenneth Han is a 61 y.o. female who presents today for FU regarding RCC. The past medical history is significant for anxiety, HAs and sleep apnea, cervical dysplasia but no carcinoma, current smoker (~1/2PPD), lap liv and tubal reversal, I&D of left breast abscess. 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 Despite attending physical therapy since the accident, she continued to experience the same low back pain. She was seen at [facility] on 10/4/22. MRI of the lumbar spine on 10/15/22 showed mild degenerative disc disease at L3-4 through L5-S1 and a 5.7 x 6.1 x 7 cm lower pole solid left renal mass. Urgent referral was placed to [facility], who referred the patient to [facility] for uro/onc evaluation and treatment of renal mass. CT AP on 10/26/22 showed bilateral enhancing renal masses, concerning for renal cell carcinoma with the left lower pole renal mass measuring up to 6.5 cm and extending along Gerota's fascia and the right midpole renal mass measuring up to 4.6 cm. Also noted was an enhancing 1.5 cm right adrenal nodule, concerning for a metastatic nodule. No recent wt loss. S/p left renal biopsy on 11/2022, Nidia grade 2 of 4, clear cell RCC. CT chest on 11/7 showed multiple nodules that appeared to be calcified, most c/w granulomatous disease, but she's aware that metastatic disease cannot be ruled out. Not on AC. Cr 0.89. Pt presented for consultation regarding systemic therapy. She was here with her sister and friend.   Imaging with bilateral enhancing solid renal masses and 1.5cm right enhancing adrenal mass. Unable to bx adrenal mass due to position per IR. She was referred by Dr Nancy Ruth for discussion of systemic therapy. We discussed staging of disease and next steps in management. She returns today for follow up on axitinib 5mg BID and cycle 3 keytruda. Since last seen, she presented to the ED with fever and right neck pain on 1/9/23, she had CT which showed looping of the catheter tip of the port looping in the R IJ, with a large occlusive thrombus just superior to the loop. IR evaluated and recommended that the port remain in place as long as she does not develop facial/neck swelling. She was started on heparin and improved. She was ultimately discharged on oral Eliquis. She is tolerating this well. She has continued improvement in her right neck. She had increased axitinib to 7 mg BID but has some pressure in her right jaw so it was decrease back to 5 mg BID. She now knows this was from the clot so she is ready to go back to 7 mg BID. She had some mild HTN in the hospital and was started on Norvasc 2.5 mg daily. BP is acceptable today and she has not taken the Norvasc. There are no GI or bowel complaints. Appetite has been good recently. Fatigue is her biggest complaint, however she continues to work ([employer]). Exertional shortness of breath is stable, no cough or edema. Dry/burning fingers resolved with Bag Sevier. She did get a second opinion at [facility] and reports that it was agreed to continue her current treatment, however the [place] MD feels that she would be a good surgical candidate and wants to review restaging scans after 3 cycles. He also suggested a genetics referral that we will place. Chronological Events:   11/23/22 heme/onc consult  11/30/22 port placed   12/2/22 FU - pt elected to p/w axitinib/pebrolizumab - discussed SE/MOA   12/23/22 FU on axitinib 5 mg BID and keytruda-tolerating well.   1/16/23 Right IJ clot on Eliquis 5 mg BID. Increase axitinib to 7 mg BID. Proceed with cycle 3 Keytruda. Family History   Problem Relation Age of Onset    Diabetes Mother     High Blood Pressure Mother     High Cholesterol Mother     COPD Mother     Cancer Father         prostate    Diabetes Sister     Stroke Sister     High Cholesterol Brother     Breast Cancer Paternal Aunt       Social History     Socioeconomic History    Marital status:      Spouse name: None    Number of children: None    Years of education: None    Highest education level: None   Occupational History    Occupation: CNA     Employer: [employer]   Tobacco Use    Smoking status: Every Day     Packs/day: 1.50     Years: 15.00     Pack years: 22.50     Types: Cigarettes    Smokeless tobacco: Never   Substance and Sexual Activity    Alcohol use: Yes     Alcohol/week: 2.0 standard drinks     Types: 2 Shots of liquor per week    Drug use: Never    Sexual activity: Not Currently     Partners: Male     Social Determinants of Health     Physical Activity: Unknown    Days of Exercise per Week: Patient refused   Intimate Partner Violence: Not At Risk    Fear of Current or Ex-Partner: No    Emotionally Abused: No    Physically Abused: No    Sexually Abused: No        Review of Systems   Constitutional:  Positive for fatigue. Negative for appetite change, chills, diaphoresis, fever and unexpected weight change. HENT:   Negative for hearing loss, mouth sores, nosebleeds, sore throat, trouble swallowing and voice change. Eyes:  Negative for icterus. Respiratory:  Positive for shortness of breath (exertional). Negative for chest tightness, hemoptysis and wheezing. Cardiovascular:  Negative for chest pain, leg swelling and palpitations. Gastrointestinal:  Negative for abdominal distention, abdominal pain, blood in stool, constipation, diarrhea, nausea and vomiting. Endocrine: Negative for hot flashes.    Genitourinary:  Negative for difficulty urinating, frequency, vaginal bleeding and vaginal discharge. Musculoskeletal:  Positive for arthralgias and back pain. Negative for flank pain, gait problem and myalgias. Skin:  Negative for itching, rash and wound. Neurological:  Negative for dizziness, extremity weakness, gait problem, headaches and numbness. Psychiatric/Behavioral:  Positive for depression. Negative for confusion. The patient is nervous/anxious. Allergies   Allergen Reactions    Latex Hives, Itching and Shortness Of Breath    Bee Venom Shortness Of Breath and Hives    Penicillins Anaphylaxis    Sulfa Antibiotics Hives, Itching and Rash    Penicillin G     Sulfamethoxazole-Trimethoprim Hives     Past Medical History:   Diagnosis Date    Anxiety 2000    Headache 1999    Sleep apnea 2002     Past Surgical History:   Procedure Laterality Date    CHOLECYSTECTOMY  2003    IR PORT PLACEMENT EQUAL OR GREATER THAN 5 YEARS  11/30/2022    IR PORT PLACEMENT EQUAL OR GREATER THAN 5 YEARS 11/30/2022 [facility] RADIOLOGY SPECIALS    TUBAL LIGATION  3/91     Current Outpatient Medications   Medication Sig Dispense Refill    apixaban (ELIQUIS) 5 MG TABS tablet Take 2 tablets by mouth 2 times daily for 11 doses Last dose 1/17/23 evening.  22 tablet 0    [START ON 1/18/2023] apixaban (ELIQUIS) 5 MG TABS tablet Take 1 tablet by mouth 2 times daily To initiate 1/18/23. 60 tablet 0    amLODIPine (NORVASC) 2.5 MG tablet Take 1 tablet by mouth daily Hold if SBP < 140 30 tablet 0    sertraline (ZOLOFT) 100 MG tablet Take 100 mg by mouth at bedtime At nights      Magic Mouthwash (MIRACLE MOUTHWASH) Swish and spit 5 mLs 4 times daily as needed for Irritation or Pain 240 mL 0    ondansetron (ZOFRAN) 4 MG tablet Take 1 tablet by mouth 3 times daily as needed for Nausea or Vomiting 30 tablet 0    prochlorperazine (COMPAZINE) 10 MG tablet Take 1 tablet by mouth every 6 hours as needed (nausea/vomiting) 120 tablet 3    axitinib (INLYTA) 5 MG tablet Take 1 tablet by mouth in the morning and 1 tablet in the evening. 60 tablet 1    Acetaminophen (TYLENOL) 325 MG CAPS As needed      nicotine (NICODERM CQ) 21 MG/24HR Place 1 patch onto the skin daily (Patient not taking: Reported on 1/16/2023) 30 patch 0    EPINEPHrine (EPIPEN 2-MARY IJ) Inject as directed (Patient not taking: No sig reported)       No current facility-administered medications for this visit. Facility-Administered Medications Ordered in Other Visits   Medication Dose Route Frequency Provider Last Rate Last Admin    sodium chloride flush 0.9 % injection 10 mL  10 mL IntraVENous PRN Anatoliy De La Vega MD   10 mL at 01/16/23 1014       No flowsheet data found. OBJECTIVE:  /85   Pulse 77   Temp 97.6 °F (36.4 °C) (Oral)   Resp 22   Ht 5' 6.5\" (1.689 m)   Wt 223 lb 9.6 oz (101.4 kg)   LMP  (LMP Unknown)   SpO2 95%   BMI 35.55 kg/m²       ECOG PERFORMANCE STATUS - 0-Fully active, able to carry on all pre-disease performance without restriction. Pain - 3/10. None/Minimal pain - not affecting QOL     Fatigue - No flowsheet data found. Distress - No flowsheet data found. Physical Exam  Vitals reviewed. Exam conducted with a chaperone present. Constitutional:       General: She is not in acute distress. Appearance: Normal appearance. She is not ill-appearing or toxic-appearing. HENT:      Head: Normocephalic and atraumatic. Nose: Nose normal.      Mouth/Throat:      Mouth: Mucous membranes are moist.   Eyes:      General: No scleral icterus. Conjunctiva/sclera: Conjunctivae normal.   Cardiovascular:      Rate and Rhythm: Normal rate and regular rhythm. Heart sounds: No murmur heard. Pulmonary:      Effort: Pulmonary effort is normal. No respiratory distress. Breath sounds: Normal breath sounds. No wheezing or rales. Abdominal:      General: Bowel sounds are normal. There is no distension. Palpations: Abdomen is soft. There is no mass. Tenderness: There is no abdominal tenderness. There is no guarding. Musculoskeletal:         General: Normal range of motion. Cervical back: Normal range of motion. Right lower leg: No edema. Left lower leg: No edema. Skin:     General: Skin is warm and dry. Coloration: Skin is not jaundiced or pale. Findings: No rash. Neurological:      General: No focal deficit present. Mental Status: She is alert and oriented to person, place, and time. Gait: Gait normal.   Psychiatric:         Behavior: Behavior normal.         Thought Content:  Thought content normal.        Labs:  Recent Results (from the past 168 hour(s))   CMP    Collection Time: 01/09/23  9:37 PM   Result Value Ref Range    Sodium 138 133 - 143 mmol/L    Potassium 3.8 3.5 - 5.1 mmol/L    Chloride 109 101 - 110 mmol/L    CO2 26 21 - 32 mmol/L    Anion Gap 3 2 - 11 mmol/L    Glucose 136 (H) 65 - 100 mg/dL    BUN 8 6 - 23 MG/DL    Creatinine 0.80 0.6 - 1.0 MG/DL    Est, Glom Filt Rate >60 >60 ml/min/1.73m2    Calcium 9.3 8.3 - 10.4 MG/DL    Total Bilirubin 0.3 0.2 - 1.1 MG/DL    ALT 21 12 - 65 U/L    AST 13 (L) 15 - 37 U/L    Alk Phosphatase 85 50 - 136 U/L    Total Protein 7.2 6.3 - 8.2 g/dL    Albumin 3.6 3.5 - 5.0 g/dL    Globulin 3.6 2.8 - 4.5 g/dL    Albumin/Globulin Ratio 1.0 0.4 - 1.6     CBC with Auto Differential    Collection Time: 01/09/23  9:37 PM   Result Value Ref Range    WBC 13.5 (H) 4.3 - 11.1 K/uL    RBC 5.51 (H) 4.05 - 5.2 M/uL    Hemoglobin 14.9 11.7 - 15.4 g/dL    Hematocrit 46.3 35.8 - 46.3 %    MCV 84.0 82 - 102 FL    MCH 27.0 26.1 - 32.9 PG    MCHC 32.2 31.4 - 35.0 g/dL    RDW 14.4 11.9 - 14.6 %    Platelets 383 235 - 043 K/uL    MPV 10.4 9.4 - 12.3 FL    nRBC 0.00 0.0 - 0.2 K/uL    Differential Type AUTOMATED      Seg Neutrophils 66 43 - 78 %    Lymphocytes 24 13 - 44 %    Monocytes 8 4.0 - 12.0 %    Eosinophils % 2 0.5 - 7.8 %    Basophils 0 0.0 - 2.0 %    Immature Granulocytes 0 0.0 - 5.0 %    Segs Absolute 9.0 (H) 1.7 - 8.2 K/UL Absolute Lymph # 3.2 0.5 - 4.6 K/UL    Absolute Mono # 1.1 0.1 - 1.3 K/UL    Absolute Eos # 0.2 0.0 - 0.8 K/UL    Basophils Absolute 0.0 0.0 - 0.2 K/UL    Absolute Immature Granulocyte 0.0 0.0 - 0.5 K/UL   C-Reactive Protein    Collection Time: 01/09/23  9:37 PM   Result Value Ref Range    CRP 4.9 (H) 0.0 - 0.9 mg/dL   Blood Culture 1    Collection Time: 01/10/23 12:29 AM    Specimen: Blood   Result Value Ref Range    Special Requests RIGHT  Antecubital        Culture NO GROWTH 5 DAYS     Blood Culture 2    Collection Time: 01/10/23 12:29 AM    Specimen: Blood   Result Value Ref Range    Special Requests RIGHT  FOREARM        Culture NO GROWTH 5 DAYS     Lactate, Sepsis    Collection Time: 01/10/23 12:29 AM   Result Value Ref Range    Lactic Acid, Sepsis 0.6 0.4 - 2.0 MMOL/L   Procalcitonin    Collection Time: 01/10/23 12:29 AM   Result Value Ref Range    Procalcitonin <0.05 0.00 - 0.49 ng/mL   APTT    Collection Time: 01/10/23  1:28 AM   Result Value Ref Range    PTT 30.7 24.5 - 34.2 SEC   Protime-INR    Collection Time: 01/10/23  1:28 AM   Result Value Ref Range    Protime 14.0 12.6 - 14.3 sec    INR 1.0     Lactate, Sepsis    Collection Time: 01/10/23  3:57 AM   Result Value Ref Range    Lactic Acid, Sepsis 0.7 0.4 - 2.0 MMOL/L   Anti-Xa, Unfractionated Heparin    Collection Time: 01/10/23  7:55 AM   Result Value Ref Range    Anti-XA Unfrac Heparin 0.80 (H) 0.3 - 0.7 IU/mL   Anti-Xa, Unfractionated Heparin    Collection Time: 01/10/23  9:43 PM   Result Value Ref Range    Anti-XA Unfrac Heparin 0.68 0.3 - 0.7 IU/mL   CBC with Auto Differential    Collection Time: 01/11/23  4:49 AM   Result Value Ref Range    WBC 9.8 4.3 - 11.1 K/uL    RBC 5.58 (H) 4.05 - 5.2 M/uL    Hemoglobin 15.0 11.7 - 15.4 g/dL    Hematocrit 46.9 (H) 35.8 - 46.3 %    MCV 84.1 82 - 102 FL    MCH 26.9 26.1 - 32.9 PG    MCHC 32.0 31.4 - 35.0 g/dL    RDW 14.3 11.9 - 14.6 %    Platelets 942 069 - 982 K/uL    MPV 10.8 9.4 - 12.3 FL    nRBC 0.00 0.0 - 0.2 K/uL    Differential Type AUTOMATED      Seg Neutrophils 61 43 - 78 %    Lymphocytes 28 13 - 44 %    Monocytes 8 4.0 - 12.0 %    Eosinophils % 3 0.5 - 7.8 %    Basophils 0 0.0 - 2.0 %    Immature Granulocytes 0 0.0 - 5.0 %    Segs Absolute 5.9 1.7 - 8.2 K/UL    Absolute Lymph # 2.7 0.5 - 4.6 K/UL    Absolute Mono # 0.8 0.1 - 1.3 K/UL    Absolute Eos # 0.3 0.0 - 0.8 K/UL    Basophils Absolute 0.0 0.0 - 0.2 K/UL    Absolute Immature Granulocyte 0.0 0.0 - 0.5 K/UL   Basic Metabolic Panel w/ Reflex to MG    Collection Time: 01/11/23  4:49 AM   Result Value Ref Range    Sodium 141 133 - 143 mmol/L    Potassium 4.0 3.5 - 5.1 mmol/L    Chloride 110 101 - 110 mmol/L    CO2 25 21 - 32 mmol/L    Anion Gap 6 2 - 11 mmol/L    Glucose 116 (H) 65 - 100 mg/dL    BUN 7 6 - 23 MG/DL    Creatinine 0.60 0.6 - 1.0 MG/DL    Est, Glom Filt Rate >60 >60 ml/min/1.73m2    Calcium 8.8 8.3 - 10.4 MG/DL   Anti-Xa, Unfractionated Heparin    Collection Time: 01/11/23  4:49 AM   Result Value Ref Range    Anti-XA Unfrac Heparin 0.81 (H) 0.3 - 0.7 IU/mL   Anti-Xa, Unfractionated Heparin    Collection Time: 01/11/23 12:19 PM   Result Value Ref Range    Anti-XA Unfrac Heparin 0.51 0.3 - 0.7 IU/mL   CBC with Auto Differential    Collection Time: 01/16/23 10:01 AM   Result Value Ref Range    WBC 12.6 (H) 4.3 - 11.1 K/uL    RBC 5.64 (H) 4.05 - 5.2 M/uL    Hemoglobin 14.8 11.7 - 15.4 g/dL    Hematocrit 46.1 35.8 - 46.3 %    MCV 81.7 (L) 82.0 - 102.0 FL    MCH 26.2 26.1 - 32.9 PG    MCHC 32.1 31.4 - 35.0 g/dL    RDW 14.1 11.9 - 14.6 %    Platelets 030 436 - 672 K/uL    MPV 10.3 9.4 - 12.3 FL    nRBC 0.00 0.0 - 0.2 K/uL    Seg Neutrophils 73 43 - 78 %    Lymphocytes 20 13 - 44 %    Monocytes 6 4.0 - 12.0 %    Eosinophils % 1 0.5 - 7.8 %    Basophils 0 0.0 - 2.0 %    Immature Granulocytes 0 0.0 - 5.0 %    Segs Absolute 9.0 (H) 1.7 - 8.2 K/UL    Absolute Lymph # 2.6 0.5 - 4.6 K/UL    Absolute Mono # 0.8 0.1 - 1.3 K/UL    Absolute Eos # 0.2 0.0 - 0.8 K/UL    Basophils Absolute 0.0 0.0 - 0.2 K/UL    Absolute Immature Granulocyte 0.0 0.0 - 0.5 K/UL    Differential Type AUTOMATED     Comprehensive Metabolic Panel    Collection Time: 01/16/23 10:01 AM   Result Value Ref Range    Sodium 138 133 - 143 mmol/L    Potassium 3.9 3.5 - 5.1 mmol/L    Chloride 103 101 - 110 mmol/L    CO2 27 21 - 32 mmol/L    Anion Gap 8 2 - 11 mmol/L    Glucose 192 (H) 65 - 100 mg/dL    BUN 10 6 - 23 MG/DL    Creatinine 0.80 0.6 - 1.0 MG/DL    Est, Glom Filt Rate >60 >60 ml/min/1.73m2    Calcium 9.3 8.3 - 10.4 MG/DL    Total Bilirubin 0.6 0.2 - 1.1 MG/DL    ALT 25 12 - 65 U/L    AST 17 15 - 37 U/L    Alk Phosphatase 79 50 - 136 U/L    Total Protein 7.3 6.3 - 8.2 g/dL    Albumin 3.5 3.5 - 5.0 g/dL    Globulin 3.8 2.8 - 4.5 g/dL    Albumin/Globulin Ratio 0.9 0.4 - 1.6         Imaging: reviewed     PATHOLOGY:       ASSESSMENT:     Diagnosis Orders   1. Renal cell carcinoma of left kidney (HCC)  CBC With Auto Differential    Comprehensive metabolic panel    TSH without Reflex      2. Mass of right adrenal gland (HCC)  CBC With Auto Differential    Comprehensive metabolic panel    TSH without Reflex      3. Fatigue due to treatment        4. Acute internal jugular vein thrombosis, right (HCC)          Oral Chemotherapy Adherence:     Current Regimen:  Drug Name: axitinib   Dose: 5mg twice daily - on dose rev up per below     Barriers to care identified including (financial, physical, psychosocial) : starting     Missed doses reported - no    Patient verbalizes understanding of what to do in the event of a missed dose: Yes     Adverse reactions/toxicities reported:NA - see HPI    -------------------------------------------------------------------------------------------------------------------------    Ms. Kyle De La Garza is here for evaluation of RCC.       Clear cell Renal cell carcinoma, Nidia grade 2 of 4 - left mass at 6.5 cm (s/p bx), right mid-pole mass at 4.5cm and right adrenal enhancing lesion (unable to bx due to location per IR); pt informed that if lung and adrenal nodule malignant - Stage IV with palliative intent in tx   - [facility] prog - <12mo to tx - intermediate risk   - International Mets RCC Criteria (IMDC) - <12mo to tx, neutrophils ULN - intermediate risk grp. - today, she is here for Carry Randal Chamorro 76 well. Labs reviewed and acceptable. - increase axitinib to 7 mg BID.   - Monitor BP. Continue Norvasc 2.5 mg BID. - proceed with next Keytruda - cycle 3.   - HFS instructed her to limit friction and try to use gloves more to decrease /hand washing, frequent use of udder cream/bag balm/etc   - echo with normal EF, port placed  - smoking cessation strongly encouraged - pt declined at this time accepting risks   - dose escalation q2wk of axitinb discussed with pt. RESUSCITATION DIRECTIVES/HOSPICE CARE: Full Support    RTC in 3 weeks for follow up and C4 Keytruda       MDM      Lab studies and imaging studies were personally reviewed. Pertinent old records were reviewed. Historical:   - here for consultation. We had a long discussion today about her case. We discussed the pathophysiology of malignancy, staging of RCC and current guidelines. We discussed tx options and she is interested in pursuing systemic therapy. She reviewed this with Dr Anh Leigh - it will let us see if the adrenal mass and both renal masses respond to tx. - per NCCN guidelines, pt's with intermediate risk advanced disease are candidates for immuno + TKI. We discussed options, will p/w axitinib and pembrolizumab. SE reviewed in great detail going over percentages of likelihood of occurrence. Printed info was given to pt for her reference. She was made aware of risk of heart failure, hemorrhage, HTN, Reversible posterior leukoencephalopathy syndrome, thrombosis (arterial/venous), thyroid d.o, wnd healing, diarrhea faisal in combination with pembro.      All questions were asked and answered to the best of my ability. The patient verbalized understanding and agrees with the plan above.               Dexter Horton, JAY-C  [facility] Hematology and Oncology  [id number] [place] [address]  Office : [phone]  Fax : [phone]

## 2023-01-16 NOTE — PROGRESS NOTES
Arrived to the [facility]. Keytruda completed. Patient tolerated well. Any issues or concerns during appointment: no.  Patient aware of next infusion appointment on 2/6/23 (date) at 36 (time). Patient aware of next lab and [facility] office visit on 2/6/23 (date) at  (time). Patient instructed to call provider with temperature of 100.4 or greater or nausea/vomiting/ diarrhea or pain not controlled by medications  Discharged ambulatory.

## 2023-01-25 DIAGNOSIS — C64.2 RENAL CELL CARCINOMA OF LEFT KIDNEY (HCC): ICD-10-CM

## 2023-01-25 DIAGNOSIS — E27.8 MASS OF RIGHT ADRENAL GLAND (HCC): ICD-10-CM

## 2023-02-03 ENCOUNTER — HOSPITAL ENCOUNTER (OUTPATIENT)
Dept: CT IMAGING | Age: 58
Discharge: HOME | End: 2023-02-03
Payer: COMMERCIAL

## 2023-02-03 DIAGNOSIS — C64.2 RENAL CELL CARCINOMA OF LEFT KIDNEY (HCC): ICD-10-CM

## 2023-02-03 PROCEDURE — 6360000004 HC RX CONTRAST MEDICATION: Performed by: NURSE PRACTITIONER

## 2023-02-03 PROCEDURE — 71260 CT THORAX DX C+: CPT

## 2023-02-03 PROCEDURE — 2580000003 HC RX 258: Performed by: NURSE PRACTITIONER

## 2023-02-03 RX ORDER — 0.9 % SODIUM CHLORIDE 0.9 %
100 INTRAVENOUS SOLUTION INTRAVENOUS ONCE
Status: COMPLETED | OUTPATIENT
Start: 2023-02-03 | End: 2023-02-03

## 2023-02-03 RX ORDER — SODIUM CHLORIDE 0.9 % (FLUSH) 0.9 %
10 SYRINGE (ML) INJECTION
Status: COMPLETED | OUTPATIENT
Start: 2023-02-03 | End: 2023-02-03

## 2023-02-03 RX ADMIN — DIATRIZOATE MEGLUMINE AND DIATRIZOATE SODIUM 15 ML: 660; 100 LIQUID ORAL; RECTAL at 09:59

## 2023-02-03 RX ADMIN — SODIUM CHLORIDE 100 ML: 9 INJECTION, SOLUTION INTRAVENOUS at 09:59

## 2023-02-03 RX ADMIN — IOPAMIDOL 100 ML: 755 INJECTION, SOLUTION INTRAVENOUS at 09:59

## 2023-02-03 RX ADMIN — SODIUM CHLORIDE, PRESERVATIVE FREE 10 ML: 5 INJECTION INTRAVENOUS at 09:59

## 2023-02-06 ENCOUNTER — HOSPITAL ENCOUNTER (OUTPATIENT)
Dept: INFUSION THERAPY | Age: 58
Discharge: HOME | End: 2023-02-06
Payer: COMMERCIAL

## 2023-02-06 ENCOUNTER — RESEARCH ENCOUNTER (OUTPATIENT)
Dept: RESEARCH | Age: 58
End: 2023-02-06

## 2023-02-06 VITALS
WEIGHT: 220.8 LBS | HEART RATE: 77 BPM | BODY MASS INDEX: 35.1 KG/M2 | DIASTOLIC BLOOD PRESSURE: 85 MMHG | RESPIRATION RATE: 16 BRPM | TEMPERATURE: 97.9 F | SYSTOLIC BLOOD PRESSURE: 127 MMHG | OXYGEN SATURATION: 93 %

## 2023-02-06 LAB
ALBUMIN SERPL-MCNC: 3.7 G/DL (ref 3.5–5)
ALBUMIN/GLOB SERPL: 0.9 (ref 0.4–1.6)
ALP SERPL-CCNC: 95 U/L (ref 50–136)
ALT SERPL-CCNC: 210 U/L (ref 12–65)
ANION GAP SERPL CALC-SCNC: 3 MMOL/L (ref 2–11)
AST SERPL-CCNC: 104 U/L (ref 15–37)
BASOPHILS # BLD: 0 K/UL (ref 0–0.2)
BASOPHILS NFR BLD: 0 % (ref 0–2)
BILIRUB SERPL-MCNC: 0.4 MG/DL (ref 0.2–1.1)
BUN SERPL-MCNC: 7 MG/DL (ref 6–23)
CALCIUM SERPL-MCNC: 9 MG/DL (ref 8.3–10.4)
CHLORIDE SERPL-SCNC: 108 MMOL/L (ref 101–110)
CO2 SERPL-SCNC: 27 MMOL/L (ref 21–32)
CREAT SERPL-MCNC: 0.7 MG/DL (ref 0.6–1)
DIFFERENTIAL METHOD BLD: ABNORMAL
EOSINOPHIL # BLD: 0.6 K/UL (ref 0–0.8)
EOSINOPHIL NFR BLD: 6 % (ref 0.5–7.8)
ERYTHROCYTE [DISTWIDTH] IN BLOOD BY AUTOMATED COUNT: 14.4 % (ref 11.9–14.6)
GLOBULIN SER CALC-MCNC: 3.9 G/DL (ref 2.8–4.5)
GLUCOSE SERPL-MCNC: 105 MG/DL (ref 65–100)
HCT VFR BLD AUTO: 48.2 % (ref 35.8–46.3)
HGB BLD-MCNC: 15.5 G/DL (ref 11.7–15.4)
IMM GRANULOCYTES # BLD AUTO: 0 K/UL (ref 0–0.5)
IMM GRANULOCYTES NFR BLD AUTO: 0 % (ref 0–5)
LYMPHOCYTES # BLD: 2.9 K/UL (ref 0.5–4.6)
LYMPHOCYTES NFR BLD: 28 % (ref 13–44)
MCH RBC QN AUTO: 26.1 PG (ref 26.1–32.9)
MCHC RBC AUTO-ENTMCNC: 32.2 G/DL (ref 31.4–35)
MCV RBC AUTO: 81.1 FL (ref 82–102)
MONOCYTES # BLD: 0.7 K/UL (ref 0.1–1.3)
MONOCYTES NFR BLD: 7 % (ref 4–12)
NEUTS SEG # BLD: 6.1 K/UL (ref 1.7–8.2)
NEUTS SEG NFR BLD: 59 % (ref 43–78)
NRBC # BLD: 0 K/UL (ref 0–0.2)
PLATELET # BLD AUTO: 275 K/UL (ref 150–450)
PMV BLD AUTO: 10.8 FL (ref 9.4–12.3)
POTASSIUM SERPL-SCNC: 4 MMOL/L (ref 3.5–5.1)
PROT SERPL-MCNC: 7.6 G/DL (ref 6.3–8.2)
RBC # BLD AUTO: 5.94 M/UL (ref 4.05–5.2)
SODIUM SERPL-SCNC: 138 MMOL/L (ref 133–143)
TSH, 3RD GENERATION: 1.9 UIU/ML (ref 0.36–3)
WBC # BLD AUTO: 10.4 K/UL (ref 4.3–11.1)

## 2023-02-06 PROCEDURE — 96360 HYDRATION IV INFUSION INIT: CPT

## 2023-02-06 PROCEDURE — 2580000003 HC RX 258: Performed by: INTERNAL MEDICINE

## 2023-02-06 PROCEDURE — 85025 COMPLETE CBC W/AUTO DIFF WBC: CPT

## 2023-02-06 PROCEDURE — 84443 ASSAY THYROID STIM HORMONE: CPT

## 2023-02-06 PROCEDURE — 80053 COMPREHEN METABOLIC PANEL: CPT

## 2023-02-06 RX ORDER — SODIUM CHLORIDE 9 MG/ML
INJECTION, SOLUTION INTRAVENOUS CONTINUOUS
Status: DISCONTINUED | OUTPATIENT
Start: 2023-02-06 | End: 2023-02-07 | Stop reason: HOSPADM

## 2023-02-06 RX ORDER — SODIUM CHLORIDE 0.9 % (FLUSH) 0.9 %
5-40 SYRINGE (ML) INJECTION PRN
Status: DISCONTINUED | OUTPATIENT
Start: 2023-02-06 | End: 2023-02-07 | Stop reason: HOSPADM

## 2023-02-06 RX ADMIN — SODIUM CHLORIDE: 9 INJECTION, SOLUTION INTRAVENOUS at 16:50

## 2023-02-06 RX ADMIN — SODIUM CHLORIDE, PRESERVATIVE FREE 10 ML: 5 INJECTION INTRAVENOUS at 16:50

## 2023-02-06 NOTE — PROGRESS NOTES
Arrived to the [facility]. Hydration completed. Patient tolerated well. Any issues or concerns during appointment: Patient arrived for lab work to receive Keytruda, however patient's ALT(210) and AST(104) came back elevated. Dr Jerome Jordan notified. Orders received to hold treatment today and administer 1 liter NS bolus. Patient verbalized understanding. Patient aware of next lab and [facility] office visit on 2/8 (date) at 4:00 PM (time). Patient instructed to call provider with temperature of 100.4 or greater or nausea/vomiting/ diarrhea or pain not controlled by medications  Discharged ambulatory.

## 2023-02-06 NOTE — RESEARCH
To infusion to see patient in follow-up for [employer] VB-001 clinical trial. Patient was given two stipend gift cards ($100 each) for stool previously collected and shipped on 12/26/22 and 01/16/23. Patient aware there are no additional study requirements at this time. Informed patient she can still contact Research RN with any questions or concerns.

## 2023-02-07 DIAGNOSIS — Z79.899 HIGH RISK MEDICATION USE: ICD-10-CM

## 2023-02-07 DIAGNOSIS — C64.2 RENAL CELL CARCINOMA OF LEFT KIDNEY (HCC): Primary | ICD-10-CM

## 2023-02-07 ASSESSMENT — ENCOUNTER SYMPTOMS
CHEST TIGHTNESS: 0
BLOOD IN STOOL: 0
ABDOMINAL DISTENTION: 0
SHORTNESS OF BREATH: 1
SORE THROAT: 0
CONSTIPATION: 0
WHEEZING: 0
ABDOMINAL PAIN: 0
NAUSEA: 0
BACK PAIN: 1
SCLERAL ICTERUS: 0
VOMITING: 0
TROUBLE SWALLOWING: 0
VOICE CHANGE: 0
HEMOPTYSIS: 0
DIARRHEA: 0

## 2023-02-07 NOTE — PROGRESS NOTES
[facility] Hematology [facility] Oncology: Established patient - follow up     Chief Complaint   Patient presents with    Follow-up     Dx; RCC - bilateral with likely met to adrenal +/- lungs     Fam hx: father - prostate cancer   Paternal aunt - hx of breast cancer     History of Present Illness:  Ms. Elle Diez is a 61 y.o. female who presents today for FU regarding RCC. The past medical history is significant for anxiety, HAs and sleep apnea, cervical dysplasia but no carcinoma, current smoker (~1/2PPD), lap liv and tubal reversal, I&D of left breast abscess. 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 Despite attending physical therapy since the accident, she continued to experience the same low back pain. She was seen at [facility] on 10/4/22. MRI of the lumbar spine on 10/15/22 showed mild degenerative disc disease at L3-4 through L5-S1 and a 5.7 x 6.1 x 7 cm lower pole solid left renal mass. Urgent referral was placed to [facility], who referred the patient to [facility] for uro/onc evaluation and treatment of renal mass. CT AP on 10/26/22 showed bilateral enhancing renal masses, concerning for renal cell carcinoma with the left lower pole renal mass measuring up to 6.5 cm and extending along Gerota's fascia and the right midpole renal mass measuring up to 4.6 cm. Also noted was an enhancing 1.5 cm right adrenal nodule, concerning for a metastatic nodule. No recent wt loss. S/p left renal biopsy on 11/2022, Nidia grade 2 of 4, clear cell RCC. CT chest on 11/7 showed multiple nodules that appeared to be calcified, most c/w granulomatous disease, but she's aware that metastatic disease cannot be ruled out. Not on AC. Cr 0.89. Pt presented for consultation regarding systemic therapy. She was here with her sister and friend.   Imaging with bilateral enhancing solid renal masses and 1.5cm right enhancing adrenal mass. Unable to bx adrenal mass due to position per IR. She was referred by Dr Darian James for discussion of systemic therapy. We discussed staging of disease and next steps in management. Works [employer]     She returns today for follow up on axitinib 5mg BID and cycle 4 keytruda. She came on Mon for tx, but due to LFT elevation, she was treated with IVF and tx was held. She is here today for two day FU and LFTs are rising. She will get additional IVF today and plan to stop axitinib and pembrolizumab until resolution. She took acetaminophen this am for a HA. BP in 110s at home. She did have diarrhea in Sunday after eating a [employer] burger. ?dehydration/hypotension. Will recheck labs in a couple of days. Per package insert, will need to restart one med at a time once LFTs normalize. We reviewed her CT results that showed response to tx after 3 cycles. Will see if can assist in sending images to [place] for review - she went for a 2nd opinion and sx there think that she may be a candidate for sx. In Jan course complicated by right IJ thrombus - she's on AC. IR evaluated and recommended that the port remain in place as long as she does not develop facial/neck swelling. She was started on heparin and improved. She was ultimately discharged on oral Eliquis. She is tolerating this well. Fatigue is main SE - but seems to be in great spirits today. Chronological Events:   11/23/22 heme/onc consult  11/30/22 port placed   12/2/22 FU - pt elected to p/w axitinib/pebrolizumab - discussed SE/MOA   12/23/22 FU on axitinib 5 mg BID and keytruda-tolerating well. 1/16/23 Right IJ clot on Eliquis 5 mg BID. Increase axitinib to 7 mg BID. Proceed with cycle 3 Keytruda.    2/8/23 - here for FU, did not p/w tx 2/6 or today due to LFT elevation; hold tx, IVF and FU on Fri for naother lab check, omit hepatotoxic meds        Family History   Problem Relation Age of Onset    Diabetes Mother     High Blood Pressure Mother     High Cholesterol Mother     COPD Mother     Cancer Father         prostate    Diabetes Sister     Stroke Sister     High Cholesterol Brother     Breast Cancer Paternal Aunt       Social History     Socioeconomic History    Marital status:      Spouse name: None    Number of children: None    Years of education: None    Highest education level: None   Occupational History    Occupation: CNA     Employer: [employer]   Tobacco Use    Smoking status: Every Day     Packs/day: 1.50     Years: 15.00     Pack years: 22.50     Types: Cigarettes    Smokeless tobacco: Never   Substance and Sexual Activity    Alcohol use: Yes     Alcohol/week: 2.0 standard drinks     Types: 2 Shots of liquor per week    Drug use: Never    Sexual activity: Not Currently     Partners: Male     Social Determinants of Health     Physical Activity: Unknown    Days of Exercise per Week: Patient refused   Intimate Partner Violence: Not At Risk    Fear of Current or Ex-Partner: No    Emotionally Abused: No    Physically Abused: No    Sexually Abused: No        Review of Systems   Constitutional:  Positive for fatigue. Negative for appetite change, chills, diaphoresis, fever and unexpected weight change. HENT:   Negative for hearing loss, mouth sores, nosebleeds, sore throat, trouble swallowing and voice change. Eyes:  Negative for icterus. Respiratory:  Positive for shortness of breath (exertional). Negative for chest tightness, hemoptysis and wheezing. Cardiovascular:  Negative for chest pain, leg swelling and palpitations. Gastrointestinal:  Negative for abdominal distention, abdominal pain, blood in stool, constipation, diarrhea, nausea and vomiting. Endocrine: Negative for hot flashes. Genitourinary:  Negative for difficulty urinating, frequency, vaginal bleeding and vaginal discharge. Musculoskeletal:  Positive for arthralgias and back pain.  Negative for flank pain, gait problem and myalgias. Skin:  Negative for itching, rash and wound. Neurological:  Negative for dizziness, extremity weakness, gait problem, headaches and numbness. Psychiatric/Behavioral:  Positive for depression. Negative for confusion. The patient is nervous/anxious. Allergies   Allergen Reactions    Latex Hives, Itching and Shortness Of Breath    Bee Venom Shortness Of Breath and Hives    Penicillins Anaphylaxis    Sulfa Antibiotics Hives, Itching and Rash    Penicillin G     Sulfamethoxazole-Trimethoprim Hives     Past Medical History:   Diagnosis Date    Anxiety 2000    Headache 1999    Sleep apnea 2002     Past Surgical History:   Procedure Laterality Date    CHOLECYSTECTOMY  2003    IR PORT PLACEMENT EQUAL OR GREATER THAN 5 YEARS  11/30/2022    IR PORT PLACEMENT EQUAL OR GREATER THAN 5 YEARS 11/30/2022 [facility] SPE[facility]    TUBAL LIGATION  3/91     Current Outpatient Medications   Medication Sig Dispense Refill    INLYTA 1 MG TABS 2 mg Takes 7mg      axitinib (INLYTA) 5 MG tablet Take 1 tablet by mouth in the morning and 1 tablet in the evening. In addition to 2mg prescription for total of 7mg twice daily. . 60 tablet 1    sertraline (ZOLOFT) 100 MG tablet Take 1 tablet by mouth at bedtime At nights 30 tablet 11    LORazepam (ATIVAN) 0.5 MG tablet Take 1 tablet by mouth every 8 hours as needed for Anxiety for up to 60 days. Max Daily Amount: 1.5 mg 30 tablet 1    amLODIPine (NORVASC) 2.5 MG tablet Take 1 tablet by mouth daily Hold if SBP < 140 30 tablet 11    apixaban (ELIQUIS) 5 MG TABS tablet Take 2 tablets by mouth 2 times daily for 11 doses Last dose 1/17/23 evening.  22 tablet 0    ondansetron (ZOFRAN) 4 MG tablet Take 1 tablet by mouth 3 times daily as needed for Nausea or Vomiting 30 tablet 0    prochlorperazine (COMPAZINE) 10 MG tablet Take 1 tablet by mouth every 6 hours as needed (nausea/vomiting) 120 tablet 3    Acetaminophen (TYLENOL) 325 MG CAPS As needed EPINEPHrine (EPIPEN 2-MARY IJ) Inject as directed       No current facility-administered medications for this visit. Facility-Administered Medications Ordered in Other Visits   Medication Dose Route Frequency Provider Last Rate Last Admin    0.9 % sodium chloride bolus  1,000 mL IntraVENous Once Xavi Cheng MD        sodium chloride flush 0.9 % injection 5-40 mL  5-40 mL IntraVENous PRN Xavi Cheng MD           No flowsheet data found. OBJECTIVE:  /83 (Site: Right Upper Arm, Position: Sitting, Cuff Size: Large Adult)   Pulse 64   Temp 97.7 °F (36.5 °C) (Oral)   Resp 16   Ht 5' 6.5\" (1.689 m)   Wt 222 lb (100.7 kg)   LMP  (LMP Unknown)   SpO2 95%   BMI 35.29 kg/m²       ECOG PERFORMANCE STATUS - 0-Fully active, able to carry on all pre-disease performance without restriction. Pain - 0 - No pain/10. None/Minimal pain - not affecting QOL     Fatigue - No flowsheet data found. Distress - No flowsheet data found. Physical Exam  Vitals reviewed. Exam conducted with a chaperone present. Constitutional:       General: She is not in acute distress. Appearance: Normal appearance. She is not ill-appearing or toxic-appearing. HENT:      Head: Normocephalic and atraumatic. Nose: Nose normal.      Mouth/Throat:      Mouth: Mucous membranes are moist.   Eyes:      General: No scleral icterus. Conjunctiva/sclera: Conjunctivae normal.   Cardiovascular:      Rate and Rhythm: Normal rate and regular rhythm. Heart sounds: No murmur heard. Pulmonary:      Effort: Pulmonary effort is normal. No respiratory distress. Breath sounds: Normal breath sounds. No wheezing or rales. Abdominal:      General: Bowel sounds are normal. There is no distension. Palpations: Abdomen is soft. There is no mass. Tenderness: There is no abdominal tenderness. There is no guarding. Musculoskeletal:         General: Normal range of motion. Cervical back: Normal range of motion. Right lower leg: No edema. Left lower leg: No edema. Skin:     General: Skin is warm and dry. Coloration: Skin is not jaundiced or pale. Findings: No rash. Neurological:      General: No focal deficit present. Mental Status: She is alert and oriented to person, place, and time. Gait: Gait normal.   Psychiatric:         Behavior: Behavior normal.         Thought Content:  Thought content normal.        Labs:  Recent Results (from the past 168 hour(s))   CBC with Auto Differential    Collection Time: 02/06/23  3:56 PM   Result Value Ref Range    WBC 10.4 4.3 - 11.1 K/uL    RBC 5.94 (H) 4.05 - 5.2 M/uL    Hemoglobin 15.5 (H) 11.7 - 15.4 g/dL    Hematocrit 48.2 (H) 35.8 - 46.3 %    MCV 81.1 (L) 82.0 - 102.0 FL    MCH 26.1 26.1 - 32.9 PG    MCHC 32.2 31.4 - 35.0 g/dL    RDW 14.4 11.9 - 14.6 %    Platelets 567 347 - 398 K/uL    MPV 10.8 9.4 - 12.3 FL    nRBC 0.00 0.0 - 0.2 K/uL    Seg Neutrophils 59 43 - 78 %    Lymphocytes 28 13 - 44 %    Monocytes 7 4.0 - 12.0 %    Eosinophils % 6 0.5 - 7.8 %    Basophils 0 0.0 - 2.0 %    Immature Granulocytes 0 0.0 - 5.0 %    Segs Absolute 6.1 1.7 - 8.2 K/UL    Absolute Lymph # 2.9 0.5 - 4.6 K/UL    Absolute Mono # 0.7 0.1 - 1.3 K/UL    Absolute Eos # 0.6 0.0 - 0.8 K/UL    Basophils Absolute 0.0 0.0 - 0.2 K/UL    Absolute Immature Granulocyte 0.0 0.0 - 0.5 K/UL    Differential Type AUTOMATED     Comprehensive Metabolic Panel    Collection Time: 02/06/23  3:56 PM   Result Value Ref Range    Sodium 138 133 - 143 mmol/L    Potassium 4.0 3.5 - 5.1 mmol/L    Chloride 108 101 - 110 mmol/L    CO2 27 21 - 32 mmol/L    Anion Gap 3 2 - 11 mmol/L    Glucose 105 (H) 65 - 100 mg/dL    BUN 7 6 - 23 MG/DL    Creatinine 0.70 0.6 - 1.0 MG/DL    Est, Glom Filt Rate >60 >60 ml/min/1.73m2    Calcium 9.0 8.3 - 10.4 MG/DL    Total Bilirubin 0.4 0.2 - 1.1 MG/DL     (H) 12 - 65 U/L     (H) 15 - 37 U/L    Alk Phosphatase 95 50 - 136 U/L    Total Protein 7.6 6.3 - 8.2 g/dL    Albumin 3.7 3.5 - 5.0 g/dL    Globulin 3.9 2.8 - 4.5 g/dL    Albumin/Globulin Ratio 0.9 0.4 - 1.6     TSH    Collection Time: 02/06/23  3:56 PM   Result Value Ref Range    TSH, 3RD GENERATION 1.900 0.358 - 3 uIU/mL   CBC with Auto Differential    Collection Time: 02/08/23  4:00 PM   Result Value Ref Range    WBC 12.0 (H) 4.3 - 11.1 K/uL    RBC 6.15 (H) 4.05 - 5.2 M/uL    Hemoglobin 16.0 (H) 11.7 - 15.4 g/dL    Hematocrit 49.4 (H) 35.8 - 46.3 %    MCV 80.3 (L) 82.0 - 102.0 FL    MCH 26.0 (L) 26.1 - 32.9 PG    MCHC 32.4 31.4 - 35.0 g/dL    RDW 14.2 11.9 - 14.6 %    Platelets 942 551 - 847 K/uL    MPV 10.9 9.4 - 12.3 FL    nRBC 0.00 0.0 - 0.2 K/uL    Differential Type AUTOMATED      Seg Neutrophils 56 43 - 78 %    Lymphocytes 30 13 - 44 %    Monocytes 7 4.0 - 12.0 %    Eosinophils % 6 0.5 - 7.8 %    Basophils 0 0.0 - 2.0 %    Immature Granulocytes 0 0.0 - 5.0 %    Segs Absolute 6.7 1.7 - 8.2 K/UL    Absolute Lymph # 3.6 0.5 - 4.6 K/UL    Absolute Mono # 0.9 0.1 - 1.3 K/UL    Absolute Eos # 0.7 0.0 - 0.8 K/UL    Basophils Absolute 0.1 0.0 - 0.2 K/UL    Absolute Immature Granulocyte 0.0 0.0 - 0.5 K/UL   Comprehensive Metabolic Panel    Collection Time: 02/08/23  4:00 PM   Result Value Ref Range    Sodium 135 133 - 143 mmol/L    Potassium 3.5 3.5 - 5.1 mmol/L    Chloride 104 101 - 110 mmol/L    CO2 27 21 - 32 mmol/L    Anion Gap 4 2 - 11 mmol/L    Glucose 80 65 - 100 mg/dL    BUN 9 6 - 23 MG/DL    Creatinine 0.80 0.6 - 1.0 MG/DL    Est, Glom Filt Rate >60 >60 ml/min/1.73m2    Calcium 9.1 8.3 - 10.4 MG/DL    Total Bilirubin 0.5 0.2 - 1.1 MG/DL     (H) 12 - 65 U/L     (H) 15 - 37 U/L    Alk Phosphatase 97 50 - 136 U/L    Total Protein 7.9 6.3 - 8.2 g/dL    Albumin 3.8 3.5 - 5.0 g/dL    Globulin 4.1 2.8 - 4.5 g/dL    Albumin/Globulin Ratio 0.9 0.4 - 1.6         Imaging: reviewed     PATHOLOGY:       ASSESSMENT:     Diagnosis Orders   1.  Renal cell carcinoma of left kidney (HCC)  CBC with Auto Differential    Comprehensive Metabolic Panel    DISCONTINUED: 0.9 % sodium chloride bolus    DISCONTINUED: sodium chloride flush 0.9 % injection 5-40 mL      2. Elevated LFTs  DISCONTINUED: 0.9 % sodium chloride bolus    DISCONTINUED: sodium chloride flush 0.9 % injection 5-40 mL      3. Acute internal jugular vein thrombosis, right ([address]        4. Port-A-Cath in place            Oral Chemotherapy Adherence:     Current Regimen:  Drug Name: axitinib   Dose: 7mg twice daily     Barriers to care identified including (financial, physical, psychosocial) : starting     Missed doses reported - no    Patient verbalizes understanding of what to do in the event of a missed dose: Yes     Adverse reactions/toxicities reported:NA - see HPI    -------------------------------------------------------------------------------------------------------------------------    Ms. Shen Jarvis is here for evaluation of RCC. Clear cell Renal cell carcinoma, Nidia grade 2 of 4 - left mass at 6.5 cm (s/p bx), right mid-pole mass at 4.5cm and right adrenal enhancing lesion (unable to bx due to location per IR); pt informed that if lung and adrenal nodule malignant - Stage IV with palliative intent in tx   - [facility] - <12mo to tx - intermediate risk   - International Mets RCC Criteria (IMDC) - <12mo to tx, neutrophils ULN - intermediate risk grp. - today, she is here for [address] well. Labs reviewed and will need to hold tx due to LFT elevation. Plan for lab recheck on Fri and IVF today   - stop axitinib to 7 mg BID. - holding Keytruda - cycle 4.   - Monitor BP.  Did have some episodes of lower BP, possibly contributing to LFTs abnormalities along with GI/diarrhea issues this wknd; stop acetaminophen   - HFS - instructed her to limit friction and try to use gloves more to decrease /hand washing, frequent use of udder cream/bag balm/etc   - echo with normal EF, port placed  - smoking cessation strongly encouraged - pt declined at this time accepting risks     - dose escalation q2wk of axitinb discussed with pt. RESUSCITATION DIRECTIVES/HOSPICE CARE: Full Support    RTC Fri for labs - will re-assess LFts     [40min - chart review, review of results and discussion of options, next steps, coordination of care and charting ]      MDM  Number of Diagnoses or Management Options  Acute internal jugular vein thrombosis, right ([address]): established, improving  Elevated LFTs: established, worsening  Renal cell carcinoma of left kidney ([address]): established, improving     Amount and/or Complexity of Data Reviewed  Clinical lab tests: ordered and reviewed  Tests in the radiology section of CPT®: ordered and reviewed  Tests in the medicine section of CPT®: ordered  Review and summarize past medical records: yes  Independent visualization of images, tracings, or specimens: yes    Risk of Complications, Morbidity, and/or Mortality  Presenting problems: moderate  Diagnostic procedures: moderate  Management options: high        Lab studies and imaging studies were personally reviewed. Pertinent old records were reviewed. Historical:   - here for consultation. We had a long discussion today about her case. We discussed the pathophysiology of malignancy, staging of RCC and current guidelines. We discussed tx options and she is interested in pursuing systemic therapy. She reviewed this with Dr Elaine Alanis - it will let us see if the adrenal mass and both renal masses respond to tx. - per NCCN guidelines, pt's with intermediate risk advanced disease are candidates for immuno + TKI. We discussed options, will p/w axitinib and pembrolizumab. SE reviewed in great detail going over percentages of likelihood of occurrence. Printed info was given to pt for her reference.   She was made aware of risk of heart failure, hemorrhage, HTN, Reversible posterior leukoencephalopathy syndrome, thrombosis (arterial/venous), thyroid d.o, wnd healing, diarrhea faisal in combination with pembro. All questions were asked and answered to the best of my ability. The patient verbalized understanding and agrees with the plan above.               Isaac Ledesma MD, MD  [facility]  [address] [place] [address]  Office : [phone]  Fax : [phone]

## 2023-02-08 ENCOUNTER — OFFICE VISIT (OUTPATIENT)
Dept: ONCOLOGY | Age: 58
End: 2023-02-08
Payer: COMMERCIAL

## 2023-02-08 ENCOUNTER — HOSPITAL ENCOUNTER (OUTPATIENT)
Dept: INFUSION THERAPY | Age: 58
Discharge: HOME | End: 2023-02-08
Payer: COMMERCIAL

## 2023-02-08 ENCOUNTER — HOSPITAL ENCOUNTER (OUTPATIENT)
Dept: LAB | Age: 58
Discharge: HOME | End: 2023-02-11
Payer: COMMERCIAL

## 2023-02-08 VITALS
DIASTOLIC BLOOD PRESSURE: 83 MMHG | HEIGHT: 67 IN | RESPIRATION RATE: 16 BRPM | HEART RATE: 64 BPM | WEIGHT: 222 LBS | OXYGEN SATURATION: 95 % | BODY MASS INDEX: 34.84 KG/M2 | SYSTOLIC BLOOD PRESSURE: 120 MMHG | TEMPERATURE: 97.7 F

## 2023-02-08 DIAGNOSIS — R79.89 ELEVATED LFTS: ICD-10-CM

## 2023-02-08 DIAGNOSIS — C64.2 RENAL CELL CARCINOMA OF LEFT KIDNEY (HCC): ICD-10-CM

## 2023-02-08 DIAGNOSIS — C64.2 RENAL CELL CARCINOMA OF LEFT KIDNEY (HCC): Primary | ICD-10-CM

## 2023-02-08 DIAGNOSIS — R79.89 ELEVATED LFTS: Primary | ICD-10-CM

## 2023-02-08 DIAGNOSIS — I82.C11 ACUTE INTERNAL JUGULAR VEIN THROMBOSIS, RIGHT (HCC): ICD-10-CM

## 2023-02-08 DIAGNOSIS — Z95.828 PORT-A-CATH IN PLACE: ICD-10-CM

## 2023-02-08 LAB
ALBUMIN SERPL-MCNC: 3.8 G/DL (ref 3.5–5)
ALBUMIN/GLOB SERPL: 0.9 (ref 0.4–1.6)
ALP SERPL-CCNC: 97 U/L (ref 50–136)
ALT SERPL-CCNC: 342 U/L (ref 12–65)
ANION GAP SERPL CALC-SCNC: 4 MMOL/L (ref 2–11)
AST SERPL-CCNC: 162 U/L (ref 15–37)
BASOPHILS # BLD: 0.1 K/UL (ref 0–0.2)
BASOPHILS NFR BLD: 0 % (ref 0–2)
BILIRUB SERPL-MCNC: 0.5 MG/DL (ref 0.2–1.1)
BUN SERPL-MCNC: 9 MG/DL (ref 6–23)
CALCIUM SERPL-MCNC: 9.1 MG/DL (ref 8.3–10.4)
CHLORIDE SERPL-SCNC: 104 MMOL/L (ref 101–110)
CO2 SERPL-SCNC: 27 MMOL/L (ref 21–32)
CREAT SERPL-MCNC: 0.8 MG/DL (ref 0.6–1)
DIFFERENTIAL METHOD BLD: ABNORMAL
EOSINOPHIL # BLD: 0.7 K/UL (ref 0–0.8)
EOSINOPHIL NFR BLD: 6 % (ref 0.5–7.8)
ERYTHROCYTE [DISTWIDTH] IN BLOOD BY AUTOMATED COUNT: 14.2 % (ref 11.9–14.6)
GLOBULIN SER CALC-MCNC: 4.1 G/DL (ref 2.8–4.5)
GLUCOSE SERPL-MCNC: 80 MG/DL (ref 65–100)
HCT VFR BLD AUTO: 49.4 % (ref 35.8–46.3)
HGB BLD-MCNC: 16 G/DL (ref 11.7–15.4)
IMM GRANULOCYTES # BLD AUTO: 0 K/UL (ref 0–0.5)
IMM GRANULOCYTES NFR BLD AUTO: 0 % (ref 0–5)
LYMPHOCYTES # BLD: 3.6 K/UL (ref 0.5–4.6)
LYMPHOCYTES NFR BLD: 30 % (ref 13–44)
MCH RBC QN AUTO: 26 PG (ref 26.1–32.9)
MCHC RBC AUTO-ENTMCNC: 32.4 G/DL (ref 31.4–35)
MCV RBC AUTO: 80.3 FL (ref 82–102)
MONOCYTES # BLD: 0.9 K/UL (ref 0.1–1.3)
MONOCYTES NFR BLD: 7 % (ref 4–12)
NEUTS SEG # BLD: 6.7 K/UL (ref 1.7–8.2)
NEUTS SEG NFR BLD: 56 % (ref 43–78)
NRBC # BLD: 0 K/UL (ref 0–0.2)
PLATELET # BLD AUTO: 275 K/UL (ref 150–450)
PMV BLD AUTO: 10.9 FL (ref 9.4–12.3)
POTASSIUM SERPL-SCNC: 3.5 MMOL/L (ref 3.5–5.1)
PROT SERPL-MCNC: 7.9 G/DL (ref 6.3–8.2)
RBC # BLD AUTO: 6.15 M/UL (ref 4.05–5.2)
SODIUM SERPL-SCNC: 135 MMOL/L (ref 133–143)
WBC # BLD AUTO: 12 K/UL (ref 4.3–11.1)

## 2023-02-08 PROCEDURE — 2580000003 HC RX 258: Performed by: INTERNAL MEDICINE

## 2023-02-08 PROCEDURE — 85025 COMPLETE CBC W/AUTO DIFF WBC: CPT

## 2023-02-08 PROCEDURE — 99215 OFFICE O/P EST HI 40 MIN: CPT | Performed by: INTERNAL MEDICINE

## 2023-02-08 PROCEDURE — 80053 COMPREHEN METABOLIC PANEL: CPT

## 2023-02-08 PROCEDURE — 96360 HYDRATION IV INFUSION INIT: CPT

## 2023-02-08 PROCEDURE — 36415 COLL VENOUS BLD VENIPUNCTURE: CPT

## 2023-02-08 RX ORDER — 0.9 % SODIUM CHLORIDE 0.9 %
1000 INTRAVENOUS SOLUTION INTRAVENOUS ONCE
Status: CANCELLED | OUTPATIENT
Start: 2023-02-08 | End: 2023-02-08

## 2023-02-08 RX ORDER — 0.9 % SODIUM CHLORIDE 0.9 %
1000 INTRAVENOUS SOLUTION INTRAVENOUS ONCE
Status: COMPLETED | OUTPATIENT
Start: 2023-02-08 | End: 2023-02-08

## 2023-02-08 RX ORDER — AXITINIB 1 MG/1
2 TABLET, FILM COATED ORAL
COMMUNITY
Start: 2023-01-20

## 2023-02-08 RX ORDER — SODIUM CHLORIDE 0.9 % (FLUSH) 0.9 %
5-40 SYRINGE (ML) INJECTION PRN
Status: CANCELLED | OUTPATIENT
Start: 2023-02-08

## 2023-02-08 RX ORDER — HEPARIN SODIUM (PORCINE) LOCK FLUSH IV SOLN 100 UNIT/ML 100 UNIT/ML
500 SOLUTION INTRAVENOUS PRN
Status: CANCELLED | OUTPATIENT
Start: 2023-02-08

## 2023-02-08 RX ORDER — SODIUM CHLORIDE 9 MG/ML
5-250 INJECTION, SOLUTION INTRAVENOUS PRN
Status: CANCELLED | OUTPATIENT
Start: 2023-02-08

## 2023-02-08 RX ORDER — SODIUM CHLORIDE 0.9 % (FLUSH) 0.9 %
5-40 SYRINGE (ML) INJECTION PRN
Status: DISCONTINUED | OUTPATIENT
Start: 2023-02-08 | End: 2023-02-09 | Stop reason: HOSPADM

## 2023-02-08 RX ADMIN — SODIUM CHLORIDE, PRESERVATIVE FREE 10 ML: 5 INJECTION INTRAVENOUS at 17:00

## 2023-02-08 RX ADMIN — SODIUM CHLORIDE, PRESERVATIVE FREE 10 ML: 5 INJECTION INTRAVENOUS at 18:05

## 2023-02-08 RX ADMIN — SODIUM CHLORIDE 1000 ML: 9 INJECTION, SOLUTION INTRAVENOUS at 17:00

## 2023-02-08 ASSESSMENT — PATIENT HEALTH QUESTIONNAIRE - PHQ9
SUM OF ALL RESPONSES TO PHQ QUESTIONS 1-9: 0
2. FEELING DOWN, DEPRESSED OR HOPELESS: 0
SUM OF ALL RESPONSES TO PHQ QUESTIONS 1-9: 0
1. LITTLE INTEREST OR PLEASURE IN DOING THINGS: 0
SUM OF ALL RESPONSES TO PHQ9 QUESTIONS 1 & 2: 0
SUM OF ALL RESPONSES TO PHQ QUESTIONS 1-9: 0
SUM OF ALL RESPONSES TO PHQ QUESTIONS 1-9: 0

## 2023-02-08 NOTE — PATIENT INSTRUCTIONS
Patient Instructions from Today's Visit    Reason for Visit:  Follow up. Diagnosis Information:  [email or web address]-[email or web address]      Plan:  Liver enzymes continue to increase. Stop [place] and Sheela Reise. More IV fluids today. Follow Up:  Labs Friday morning.       Recent Lab Results:  Hospital Outpatient Visit on 02/08/2023   Component Date Value Ref Range Status    WBC 02/08/2023 12.0 (A)  4.3 - 11.1 K/uL Final    RBC 02/08/2023 6.15 (A)  4.05 - 5.2 M/uL Final    Hemoglobin 02/08/2023 16.0 (A)  11.7 - 15.4 g/dL Final    Hematocrit 02/08/2023 49.4 (A)  35.8 - 46.3 % Final    MCV 02/08/2023 80.3 (A)  82.0 - 102.0 FL Final    MCH 02/08/2023 26.0 (A)  26.1 - 32.9 PG Final    MCHC 02/08/2023 32.4  31.4 - 35.0 g/dL Final    RDW 02/08/2023 14.2  11.9 - 14.6 % Final    Platelets 68/32/5341 275  150 - 450 K/uL Final    MPV 02/08/2023 10.9  9.4 - 12.3 FL Final    nRBC 02/08/2023 0.00  0.0 - 0.2 K/uL Final    **Note: Absolute NRBC parameter is now reported with Hemogram**    Differential Type 02/08/2023 AUTOMATED    Final    Seg Neutrophils 02/08/2023 56  43 - 78 % Final    Lymphocytes 02/08/2023 30  13 - 44 % Final    Monocytes 02/08/2023 7  4.0 - 12.0 % Final    Eosinophils % 02/08/2023 6  0.5 - 7.8 % Final    Basophils 02/08/2023 0  0.0 - 2.0 % Final    Immature Granulocytes 02/08/2023 0  0.0 - 5.0 % Final    Segs Absolute 02/08/2023 6.7  1.7 - 8.2 K/UL Final    Absolute Lymph # 02/08/2023 3.6  0.5 - 4.6 K/UL Final    Absolute Mono # 02/08/2023 0.9  0.1 - 1.3 K/UL Final    Absolute Eos # 02/08/2023 0.7  0.0 - 0.8 K/UL Final    Basophils Absolute 02/08/2023 0.1  0.0 - 0.2 K/UL Final    Absolute Immature Granulocyte 02/08/2023 0.0  0.0 - 0.5 K/UL Final         Treatment Summary has been discussed and given to patient: n/a        -------------------------------------------------------------------------------------------------------------------  Please call our office at [phone] if you have any  of the following symptoms:   Fever of 100.5 or greater  Chills  Shortness of breath  Swelling or pain in one leg    After office hours an answering service is available and will contact a provider for emergencies or if you are experiencing any of the above symptoms. Patient did express an interest in My Chart. My Chart log in information explained on the after visit summary printout at the [facility] [address] desk.     Rik Flores RN

## 2023-02-08 NOTE — PROGRESS NOTES
Arrived to the [facility]. IV fluids completed. Patient tolerated without difficulty. Any issues or concerns during appointment: None. Patient aware of next infusion appointment on 02/10 (date) at 0700 (time). Patient instructed to call provider with temperature of 100.4 or greater or nausea/vomiting/ diarrhea or pain not controlled by medications  Discharged ambulatory.

## 2023-02-09 ENCOUNTER — PATIENT MESSAGE (OUTPATIENT)
Dept: INTERNAL MEDICINE CLINIC | Facility: CLINIC | Age: 58
End: 2023-02-09

## 2023-02-09 DIAGNOSIS — F41.8 SITUATIONAL ANXIETY: ICD-10-CM

## 2023-02-09 NOTE — TELEPHONE ENCOUNTER
From: Gisela Favorite  To: Dr. Rachel Yee: 2/9/2023 5:16 PM EST  Subject: Anxiety     Are they anything different we can try for anxiety the lorazepam isn't working well. Have a lot going on with my treatments. Had to stop it temporarily.

## 2023-02-10 ENCOUNTER — HOSPITAL ENCOUNTER (OUTPATIENT)
Dept: LAB | Age: 58
Discharge: HOME | End: 2023-02-10
Payer: COMMERCIAL

## 2023-02-10 ENCOUNTER — HOSPITAL ENCOUNTER (OUTPATIENT)
Dept: INFUSION THERAPY | Age: 58
Discharge: HOME | End: 2023-02-10
Payer: COMMERCIAL

## 2023-02-10 ENCOUNTER — TELEPHONE (OUTPATIENT)
Dept: ONCOLOGY | Age: 58
End: 2023-02-10

## 2023-02-10 VITALS
HEART RATE: 75 BPM | RESPIRATION RATE: 16 BRPM | OXYGEN SATURATION: 92 % | TEMPERATURE: 98.1 F | DIASTOLIC BLOOD PRESSURE: 70 MMHG | SYSTOLIC BLOOD PRESSURE: 124 MMHG

## 2023-02-10 DIAGNOSIS — C64.2 RENAL CELL CARCINOMA OF LEFT KIDNEY (HCC): ICD-10-CM

## 2023-02-10 DIAGNOSIS — R79.89 ELEVATED LFTS: Primary | ICD-10-CM

## 2023-02-10 LAB
ALBUMIN SERPL-MCNC: 3.6 G/DL (ref 3.5–5)
ALBUMIN/GLOB SERPL: 1 (ref 0.4–1.6)
ALP SERPL-CCNC: 95 U/L (ref 50–136)
ALT SERPL-CCNC: 329 U/L (ref 12–65)
ANION GAP SERPL CALC-SCNC: 5 MMOL/L (ref 2–11)
AST SERPL-CCNC: 150 U/L (ref 15–37)
BASOPHILS # BLD: 0 K/UL (ref 0–0.2)
BASOPHILS NFR BLD: 0 % (ref 0–2)
BILIRUB SERPL-MCNC: 0.6 MG/DL (ref 0.2–1.1)
BUN SERPL-MCNC: 7 MG/DL (ref 6–23)
CALCIUM SERPL-MCNC: 9.3 MG/DL (ref 8.3–10.4)
CHLORIDE SERPL-SCNC: 106 MMOL/L (ref 101–110)
CO2 SERPL-SCNC: 26 MMOL/L (ref 21–32)
CREAT SERPL-MCNC: 0.8 MG/DL (ref 0.6–1)
DIFFERENTIAL METHOD BLD: ABNORMAL
EOSINOPHIL # BLD: 0.6 K/UL (ref 0–0.8)
EOSINOPHIL NFR BLD: 7 % (ref 0.5–7.8)
ERYTHROCYTE [DISTWIDTH] IN BLOOD BY AUTOMATED COUNT: 14.6 % (ref 11.9–14.6)
GLOBULIN SER CALC-MCNC: 3.7 G/DL (ref 2.8–4.5)
GLUCOSE SERPL-MCNC: 134 MG/DL (ref 65–100)
HCT VFR BLD AUTO: 48.8 % (ref 35.8–46.3)
HGB BLD-MCNC: 15.7 G/DL (ref 11.7–15.4)
IMM GRANULOCYTES # BLD AUTO: 0 K/UL (ref 0–0.5)
IMM GRANULOCYTES NFR BLD AUTO: 0 % (ref 0–5)
LYMPHOCYTES # BLD: 2.1 K/UL (ref 0.5–4.6)
LYMPHOCYTES NFR BLD: 22 % (ref 13–44)
MCH RBC QN AUTO: 26.3 PG (ref 26.1–32.9)
MCHC RBC AUTO-ENTMCNC: 32.2 G/DL (ref 31.4–35)
MCV RBC AUTO: 81.6 FL (ref 82–102)
MONOCYTES # BLD: 0.8 K/UL (ref 0.1–1.3)
MONOCYTES NFR BLD: 8 % (ref 4–12)
NEUTS SEG # BLD: 6.1 K/UL (ref 1.7–8.2)
NEUTS SEG NFR BLD: 63 % (ref 43–78)
NRBC # BLD: 0 K/UL (ref 0–0.2)
PLATELET # BLD AUTO: 266 K/UL (ref 150–450)
PMV BLD AUTO: 10.8 FL (ref 9.4–12.3)
POTASSIUM SERPL-SCNC: 3.9 MMOL/L (ref 3.5–5.1)
PROT SERPL-MCNC: 7.3 G/DL (ref 6.3–8.2)
RBC # BLD AUTO: 5.98 M/UL (ref 4.05–5.2)
SODIUM SERPL-SCNC: 137 MMOL/L (ref 133–143)
WBC # BLD AUTO: 9.6 K/UL (ref 4.3–11.1)

## 2023-02-10 PROCEDURE — 2580000003 HC RX 258: Performed by: INTERNAL MEDICINE

## 2023-02-10 PROCEDURE — 99211 OFF/OP EST MAY X REQ PHY/QHP: CPT

## 2023-02-10 PROCEDURE — 80053 COMPREHEN METABOLIC PANEL: CPT

## 2023-02-10 PROCEDURE — 85025 COMPLETE CBC W/AUTO DIFF WBC: CPT

## 2023-02-10 PROCEDURE — 36415 COLL VENOUS BLD VENIPUNCTURE: CPT

## 2023-02-10 RX ORDER — SODIUM CHLORIDE 9 MG/ML
5-250 INJECTION, SOLUTION INTRAVENOUS PRN
OUTPATIENT
Start: 2023-02-10

## 2023-02-10 RX ORDER — HEPARIN SODIUM (PORCINE) LOCK FLUSH IV SOLN 100 UNIT/ML 100 UNIT/ML
500 SOLUTION INTRAVENOUS PRN
Status: DISCONTINUED | OUTPATIENT
Start: 2023-02-10 | End: 2023-02-11 | Stop reason: HOSPADM

## 2023-02-10 RX ORDER — 0.9 % SODIUM CHLORIDE 0.9 %
500 INTRAVENOUS SOLUTION INTRAVENOUS ONCE
Status: CANCELLED
Start: 2023-02-10 | End: 2023-02-10

## 2023-02-10 RX ORDER — HEPARIN SODIUM (PORCINE) LOCK FLUSH IV SOLN 100 UNIT/ML 100 UNIT/ML
500 SOLUTION INTRAVENOUS PRN
OUTPATIENT
Start: 2023-02-10

## 2023-02-10 RX ORDER — BUPROPION HYDROCHLORIDE 150 MG/1
150 TABLET ORAL EVERY MORNING
Qty: 30 TABLET | Refills: 5 | Status: SHIPPED | OUTPATIENT
Start: 2023-02-10 | End: 2023-02-10 | Stop reason: SINTOL

## 2023-02-10 RX ORDER — SERTRALINE HYDROCHLORIDE 100 MG/1
150 TABLET, FILM COATED ORAL NIGHTLY
Qty: 45 TABLET | Refills: 11 | Status: SHIPPED | OUTPATIENT
Start: 2023-02-10

## 2023-02-10 RX ORDER — 0.9 % SODIUM CHLORIDE 0.9 %
500 INTRAVENOUS SOLUTION INTRAVENOUS ONCE
Status: COMPLETED | OUTPATIENT
Start: 2023-02-10 | End: 2023-02-10

## 2023-02-10 RX ORDER — SODIUM CHLORIDE 0.9 % (FLUSH) 0.9 %
5-40 SYRINGE (ML) INJECTION PRN
OUTPATIENT
Start: 2023-02-10

## 2023-02-10 RX ORDER — SODIUM CHLORIDE 9 MG/ML
5-250 INJECTION, SOLUTION INTRAVENOUS PRN
Status: DISCONTINUED | OUTPATIENT
Start: 2023-02-10 | End: 2023-02-11 | Stop reason: HOSPADM

## 2023-02-10 RX ORDER — LORAZEPAM 1 MG/1
.5-1 TABLET ORAL 2 TIMES DAILY PRN
Qty: 45 TABLET | Refills: 1 | Status: SHIPPED | OUTPATIENT
Start: 2023-02-10 | End: 2023-04-11

## 2023-02-10 RX ORDER — SODIUM CHLORIDE 0.9 % (FLUSH) 0.9 %
5-40 SYRINGE (ML) INJECTION PRN
Status: DISCONTINUED | OUTPATIENT
Start: 2023-02-10 | End: 2023-02-11 | Stop reason: HOSPADM

## 2023-02-10 RX ADMIN — SODIUM CHLORIDE 500 ML: 9 INJECTION, SOLUTION INTRAVENOUS at 09:05

## 2023-02-10 RX ADMIN — SODIUM CHLORIDE, PRESERVATIVE FREE 10 ML: 5 INJECTION INTRAVENOUS at 09:05

## 2023-02-10 NOTE — PROGRESS NOTES
Arrived to the [facility]. IVF bolus  completed. Patient tolerated well. Any issues or concerns during appointment: no  Patient instructed to call provider with temperature of 100.4 or greater or nausea/vomiting/ diarrhea or pain not controlled by medications  Discharged ambulatory.

## 2023-02-13 ENCOUNTER — HOSPITAL ENCOUNTER (OUTPATIENT)
Dept: LAB | Age: 58
Discharge: HOME | End: 2023-02-16
Payer: COMMERCIAL

## 2023-02-13 DIAGNOSIS — R79.89 ELEVATED LFTS: ICD-10-CM

## 2023-02-13 LAB
ALBUMIN SERPL-MCNC: 3.6 G/DL (ref 3.5–5)
ALBUMIN/GLOB SERPL: 1.1 (ref 0.4–1.6)
ALP SERPL-CCNC: 86 U/L (ref 50–136)
ALT SERPL-CCNC: 355 U/L (ref 12–65)
ANION GAP SERPL CALC-SCNC: 5 MMOL/L (ref 2–11)
AST SERPL-CCNC: 187 U/L (ref 15–37)
BASOPHILS # BLD: 0 K/UL (ref 0–0.2)
BASOPHILS NFR BLD: 0 % (ref 0–2)
BILIRUB SERPL-MCNC: 0.5 MG/DL (ref 0.2–1.1)
BUN SERPL-MCNC: 6 MG/DL (ref 6–23)
CALCIUM SERPL-MCNC: 9.2 MG/DL (ref 8.3–10.4)
CHLORIDE SERPL-SCNC: 109 MMOL/L (ref 101–110)
CO2 SERPL-SCNC: 26 MMOL/L (ref 21–32)
CREAT SERPL-MCNC: 0.8 MG/DL (ref 0.6–1)
DIFFERENTIAL METHOD BLD: ABNORMAL
EOSINOPHIL # BLD: 0.5 K/UL (ref 0–0.8)
EOSINOPHIL NFR BLD: 5 % (ref 0.5–7.8)
ERYTHROCYTE [DISTWIDTH] IN BLOOD BY AUTOMATED COUNT: 14.8 % (ref 11.9–14.6)
GLOBULIN SER CALC-MCNC: 3.4 G/DL (ref 2.8–4.5)
GLUCOSE SERPL-MCNC: 163 MG/DL (ref 65–100)
HCT VFR BLD AUTO: 47.3 % (ref 35.8–46.3)
HGB BLD-MCNC: 15.1 G/DL (ref 11.7–15.4)
IMM GRANULOCYTES # BLD AUTO: 0 K/UL (ref 0–0.5)
IMM GRANULOCYTES NFR BLD AUTO: 0 % (ref 0–5)
LYMPHOCYTES # BLD: 1.8 K/UL (ref 0.5–4.6)
LYMPHOCYTES NFR BLD: 18 % (ref 13–44)
MCH RBC QN AUTO: 26.1 PG (ref 26.1–32.9)
MCHC RBC AUTO-ENTMCNC: 31.9 G/DL (ref 31.4–35)
MCV RBC AUTO: 81.7 FL (ref 82–102)
MONOCYTES # BLD: 0.6 K/UL (ref 0.1–1.3)
MONOCYTES NFR BLD: 7 % (ref 4–12)
NEUTS SEG # BLD: 6.7 K/UL (ref 1.7–8.2)
NEUTS SEG NFR BLD: 70 % (ref 43–78)
NRBC # BLD: 0 K/UL (ref 0–0.2)
PLATELET # BLD AUTO: 281 K/UL (ref 150–450)
PMV BLD AUTO: 10.6 FL (ref 9.4–12.3)
POTASSIUM SERPL-SCNC: 3.8 MMOL/L (ref 3.5–5.1)
PROT SERPL-MCNC: 7 G/DL (ref 6.3–8.2)
RBC # BLD AUTO: 5.79 M/UL (ref 4.05–5.2)
SODIUM SERPL-SCNC: 140 MMOL/L (ref 133–143)
WBC # BLD AUTO: 9.6 K/UL (ref 4.3–11.1)

## 2023-02-13 PROCEDURE — 85025 COMPLETE CBC W/AUTO DIFF WBC: CPT

## 2023-02-13 PROCEDURE — 36415 COLL VENOUS BLD VENIPUNCTURE: CPT

## 2023-02-13 PROCEDURE — 80053 COMPREHEN METABOLIC PANEL: CPT

## 2023-02-16 ENCOUNTER — HOSPITAL ENCOUNTER (OUTPATIENT)
Dept: LAB | Age: 58
Discharge: HOME | End: 2023-02-16
Payer: COMMERCIAL

## 2023-02-16 DIAGNOSIS — C64.2 RENAL CELL CARCINOMA OF LEFT KIDNEY (HCC): Primary | ICD-10-CM

## 2023-02-16 DIAGNOSIS — C64.2 RENAL CELL CARCINOMA OF LEFT KIDNEY (HCC): ICD-10-CM

## 2023-02-16 LAB
ALBUMIN SERPL-MCNC: 3.6 G/DL (ref 3.5–5)
ALBUMIN/GLOB SERPL: 1.1 (ref 0.4–1.6)
ALP SERPL-CCNC: 84 U/L (ref 50–136)
ALT SERPL-CCNC: 224 U/L (ref 12–65)
ANION GAP SERPL CALC-SCNC: 4 MMOL/L (ref 2–11)
AST SERPL-CCNC: 70 U/L (ref 15–37)
BASOPHILS # BLD: 0 K/UL (ref 0–0.2)
BASOPHILS NFR BLD: 0 % (ref 0–2)
BILIRUB SERPL-MCNC: 0.4 MG/DL (ref 0.2–1.1)
BUN SERPL-MCNC: 9 MG/DL (ref 6–23)
CALCIUM SERPL-MCNC: 8.7 MG/DL (ref 8.3–10.4)
CHLORIDE SERPL-SCNC: 109 MMOL/L (ref 101–110)
CO2 SERPL-SCNC: 25 MMOL/L (ref 21–32)
CREAT SERPL-MCNC: 0.8 MG/DL (ref 0.6–1)
DIFFERENTIAL METHOD BLD: ABNORMAL
EOSINOPHIL # BLD: 0.1 K/UL (ref 0–0.8)
EOSINOPHIL NFR BLD: 1 % (ref 0.5–7.8)
ERYTHROCYTE [DISTWIDTH] IN BLOOD BY AUTOMATED COUNT: 15.1 % (ref 11.9–14.6)
GLOBULIN SER CALC-MCNC: 3.3 G/DL (ref 2.8–4.5)
GLUCOSE SERPL-MCNC: 148 MG/DL (ref 65–100)
HCT VFR BLD AUTO: 45.8 % (ref 35.8–46.3)
HGB BLD-MCNC: 14.6 G/DL (ref 11.7–15.4)
IMM GRANULOCYTES # BLD AUTO: 0.1 K/UL (ref 0–0.5)
IMM GRANULOCYTES NFR BLD AUTO: 0 % (ref 0–5)
LYMPHOCYTES # BLD: 1.8 K/UL (ref 0.5–4.6)
LYMPHOCYTES NFR BLD: 14 % (ref 13–44)
MCH RBC QN AUTO: 26.4 PG (ref 26.1–32.9)
MCHC RBC AUTO-ENTMCNC: 31.9 G/DL (ref 31.4–35)
MCV RBC AUTO: 82.8 FL (ref 82–102)
MONOCYTES # BLD: 0.7 K/UL (ref 0.1–1.3)
MONOCYTES NFR BLD: 6 % (ref 4–12)
NEUTS SEG # BLD: 10.2 K/UL (ref 1.7–8.2)
NEUTS SEG NFR BLD: 79 % (ref 43–78)
NRBC # BLD: 0 K/UL (ref 0–0.2)
PLATELET # BLD AUTO: 280 K/UL (ref 150–450)
PMV BLD AUTO: 10.8 FL (ref 9.4–12.3)
POTASSIUM SERPL-SCNC: 3.7 MMOL/L (ref 3.5–5.1)
PROT SERPL-MCNC: 6.9 G/DL (ref 6.3–8.2)
RBC # BLD AUTO: 5.53 M/UL (ref 4.05–5.2)
SODIUM SERPL-SCNC: 138 MMOL/L (ref 133–143)
WBC # BLD AUTO: 13 K/UL (ref 4.3–11.1)

## 2023-02-16 PROCEDURE — 80053 COMPREHEN METABOLIC PANEL: CPT

## 2023-02-16 PROCEDURE — 36415 COLL VENOUS BLD VENIPUNCTURE: CPT

## 2023-02-16 PROCEDURE — 85025 COMPLETE CBC W/AUTO DIFF WBC: CPT

## 2023-02-21 ENCOUNTER — HOSPITAL ENCOUNTER (OUTPATIENT)
Dept: LAB | Age: 58
Discharge: HOME | End: 2023-02-24
Payer: COMMERCIAL

## 2023-02-21 DIAGNOSIS — C64.2 RENAL CELL CARCINOMA OF LEFT KIDNEY (HCC): ICD-10-CM

## 2023-02-21 DIAGNOSIS — C64.2 RENAL CELL CARCINOMA OF LEFT KIDNEY (HCC): Primary | ICD-10-CM

## 2023-02-21 LAB
ALBUMIN SERPL-MCNC: 3.9 G/DL (ref 3.5–5)
ALBUMIN/GLOB SERPL: 1 (ref 0.4–1.6)
ALP SERPL-CCNC: 93 U/L (ref 50–136)
ALT SERPL-CCNC: 328 U/L (ref 12–65)
ANION GAP SERPL CALC-SCNC: 6 MMOL/L (ref 2–11)
AST SERPL-CCNC: 165 U/L (ref 15–37)
BASOPHILS # BLD: 0 K/UL (ref 0–0.2)
BASOPHILS NFR BLD: 0 % (ref 0–2)
BILIRUB SERPL-MCNC: 0.5 MG/DL (ref 0.2–1.1)
BUN SERPL-MCNC: 12 MG/DL (ref 6–23)
CALCIUM SERPL-MCNC: 10.1 MG/DL (ref 8.3–10.4)
CHLORIDE SERPL-SCNC: 107 MMOL/L (ref 101–110)
CO2 SERPL-SCNC: 24 MMOL/L (ref 21–32)
CREAT SERPL-MCNC: 1 MG/DL (ref 0.6–1)
DIFFERENTIAL METHOD BLD: ABNORMAL
EOSINOPHIL # BLD: 0.5 K/UL (ref 0–0.8)
EOSINOPHIL NFR BLD: 6 % (ref 0.5–7.8)
ERYTHROCYTE [DISTWIDTH] IN BLOOD BY AUTOMATED COUNT: 15 % (ref 11.9–14.6)
GLOBULIN SER CALC-MCNC: 3.9 G/DL (ref 2.8–4.5)
GLUCOSE SERPL-MCNC: 160 MG/DL (ref 65–100)
HCT VFR BLD AUTO: 50.7 % (ref 35.8–46.3)
HGB BLD-MCNC: 16.5 G/DL (ref 11.7–15.4)
IMM GRANULOCYTES # BLD AUTO: 0 K/UL (ref 0–0.5)
IMM GRANULOCYTES NFR BLD AUTO: 0 % (ref 0–5)
LYMPHOCYTES # BLD: 2.5 K/UL (ref 0.5–4.6)
LYMPHOCYTES NFR BLD: 26 % (ref 13–44)
MCH RBC QN AUTO: 26.5 PG (ref 26.1–32.9)
MCHC RBC AUTO-ENTMCNC: 32.5 G/DL (ref 31.4–35)
MCV RBC AUTO: 81.4 FL (ref 82–102)
MONOCYTES # BLD: 0.8 K/UL (ref 0.1–1.3)
MONOCYTES NFR BLD: 8 % (ref 4–12)
NEUTS SEG # BLD: 5.9 K/UL (ref 1.7–8.2)
NEUTS SEG NFR BLD: 61 % (ref 43–78)
NRBC # BLD: 0 K/UL (ref 0–0.2)
PLATELET # BLD AUTO: 320 K/UL (ref 150–450)
PMV BLD AUTO: 10.5 FL (ref 9.4–12.3)
POTASSIUM SERPL-SCNC: 4 MMOL/L (ref 3.5–5.1)
PROT SERPL-MCNC: 7.8 G/DL (ref 6.3–8.2)
RBC # BLD AUTO: 6.23 M/UL (ref 4.05–5.2)
SODIUM SERPL-SCNC: 137 MMOL/L (ref 133–143)
WBC # BLD AUTO: 9.8 K/UL (ref 4.3–11.1)

## 2023-02-21 PROCEDURE — 36415 COLL VENOUS BLD VENIPUNCTURE: CPT

## 2023-02-21 PROCEDURE — 85025 COMPLETE CBC W/AUTO DIFF WBC: CPT

## 2023-02-21 PROCEDURE — 80053 COMPREHEN METABOLIC PANEL: CPT

## 2023-02-22 DIAGNOSIS — R79.89 ELEVATED LFTS: Primary | ICD-10-CM

## 2023-02-22 DIAGNOSIS — C64.2 RENAL CELL CARCINOMA OF LEFT KIDNEY (HCC): ICD-10-CM

## 2023-02-22 RX ORDER — PANTOPRAZOLE SODIUM 40 MG/1
40 TABLET, DELAYED RELEASE ORAL
Qty: 30 TABLET | Refills: 1 | Status: SHIPPED | OUTPATIENT
Start: 2023-02-22

## 2023-02-22 RX ORDER — PREDNISONE 20 MG/1
100 TABLET ORAL DAILY
Qty: 120 TABLET | Refills: 0 | Status: SHIPPED | OUTPATIENT
Start: 2023-02-22 | End: 2023-03-18

## 2023-02-23 NOTE — PROGRESS NOTES
Per [employer] email.      Regarding patient     Name: Mechelle Hall  : 1965  MD: FERNANDO    The prescription for INLYTA TAB 5MG is ready to be set up for shipment with a co-pay of $0.   Please let me know if you have any questions/concerns.    Thanks,

## 2023-03-01 ENCOUNTER — HOSPITAL ENCOUNTER (OUTPATIENT)
Dept: LAB | Age: 58
Discharge: HOME | End: 2023-03-04
Payer: COMMERCIAL

## 2023-03-01 DIAGNOSIS — C64.2 RENAL CELL CARCINOMA OF LEFT KIDNEY (HCC): ICD-10-CM

## 2023-03-01 DIAGNOSIS — R79.89 ELEVATED LFTS: Primary | ICD-10-CM

## 2023-03-01 LAB
ALBUMIN SERPL-MCNC: 3.7 G/DL (ref 3.5–5)
ALBUMIN/GLOB SERPL: 1.1 (ref 0.4–1.6)
ALP SERPL-CCNC: 77 U/L (ref 50–136)
ALT SERPL-CCNC: 114 U/L (ref 12–65)
ANION GAP SERPL CALC-SCNC: 7 MMOL/L (ref 2–11)
AST SERPL-CCNC: 37 U/L (ref 15–37)
BASOPHILS # BLD: 0.1 K/UL (ref 0–0.2)
BASOPHILS NFR BLD: 0 % (ref 0–2)
BILIRUB SERPL-MCNC: 0.3 MG/DL (ref 0.2–1.1)
BUN SERPL-MCNC: 9 MG/DL (ref 6–23)
CALCIUM SERPL-MCNC: 8.8 MG/DL (ref 8.3–10.4)
CHLORIDE SERPL-SCNC: 103 MMOL/L (ref 101–110)
CO2 SERPL-SCNC: 28 MMOL/L (ref 21–32)
CREAT SERPL-MCNC: 0.7 MG/DL (ref 0.6–1)
DIFFERENTIAL METHOD BLD: ABNORMAL
EOSINOPHIL # BLD: 0.1 K/UL (ref 0–0.8)
EOSINOPHIL NFR BLD: 1 % (ref 0.5–7.8)
ERYTHROCYTE [DISTWIDTH] IN BLOOD BY AUTOMATED COUNT: 15.2 % (ref 11.9–14.6)
GLOBULIN SER CALC-MCNC: 3.4 G/DL (ref 2.8–4.5)
GLUCOSE SERPL-MCNC: 125 MG/DL (ref 65–100)
HCT VFR BLD AUTO: 50.7 % (ref 35.8–46.3)
HGB BLD-MCNC: 16.2 G/DL (ref 11.7–15.4)
IMM GRANULOCYTES # BLD AUTO: 0.1 K/UL (ref 0–0.5)
IMM GRANULOCYTES NFR BLD AUTO: 1 % (ref 0–5)
LYMPHOCYTES # BLD: 5 K/UL (ref 0.5–4.6)
LYMPHOCYTES NFR BLD: 31 % (ref 13–44)
MCH RBC QN AUTO: 25.9 PG (ref 26.1–32.9)
MCHC RBC AUTO-ENTMCNC: 32 G/DL (ref 31.4–35)
MCV RBC AUTO: 81 FL (ref 82–102)
MONOCYTES # BLD: 1.2 K/UL (ref 0.1–1.3)
MONOCYTES NFR BLD: 7 % (ref 4–12)
NEUTS SEG # BLD: 9.8 K/UL (ref 1.7–8.2)
NEUTS SEG NFR BLD: 60 % (ref 43–78)
NRBC # BLD: 0 K/UL (ref 0–0.2)
PLATELET # BLD AUTO: 295 K/UL (ref 150–450)
PMV BLD AUTO: 10.7 FL (ref 9.4–12.3)
POTASSIUM SERPL-SCNC: 3.8 MMOL/L (ref 3.5–5.1)
PROT SERPL-MCNC: 7.1 G/DL (ref 6.3–8.2)
RBC # BLD AUTO: 6.26 M/UL (ref 4.05–5.2)
SODIUM SERPL-SCNC: 138 MMOL/L (ref 133–143)
WBC # BLD AUTO: 16.2 K/UL (ref 4.3–11.1)

## 2023-03-01 PROCEDURE — 80053 COMPREHEN METABOLIC PANEL: CPT

## 2023-03-01 PROCEDURE — 36415 COLL VENOUS BLD VENIPUNCTURE: CPT

## 2023-03-01 PROCEDURE — 85025 COMPLETE CBC W/AUTO DIFF WBC: CPT

## 2023-03-02 DIAGNOSIS — E27.8 MASS OF RIGHT ADRENAL GLAND (HCC): ICD-10-CM

## 2023-03-02 DIAGNOSIS — C64.2 RENAL CELL CARCINOMA OF LEFT KIDNEY (HCC): ICD-10-CM

## 2023-03-06 ENCOUNTER — HOSPITAL ENCOUNTER (OUTPATIENT)
Dept: LAB | Age: 58
Discharge: HOME | End: 2023-03-09
Payer: COMMERCIAL

## 2023-03-06 ENCOUNTER — CLINICAL DOCUMENTATION (OUTPATIENT)
Dept: ONCOLOGY | Age: 58
End: 2023-03-06

## 2023-03-06 DIAGNOSIS — R79.89 ELEVATED LFTS: ICD-10-CM

## 2023-03-06 DIAGNOSIS — R79.89 ELEVATED LFTS: Primary | ICD-10-CM

## 2023-03-06 DIAGNOSIS — C64.2 RENAL CELL CARCINOMA OF LEFT KIDNEY (HCC): ICD-10-CM

## 2023-03-06 LAB
ALBUMIN SERPL-MCNC: 3.5 G/DL (ref 3.5–5)
ALBUMIN/GLOB SERPL: 1.1 (ref 0.4–1.6)
ALP SERPL-CCNC: 70 U/L (ref 50–136)
ALT SERPL-CCNC: 79 U/L (ref 12–65)
ANION GAP SERPL CALC-SCNC: 6 MMOL/L (ref 2–11)
AST SERPL-CCNC: 28 U/L (ref 15–37)
BASOPHILS # BLD: 0 K/UL (ref 0–0.2)
BASOPHILS NFR BLD: 0 % (ref 0–2)
BILIRUB SERPL-MCNC: 0.4 MG/DL (ref 0.2–1.1)
BUN SERPL-MCNC: 15 MG/DL (ref 6–23)
CALCIUM SERPL-MCNC: 9.4 MG/DL (ref 8.3–10.4)
CHLORIDE SERPL-SCNC: 107 MMOL/L (ref 101–110)
CO2 SERPL-SCNC: 26 MMOL/L (ref 21–32)
CREAT SERPL-MCNC: 1 MG/DL (ref 0.6–1)
DIFFERENTIAL METHOD BLD: ABNORMAL
EOSINOPHIL # BLD: 0.3 K/UL (ref 0–0.8)
EOSINOPHIL NFR BLD: 2 % (ref 0.5–7.8)
ERYTHROCYTE [DISTWIDTH] IN BLOOD BY AUTOMATED COUNT: 15.4 % (ref 11.9–14.6)
GLOBULIN SER CALC-MCNC: 3.2 G/DL (ref 2.8–4.5)
GLUCOSE SERPL-MCNC: 168 MG/DL (ref 65–100)
HCT VFR BLD AUTO: 49.1 % (ref 35.8–46.3)
HGB BLD-MCNC: 15.8 G/DL (ref 11.7–15.4)
IMM GRANULOCYTES # BLD AUTO: 0.1 K/UL (ref 0–0.5)
IMM GRANULOCYTES NFR BLD AUTO: 1 % (ref 0–5)
LYMPHOCYTES # BLD: 4.6 K/UL (ref 0.5–4.6)
LYMPHOCYTES NFR BLD: 30 % (ref 13–44)
MCH RBC QN AUTO: 26.2 PG (ref 26.1–32.9)
MCHC RBC AUTO-ENTMCNC: 32.2 G/DL (ref 31.4–35)
MCV RBC AUTO: 81.6 FL (ref 82–102)
MONOCYTES # BLD: 1.1 K/UL (ref 0.1–1.3)
MONOCYTES NFR BLD: 7 % (ref 4–12)
NEUTS SEG # BLD: 9.1 K/UL (ref 1.7–8.2)
NEUTS SEG NFR BLD: 60 % (ref 43–78)
NRBC # BLD: 0 K/UL (ref 0–0.2)
PLATELET # BLD AUTO: 230 K/UL (ref 150–450)
PMV BLD AUTO: 10.6 FL (ref 9.4–12.3)
POTASSIUM SERPL-SCNC: 3.6 MMOL/L (ref 3.5–5.1)
PROT SERPL-MCNC: 6.7 G/DL (ref 6.3–8.2)
RBC # BLD AUTO: 6.02 M/UL (ref 4.05–5.2)
SODIUM SERPL-SCNC: 139 MMOL/L (ref 133–143)
WBC # BLD AUTO: 15.2 K/UL (ref 4.3–11.1)

## 2023-03-06 PROCEDURE — 85025 COMPLETE CBC W/AUTO DIFF WBC: CPT

## 2023-03-06 PROCEDURE — 80053 COMPREHEN METABOLIC PANEL: CPT

## 2023-03-06 PROCEDURE — 36415 COLL VENOUS BLD VENIPUNCTURE: CPT

## 2023-03-06 NOTE — PROGRESS NOTES
Returned call to Dr. Michelle Eckert -Rachel Coburn. Patient's surgeon. We discussed the current events/ LFT elevations with immunotherapy being the most likely culprit. Plan for CT chest abdomen pelvis next week/here with images pushes to Matt - RN aware. She continues on slow taper of steroids. With the labs resulting today, we will lower her prednisone to 60 mg and repeat labs on Thursday. Per surgery, will most likely need right nephrectomy and possibly left partial nephrectomy. Some of the planning around surgery will be determined by her next imaging. Patient will need to be off steroids for surgery. RN will update pt.

## 2023-03-09 ENCOUNTER — HOSPITAL ENCOUNTER (OUTPATIENT)
Dept: LAB | Age: 58
Discharge: HOME | End: 2023-03-09
Payer: COMMERCIAL

## 2023-03-09 ENCOUNTER — CLINICAL DOCUMENTATION (OUTPATIENT)
Dept: ONCOLOGY | Age: 58
End: 2023-03-09

## 2023-03-09 DIAGNOSIS — C64.2 RENAL CELL CARCINOMA OF LEFT KIDNEY (HCC): ICD-10-CM

## 2023-03-09 DIAGNOSIS — R79.89 ELEVATED LFTS: Primary | ICD-10-CM

## 2023-03-09 DIAGNOSIS — R79.89 ELEVATED LFTS: ICD-10-CM

## 2023-03-09 LAB
ALBUMIN SERPL-MCNC: 3.6 G/DL (ref 3.5–5)
ALBUMIN/GLOB SERPL: 1.1 (ref 0.4–1.6)
ALP SERPL-CCNC: 68 U/L (ref 50–136)
ALT SERPL-CCNC: 72 U/L (ref 12–65)
ANION GAP SERPL CALC-SCNC: 4 MMOL/L (ref 2–11)
AST SERPL-CCNC: 23 U/L (ref 15–37)
BASOPHILS # BLD: 0 K/UL (ref 0–0.2)
BASOPHILS NFR BLD: 0 % (ref 0–2)
BILIRUB SERPL-MCNC: 0.4 MG/DL (ref 0.2–1.1)
BUN SERPL-MCNC: 17 MG/DL (ref 6–23)
CALCIUM SERPL-MCNC: 9.1 MG/DL (ref 8.3–10.4)
CHLORIDE SERPL-SCNC: 106 MMOL/L (ref 101–110)
CO2 SERPL-SCNC: 28 MMOL/L (ref 21–32)
CREAT SERPL-MCNC: 1 MG/DL (ref 0.6–1)
DIFFERENTIAL METHOD BLD: ABNORMAL
EOSINOPHIL # BLD: 0.3 K/UL (ref 0–0.8)
EOSINOPHIL NFR BLD: 2 % (ref 0.5–7.8)
ERYTHROCYTE [DISTWIDTH] IN BLOOD BY AUTOMATED COUNT: 16 % (ref 11.9–14.6)
GLOBULIN SER CALC-MCNC: 3.3 G/DL (ref 2.8–4.5)
GLUCOSE SERPL-MCNC: 184 MG/DL (ref 65–100)
HCT VFR BLD AUTO: 51.2 % (ref 35.8–46.3)
HGB BLD-MCNC: 16.2 G/DL (ref 11.7–15.4)
IMM GRANULOCYTES # BLD AUTO: 0.1 K/UL (ref 0–0.5)
IMM GRANULOCYTES NFR BLD AUTO: 1 % (ref 0–5)
LYMPHOCYTES # BLD: 5 K/UL (ref 0.5–4.6)
LYMPHOCYTES NFR BLD: 27 % (ref 13–44)
MCH RBC QN AUTO: 25.7 PG (ref 26.1–32.9)
MCHC RBC AUTO-ENTMCNC: 31.6 G/DL (ref 31.4–35)
MCV RBC AUTO: 81.1 FL (ref 82–102)
MONOCYTES # BLD: 1.3 K/UL (ref 0.1–1.3)
MONOCYTES NFR BLD: 7 % (ref 4–12)
NEUTS SEG # BLD: 11.7 K/UL (ref 1.7–8.2)
NEUTS SEG NFR BLD: 63 % (ref 43–78)
NRBC # BLD: 0 K/UL (ref 0–0.2)
PLATELET # BLD AUTO: 234 K/UL (ref 150–450)
PMV BLD AUTO: 10.6 FL (ref 9.4–12.3)
POTASSIUM SERPL-SCNC: 3.6 MMOL/L (ref 3.5–5.1)
PROT SERPL-MCNC: 6.9 G/DL (ref 6.3–8.2)
RBC # BLD AUTO: 6.31 M/UL (ref 4.05–5.2)
SODIUM SERPL-SCNC: 138 MMOL/L (ref 133–143)
WBC # BLD AUTO: 18.5 K/UL (ref 4.3–11.1)

## 2023-03-09 PROCEDURE — 36415 COLL VENOUS BLD VENIPUNCTURE: CPT

## 2023-03-09 PROCEDURE — 85025 COMPLETE CBC W/AUTO DIFF WBC: CPT

## 2023-03-09 PROCEDURE — 80053 COMPREHEN METABOLIC PANEL: CPT

## 2023-03-09 NOTE — PROGRESS NOTES
Called pt to discuss labs from this morning. Dr. Mathew Antony advised pt to continue prednisone taper - drop down to 40mg this week and recheck labs in 1 week. Pt VU and was in agreement.

## 2023-03-14 ENCOUNTER — HOSPITAL ENCOUNTER (OUTPATIENT)
Dept: CT IMAGING | Age: 58
Discharge: HOME | End: 2023-03-17

## 2023-03-14 DIAGNOSIS — C64.2 RENAL CELL CARCINOMA OF LEFT KIDNEY (HCC): ICD-10-CM

## 2023-03-14 DIAGNOSIS — R79.89 ELEVATED LFTS: ICD-10-CM

## 2023-03-14 RX ORDER — HEPARIN SODIUM (PORCINE) LOCK FLUSH IV SOLN 100 UNIT/ML 100 UNIT/ML
500 SOLUTION INTRAVENOUS ONCE
Status: COMPLETED | OUTPATIENT
Start: 2023-03-14 | End: 2023-03-14

## 2023-03-14 RX ADMIN — HEPARIN SODIUM (PORCINE) LOCK FLUSH IV SOLN 100 UNIT/ML 500 UNITS: 100 SOLUTION at 09:12

## 2023-03-14 NOTE — FLOWSHEET NOTE
Port accessed with #20 gauge 3/4 inch Ortiz needle. Site without any redness or swelling, had good blood return. Sensitive dressing applied to site. Pt tolerated procedure well, offering no complaints.

## 2023-03-14 NOTE — FLOWSHEET NOTE
Port de-accessed after being flushed with 20 cc of normal saline and 5 cc of heparin flush. Site without any redness or swelling, had good blood return. Pressure dressing applied to site with 2x2 gauze dressing and paper tape. Pt tolerated procedure well, offering no complaints.

## 2023-03-16 ENCOUNTER — HOSPITAL ENCOUNTER (OUTPATIENT)
Dept: LAB | Age: 58
Discharge: HOME | End: 2023-03-16
Payer: COMMERCIAL

## 2023-03-16 DIAGNOSIS — R79.89 ELEVATED LFTS: ICD-10-CM

## 2023-03-16 DIAGNOSIS — C64.2 RENAL CELL CARCINOMA OF LEFT KIDNEY (HCC): ICD-10-CM

## 2023-03-16 LAB
ALBUMIN SERPL-MCNC: 3.6 G/DL (ref 3.5–5)
ALBUMIN/GLOB SERPL: 1.1 (ref 0.4–1.6)
ALP SERPL-CCNC: 68 U/L (ref 50–136)
ALT SERPL-CCNC: 71 U/L (ref 12–65)
ANION GAP SERPL CALC-SCNC: 4 MMOL/L (ref 2–11)
AST SERPL-CCNC: 26 U/L (ref 15–37)
BASOPHILS # BLD: 0.1 K/UL (ref 0–0.2)
BASOPHILS NFR BLD: 0 % (ref 0–2)
BILIRUB SERPL-MCNC: 0.4 MG/DL (ref 0.2–1.1)
BUN SERPL-MCNC: 15 MG/DL (ref 6–23)
CALCIUM SERPL-MCNC: 9.1 MG/DL (ref 8.3–10.4)
CHLORIDE SERPL-SCNC: 101 MMOL/L (ref 101–110)
CO2 SERPL-SCNC: 30 MMOL/L (ref 21–32)
CREAT SERPL-MCNC: 0.8 MG/DL (ref 0.6–1)
DIFFERENTIAL METHOD BLD: ABNORMAL
EOSINOPHIL # BLD: 0.4 K/UL (ref 0–0.8)
EOSINOPHIL NFR BLD: 3 % (ref 0.5–7.8)
ERYTHROCYTE [DISTWIDTH] IN BLOOD BY AUTOMATED COUNT: 15.9 % (ref 11.9–14.6)
GLOBULIN SER CALC-MCNC: 3.4 G/DL (ref 2.8–4.5)
GLUCOSE SERPL-MCNC: 181 MG/DL (ref 65–100)
HCT VFR BLD AUTO: 49.4 % (ref 35.8–46.3)
HGB BLD-MCNC: 15.8 G/DL (ref 11.7–15.4)
IMM GRANULOCYTES # BLD AUTO: 0.1 K/UL (ref 0–0.5)
IMM GRANULOCYTES NFR BLD AUTO: 0 % (ref 0–5)
LYMPHOCYTES # BLD: 4.2 K/UL (ref 0.5–4.6)
LYMPHOCYTES NFR BLD: 30 % (ref 13–44)
MCH RBC QN AUTO: 26 PG (ref 26.1–32.9)
MCHC RBC AUTO-ENTMCNC: 32 G/DL (ref 31.4–35)
MCV RBC AUTO: 81.4 FL (ref 82–102)
MONOCYTES # BLD: 0.9 K/UL (ref 0.1–1.3)
MONOCYTES NFR BLD: 7 % (ref 4–12)
NEUTS SEG # BLD: 8.1 K/UL (ref 1.7–8.2)
NEUTS SEG NFR BLD: 60 % (ref 43–78)
NRBC # BLD: 0 K/UL (ref 0–0.2)
PLATELET # BLD AUTO: 220 K/UL (ref 150–450)
PMV BLD AUTO: 11 FL (ref 9.4–12.3)
POTASSIUM SERPL-SCNC: 3.7 MMOL/L (ref 3.5–5.1)
PROT SERPL-MCNC: 7 G/DL (ref 6.3–8.2)
RBC # BLD AUTO: 6.07 M/UL (ref 4.05–5.2)
SODIUM SERPL-SCNC: 135 MMOL/L (ref 133–143)
WBC # BLD AUTO: 13.7 K/UL (ref 4.3–11.1)

## 2023-03-16 PROCEDURE — 80053 COMPREHEN METABOLIC PANEL: CPT

## 2023-03-16 PROCEDURE — 36415 COLL VENOUS BLD VENIPUNCTURE: CPT

## 2023-03-16 PROCEDURE — 85025 COMPLETE CBC W/AUTO DIFF WBC: CPT

## 2023-03-17 NOTE — PROGRESS NOTES
Per [facility]    \"  Name: Margarita Parnell  : 1965  MD: Kemar Akbar    The prescription for INLYTA TAB 5MG has been set up for shipment with a co-pay of $0.'

## 2023-03-20 DIAGNOSIS — C64.2 RENAL CELL CARCINOMA OF LEFT KIDNEY (HCC): Primary | ICD-10-CM

## 2023-03-20 RX ORDER — AXITINIB 1 MG/1
2 TABLET, FILM COATED ORAL 2 TIMES DAILY
Qty: 60 TABLET | Refills: 1 | Status: SHIPPED | OUTPATIENT
Start: 2023-03-20

## 2023-03-20 ASSESSMENT — ENCOUNTER SYMPTOMS
VOMITING: 0
ABDOMINAL PAIN: 0
SORE THROAT: 0
WHEEZING: 0
CHEST TIGHTNESS: 0
DIARRHEA: 0
TROUBLE SWALLOWING: 0
CONSTIPATION: 0
BACK PAIN: 1
ABDOMINAL DISTENTION: 0
NAUSEA: 0
BLOOD IN STOOL: 0
SHORTNESS OF BREATH: 1
SCLERAL ICTERUS: 0
VOICE CHANGE: 0
HEMOPTYSIS: 0

## 2023-03-20 NOTE — PROGRESS NOTES
DIRECTIVES/HOSPICE CARE: Full Support    RTC after sx discussion   C/w axitinib for now     [40min - chart review, review of results and discussion of options, next steps, coordination of care, communication with uro sx and charting ]      MDM  Number of Diagnoses or Management Options  Elevated LFTs: established, improving  Renal cell carcinoma of left kidney ([facility] [address]): new, needed workup     Amount and/or Complexity of Data Reviewed  Clinical lab tests: ordered and reviewed  Tests in the radiology section of CPT®: ordered and reviewed  Tests in the medicine section of CPT®: ordered  Review and summarize past medical records: yes  Discuss the patient with other providers: yes  Independent visualization of images, tracings, or specimens: yes    Risk of Complications, Morbidity, and/or Mortality  Presenting problems: moderate  Diagnostic procedures: moderate  Management options: high        Lab studies and imaging studies were personally reviewed. Pertinent old records were reviewed. Historical:   - here for consultation. We had a long discussion today about her case. We discussed the pathophysiology of malignancy, staging of RCC and current guidelines. We discussed tx options and she is interested in pursuing systemic therapy. She reviewed this with Dr Olga Lidia Deutsch - it will let us see if the adrenal mass and both renal masses respond to tx. - per NCCN guidelines, pt's with intermediate risk advanced disease are candidates for immuno + TKI. We discussed options, will p/w axitinib and pembrolizumab. SE reviewed in great detail going over percentages of likelihood of occurrence. Printed info was given to pt for her reference. She was made aware of risk of heart failure, hemorrhage, HTN, Reversible posterior leukoencephalopathy syndrome, thrombosis (arterial/venous), thyroid d.o, wnd healing, diarrhea faisal in combination with pembro. - here for Carry Randal Chamorro 76 well.  Labs reviewed and will need to hold tx due to LFT

## 2023-03-21 ENCOUNTER — TELEMEDICINE (OUTPATIENT)
Dept: ONCOLOGY | Age: 58
End: 2023-03-21
Payer: COMMERCIAL

## 2023-03-21 DIAGNOSIS — R79.89 ELEVATED LFTS: ICD-10-CM

## 2023-03-21 DIAGNOSIS — C64.2 RENAL CELL CARCINOMA OF LEFT KIDNEY (HCC): Primary | ICD-10-CM

## 2023-03-21 PROCEDURE — 99215 OFFICE O/P EST HI 40 MIN: CPT | Performed by: INTERNAL MEDICINE

## 2023-03-21 NOTE — PATIENT INSTRUCTIONS
Patient Instructions from Today's Visit    Reason for Visit:  Virtual Visit    Diagnosis Information:  https://[email or web address]/. net/about-us/asco-answers-patient-education-materials/uwkb-lgpwyfm-lpst-sheets    Plan:  Labs reviewed. Scan reviewed. Once we let you know if you should stop the steroids, keep an eye out for any signs or symptoms of adrenal insuffiencey.   These can include but are not limited to:  chronic, or long-lasting, fatigue  muscle weakness  loss of appetite  weight loss  abdominal pain    Follow Up:  Labs on Thursday    Recent Lab Results:  Hospital Outpatient Visit on 03/16/2023   Component Date Value Ref Range Status    WBC 03/16/2023 13.7 (A)  4.3 - 11.1 K/uL Final    RBC 03/16/2023 6.07 (A)  4.05 - 5.2 M/uL Final    Hemoglobin 03/16/2023 15.8 (A)  11.7 - 15.4 g/dL Final    Hematocrit 03/16/2023 49.4 (A)  35.8 - 46.3 % Final    MCV 03/16/2023 81.4 (A)  82.0 - 102.0 FL Final    MCH 03/16/2023 26.0 (A)  26.1 - 32.9 PG Final    MCHC 03/16/2023 32.0  31.4 - 35.0 g/dL Final    RDW 03/16/2023 15.9 (A)  11.9 - 14.6 % Final    Platelets 82/76/0015 220  150 - 450 K/uL Final    MPV 03/16/2023 11.0  9.4 - 12.3 FL Final    nRBC 03/16/2023 0.00  0.0 - 0.2 K/uL Final    **Note: Absolute NRBC parameter is now reported with Hemogram**    Seg Neutrophils 03/16/2023 60  43 - 78 % Final    Lymphocytes 03/16/2023 30  13 - 44 % Final    Monocytes 03/16/2023 7  4.0 - 12.0 % Final    Eosinophils % 03/16/2023 3  0.5 - 7.8 % Final    Basophils 03/16/2023 0  0.0 - 2.0 % Final    Immature Granulocytes 03/16/2023 0  0.0 - 5.0 % Final    Segs Absolute 03/16/2023 8.1  1.7 - 8.2 K/UL Final    Absolute Lymph # 03/16/2023 4.2  0.5 - 4.6 K/UL Final    Absolute Mono # 03/16/2023 0.9  0.1 - 1.3 K/UL Final    Absolute Eos # 03/16/2023 0.4  0.0 - 0.8 K/UL Final    Basophils Absolute 03/16/2023 0.1  0.0 - 0.2 K/UL Final    Absolute Immature Granulocyte 03/16/2023 0.1  0.0 - 0.5 K/UL Final    Differential Type 03/16/2023 AUTOMATED

## 2023-03-22 NOTE — PROGRESS NOTES
back pain. She was seen at the [facility] on 10/4/22. MRI of the lumbar spine was ordered and performed on 10/15/22 which revealed mild degenerative disc disease at L3-4 through L5-S1 and a 5.7 x 6.1 x 7 cm lower pole solid left renal mass. Urgent referral was placed to [facility], who referred the patient to [facility] for uro/onc evaluation and treatment of renal mass. CT of the abdomen and pelvis with and without contrast was completed on 10/26/22 demonstrating bilateral enhancing renal masses, likely concerning for renal cell carcinoma with the left lower pole renal mass measuring up to 6.5 cm and extending along Gerota's fascia and the right midpole renal mass measuring up to 4.6 cm. Also noted was an enhancing 1.5 cm right adrenal nodule, concerning for a metastatic nodule. Past medical, family and social histories, as well as medications and allergies, were reviewed and updated in the medical record as appropriate. PMH:     Past Medical History:   Diagnosis Date    Anxiety 2000    Headache 1999    Sleep apnea 2002       MEDs:     amLODIPine  apixaban Tabs  axitinib  EPIPEN 2-MARY IJ  Inlyta Tabs  LORazepam  ondansetron  pantoprazole  prochlorperazine  sertraline  Tylenol Caps     ALLERGIES:    Allergies   Allergen Reactions    Latex Hives, Itching and Shortness Of Breath    Bee Venom Shortness Of Breath and Hives    Penicillins Anaphylaxis    Sulfa Antibiotics Hives, Itching and Rash    Penicillin G     Sulfamethoxazole-Trimethoprim Hives       ROS:     Review of Systems   Constitutional: Negative. Negative for chills, fatigue and fever. Respiratory: Negative. Cardiovascular: Negative. Gastrointestinal: Negative. Genitourinary: Negative. Negative for difficulty urinating, dysuria, flank pain, frequency, hematuria and urgency. Musculoskeletal: Negative. All other systems reviewed and are negative.      PHYSICAL

## 2023-03-23 ENCOUNTER — HOSPITAL ENCOUNTER (OUTPATIENT)
Dept: LAB | Age: 58
Discharge: HOME | End: 2023-03-23
Payer: COMMERCIAL

## 2023-03-23 DIAGNOSIS — R79.89 ELEVATED LFTS: ICD-10-CM

## 2023-03-23 DIAGNOSIS — C64.2 RENAL CELL CARCINOMA OF LEFT KIDNEY (HCC): ICD-10-CM

## 2023-03-23 DIAGNOSIS — C64.2 RENAL CELL CARCINOMA OF LEFT KIDNEY (HCC): Primary | ICD-10-CM

## 2023-03-23 LAB
ALBUMIN SERPL-MCNC: 3.4 G/DL (ref 3.5–5)
ALBUMIN/GLOB SERPL: 1 (ref 0.4–1.6)
ALP SERPL-CCNC: 71 U/L (ref 50–136)
ALT SERPL-CCNC: 77 U/L (ref 12–65)
ANION GAP SERPL CALC-SCNC: 5 MMOL/L (ref 2–11)
AST SERPL-CCNC: 24 U/L (ref 15–37)
BASOPHILS # BLD: 0.1 K/UL (ref 0–0.2)
BASOPHILS NFR BLD: 1 % (ref 0–2)
BILIRUB SERPL-MCNC: 0.4 MG/DL (ref 0.2–1.1)
BUN SERPL-MCNC: 14 MG/DL (ref 6–23)
CALCIUM SERPL-MCNC: 9 MG/DL (ref 8.3–10.4)
CHLORIDE SERPL-SCNC: 105 MMOL/L (ref 101–110)
CO2 SERPL-SCNC: 27 MMOL/L (ref 21–32)
CREAT SERPL-MCNC: 0.8 MG/DL (ref 0.6–1)
DIFFERENTIAL METHOD BLD: ABNORMAL
EOSINOPHIL # BLD: 0.5 K/UL (ref 0–0.8)
EOSINOPHIL NFR BLD: 4 % (ref 0.5–7.8)
ERYTHROCYTE [DISTWIDTH] IN BLOOD BY AUTOMATED COUNT: 16.1 % (ref 11.9–14.6)
GLOBULIN SER CALC-MCNC: 3.4 G/DL (ref 2.8–4.5)
GLUCOSE SERPL-MCNC: 168 MG/DL (ref 65–100)
HCT VFR BLD AUTO: 48.3 % (ref 35.8–46.3)
HGB BLD-MCNC: 15.6 G/DL (ref 11.7–15.4)
IMM GRANULOCYTES # BLD AUTO: 0.1 K/UL (ref 0–0.5)
IMM GRANULOCYTES NFR BLD AUTO: 0 % (ref 0–5)
LYMPHOCYTES # BLD: 3.4 K/UL (ref 0.5–4.6)
LYMPHOCYTES NFR BLD: 27 % (ref 13–44)
MCH RBC QN AUTO: 26.3 PG (ref 26.1–32.9)
MCHC RBC AUTO-ENTMCNC: 32.3 G/DL (ref 31.4–35)
MCV RBC AUTO: 81.5 FL (ref 82–102)
MONOCYTES # BLD: 0.9 K/UL (ref 0.1–1.3)
MONOCYTES NFR BLD: 8 % (ref 4–12)
NEUTS SEG # BLD: 7.5 K/UL (ref 1.7–8.2)
NEUTS SEG NFR BLD: 60 % (ref 43–78)
NRBC # BLD: 0 K/UL (ref 0–0.2)
PLATELET # BLD AUTO: 238 K/UL (ref 150–450)
PMV BLD AUTO: 10 FL (ref 9.4–12.3)
POTASSIUM SERPL-SCNC: 4 MMOL/L (ref 3.5–5.1)
PROT SERPL-MCNC: 6.8 G/DL (ref 6.3–8.2)
RBC # BLD AUTO: 5.93 M/UL (ref 4.05–5.2)
SODIUM SERPL-SCNC: 137 MMOL/L (ref 133–143)
WBC # BLD AUTO: 12.4 K/UL (ref 4.3–11.1)

## 2023-03-23 PROCEDURE — 36415 COLL VENOUS BLD VENIPUNCTURE: CPT

## 2023-03-23 PROCEDURE — 80053 COMPREHEN METABOLIC PANEL: CPT

## 2023-03-23 PROCEDURE — 85025 COMPLETE CBC W/AUTO DIFF WBC: CPT

## 2023-03-27 ENCOUNTER — OFFICE VISIT (OUTPATIENT)
Dept: ONCOLOGY | Age: 58
End: 2023-03-27
Payer: COMMERCIAL

## 2023-03-27 VITALS
HEART RATE: 90 BPM | DIASTOLIC BLOOD PRESSURE: 83 MMHG | SYSTOLIC BLOOD PRESSURE: 138 MMHG | RESPIRATION RATE: 14 BRPM | BODY MASS INDEX: 36.22 KG/M2 | TEMPERATURE: 97.5 F | WEIGHT: 230.8 LBS | HEIGHT: 67 IN | OXYGEN SATURATION: 94 %

## 2023-03-27 DIAGNOSIS — C64.9 RENAL CELL CARCINOMA, UNSPECIFIED LATERALITY (HCC): Primary | ICD-10-CM

## 2023-03-27 PROCEDURE — 99215 OFFICE O/P EST HI 40 MIN: CPT | Performed by: UROLOGY

## 2023-03-27 ASSESSMENT — PATIENT HEALTH QUESTIONNAIRE - PHQ9
2. FEELING DOWN, DEPRESSED OR HOPELESS: 0
1. LITTLE INTEREST OR PLEASURE IN DOING THINGS: 0
SUM OF ALL RESPONSES TO PHQ9 QUESTIONS 1 & 2: 0
SUM OF ALL RESPONSES TO PHQ QUESTIONS 1-9: 0

## 2023-03-27 ASSESSMENT — ENCOUNTER SYMPTOMS
RESPIRATORY NEGATIVE: 1
GASTROINTESTINAL NEGATIVE: 1

## 2023-03-27 NOTE — PATIENT INSTRUCTIONS
physician care at the nearest hospital emergency room. Skin rashes occur from time to time in people with and without known allergies. They can be associated particularly with antibiotics or antibacterials. If you get a rash, stop your antibiotic and call your physician, as he may be able to prescribe an antihistamine to help alleviate the rash symptoms and start you on a new antibiotic. You need to walk every day, but if at anytime you feel your heart racing or pounding hard in your chest, rest immediately. Take it easier on your next walk. Movement is encouraged to prevent the development of clots in your leg veins, which could become like-threatening pulmonary emboli as described in comment #1. If you have any questions or concerns, please call the office [phone].

## 2023-03-30 ENCOUNTER — HOSPITAL ENCOUNTER (OUTPATIENT)
Dept: LAB | Age: 58
Discharge: HOME | End: 2023-03-30
Payer: COMMERCIAL

## 2023-03-30 DIAGNOSIS — R79.89 ELEVATED LFTS: ICD-10-CM

## 2023-03-30 DIAGNOSIS — C64.2 RENAL CELL CARCINOMA OF LEFT KIDNEY (HCC): ICD-10-CM

## 2023-03-30 LAB
ALBUMIN SERPL-MCNC: 3.4 G/DL (ref 3.5–5)
ALBUMIN/GLOB SERPL: 1 (ref 0.4–1.6)
ALP SERPL-CCNC: 77 U/L (ref 50–136)
ALT SERPL-CCNC: 104 U/L (ref 12–65)
ANION GAP SERPL CALC-SCNC: 4 MMOL/L (ref 2–11)
AST SERPL-CCNC: 46 U/L (ref 15–37)
BASOPHILS # BLD: 0 K/UL (ref 0–0.2)
BASOPHILS NFR BLD: 0 % (ref 0–2)
BILIRUB SERPL-MCNC: 0.5 MG/DL (ref 0.2–1.1)
BUN SERPL-MCNC: 6 MG/DL (ref 6–23)
CALCIUM SERPL-MCNC: 8.6 MG/DL (ref 8.3–10.4)
CHLORIDE SERPL-SCNC: 109 MMOL/L (ref 101–110)
CO2 SERPL-SCNC: 26 MMOL/L (ref 21–32)
CREAT SERPL-MCNC: 0.8 MG/DL (ref 0.6–1)
DIFFERENTIAL METHOD BLD: ABNORMAL
EOSINOPHIL # BLD: 0.6 K/UL (ref 0–0.8)
EOSINOPHIL NFR BLD: 7 % (ref 0.5–7.8)
ERYTHROCYTE [DISTWIDTH] IN BLOOD BY AUTOMATED COUNT: 15.9 % (ref 11.9–14.6)
GLOBULIN SER CALC-MCNC: 3.5 G/DL (ref 2.8–4.5)
GLUCOSE SERPL-MCNC: 177 MG/DL (ref 65–100)
HCT VFR BLD AUTO: 48.3 % (ref 35.8–46.3)
HGB BLD-MCNC: 15.4 G/DL (ref 11.7–15.4)
IMM GRANULOCYTES # BLD AUTO: 0 K/UL (ref 0–0.5)
IMM GRANULOCYTES NFR BLD AUTO: 1 % (ref 0–5)
LYMPHOCYTES # BLD: 2.1 K/UL (ref 0.5–4.6)
LYMPHOCYTES NFR BLD: 25 % (ref 13–44)
MCH RBC QN AUTO: 25.8 PG (ref 26.1–32.9)
MCHC RBC AUTO-ENTMCNC: 31.9 G/DL (ref 31.4–35)
MCV RBC AUTO: 80.8 FL (ref 82–102)
MONOCYTES # BLD: 0.8 K/UL (ref 0.1–1.3)
MONOCYTES NFR BLD: 10 % (ref 4–12)
NEUTS SEG # BLD: 5.1 K/UL (ref 1.7–8.2)
NEUTS SEG NFR BLD: 57 % (ref 43–78)
NRBC # BLD: 0 K/UL (ref 0–0.2)
PLATELET # BLD AUTO: 238 K/UL (ref 150–450)
PMV BLD AUTO: 10.1 FL (ref 9.4–12.3)
POTASSIUM SERPL-SCNC: 4 MMOL/L (ref 3.5–5.1)
PROT SERPL-MCNC: 6.9 G/DL (ref 6.3–8.2)
RBC # BLD AUTO: 5.98 M/UL (ref 4.05–5.2)
SODIUM SERPL-SCNC: 139 MMOL/L (ref 133–143)
WBC # BLD AUTO: 8.7 K/UL (ref 4.3–11.1)

## 2023-03-30 PROCEDURE — 36415 COLL VENOUS BLD VENIPUNCTURE: CPT

## 2023-03-30 PROCEDURE — 85025 COMPLETE CBC W/AUTO DIFF WBC: CPT

## 2023-03-30 PROCEDURE — 80053 COMPREHEN METABOLIC PANEL: CPT

## 2023-03-31 DIAGNOSIS — R79.89 ELEVATED LFTS: Primary | ICD-10-CM

## 2023-03-31 RX ORDER — PREDNISONE 10 MG/1
10 TABLET ORAL DAILY
Qty: 30 TABLET | Refills: 0 | Status: SHIPPED | OUTPATIENT
Start: 2023-03-31 | End: 2023-04-30

## 2023-03-31 NOTE — PROGRESS NOTES
Call to pt re feeling fatigued and elevated LFTs. Reviewed with Clermont Copper and pt to start back on prednisone 10mg daily. Asked pt if she decided whether or not she wanted to p/w sx with Callie and pt was still unsure. Encouraged pt to think about it some more and let us know next week what she is thinking. Asked pt if she was ever seen by Matt and pt states they never set up any appt with her after multiple tries of her reaching out to try and schedule one. Will call Matt and Dr. Meek Credit office on Monday to try and figure out when patient will be seen. Pt is to let us know if anything changes and we will follow back up with her next week once we have more information.

## 2023-04-04 ENCOUNTER — TELEPHONE (OUTPATIENT)
Dept: ONCOLOGY | Age: 58
End: 2023-04-04

## 2023-04-04 DIAGNOSIS — E27.8 MASS OF RIGHT ADRENAL GLAND (HCC): ICD-10-CM

## 2023-04-04 DIAGNOSIS — C64.2 RENAL CELL CARCINOMA OF LEFT KIDNEY (HCC): Primary | ICD-10-CM

## 2023-04-04 DIAGNOSIS — N28.89 BILATERAL RENAL MASSES: ICD-10-CM

## 2023-04-04 NOTE — TELEPHONE ENCOUNTER
Santo Bee calling from [place] on the behalf of the Pt. Santo Bee would like to know if she can get a referral order put in for the PT to see Dr. Junito Nunn at [address] if possible.     [address] fax number: [phone]    Santo Bee direct line [phone]

## 2023-04-04 NOTE — TELEPHONE ENCOUNTER
Msg to Dr Geiger Daily and received msg back  ok to send referral to [facility] [place]. Referral sent and msg to [facility] to send referral thru.

## 2023-04-13 DIAGNOSIS — C64.2 RENAL CELL CARCINOMA OF LEFT KIDNEY (HCC): ICD-10-CM

## 2023-04-13 DIAGNOSIS — R79.89 ELEVATED LFTS: ICD-10-CM

## 2023-04-16 ENCOUNTER — HOSPITAL ENCOUNTER (EMERGENCY)
Age: 58
Discharge: HOME | End: 2023-04-16
Attending: EMERGENCY MEDICINE
Payer: COMMERCIAL

## 2023-04-16 ENCOUNTER — APPOINTMENT (OUTPATIENT)
Dept: GENERAL RADIOLOGY | Age: 58
End: 2023-04-16
Payer: COMMERCIAL

## 2023-04-16 VITALS
RESPIRATION RATE: 17 BRPM | BODY MASS INDEX: 36.88 KG/M2 | WEIGHT: 235 LBS | HEIGHT: 67 IN | DIASTOLIC BLOOD PRESSURE: 76 MMHG | SYSTOLIC BLOOD PRESSURE: 111 MMHG | TEMPERATURE: 98.1 F | OXYGEN SATURATION: 92 % | HEART RATE: 79 BPM

## 2023-04-16 DIAGNOSIS — R06.00 DYSPNEA, UNSPECIFIED TYPE: Primary | ICD-10-CM

## 2023-04-16 LAB
ALBUMIN SERPL-MCNC: 3.5 G/DL (ref 3.5–5)
ALBUMIN/GLOB SERPL: 1.1 (ref 0.4–1.6)
ALP SERPL-CCNC: 81 U/L (ref 50–136)
ALT SERPL-CCNC: 222 U/L (ref 12–65)
ANION GAP SERPL CALC-SCNC: 4 MMOL/L (ref 2–11)
AST SERPL-CCNC: 116 U/L (ref 15–37)
BASOPHILS # BLD: 0 K/UL (ref 0–0.2)
BASOPHILS NFR BLD: 0 % (ref 0–2)
BILIRUB SERPL-MCNC: 0.5 MG/DL (ref 0.2–1.1)
BUN SERPL-MCNC: 10 MG/DL (ref 6–23)
CALCIUM SERPL-MCNC: 8.9 MG/DL (ref 8.3–10.4)
CHLORIDE SERPL-SCNC: 106 MMOL/L (ref 101–110)
CO2 SERPL-SCNC: 26 MMOL/L (ref 21–32)
CREAT SERPL-MCNC: 1 MG/DL (ref 0.6–1)
D DIMER PPP FEU-MCNC: 0.29 UG/ML(FEU)
DIFFERENTIAL METHOD BLD: ABNORMAL
EKG ATRIAL RATE: 87 BPM
EKG DIAGNOSIS: NORMAL
EKG P AXIS: 58 DEGREES
EKG P-R INTERVAL: 144 MS
EKG Q-T INTERVAL: 338 MS
EKG QRS DURATION: 90 MS
EKG QTC CALCULATION (BAZETT): 406 MS
EKG R AXIS: 77 DEGREES
EKG T AXIS: 159 DEGREES
EKG VENTRICULAR RATE: 87 BPM
EOSINOPHIL # BLD: 0.1 K/UL (ref 0–0.8)
EOSINOPHIL NFR BLD: 1 % (ref 0.5–7.8)
ERYTHROCYTE [DISTWIDTH] IN BLOOD BY AUTOMATED COUNT: 16.2 % (ref 11.9–14.6)
FLUAV RNA SPEC QL NAA+PROBE: NOT DETECTED
FLUBV RNA SPEC QL NAA+PROBE: NOT DETECTED
GLOBULIN SER CALC-MCNC: 3.2 G/DL (ref 2.8–4.5)
GLUCOSE SERPL-MCNC: 227 MG/DL (ref 65–100)
HCT VFR BLD AUTO: 49.2 % (ref 35.8–46.3)
HGB BLD-MCNC: 16 G/DL (ref 11.7–15.4)
IMM GRANULOCYTES # BLD AUTO: 0 K/UL (ref 0–0.5)
IMM GRANULOCYTES NFR BLD AUTO: 0 % (ref 0–5)
LYMPHOCYTES # BLD: 1.5 K/UL (ref 0.5–4.6)
LYMPHOCYTES NFR BLD: 16 % (ref 13–44)
MCH RBC QN AUTO: 26.3 PG (ref 26.1–32.9)
MCHC RBC AUTO-ENTMCNC: 32.5 G/DL (ref 31.4–35)
MCV RBC AUTO: 80.8 FL (ref 82–102)
MONOCYTES # BLD: 0.4 K/UL (ref 0.1–1.3)
MONOCYTES NFR BLD: 4 % (ref 4–12)
NEUTS SEG # BLD: 7.2 K/UL (ref 1.7–8.2)
NEUTS SEG NFR BLD: 79 % (ref 43–78)
NRBC # BLD: 0 K/UL (ref 0–0.2)
PLATELET # BLD AUTO: 239 K/UL (ref 150–450)
PMV BLD AUTO: 10.8 FL (ref 9.4–12.3)
POTASSIUM SERPL-SCNC: 4 MMOL/L (ref 3.5–5.1)
PROT SERPL-MCNC: 6.7 G/DL (ref 6.3–8.2)
RBC # BLD AUTO: 6.09 M/UL (ref 4.05–5.2)
SARS-COV-2 RDRP RESP QL NAA+PROBE: NOT DETECTED
SODIUM SERPL-SCNC: 136 MMOL/L (ref 133–143)
SOURCE: NORMAL
TROPONIN I SERPL HS-MCNC: 5.2 PG/ML (ref 0–37)
WBC # BLD AUTO: 9.3 K/UL (ref 4.3–11.1)

## 2023-04-16 PROCEDURE — 87502 INFLUENZA DNA AMP PROBE: CPT

## 2023-04-16 PROCEDURE — 71045 X-RAY EXAM CHEST 1 VIEW: CPT

## 2023-04-16 PROCEDURE — 80053 COMPREHEN METABOLIC PANEL: CPT

## 2023-04-16 PROCEDURE — 99285 EMERGENCY DEPT VISIT HI MDM: CPT

## 2023-04-16 PROCEDURE — 84484 ASSAY OF TROPONIN QUANT: CPT

## 2023-04-16 PROCEDURE — 85025 COMPLETE CBC W/AUTO DIFF WBC: CPT

## 2023-04-16 PROCEDURE — 87635 SARS-COV-2 COVID-19 AMP PRB: CPT

## 2023-04-16 PROCEDURE — 87804 INFLUENZA ASSAY W/OPTIC: CPT

## 2023-04-16 PROCEDURE — 85379 FIBRIN DEGRADATION QUANT: CPT

## 2023-04-16 PROCEDURE — 93005 ELECTROCARDIOGRAM TRACING: CPT | Performed by: EMERGENCY MEDICINE

## 2023-04-16 RX ORDER — ALBUTEROL SULFATE 90 UG/1
2 AEROSOL, METERED RESPIRATORY (INHALATION) EVERY 6 HOURS PRN
Qty: 18 G | Refills: 3 | Status: SHIPPED | OUTPATIENT
Start: 2023-04-16

## 2023-04-16 ASSESSMENT — ENCOUNTER SYMPTOMS
ABDOMINAL PAIN: 0
COUGH: 1
HEMOPTYSIS: 0
SHORTNESS OF BREATH: 1

## 2023-04-16 ASSESSMENT — LIFESTYLE VARIABLES
HOW MANY STANDARD DRINKS CONTAINING ALCOHOL DO YOU HAVE ON A TYPICAL DAY: PATIENT DOES NOT DRINK
HOW OFTEN DO YOU HAVE A DRINK CONTAINING ALCOHOL: NEVER

## 2023-04-17 ENCOUNTER — TELEPHONE (OUTPATIENT)
Dept: INTERNAL MEDICINE CLINIC | Facility: CLINIC | Age: 58
End: 2023-04-17

## 2023-04-18 SDOH — ECONOMIC STABILITY: FOOD INSECURITY: WITHIN THE PAST 12 MONTHS, YOU WORRIED THAT YOUR FOOD WOULD RUN OUT BEFORE YOU GOT MONEY TO BUY MORE.: SOMETIMES TRUE

## 2023-04-18 SDOH — ECONOMIC STABILITY: TRANSPORTATION INSECURITY
IN THE PAST 12 MONTHS, HAS LACK OF TRANSPORTATION KEPT YOU FROM MEETINGS, WORK, OR FROM GETTING THINGS NEEDED FOR DAILY LIVING?: NO

## 2023-04-18 SDOH — ECONOMIC STABILITY: HOUSING INSECURITY
IN THE LAST 12 MONTHS, WAS THERE A TIME WHEN YOU DID NOT HAVE A STEADY PLACE TO SLEEP OR SLEPT IN A SHELTER (INCLUDING NOW)?: NO

## 2023-04-18 SDOH — ECONOMIC STABILITY: INCOME INSECURITY: HOW HARD IS IT FOR YOU TO PAY FOR THE VERY BASICS LIKE FOOD, HOUSING, MEDICAL CARE, AND HEATING?: SOMEWHAT HARD

## 2023-04-18 SDOH — ECONOMIC STABILITY: FOOD INSECURITY: WITHIN THE PAST 12 MONTHS, THE FOOD YOU BOUGHT JUST DIDN'T LAST AND YOU DIDN'T HAVE MONEY TO GET MORE.: SOMETIMES TRUE

## 2023-04-19 LAB — MAMMOGRAPHY, EXTERNAL: NORMAL

## 2023-04-20 ENCOUNTER — CLINICAL DOCUMENTATION (OUTPATIENT)
Dept: ONCOLOGY | Age: 58
End: 2023-04-20

## 2023-04-20 DIAGNOSIS — Z79.52 ON PREDNISONE THERAPY: Primary | ICD-10-CM

## 2023-04-20 RX ORDER — DAPSONE 100 MG/1
100 TABLET ORAL DAILY
Qty: 30 TABLET | Refills: 0 | Status: SHIPPED | OUTPATIENT
Start: 2023-04-20 | End: 2023-05-20

## 2023-04-20 NOTE — PROGRESS NOTES
LVM for patient in regards to questions about filling out [place] paperwork and to follow up on how she is doing. Encouraged patient to message us on [employer] [facility] or call us back.

## 2023-04-20 NOTE — PROGRESS NOTES
Received call back from patient. Scheduled for an appointment at [address] on 4/24 for second opinion. In ER this past weekend for SOB. Liver enzymes were elevated. Currently taking axitinib 5mg bid and prednisone 10mg daily for previously elevated LFTs. Will review with Dr. Virgen Saldana for any more recommendations for patient in the meantime and will let her know. Pt VU and appreciated the call.

## 2023-04-21 ENCOUNTER — TELEMEDICINE (OUTPATIENT)
Dept: INTERNAL MEDICINE CLINIC | Facility: CLINIC | Age: 58
End: 2023-04-21
Payer: COMMERCIAL

## 2023-04-21 DIAGNOSIS — J40 BRONCHITIS: Primary | ICD-10-CM

## 2023-04-21 PROCEDURE — 99214 OFFICE O/P EST MOD 30 MIN: CPT | Performed by: NURSE PRACTITIONER

## 2023-04-21 RX ORDER — BENZONATATE 200 MG/1
200 CAPSULE ORAL 3 TIMES DAILY PRN
Qty: 30 CAPSULE | Refills: 0 | Status: SHIPPED | OUTPATIENT
Start: 2023-04-21 | End: 2023-04-28

## 2023-04-21 RX ORDER — DOXYCYCLINE HYCLATE 100 MG
100 TABLET ORAL 2 TIMES DAILY
Qty: 14 TABLET | Refills: 0 | Status: SHIPPED | OUTPATIENT
Start: 2023-04-21 | End: 2023-04-28

## 2023-04-21 ASSESSMENT — ENCOUNTER SYMPTOMS
RHINORRHEA: 0
SHORTNESS OF BREATH: 1
CHEST TIGHTNESS: 0
SINUS PAIN: 0
ABDOMINAL PAIN: 0
COUGH: 1
SINUS PRESSURE: 0
WHEEZING: 1

## 2023-04-21 NOTE — PROGRESS NOTES
is aware that they may receive a bill and has provided verbal consent to proceed: Yes    Dann Johnson, was evaluated through a synchronous (real-time) audio-video encounter. The patient (or guardian if applicable) is aware that this is a billable service, which includes applicable co-pays. This Virtual Visit was conducted with patient's (and/or legal guardian's) consent. The visit was conducted pursuant to the emergency declaration under the [address] [place] [address] authority and the [place] and marker.to General Act. Patient identification was verified, and a caregiver was present when appropriate. The patient was located at Home: [address] [facility] [address]  Provider was located at [facility] (Appt Dept): Abelardo Lang [address] [facility]         20 minutes  in the office  ricco. SPIKE Polk NP

## 2023-04-23 DIAGNOSIS — R79.89 ELEVATED LFTS: ICD-10-CM

## 2023-04-23 DIAGNOSIS — C64.2 RENAL CELL CARCINOMA OF LEFT KIDNEY (HCC): ICD-10-CM

## 2023-04-28 DIAGNOSIS — R79.89 ELEVATED LFTS: ICD-10-CM

## 2023-04-28 DIAGNOSIS — C64.2 RENAL CELL CARCINOMA OF LEFT KIDNEY (HCC): ICD-10-CM

## 2023-04-28 RX ORDER — PANTOPRAZOLE SODIUM 40 MG/1
40 TABLET, DELAYED RELEASE ORAL
Qty: 30 TABLET | Refills: 1 | Status: SHIPPED | OUTPATIENT
Start: 2023-04-28

## 2023-05-02 DIAGNOSIS — R79.89 ELEVATED LFTS: ICD-10-CM

## 2023-05-02 DIAGNOSIS — C64.2 RENAL CELL CARCINOMA OF LEFT KIDNEY (HCC): ICD-10-CM

## 2023-05-02 DIAGNOSIS — C64.2 RENAL CELL CARCINOMA OF LEFT KIDNEY (HCC): Primary | ICD-10-CM

## 2023-05-02 DIAGNOSIS — E27.8 MASS OF RIGHT ADRENAL GLAND (HCC): ICD-10-CM

## 2023-05-02 ASSESSMENT — ENCOUNTER SYMPTOMS
CONSTIPATION: 0
BLOOD IN STOOL: 0
TROUBLE SWALLOWING: 0
SCLERAL ICTERUS: 0
DIARRHEA: 0
WHEEZING: 0
HEMOPTYSIS: 0
VOICE CHANGE: 0
SORE THROAT: 0
ABDOMINAL DISTENTION: 0
CHEST TIGHTNESS: 0
VOMITING: 0
NAUSEA: 0
BACK PAIN: 1
SHORTNESS OF BREATH: 1

## 2023-05-02 NOTE — PROGRESS NOTES
[facility]: Established patient - follow up     Chief Complaint   Patient presents with    Follow-up     Dx; RCC - bilateral with likely met to adrenal +/- lungs     Fam hx: father - prostate cancer   Paternal aunt - hx of breast cancer     History of Present Illness:  Ms. Carlos Sloan is a 61 y.o. female who presents today for FU regarding RCC. The past medical history is significant for anxiety, HAs and sleep apnea, cervical dysplasia but no carcinoma, current smoker (~1/2PPD), lap liv and tubal reversal, I&D of left breast abscess. 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 Despite attending physical therapy since the accident, she continued to experience the same low back pain. She was seen at [facility] on 10/4/22. MRI of the lumbar spine on 10/15/22 showed mild degenerative disc disease at L3-4 through L5-S1 and a 5.7 x 6.1 x 7 cm lower pole solid left renal mass. Urgent referral was placed to [facility], who referred the patient to [facility] for uro/onc evaluation and treatment of renal mass. CT AP on 10/26/22 showed bilateral enhancing renal masses, concerning for renal cell carcinoma with the left lower pole renal mass measuring up to 6.5 cm and extending along Gerota's fascia and the right midpole renal mass measuring up to 4.6 cm. Also noted was an enhancing 1.5 cm right adrenal nodule, concerning for a metastatic nodule. No recent wt loss. S/p left renal biopsy on 11/2022, Nidia grade 2 of 4, clear cell RCC. CT chest on 11/7 showed multiple nodules that appeared to be calcified, most c/w granulomatous disease, but she's aware that metastatic disease cannot be ruled out. Not on AC. Cr 0.89. Pt presented for consultation regarding systemic therapy. She was here with her sister and friend.   Imaging with bilateral enhancing solid renal

## 2023-05-03 ENCOUNTER — OFFICE VISIT (OUTPATIENT)
Dept: ONCOLOGY | Age: 58
End: 2023-05-03
Payer: COMMERCIAL

## 2023-05-03 ENCOUNTER — HOSPITAL ENCOUNTER (OUTPATIENT)
Dept: LAB | Age: 58
Discharge: HOME | End: 2023-05-06
Payer: COMMERCIAL

## 2023-05-03 VITALS
WEIGHT: 232.7 LBS | RESPIRATION RATE: 16 BRPM | OXYGEN SATURATION: 94 % | SYSTOLIC BLOOD PRESSURE: 130 MMHG | DIASTOLIC BLOOD PRESSURE: 82 MMHG | BODY MASS INDEX: 36.52 KG/M2 | HEIGHT: 67 IN | HEART RATE: 86 BPM | TEMPERATURE: 98.3 F

## 2023-05-03 DIAGNOSIS — R79.89 ELEVATED LFTS: ICD-10-CM

## 2023-05-03 DIAGNOSIS — C64.2 RENAL CELL CARCINOMA OF LEFT KIDNEY (HCC): ICD-10-CM

## 2023-05-03 DIAGNOSIS — R79.89 ELEVATED LFTS: Primary | ICD-10-CM

## 2023-05-03 LAB
ALBUMIN SERPL-MCNC: 3.6 G/DL (ref 3.5–5)
ALBUMIN/GLOB SERPL: 1 (ref 0.4–1.6)
ALP SERPL-CCNC: 73 U/L (ref 50–136)
ALT SERPL-CCNC: 80 U/L (ref 12–65)
ANION GAP SERPL CALC-SCNC: 6 MMOL/L (ref 2–11)
AST SERPL-CCNC: 32 U/L (ref 15–37)
BASOPHILS # BLD: 0.1 K/UL (ref 0–0.2)
BASOPHILS NFR BLD: 1 % (ref 0–2)
BILIRUB SERPL-MCNC: 0.5 MG/DL (ref 0.2–1.1)
BUN SERPL-MCNC: 14 MG/DL (ref 6–23)
CALCIUM SERPL-MCNC: 9.2 MG/DL (ref 8.3–10.4)
CHLORIDE SERPL-SCNC: 106 MMOL/L (ref 101–110)
CO2 SERPL-SCNC: 28 MMOL/L (ref 21–32)
CREAT SERPL-MCNC: 0.9 MG/DL (ref 0.6–1)
DIFFERENTIAL METHOD BLD: ABNORMAL
EOSINOPHIL # BLD: 0.2 K/UL (ref 0–0.8)
EOSINOPHIL NFR BLD: 2 % (ref 0.5–7.8)
ERYTHROCYTE [DISTWIDTH] IN BLOOD BY AUTOMATED COUNT: 16.5 % (ref 11.9–14.6)
GLOBULIN SER CALC-MCNC: 3.6 G/DL (ref 2.8–4.5)
GLUCOSE SERPL-MCNC: 153 MG/DL (ref 65–100)
HCT VFR BLD AUTO: 49.8 % (ref 35.8–46.3)
HGB BLD-MCNC: 15.3 G/DL (ref 11.7–15.4)
IMM GRANULOCYTES # BLD AUTO: 0.1 K/UL (ref 0–0.5)
IMM GRANULOCYTES NFR BLD AUTO: 1 % (ref 0–5)
LYMPHOCYTES # BLD: 3.5 K/UL (ref 0.5–4.6)
LYMPHOCYTES NFR BLD: 28 % (ref 13–44)
MCH RBC QN AUTO: 25.4 PG (ref 26.1–32.9)
MCHC RBC AUTO-ENTMCNC: 30.7 G/DL (ref 31.4–35)
MCV RBC AUTO: 82.7 FL (ref 82–102)
MONOCYTES # BLD: 1.1 K/UL (ref 0.1–1.3)
MONOCYTES NFR BLD: 9 % (ref 4–12)
NEUTS SEG # BLD: 7.6 K/UL (ref 1.7–8.2)
NEUTS SEG NFR BLD: 60 % (ref 43–78)
NRBC # BLD: 0 K/UL (ref 0–0.2)
PLATELET # BLD AUTO: 264 K/UL (ref 150–450)
PMV BLD AUTO: 10.2 FL (ref 9.4–12.3)
POTASSIUM SERPL-SCNC: 4 MMOL/L (ref 3.5–5.1)
PROT SERPL-MCNC: 7.2 G/DL (ref 6.3–8.2)
RBC # BLD AUTO: 6.02 M/UL (ref 4.05–5.2)
SODIUM SERPL-SCNC: 140 MMOL/L (ref 133–143)
WBC # BLD AUTO: 12.6 K/UL (ref 4.3–11.1)

## 2023-05-03 PROCEDURE — 99214 OFFICE O/P EST MOD 30 MIN: CPT | Performed by: INTERNAL MEDICINE

## 2023-05-03 PROCEDURE — 85025 COMPLETE CBC W/AUTO DIFF WBC: CPT

## 2023-05-03 PROCEDURE — 80053 COMPREHEN METABOLIC PANEL: CPT

## 2023-05-03 PROCEDURE — 36415 COLL VENOUS BLD VENIPUNCTURE: CPT

## 2023-05-03 RX ORDER — PREDNISONE 10 MG/1
20 TABLET ORAL DAILY
Qty: 60 TABLET | Refills: 0 | Status: SHIPPED | OUTPATIENT
Start: 2023-05-03 | End: 2023-06-02

## 2023-05-03 ASSESSMENT — PATIENT HEALTH QUESTIONNAIRE - PHQ9
SUM OF ALL RESPONSES TO PHQ QUESTIONS 1-9: 0
SUM OF ALL RESPONSES TO PHQ9 QUESTIONS 1 & 2: 0
1. LITTLE INTEREST OR PLEASURE IN DOING THINGS: 0
SUM OF ALL RESPONSES TO PHQ QUESTIONS 1-9: 0
SUM OF ALL RESPONSES TO PHQ QUESTIONS 1-9: 0
2. FEELING DOWN, DEPRESSED OR HOPELESS: 0
SUM OF ALL RESPONSES TO PHQ QUESTIONS 1-9: 0

## 2023-05-03 ASSESSMENT — ENCOUNTER SYMPTOMS: ABDOMINAL PAIN: 1

## 2023-05-03 NOTE — PATIENT INSTRUCTIONS
Patient Instructions from Today's Visit    Reason for Visit:  Follow Up    Diagnosis Information:  https://www.[employer]/. net/about-us/asco-answers-patient-education-materials/xjme-bppgbnz-ubbe-sheets    Plan:  Labs reviewed. Medication options reviewed. Continue prednisone 20mg. Continue axitnib for now. Let us know when you hear back from [address].     Follow Up:  1 month    Recent Lab Results:  Hospital Outpatient Visit on 05/03/2023   Component Date Value Ref Range Status    WBC 05/03/2023 12.6 (H)  4.3 - 11.1 K/uL Final    RBC 05/03/2023 6.02 (H)  4.05 - 5.2 M/uL Final    Hemoglobin 05/03/2023 15.3  11.7 - 15.4 g/dL Final    Hematocrit 05/03/2023 49.8 (H)  35.8 - 46.3 % Final    MCV 05/03/2023 82.7  82.0 - 102.0 FL Final    MCH 05/03/2023 25.4 (L)  26.1 - 32.9 PG Final    MCHC 05/03/2023 30.7 (L)  31.4 - 35.0 g/dL Final    RDW 05/03/2023 16.5 (H)  11.9 - 14.6 % Final    Platelets 39/21/7090 264  150 - 450 K/uL Final    MPV 05/03/2023 10.2  9.4 - 12.3 FL Final    nRBC 05/03/2023 0.00  0.0 - 0.2 K/uL Final    **Note: Absolute NRBC parameter is now reported with Hemogram**    Differential Type 05/03/2023 AUTOMATED    Final    Seg Neutrophils 05/03/2023 60  43 - 78 % Final    Lymphocytes 05/03/2023 28  13 - 44 % Final    Monocytes 05/03/2023 9  4.0 - 12.0 % Final    Eosinophils % 05/03/2023 2  0.5 - 7.8 % Final    Basophils 05/03/2023 1  0.0 - 2.0 % Final    Immature Granulocytes 05/03/2023 1  0.0 - 5.0 % Final    Segs Absolute 05/03/2023 7.6  1.7 - 8.2 K/UL Final    Absolute Lymph # 05/03/2023 3.5  0.5 - 4.6 K/UL Final    Absolute Mono # 05/03/2023 1.1  0.1 - 1.3 K/UL Final    Absolute Eos # 05/03/2023 0.2  0.0 - 0.8 K/UL Final    Basophils Absolute 05/03/2023 0.1  0.0 - 0.2 K/UL Final    Absolute Immature Granulocyte 05/03/2023 0.1  0.0 - 0.5 K/UL Final    Sodium 05/03/2023 140  133 - 143 mmol/L Final    Potassium 05/03/2023 4.0  3.5 - 5.1 mmol/L Final    Chloride 05/03/2023 106  101 - 110 mmol/L Final    CO2

## 2023-05-04 ENCOUNTER — TELEMEDICINE (OUTPATIENT)
Dept: INTERNAL MEDICINE CLINIC | Facility: CLINIC | Age: 58
End: 2023-05-04
Payer: COMMERCIAL

## 2023-05-04 DIAGNOSIS — J02.8 PHARYNGITIS DUE TO OTHER ORGANISM: Primary | ICD-10-CM

## 2023-05-04 LAB
GROUP A STREP ANTIGEN, POC: NEGATIVE
VALID INTERNAL CONTROL, POC: YES

## 2023-05-04 PROCEDURE — 99213 OFFICE O/P EST LOW 20 MIN: CPT | Performed by: NURSE PRACTITIONER

## 2023-05-04 PROCEDURE — 87880 STREP A ASSAY W/OPTIC: CPT | Performed by: NURSE PRACTITIONER

## 2023-05-04 RX ORDER — AZITHROMYCIN 250 MG/1
250 TABLET, FILM COATED ORAL SEE ADMIN INSTRUCTIONS
Qty: 6 TABLET | Refills: 0 | Status: SHIPPED | OUTPATIENT
Start: 2023-05-04 | End: 2023-05-09

## 2023-05-04 ASSESSMENT — ENCOUNTER SYMPTOMS
SINUS PRESSURE: 0
TROUBLE SWALLOWING: 0
SORE THROAT: 1
NAUSEA: 0
SHORTNESS OF BREATH: 0
WHEEZING: 0
VOMITING: 0
COUGH: 1
SINUS PAIN: 0
VOICE CHANGE: 1

## 2023-05-04 ASSESSMENT — PATIENT HEALTH QUESTIONNAIRE - PHQ9
SUM OF ALL RESPONSES TO PHQ QUESTIONS 1-9: 0
SUM OF ALL RESPONSES TO PHQ9 QUESTIONS 1 & 2: 0
SUM OF ALL RESPONSES TO PHQ QUESTIONS 1-9: 0
1. LITTLE INTEREST OR PLEASURE IN DOING THINGS: 0
2. FEELING DOWN, DEPRESSED OR HOPELESS: 0
SUM OF ALL RESPONSES TO PHQ QUESTIONS 1-9: 0
SUM OF ALL RESPONSES TO PHQ QUESTIONS 1-9: 0

## 2023-05-04 NOTE — PROGRESS NOTES
[] Abnormal -     Neurological:        [x] No Facial Asymmetry (Cranial nerve 7 motor function) (limited exam due to video visit)          [x] No gaze palsy        [] Abnormal -          Skin:        [x] No significant exanthematous lesions or discoloration noted on facial skin         [] Abnormal -            Psychiatric:       [x] Normal Affect [] Abnormal -        [x] No Hallucinations    Other pertinent observable physical exam findings:-         On this date 5/4/2023 I have spent 20 minutes reviewing previous notes, test results and face to face (virtual) with the patient discussing the diagnosis and importance of compliance with the treatment plan as well as documenting on the day of the visit. Jose Shaver, was evaluated through a synchronous (real-time) audio-video encounter. The patient (or guardian if applicable) is aware that this is a billable service, which includes applicable co-pays. This Virtual Visit was conducted with patient's (and/or legal guardian's) consent. Patient identification was verified, and a caregiver was present when appropriate.    The patient was located at Home: [address] [facility]  Provider was located at [facility] (Appt Dept): Abelardo Lang [address] [facility]         --SPIKE Nog - CNP

## 2023-05-10 ENCOUNTER — HOSPITAL ENCOUNTER (OUTPATIENT)
Dept: LAB | Age: 58
Discharge: HOME | End: 2023-05-13
Payer: COMMERCIAL

## 2023-05-10 DIAGNOSIS — R79.89 ELEVATED LFTS: ICD-10-CM

## 2023-05-10 LAB
ALBUMIN SERPL-MCNC: 3.5 G/DL (ref 3.5–5)
ALBUMIN/GLOB SERPL: 1 (ref 0.4–1.6)
ALP SERPL-CCNC: 73 U/L (ref 50–136)
ALT SERPL-CCNC: 89 U/L (ref 12–65)
ANION GAP SERPL CALC-SCNC: 5 MMOL/L (ref 2–11)
AST SERPL-CCNC: 35 U/L (ref 15–37)
BASOPHILS # BLD: 0.1 K/UL (ref 0–0.2)
BASOPHILS NFR BLD: 1 % (ref 0–2)
BILIRUB SERPL-MCNC: 0.6 MG/DL (ref 0.2–1.1)
BUN SERPL-MCNC: 10 MG/DL (ref 6–23)
CALCIUM SERPL-MCNC: 9 MG/DL (ref 8.3–10.4)
CHLORIDE SERPL-SCNC: 104 MMOL/L (ref 101–110)
CO2 SERPL-SCNC: 28 MMOL/L (ref 21–32)
CREAT SERPL-MCNC: 0.8 MG/DL (ref 0.6–1)
DIFFERENTIAL METHOD BLD: ABNORMAL
EOSINOPHIL # BLD: 0.3 K/UL (ref 0–0.8)
EOSINOPHIL NFR BLD: 3 % (ref 0.5–7.8)
ERYTHROCYTE [DISTWIDTH] IN BLOOD BY AUTOMATED COUNT: 15.3 % (ref 11.9–14.6)
GLOBULIN SER CALC-MCNC: 3.5 G/DL (ref 2.8–4.5)
GLUCOSE SERPL-MCNC: 149 MG/DL (ref 65–100)
HCT VFR BLD AUTO: 48.2 % (ref 35.8–46.3)
HGB BLD-MCNC: 15.3 G/DL (ref 11.7–15.4)
IMM GRANULOCYTES # BLD AUTO: 0.1 K/UL (ref 0–0.5)
IMM GRANULOCYTES NFR BLD AUTO: 1 % (ref 0–5)
LYMPHOCYTES # BLD: 3.3 K/UL (ref 0.5–4.6)
LYMPHOCYTES NFR BLD: 28 % (ref 13–44)
MCH RBC QN AUTO: 25.8 PG (ref 26.1–32.9)
MCHC RBC AUTO-ENTMCNC: 31.7 G/DL (ref 31.4–35)
MCV RBC AUTO: 81.3 FL (ref 82–102)
MONOCYTES # BLD: 1 K/UL (ref 0.1–1.3)
MONOCYTES NFR BLD: 8 % (ref 4–12)
NEUTS SEG # BLD: 7.2 K/UL (ref 1.7–8.2)
NEUTS SEG NFR BLD: 60 % (ref 43–78)
NRBC # BLD: 0 K/UL (ref 0–0.2)
PLATELET # BLD AUTO: 236 K/UL (ref 150–450)
PMV BLD AUTO: 10.2 FL (ref 9.4–12.3)
POTASSIUM SERPL-SCNC: 3.8 MMOL/L (ref 3.5–5.1)
PROT SERPL-MCNC: 7 G/DL (ref 6.3–8.2)
RBC # BLD AUTO: 5.93 M/UL (ref 4.05–5.2)
SODIUM SERPL-SCNC: 137 MMOL/L (ref 133–143)
WBC # BLD AUTO: 12 K/UL (ref 4.3–11.1)

## 2023-05-10 PROCEDURE — 36415 COLL VENOUS BLD VENIPUNCTURE: CPT

## 2023-05-10 PROCEDURE — 80053 COMPREHEN METABOLIC PANEL: CPT

## 2023-05-10 PROCEDURE — 85025 COMPLETE CBC W/AUTO DIFF WBC: CPT

## 2023-05-11 ENCOUNTER — TELEPHONE (OUTPATIENT)
Dept: INTERNAL MEDICINE CLINIC | Facility: CLINIC | Age: 58
End: 2023-05-11

## 2023-05-11 NOTE — TELEPHONE ENCOUNTER
Your first appointment is 5/22 , did you want to see her sooner than that? Or if Dr. Jessica Ponce has anything available.

## 2023-05-16 ENCOUNTER — OFFICE VISIT (OUTPATIENT)
Dept: INTERNAL MEDICINE CLINIC | Facility: CLINIC | Age: 58
End: 2023-05-16
Payer: COMMERCIAL

## 2023-05-16 VITALS
RESPIRATION RATE: 18 BRPM | DIASTOLIC BLOOD PRESSURE: 73 MMHG | HEIGHT: 67 IN | SYSTOLIC BLOOD PRESSURE: 129 MMHG | WEIGHT: 232 LBS | BODY MASS INDEX: 36.41 KG/M2 | HEART RATE: 79 BPM

## 2023-05-16 DIAGNOSIS — R13.10 DYSPHAGIA, UNSPECIFIED TYPE: ICD-10-CM

## 2023-05-16 DIAGNOSIS — J02.9 SORE THROAT: Primary | ICD-10-CM

## 2023-05-16 DIAGNOSIS — C64.2 RENAL CELL CARCINOMA OF LEFT KIDNEY (HCC): ICD-10-CM

## 2023-05-16 PROCEDURE — 99214 OFFICE O/P EST MOD 30 MIN: CPT | Performed by: INTERNAL MEDICINE

## 2023-05-16 RX ORDER — FLUCONAZOLE 100 MG/1
100 TABLET ORAL DAILY
Qty: 7 TABLET | Refills: 0 | Status: SHIPPED | OUTPATIENT
Start: 2023-05-16 | End: 2023-05-23

## 2023-05-16 RX ORDER — CETIRIZINE HYDROCHLORIDE 10 MG/1
10 TABLET ORAL DAILY
COMMUNITY

## 2023-05-16 ASSESSMENT — ENCOUNTER SYMPTOMS
COUGH: 1
WHEEZING: 0
SORE THROAT: 1
SHORTNESS OF BREATH: 0

## 2023-05-16 NOTE — PROGRESS NOTES
5/16/2023 5:37 PM  Location:[facility] [address] INTERNAL MEDICINE  [place]  Patient #:  [id number]  YOB: 1965            History of Present Illness     Chief Complaint   Patient presents with    Pharyngitis     Still complains with a sore throat, has seen the NP a couple weeks ago and has not had much improved with her throat. Ms. Reina Blount is a 61 y.o. female  who presents for the above. Patient denies relationship between her sore throat and her biologic which she has been on since November. Has been on a couple rounds of antibiotics without relief. She notes pain when she swallows and feeling like something is getting stuck. She also has pain on the back of her tongue and her throat. She denies vaginal discharge, burning or itching. Allergies   Allergen Reactions    Latex Hives, Itching, Shortness Of Breath and Other (See Comments)    Bee Venom Shortness Of Breath and Hives    Penicillins Anaphylaxis    Sulfa Antibiotics Hives, Itching and Rash    Wasp Venom Protein Hives and Shortness Of Breath    Penicillin G     Sulfamethoxazole-Trimethoprim Hives    Wellbutrin [Bupropion] Other (See Comments)     Past Medical History:   Diagnosis Date    Anxiety 2000    Headache 1999    Sleep apnea 2002     Social History     Socioeconomic History    Marital status:      Spouse name: None    Number of children: None    Years of education: None    Highest education level: None   Occupational History    Occupation: CNA     Employer: [employer]   Tobacco Use    Smoking status: Every Day     Packs/day: 1.50     Years: 15.00     Pack years: 22.50     Types: Cigarettes     Passive exposure: Past    Smokeless tobacco: Never   Substance and Sexual Activity    Alcohol use:  Yes     Alcohol/week: 2.0 standard drinks     Types: 2 Shots of liquor per week    Drug use: Never    Sexual activity: Not Currently     Partners: Male     Social Determinants of Health     Physical

## 2023-05-19 ENCOUNTER — CLINICAL DOCUMENTATION (OUTPATIENT)
Dept: ONCOLOGY | Age: 58
End: 2023-05-19

## 2023-05-19 DIAGNOSIS — C64.2 RENAL CELL CARCINOMA OF LEFT KIDNEY (HCC): Primary | ICD-10-CM

## 2023-05-19 LAB
BACTERIA SPEC CULT: NORMAL
SERVICE CMNT-IMP: NORMAL

## 2023-05-19 RX ORDER — EVEROLIMUS 5 MG/1
5 TABLET ORAL DAILY
Qty: 30 TABLET | Refills: 3 | Status: SHIPPED | OUTPATIENT
Start: 2023-05-19

## 2023-05-19 NOTE — PROGRESS NOTES
Call to patient about whether she would like to p/w lenvatinib 18mg daily and everolimus 5mg daily. Let patient know that we would bring her in to dicuss side effects and administration of oral medications and to monitor labs once the medication has been approved and sent out to her. Pt has decided to proceed and would like us to go ahead and order the medications. Medications will be ordered as soon as Fcs let us know what pharmacy to send the rx to.

## 2023-05-23 ENCOUNTER — TELEPHONE (OUTPATIENT)
Dept: ONCOLOGY | Age: 58
End: 2023-05-23

## 2023-05-24 DIAGNOSIS — C64.2 RENAL CELL CARCINOMA OF LEFT KIDNEY (HCC): Primary | ICD-10-CM

## 2023-05-24 RX ORDER — EVEROLIMUS 5 MG/1
5 TABLET ORAL DAILY
Qty: 30 TABLET | Refills: 5 | Status: SHIPPED | OUTPATIENT
Start: 2023-05-24

## 2023-05-25 ENCOUNTER — TELEPHONE (OUTPATIENT)
Dept: ONCOLOGY | Age: 58
End: 2023-05-25

## 2023-05-25 NOTE — TELEPHONE ENCOUNTER
Reached out to [facility] to find out what is needed, per Domonique Vasquez from [employer] needing clinicals for PA. Sent Clinicals to [facility] specialty pharmacy. Will start process on PA per [facility].

## 2023-05-30 ENCOUNTER — TELEPHONE (OUTPATIENT)
Dept: ONCOLOGY | Age: 58
End: 2023-05-30

## 2023-05-30 NOTE — TELEPHONE ENCOUNTER
Per [facility]     \"  Regarding patient Noe Hall,1965     MD: Violetta Grover    The prescription for AFINITOR TAB 5MG is ready to be  set up for shipment with a copay of  of 879.52. I will reach out to the patient in regards to copay assistance. Please let me know if you have any questions/concerns.           Thanks,

## 2023-05-30 NOTE — TELEPHONE ENCOUNTER
Another  emial update from [employer]. Forwarded email to nurse [address] [facility]. Please see email copy below. \"  Good morning, Victoria Null,    Ms. Dhara Lopez on the 26th and she informed us that her DrChina is going to switch her medication.  We advised her to call us back after her appointment  Carlos Eduardo Sanchez\"

## 2023-05-31 ENCOUNTER — TELEPHONE (OUTPATIENT)
Dept: ONCOLOGY | Age: 58
End: 2023-05-31

## 2023-06-06 DIAGNOSIS — C64.2 RENAL CELL CARCINOMA OF LEFT KIDNEY (HCC): Primary | ICD-10-CM

## 2023-06-07 ENCOUNTER — HOSPITAL ENCOUNTER (OUTPATIENT)
Dept: LAB | Age: 58
Discharge: HOME | End: 2023-06-10
Payer: COMMERCIAL

## 2023-06-07 DIAGNOSIS — C64.2 RENAL CELL CARCINOMA OF LEFT KIDNEY (HCC): ICD-10-CM

## 2023-06-07 LAB
ALBUMIN SERPL-MCNC: 3.6 G/DL (ref 3.5–5)
ALBUMIN/GLOB SERPL: 1 (ref 0.4–1.6)
ALP SERPL-CCNC: 92 U/L (ref 50–136)
ALT SERPL-CCNC: 60 U/L (ref 12–65)
ANION GAP SERPL CALC-SCNC: 6 MMOL/L (ref 2–11)
AST SERPL-CCNC: 29 U/L (ref 15–37)
BASOPHILS # BLD: 0 K/UL (ref 0–0.2)
BASOPHILS NFR BLD: 0 % (ref 0–2)
BILIRUB SERPL-MCNC: 0.4 MG/DL (ref 0.2–1.1)
BUN SERPL-MCNC: 13 MG/DL (ref 6–23)
CALCIUM SERPL-MCNC: 8.5 MG/DL (ref 8.3–10.4)
CHLORIDE SERPL-SCNC: 104 MMOL/L (ref 101–110)
CO2 SERPL-SCNC: 27 MMOL/L (ref 21–32)
CREAT SERPL-MCNC: 0.8 MG/DL (ref 0.6–1)
DIFFERENTIAL METHOD BLD: ABNORMAL
EOSINOPHIL # BLD: 0.1 K/UL (ref 0–0.8)
EOSINOPHIL NFR BLD: 1 % (ref 0.5–7.8)
ERYTHROCYTE [DISTWIDTH] IN BLOOD BY AUTOMATED COUNT: 15.1 % (ref 11.9–14.6)
GLOBULIN SER CALC-MCNC: 3.7 G/DL (ref 2.8–4.5)
GLUCOSE SERPL-MCNC: 212 MG/DL (ref 65–100)
HCT VFR BLD AUTO: 47.2 % (ref 35.8–46.3)
HGB BLD-MCNC: 14.9 G/DL (ref 11.7–15.4)
IMM GRANULOCYTES # BLD AUTO: 0 K/UL (ref 0–0.5)
IMM GRANULOCYTES NFR BLD AUTO: 0 % (ref 0–5)
LYMPHOCYTES # BLD: 4.6 K/UL (ref 0.5–4.6)
LYMPHOCYTES NFR BLD: 49 % (ref 13–44)
MCH RBC QN AUTO: 26.4 PG (ref 26.1–32.9)
MCHC RBC AUTO-ENTMCNC: 31.6 G/DL (ref 31.4–35)
MCV RBC AUTO: 83.5 FL (ref 82–102)
MONOCYTES # BLD: 0.7 K/UL (ref 0.1–1.3)
MONOCYTES NFR BLD: 7 % (ref 4–12)
NEUTS SEG # BLD: 4.2 K/UL (ref 1.7–8.2)
NEUTS SEG NFR BLD: 43 % (ref 43–78)
NRBC # BLD: 0 K/UL (ref 0–0.2)
PLATELET # BLD AUTO: 219 K/UL (ref 150–450)
PMV BLD AUTO: 10.4 FL (ref 9.4–12.3)
POTASSIUM SERPL-SCNC: 4 MMOL/L (ref 3.5–5.1)
PROT SERPL-MCNC: 7.3 G/DL (ref 6.3–8.2)
RBC # BLD AUTO: 5.65 M/UL (ref 4.05–5.2)
SODIUM SERPL-SCNC: 137 MMOL/L (ref 133–143)
WBC # BLD AUTO: 9.7 K/UL (ref 4.3–11.1)

## 2023-06-07 PROCEDURE — 85025 COMPLETE CBC W/AUTO DIFF WBC: CPT

## 2023-06-07 PROCEDURE — 36415 COLL VENOUS BLD VENIPUNCTURE: CPT

## 2023-06-07 PROCEDURE — 80053 COMPREHEN METABOLIC PANEL: CPT

## 2023-06-09 NOTE — TELEPHONE ENCOUNTER
Call back to [facility] after reviewing chart and speaking with patient to confirm that Dr. Juan Lacey is the one who has been managing patient's eliquis. Let GI know that the anticoag form should go to them at this time but that we are here if they need other clarification. Kristen at [facility] and took out callback number if need be.

## 2023-06-19 ENCOUNTER — TELEPHONE (OUTPATIENT)
Dept: ONCOLOGY | Age: 58
End: 2023-06-19

## 2023-06-19 DIAGNOSIS — K13.79 MOUTH PAIN: ICD-10-CM

## 2023-06-19 NOTE — TELEPHONE ENCOUNTER
Call to patient re mychart message of mouth sores and mouth pain/redness. Pt notified that original pharmacy does not have all ingredients for magic mouthwash - resent to [facility] in [facility] per patient request.  Will bring in patient for a visit with NP tomorrow. Pt notified of appointment and VU. Appreciated the call and will let patient know if we have any other recs in the meantime.

## 2023-06-19 NOTE — TELEPHONE ENCOUNTER
Pt called stating [facility] in [facility] does not have one of the ingredients for Rx written. Pt asks for call asking to have alternate or call pharm to discuss.

## 2023-06-20 ENCOUNTER — HOSPITAL ENCOUNTER (OUTPATIENT)
Dept: LAB | Age: 58
Discharge: HOME | End: 2023-06-23
Payer: COMMERCIAL

## 2023-06-20 ENCOUNTER — OFFICE VISIT (OUTPATIENT)
Dept: ONCOLOGY | Age: 58
End: 2023-06-20
Payer: COMMERCIAL

## 2023-06-20 VITALS
TEMPERATURE: 97.6 F | BODY MASS INDEX: 37.13 KG/M2 | HEART RATE: 77 BPM | HEIGHT: 67 IN | SYSTOLIC BLOOD PRESSURE: 151 MMHG | OXYGEN SATURATION: 96 % | DIASTOLIC BLOOD PRESSURE: 96 MMHG | WEIGHT: 236.6 LBS | RESPIRATION RATE: 14 BRPM

## 2023-06-20 DIAGNOSIS — C64.9 RENAL CELL CARCINOMA, UNSPECIFIED LATERALITY (HCC): Primary | ICD-10-CM

## 2023-06-20 DIAGNOSIS — K13.79 MOUTH PAIN: ICD-10-CM

## 2023-06-20 DIAGNOSIS — C64.2 RENAL CELL CARCINOMA OF LEFT KIDNEY (HCC): ICD-10-CM

## 2023-06-20 LAB
ALBUMIN SERPL-MCNC: 3.7 G/DL (ref 3.5–5)
ALBUMIN/GLOB SERPL: 1 (ref 0.4–1.6)
ALP SERPL-CCNC: 87 U/L (ref 50–136)
ALT SERPL-CCNC: 77 U/L (ref 12–65)
ANION GAP SERPL CALC-SCNC: 4 MMOL/L (ref 2–11)
AST SERPL-CCNC: 53 U/L (ref 15–37)
BASOPHILS # BLD: 0 K/UL (ref 0–0.2)
BASOPHILS NFR BLD: 0 % (ref 0–2)
BILIRUB SERPL-MCNC: 0.6 MG/DL (ref 0.2–1.1)
BUN SERPL-MCNC: 6 MG/DL (ref 6–23)
CALCIUM SERPL-MCNC: 8.9 MG/DL (ref 8.3–10.4)
CHLORIDE SERPL-SCNC: 106 MMOL/L (ref 101–110)
CO2 SERPL-SCNC: 28 MMOL/L (ref 21–32)
CREAT SERPL-MCNC: 0.9 MG/DL (ref 0.6–1)
DIFFERENTIAL METHOD BLD: ABNORMAL
EOSINOPHIL # BLD: 0.2 K/UL (ref 0–0.8)
EOSINOPHIL NFR BLD: 2 % (ref 0.5–7.8)
ERYTHROCYTE [DISTWIDTH] IN BLOOD BY AUTOMATED COUNT: 14.2 % (ref 11.9–14.6)
GLOBULIN SER CALC-MCNC: 3.8 G/DL (ref 2.8–4.5)
GLUCOSE SERPL-MCNC: 204 MG/DL (ref 65–100)
HCT VFR BLD AUTO: 46.1 % (ref 35.8–46.3)
HGB BLD-MCNC: 14.9 G/DL (ref 11.7–15.4)
IMM GRANULOCYTES # BLD AUTO: 0 K/UL (ref 0–0.5)
IMM GRANULOCYTES NFR BLD AUTO: 0 % (ref 0–5)
LYMPHOCYTES # BLD: 3 K/UL (ref 0.5–4.6)
LYMPHOCYTES NFR BLD: 33 % (ref 13–44)
MCH RBC QN AUTO: 26 PG (ref 26.1–32.9)
MCHC RBC AUTO-ENTMCNC: 32.3 G/DL (ref 31.4–35)
MCV RBC AUTO: 80.6 FL (ref 82–102)
MONOCYTES # BLD: 0.8 K/UL (ref 0.1–1.3)
MONOCYTES NFR BLD: 8 % (ref 4–12)
NEUTS SEG # BLD: 5.2 K/UL (ref 1.7–8.2)
NEUTS SEG NFR BLD: 57 % (ref 43–78)
NRBC # BLD: 0 K/UL (ref 0–0.2)
PLATELET # BLD AUTO: 150 K/UL (ref 150–450)
PMV BLD AUTO: 10.1 FL (ref 9.4–12.3)
POTASSIUM SERPL-SCNC: 4 MMOL/L (ref 3.5–5.1)
PROT SERPL-MCNC: 7.5 G/DL (ref 6.3–8.2)
RBC # BLD AUTO: 5.72 M/UL (ref 4.05–5.2)
SODIUM SERPL-SCNC: 138 MMOL/L (ref 133–143)
WBC # BLD AUTO: 9.1 K/UL (ref 4.3–11.1)

## 2023-06-20 PROCEDURE — 80053 COMPREHEN METABOLIC PANEL: CPT

## 2023-06-20 PROCEDURE — 99214 OFFICE O/P EST MOD 30 MIN: CPT | Performed by: NURSE PRACTITIONER

## 2023-06-20 PROCEDURE — 36415 COLL VENOUS BLD VENIPUNCTURE: CPT

## 2023-06-20 PROCEDURE — 85025 COMPLETE CBC W/AUTO DIFF WBC: CPT

## 2023-06-20 RX ORDER — DOXYCYCLINE HYCLATE 100 MG
100 TABLET ORAL 2 TIMES DAILY
Qty: 20 TABLET | Refills: 0 | Status: SHIPPED | OUTPATIENT
Start: 2023-06-20 | End: 2023-06-30

## 2023-06-20 RX ORDER — NICOTINE 21 MG/24HR
PATCH, TRANSDERMAL 24 HOURS TRANSDERMAL
COMMUNITY
Start: 2023-05-31

## 2023-06-20 ASSESSMENT — ENCOUNTER SYMPTOMS
BACK PAIN: 1
NAUSEA: 0
CHEST TIGHTNESS: 0
BLOOD IN STOOL: 0
SHORTNESS OF BREATH: 1
CONSTIPATION: 0
TROUBLE SWALLOWING: 0
HEMOPTYSIS: 0
VOMITING: 0
ABDOMINAL DISTENTION: 0
SCLERAL ICTERUS: 0
WHEEZING: 0
DIARRHEA: 0
VOICE CHANGE: 0
SORE THROAT: 0
ABDOMINAL PAIN: 1

## 2023-06-20 ASSESSMENT — PATIENT HEALTH QUESTIONNAIRE - PHQ9
1. LITTLE INTEREST OR PLEASURE IN DOING THINGS: 0
SUM OF ALL RESPONSES TO PHQ QUESTIONS 1-9: 0
SUM OF ALL RESPONSES TO PHQ9 QUESTIONS 1 & 2: 0
SUM OF ALL RESPONSES TO PHQ QUESTIONS 1-9: 0
2. FEELING DOWN, DEPRESSED OR HOPELESS: 0

## 2023-06-22 ENCOUNTER — HOSPITAL ENCOUNTER (OUTPATIENT)
Dept: CT IMAGING | Age: 58
Discharge: HOME | End: 2023-06-22
Attending: INTERNAL MEDICINE
Payer: COMMERCIAL

## 2023-06-22 DIAGNOSIS — C64.2 RENAL CELL CARCINOMA OF LEFT KIDNEY (HCC): ICD-10-CM

## 2023-06-22 DIAGNOSIS — C64.9 RENAL CELL CARCINOMA, UNSPECIFIED LATERALITY (HCC): ICD-10-CM

## 2023-06-22 PROCEDURE — 6360000004 HC RX CONTRAST MEDICATION: Performed by: INTERNAL MEDICINE

## 2023-06-22 PROCEDURE — 74178 CT ABD&PLV WO CNTR FLWD CNTR: CPT

## 2023-06-22 PROCEDURE — 2580000003 HC RX 258: Performed by: INTERNAL MEDICINE

## 2023-06-22 RX ORDER — HEPARIN SODIUM 100 [USP'U]/ML
500 INJECTION, SOLUTION INTRAVENOUS ONCE
Status: DISCONTINUED | OUTPATIENT
Start: 2023-06-22 | End: 2023-06-26 | Stop reason: HOSPADM

## 2023-06-22 RX ORDER — SODIUM CHLORIDE 0.9 % (FLUSH) 0.9 %
10 SYRINGE (ML) INJECTION
Status: COMPLETED | OUTPATIENT
Start: 2023-06-22 | End: 2023-06-22

## 2023-06-22 RX ORDER — 0.9 % SODIUM CHLORIDE 0.9 %
100 INTRAVENOUS SOLUTION INTRAVENOUS
Status: COMPLETED | OUTPATIENT
Start: 2023-06-22 | End: 2023-06-22

## 2023-06-22 RX ADMIN — IOPAMIDOL 100 ML: 755 INJECTION, SOLUTION INTRAVENOUS at 16:09

## 2023-06-22 RX ADMIN — SODIUM CHLORIDE 100 ML: 9 INJECTION, SOLUTION INTRAVENOUS at 16:09

## 2023-06-22 RX ADMIN — DIATRIZOATE MEGLUMINE AND DIATRIZOATE SODIUM 15 ML: 660; 100 LIQUID ORAL; RECTAL at 16:09

## 2023-06-22 RX ADMIN — SODIUM CHLORIDE, PRESERVATIVE FREE 10 ML: 5 INJECTION INTRAVENOUS at 16:08

## 2023-06-23 ENCOUNTER — TELEPHONE (OUTPATIENT)
Dept: ONCOLOGY | Age: 58
End: 2023-06-23

## 2023-06-23 DIAGNOSIS — C64.9 RENAL CELL CARCINOMA, UNSPECIFIED LATERALITY (HCC): Primary | ICD-10-CM

## 2023-06-23 RX ORDER — AMLODIPINE BESYLATE 5 MG/1
5 TABLET ORAL DAILY
Qty: 30 TABLET | Refills: 1 | Status: SHIPPED | OUTPATIENT
Start: 2023-06-23

## 2023-06-23 NOTE — TELEPHONE ENCOUNTER
Pt states her BP has been consistently elevated for the last couple weeks - checks BP in morning, mid-day, and evening. States diastolic is consistently in high 90's to 100's. Current /113 - reporting bad headache. Pt currently on Amlodipine 2.5 which she takes at night (prescribed by PCP)    Pt is on Everolimis (5mg) and Lenvatinib (18mg). Msg routed to Nps for further direction.

## 2023-06-23 NOTE — TELEPHONE ENCOUNTER
Advised pt hold Lenvatinib until she returns for next OV and increase Norvasc to 5mg daily. Advised pt to continue to check BP before taking Norvasc. Advised pt to go to ED if symptoms persists or if she begins to experience vision changes. Pt continued to describe how bad her headache is and that she's started pouring sweat. Advised to go to ED to get BP under control. Pt verbalized understanding of all instruction given.

## 2023-06-23 NOTE — TELEPHONE ENCOUNTER
Pt called in stating she just missed a call from some at [address] [place] [address],[facility] [address] [facility]. .. requested to speak with Derek King. Goldy Rai # [phone]

## 2023-06-26 ENCOUNTER — CLINICAL DOCUMENTATION (OUTPATIENT)
Dept: ONCOLOGY | Age: 58
End: 2023-06-26

## 2023-06-27 ENCOUNTER — PATIENT MESSAGE (OUTPATIENT)
Dept: INTERNAL MEDICINE CLINIC | Facility: CLINIC | Age: 58
End: 2023-06-27

## 2023-06-27 ENCOUNTER — HOSPITAL ENCOUNTER (OUTPATIENT)
Dept: LAB | Age: 58
Discharge: HOME | End: 2023-06-30
Payer: COMMERCIAL

## 2023-06-27 DIAGNOSIS — C64.9 RENAL CELL CARCINOMA, UNSPECIFIED LATERALITY (HCC): ICD-10-CM

## 2023-06-27 LAB
ALBUMIN SERPL-MCNC: 3.2 G/DL (ref 3.5–5)
ALBUMIN/GLOB SERPL: 1 (ref 0.4–1.6)
ALP SERPL-CCNC: 70 U/L (ref 50–136)
ALT SERPL-CCNC: 42 U/L (ref 12–65)
ANION GAP SERPL CALC-SCNC: 6 MMOL/L (ref 2–11)
AST SERPL-CCNC: 23 U/L (ref 15–37)
BASOPHILS # BLD: 0 K/UL (ref 0–0.2)
BASOPHILS NFR BLD: 0 % (ref 0–2)
BILIRUB SERPL-MCNC: 0.3 MG/DL (ref 0.2–1.1)
BUN SERPL-MCNC: 8 MG/DL (ref 6–23)
CALCIUM SERPL-MCNC: 8.8 MG/DL (ref 8.3–10.4)
CHLORIDE SERPL-SCNC: 109 MMOL/L (ref 101–110)
CO2 SERPL-SCNC: 26 MMOL/L (ref 21–32)
CREAT SERPL-MCNC: 0.8 MG/DL (ref 0.6–1)
DIFFERENTIAL METHOD BLD: ABNORMAL
EOSINOPHIL # BLD: 0.1 K/UL (ref 0–0.8)
EOSINOPHIL NFR BLD: 2 % (ref 0.5–7.8)
ERYTHROCYTE [DISTWIDTH] IN BLOOD BY AUTOMATED COUNT: 14.5 % (ref 11.9–14.6)
GLOBULIN SER CALC-MCNC: 3.3 G/DL (ref 2.8–4.5)
GLUCOSE SERPL-MCNC: 183 MG/DL (ref 65–100)
HCT VFR BLD AUTO: 43.2 % (ref 35.8–46.3)
HGB BLD-MCNC: 13.7 G/DL (ref 11.7–15.4)
IMM GRANULOCYTES # BLD AUTO: 0 K/UL (ref 0–0.5)
IMM GRANULOCYTES NFR BLD AUTO: 0 % (ref 0–5)
LYMPHOCYTES # BLD: 3 K/UL (ref 0.5–4.6)
LYMPHOCYTES NFR BLD: 31 % (ref 13–44)
MCH RBC QN AUTO: 25.7 PG (ref 26.1–32.9)
MCHC RBC AUTO-ENTMCNC: 31.7 G/DL (ref 31.4–35)
MCV RBC AUTO: 80.9 FL (ref 82–102)
MONOCYTES # BLD: 0.7 K/UL (ref 0.1–1.3)
MONOCYTES NFR BLD: 7 % (ref 4–12)
NEUTS SEG # BLD: 5.8 K/UL (ref 1.7–8.2)
NEUTS SEG NFR BLD: 60 % (ref 43–78)
NRBC # BLD: 0 K/UL (ref 0–0.2)
PLATELET # BLD AUTO: 173 K/UL (ref 150–450)
PMV BLD AUTO: 11.2 FL (ref 9.4–12.3)
POTASSIUM SERPL-SCNC: 3.4 MMOL/L (ref 3.5–5.1)
PROT SERPL-MCNC: 6.5 G/DL (ref 6.3–8.2)
RBC # BLD AUTO: 5.34 M/UL (ref 4.05–5.2)
SODIUM SERPL-SCNC: 141 MMOL/L (ref 133–143)
WBC # BLD AUTO: 9.6 K/UL (ref 4.3–11.1)

## 2023-06-27 PROCEDURE — 85025 COMPLETE CBC W/AUTO DIFF WBC: CPT

## 2023-06-27 PROCEDURE — 36415 COLL VENOUS BLD VENIPUNCTURE: CPT

## 2023-06-27 PROCEDURE — 80053 COMPREHEN METABOLIC PANEL: CPT

## 2023-06-28 ENCOUNTER — APPOINTMENT (OUTPATIENT)
Dept: ULTRASOUND IMAGING | Age: 58
End: 2023-06-28
Payer: COMMERCIAL

## 2023-06-28 ENCOUNTER — HOSPITAL ENCOUNTER (EMERGENCY)
Age: 58
Discharge: HOME | End: 2023-06-28
Attending: EMERGENCY MEDICINE
Payer: COMMERCIAL

## 2023-06-28 VITALS
BODY MASS INDEX: 36.96 KG/M2 | RESPIRATION RATE: 20 BRPM | OXYGEN SATURATION: 93 % | DIASTOLIC BLOOD PRESSURE: 74 MMHG | HEART RATE: 99 BPM | TEMPERATURE: 97.3 F | SYSTOLIC BLOOD PRESSURE: 117 MMHG | HEIGHT: 66 IN | WEIGHT: 230 LBS

## 2023-06-28 DIAGNOSIS — M79.605 LEFT LEG PAIN: Primary | ICD-10-CM

## 2023-06-28 PROCEDURE — 99284 EMERGENCY DEPT VISIT MOD MDM: CPT

## 2023-06-28 PROCEDURE — 6370000000 HC RX 637 (ALT 250 FOR IP): Performed by: PHYSICIAN ASSISTANT

## 2023-06-28 PROCEDURE — 93971 EXTREMITY STUDY: CPT

## 2023-06-28 RX ORDER — OXYCODONE HYDROCHLORIDE AND ACETAMINOPHEN 5; 325 MG/1; MG/1
1 TABLET ORAL ONCE
Status: COMPLETED | OUTPATIENT
Start: 2023-06-28 | End: 2023-06-28

## 2023-06-28 RX ORDER — OXYCODONE HYDROCHLORIDE AND ACETAMINOPHEN 5; 325 MG/1; MG/1
1 TABLET ORAL EVERY 8 HOURS PRN
Qty: 10 TABLET | Refills: 0 | Status: SHIPPED | OUTPATIENT
Start: 2023-06-28 | End: 2023-07-01

## 2023-06-28 RX ADMIN — OXYCODONE HYDROCHLORIDE AND ACETAMINOPHEN 1 TABLET: 5; 325 TABLET ORAL at 00:41

## 2023-06-28 ASSESSMENT — PAIN DESCRIPTION - LOCATION: LOCATION: LEG

## 2023-06-28 ASSESSMENT — PAIN - FUNCTIONAL ASSESSMENT: PAIN_FUNCTIONAL_ASSESSMENT: 0-10

## 2023-06-28 ASSESSMENT — PAIN DESCRIPTION - DESCRIPTORS: DESCRIPTORS: SHOOTING

## 2023-06-28 ASSESSMENT — LIFESTYLE VARIABLES
HOW OFTEN DO YOU HAVE A DRINK CONTAINING ALCOHOL: NEVER
HOW MANY STANDARD DRINKS CONTAINING ALCOHOL DO YOU HAVE ON A TYPICAL DAY: PATIENT DOES NOT DRINK

## 2023-06-28 ASSESSMENT — PAIN SCALES - GENERAL
PAINLEVEL_OUTOF10: 4
PAINLEVEL_OUTOF10: 7

## 2023-06-28 ASSESSMENT — PAIN DESCRIPTION - ORIENTATION: ORIENTATION: LEFT

## 2023-07-03 ENCOUNTER — OFFICE VISIT (OUTPATIENT)
Dept: INTERNAL MEDICINE CLINIC | Facility: CLINIC | Age: 58
End: 2023-07-03
Payer: COMMERCIAL

## 2023-07-03 VITALS
OXYGEN SATURATION: 94 % | DIASTOLIC BLOOD PRESSURE: 59 MMHG | WEIGHT: 244 LBS | SYSTOLIC BLOOD PRESSURE: 119 MMHG | BODY MASS INDEX: 39.21 KG/M2 | HEART RATE: 89 BPM | HEIGHT: 66 IN | TEMPERATURE: 98.1 F | RESPIRATION RATE: 17 BRPM

## 2023-07-03 DIAGNOSIS — M25.562 LEFT MEDIAL KNEE PAIN: Primary | ICD-10-CM

## 2023-07-03 DIAGNOSIS — R73.09 ELEVATED GLUCOSE: ICD-10-CM

## 2023-07-03 LAB
ALBUMIN SERPL-MCNC: 3.3 G/DL (ref 3.5–5)
ALBUMIN/GLOB SERPL: 0.9 (ref 0.4–1.6)
ALP SERPL-CCNC: 76 U/L (ref 50–136)
ALT SERPL-CCNC: 48 U/L (ref 12–65)
ANION GAP SERPL CALC-SCNC: 7 MMOL/L (ref 2–11)
AST SERPL-CCNC: 24 U/L (ref 15–37)
BILIRUB SERPL-MCNC: 0.2 MG/DL (ref 0.2–1.1)
BUN SERPL-MCNC: 11 MG/DL (ref 6–23)
CALCIUM SERPL-MCNC: 9.5 MG/DL (ref 8.3–10.4)
CHLORIDE SERPL-SCNC: 103 MMOL/L (ref 101–110)
CO2 SERPL-SCNC: 22 MMOL/L (ref 21–32)
CREAT SERPL-MCNC: 1.2 MG/DL (ref 0.6–1)
GLOBULIN SER CALC-MCNC: 3.8 G/DL (ref 2.8–4.5)
GLUCOSE SERPL-MCNC: 345 MG/DL (ref 65–100)
POTASSIUM SERPL-SCNC: 4.1 MMOL/L (ref 3.5–5.1)
PROT SERPL-MCNC: 7.1 G/DL (ref 6.3–8.2)
SODIUM SERPL-SCNC: 132 MMOL/L (ref 133–143)

## 2023-07-03 PROCEDURE — 99213 OFFICE O/P EST LOW 20 MIN: CPT | Performed by: NURSE PRACTITIONER

## 2023-07-03 ASSESSMENT — PATIENT HEALTH QUESTIONNAIRE - PHQ9
2. FEELING DOWN, DEPRESSED OR HOPELESS: SEVERAL DAYS
SUM OF ALL RESPONSES TO PHQ QUESTIONS 1-9: 2
SUM OF ALL RESPONSES TO PHQ QUESTIONS 1-9: 2
1. LITTLE INTEREST OR PLEASURE IN DOING THINGS: SEVERAL DAYS
SUM OF ALL RESPONSES TO PHQ9 QUESTIONS 1 & 2: 2
SUM OF ALL RESPONSES TO PHQ9 QUESTIONS 1 & 2: 2
2. FEELING DOWN, DEPRESSED OR HOPELESS: 1
SUM OF ALL RESPONSES TO PHQ QUESTIONS 1-9: 2
1. LITTLE INTEREST OR PLEASURE IN DOING THINGS: 1
SUM OF ALL RESPONSES TO PHQ QUESTIONS 1-9: 2

## 2023-07-03 ASSESSMENT — ENCOUNTER SYMPTOMS: COLOR CHANGE: 0

## 2023-07-03 NOTE — PROGRESS NOTES
7/3/2023 3:34 PM  Location:[address] INTERNAL MEDICINE  [place]  Patient #:  [id number]  YOB: 1965          YOUR LAST HEMOGLOBIN A1CS:   No results found for: HBA1C, [id number]    YOUR LAST LIPID PROFILE:   Lab Results   Component Value Date/Time    CHOL 185 10/25/2022 02:58 PM    HDL 38 10/25/2022 02:58 PM         Lab Results   Component Value Date/Time    BUN 8 06/27/2023 08:44 AM     06/27/2023 08:44 AM    K 3.4 06/27/2023 08:44 AM     06/27/2023 08:44 AM    CO2 26 06/27/2023 08:44 AM           History of Present Illness     Chief Complaint   Patient presents with    Follow-Up from Hospital     ER follow up: [facility] Guest: 6/28/23       Ms. Lord Santiago is a 61 y.o. female  who presents for left knee and lower leg pain and swelling. Ms. Lord Santiago presents for ER follow-up. She reports 1 week of left knee swelling and pain, nontraumatic. She underwent an ultrasound which was negative for DVT and was prescribed oxycodone for pain. She continues taking her Eliquis uninterrupted reports that she continues to have pain in her medial knee and in her left tibia and fibula. The knee brace was not helpful for her pain. She has taken oxycodone sparingly. 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 She has been weaning off of her prednisone. She has had elevated blood sugars at her last oncology appointments.          Allergies   Allergen Reactions    Latex Hives, Itching, Shortness Of Breath and Other (See Comments)    Bee Venom Shortness Of Breath and Hives    Penicillins Anaphylaxis    Sulfa Antibiotics Hives, Itching and Rash    Wasp Venom Protein Hives and Shortness Of Breath    Penicillin G     Sulfamethoxazole-Trimethoprim Hives    Wellbutrin [Bupropion] Other (See Comments)     Past

## 2023-07-04 ENCOUNTER — APPOINTMENT (OUTPATIENT)
Dept: GENERAL RADIOLOGY | Age: 58
End: 2023-07-04
Payer: COMMERCIAL

## 2023-07-04 ENCOUNTER — HOSPITAL ENCOUNTER (EMERGENCY)
Age: 58
Discharge: HOME | End: 2023-07-04
Attending: EMERGENCY MEDICINE
Payer: COMMERCIAL

## 2023-07-04 ENCOUNTER — PATIENT MESSAGE (OUTPATIENT)
Dept: INTERNAL MEDICINE CLINIC | Facility: CLINIC | Age: 58
End: 2023-07-04

## 2023-07-04 ENCOUNTER — TELEPHONE (OUTPATIENT)
Dept: INTERNAL MEDICINE CLINIC | Facility: CLINIC | Age: 58
End: 2023-07-04

## 2023-07-04 VITALS
RESPIRATION RATE: 20 BRPM | OXYGEN SATURATION: 96 % | TEMPERATURE: 97.5 F | SYSTOLIC BLOOD PRESSURE: 139 MMHG | DIASTOLIC BLOOD PRESSURE: 73 MMHG | HEART RATE: 80 BPM

## 2023-07-04 DIAGNOSIS — S92.153A: Primary | ICD-10-CM

## 2023-07-04 DIAGNOSIS — E11.65 TYPE 2 DIABETES MELLITUS WITH HYPERGLYCEMIA, WITHOUT LONG-TERM CURRENT USE OF INSULIN (HCC): Primary | ICD-10-CM

## 2023-07-04 LAB
EST. AVERAGE GLUCOSE BLD GHB EST-MCNC: 171 MG/DL
HBA1C MFR BLD: 7.6 % (ref 4.8–5.6)

## 2023-07-04 PROCEDURE — 99283 EMERGENCY DEPT VISIT LOW MDM: CPT

## 2023-07-04 PROCEDURE — 73630 X-RAY EXAM OF FOOT: CPT

## 2023-07-04 PROCEDURE — 73610 X-RAY EXAM OF ANKLE: CPT

## 2023-07-04 PROCEDURE — 73562 X-RAY EXAM OF KNEE 3: CPT

## 2023-07-04 PROCEDURE — 6370000000 HC RX 637 (ALT 250 FOR IP)

## 2023-07-04 RX ORDER — HYDROCODONE BITARTRATE AND ACETAMINOPHEN 5; 325 MG/1; MG/1
1 TABLET ORAL EVERY 6 HOURS PRN
Qty: 10 TABLET | Refills: 0 | Status: SHIPPED | OUTPATIENT
Start: 2023-07-04 | End: 2023-07-07

## 2023-07-04 RX ORDER — HYDROCODONE BITARTRATE AND ACETAMINOPHEN 5; 325 MG/1; MG/1
1 TABLET ORAL
Status: COMPLETED | OUTPATIENT
Start: 2023-07-04 | End: 2023-07-04

## 2023-07-04 RX ADMIN — HYDROCODONE BITARTRATE AND ACETAMINOPHEN 1 TABLET: 5; 325 TABLET ORAL at 23:15

## 2023-07-04 ASSESSMENT — PAIN SCALES - GENERAL
PAINLEVEL_OUTOF10: 7

## 2023-07-04 ASSESSMENT — PAIN - FUNCTIONAL ASSESSMENT: PAIN_FUNCTIONAL_ASSESSMENT: 0-10

## 2023-07-04 ASSESSMENT — PAIN DESCRIPTION - LOCATION
LOCATION: ANKLE
LOCATION: ANKLE

## 2023-07-04 ASSESSMENT — PAIN DESCRIPTION - ORIENTATION
ORIENTATION: LEFT
ORIENTATION: LEFT

## 2023-07-04 NOTE — TELEPHONE ENCOUNTER
The following message sent on my chart. She needs 3 month follow up with a1c with pcp or me. Thanks! Katelynn    Ms. Handley Opal-  The lab results show that you are a diabetic and your a1c is 7.6, with an average daily blood sugar of 171. The glucose level on your labs yesterday was quite high at over 300. I have called in Metformin for you to take. Hoping that backing off the prednisone will also help things. Make sure that you let the oncologists know about your new diagnosis and new medications. I will have the office get you in for a 3 month follow up and schafer sure your a1c is improving. Hoping you are well!   Pablo Benjamin, [address] [facility]

## 2023-07-05 NOTE — TELEPHONE ENCOUNTER
I have talked with Ms. Hunter Cole and have given her this message. I have her scheduled with Katelynn on October 5th for A1c follow up.

## 2023-07-05 NOTE — DISCHARGE INSTRUCTIONS
Follow-up with orthopedics as listed on this document. The office may call you for a follow-up appointment. If they do not call you please call them by Thursday of this week. Do not bear weight to the extremity until you are cleared by orthopedics. Use Tylenol or ibuprofen for less severe pain. Norco as needed for more severe pain. This is a narcotic pain medication so do not take this with alcohol or drive a car while you are using this. As we discussed Narcan should be picked up from the pharmacy with this prescription. Narcan should be available in your home and those living with you should know how to administer in case of unresponsiveness.   If you are unresponsive and needed Narcan please call 911 and present to the hospital.

## 2023-07-05 NOTE — ED TRIAGE NOTES
Patient fell down 4 steps and complaining of left ankle pain. Patient states 7/10 pain. A+Ox4. Patient takes eliquis 5mg 2x daily.

## 2023-07-05 NOTE — ED PROVIDER NOTES
LIGATION  3/91        Social History     Socioeconomic History    Marital status:    Occupational History    Occupation: CNA     Employer: [employer]OS[employer]   Tobacco Use    Smoking status: Every Day     Packs/day: 1.50     Years: 15.00     Pack years: 22.50     Types: Cigarettes     Passive exposure: Past    Smokeless tobacco: Never   Substance and Sexual Activity    Alcohol use: Yes     Alcohol/week: 2.0 standard drinks     Types: 2 Shots of liquor per week    Drug use: Never    Sexual activity: Not Currently     Partners: Male     Social Determinants of Health     Physical Activity: Unknown    Days of Exercise per Week: Patient refused   Intimate Partner Violence: Not At Risk    Fear of Current or Ex-Partner: No    Emotionally Abused: No    Physically Abused: No    Sexually Abused: No        Discharge Medication List as of 7/4/2023 11:31 PM        CONTINUE these medications which have NOT CHANGED    Details   metFORMIN (GLUCOPHAGE) 500 MG tablet Take 1 tablet by mouth 2 times daily (with meals), Disp-60 tablet, R-5Normal      amLODIPine (NORVASC) 5 MG tablet Take 1 tablet by mouth daily, Disp-30 tablet, R-1Normal      nicotine (NICODERM CQ) 14 MG/24HR Historical Med      Magic Mouthwash (MIRACLE MOUTHWASH) Swish and spit 5 mLs 4 times daily as needed for Irritation or Pain, Disp-240 mL, R-1Mix equal parts diphenhydramine, maalox, and lidocaine. Normal      fluticasone (CUTIVATE) 0.05 % cream Apply topically 2 times daily as needed. , Disp-30 g, R-1, Normal      Everolimus (AFINITOR) 5 MG TABS Take 1 tablet by mouth daily, Disp-30 tablet, R-5Normal      Lenvatinib, 18 MG Daily Dose, 10 MG & 2 x 4 MG CPPK Take 18 mg by mouth daily, Disp-30 each, R-3Normal      cetirizine (ZYRTEC) 10 MG tablet Take 1 tablet by mouth dailyHistorical Med      pantoprazole (PROTONIX) 40 MG tablet Take 1 tablet by mouth every morning (before breakfast), Disp-30 tablet, R-1Normal      albuterol sulfate HFA (PROAIR HFA) 108 (90 Base) MCG/ACT

## 2023-07-06 ENCOUNTER — OFFICE VISIT (OUTPATIENT)
Dept: ONCOLOGY | Age: 58
End: 2023-07-06
Payer: COMMERCIAL

## 2023-07-06 ENCOUNTER — HOSPITAL ENCOUNTER (OUTPATIENT)
Dept: LAB | Age: 58
Discharge: HOME | End: 2023-07-06
Payer: COMMERCIAL

## 2023-07-06 ENCOUNTER — OFFICE VISIT (OUTPATIENT)
Dept: ORTHOPEDIC SURGERY | Age: 58
End: 2023-07-06

## 2023-07-06 VITALS
DIASTOLIC BLOOD PRESSURE: 82 MMHG | HEART RATE: 88 BPM | RESPIRATION RATE: 12 BRPM | TEMPERATURE: 98.7 F | SYSTOLIC BLOOD PRESSURE: 137 MMHG | OXYGEN SATURATION: 96 % | BODY MASS INDEX: 38.79 KG/M2 | HEIGHT: 67 IN

## 2023-07-06 DIAGNOSIS — S93.402A MODERATE LEFT ANKLE SPRAIN, INITIAL ENCOUNTER: ICD-10-CM

## 2023-07-06 DIAGNOSIS — M79.671 RIGHT FOOT PAIN: Primary | ICD-10-CM

## 2023-07-06 DIAGNOSIS — C64.9 RENAL CELL CARCINOMA, UNSPECIFIED LATERALITY (HCC): Primary | ICD-10-CM

## 2023-07-06 DIAGNOSIS — C64.2 RENAL CELL CARCINOMA OF LEFT KIDNEY (HCC): ICD-10-CM

## 2023-07-06 LAB
ALBUMIN SERPL-MCNC: 3.5 G/DL (ref 3.5–5)
ALBUMIN/GLOB SERPL: 0.9 (ref 0.4–1.6)
ALP SERPL-CCNC: 73 U/L (ref 50–136)
ALT SERPL-CCNC: 43 U/L (ref 12–65)
ANION GAP SERPL CALC-SCNC: 7 MMOL/L (ref 2–11)
AST SERPL-CCNC: 24 U/L (ref 15–37)
BASOPHILS # BLD: 0 K/UL (ref 0–0.2)
BASOPHILS NFR BLD: 0 % (ref 0–2)
BILIRUB SERPL-MCNC: 0.3 MG/DL (ref 0.2–1.1)
BUN SERPL-MCNC: 8 MG/DL (ref 6–23)
CALCIUM SERPL-MCNC: 9.5 MG/DL (ref 8.3–10.4)
CHLORIDE SERPL-SCNC: 105 MMOL/L (ref 101–110)
CO2 SERPL-SCNC: 24 MMOL/L (ref 21–32)
CREAT SERPL-MCNC: 0.9 MG/DL (ref 0.6–1)
DIFFERENTIAL METHOD BLD: ABNORMAL
EOSINOPHIL # BLD: 0.2 K/UL (ref 0–0.8)
EOSINOPHIL NFR BLD: 2 % (ref 0.5–7.8)
ERYTHROCYTE [DISTWIDTH] IN BLOOD BY AUTOMATED COUNT: 14.4 % (ref 11.9–14.6)
GLOBULIN SER CALC-MCNC: 3.8 G/DL (ref 2.8–4.5)
GLUCOSE SERPL-MCNC: 264 MG/DL (ref 65–100)
HCT VFR BLD AUTO: 44.9 % (ref 35.8–46.3)
HGB BLD-MCNC: 13.9 G/DL (ref 11.7–15.4)
IMM GRANULOCYTES # BLD AUTO: 0.1 K/UL (ref 0–0.5)
IMM GRANULOCYTES NFR BLD AUTO: 1 % (ref 0–5)
LYMPHOCYTES # BLD: 3.8 K/UL (ref 0.5–4.6)
LYMPHOCYTES NFR BLD: 32 % (ref 13–44)
MCH RBC QN AUTO: 25.1 PG (ref 26.1–32.9)
MCHC RBC AUTO-ENTMCNC: 31 G/DL (ref 31.4–35)
MCV RBC AUTO: 81.2 FL (ref 82–102)
MONOCYTES # BLD: 0.8 K/UL (ref 0.1–1.3)
MONOCYTES NFR BLD: 7 % (ref 4–12)
NEUTS SEG # BLD: 7.1 K/UL (ref 1.7–8.2)
NEUTS SEG NFR BLD: 59 % (ref 43–78)
NRBC # BLD: 0 K/UL (ref 0–0.2)
PLATELET # BLD AUTO: 331 K/UL (ref 150–450)
PMV BLD AUTO: 10.8 FL (ref 9.4–12.3)
POTASSIUM SERPL-SCNC: 3.8 MMOL/L (ref 3.5–5.1)
PROT SERPL-MCNC: 7.3 G/DL (ref 6.3–8.2)
RBC # BLD AUTO: 5.53 M/UL (ref 4.05–5.2)
SODIUM SERPL-SCNC: 136 MMOL/L (ref 133–143)
WBC # BLD AUTO: 12.1 K/UL (ref 4.3–11.1)

## 2023-07-06 PROCEDURE — 36415 COLL VENOUS BLD VENIPUNCTURE: CPT

## 2023-07-06 PROCEDURE — 80053 COMPREHEN METABOLIC PANEL: CPT

## 2023-07-06 PROCEDURE — 99214 OFFICE O/P EST MOD 30 MIN: CPT | Performed by: NURSE PRACTITIONER

## 2023-07-06 PROCEDURE — 85025 COMPLETE CBC W/AUTO DIFF WBC: CPT

## 2023-07-06 ASSESSMENT — ENCOUNTER SYMPTOMS
BACK PAIN: 1
CHEST TIGHTNESS: 0
DIARRHEA: 1
ABDOMINAL DISTENTION: 0
ABDOMINAL PAIN: 0
SCLERAL ICTERUS: 0
WHEEZING: 0
CONSTIPATION: 0
SHORTNESS OF BREATH: 1
VOICE CHANGE: 0
BLOOD IN STOOL: 0
HEMOPTYSIS: 0
TROUBLE SWALLOWING: 0
SORE THROAT: 0
NAUSEA: 0
VOMITING: 0

## 2023-07-06 ASSESSMENT — PATIENT HEALTH QUESTIONNAIRE - PHQ9
1. LITTLE INTEREST OR PLEASURE IN DOING THINGS: 0
SUM OF ALL RESPONSES TO PHQ QUESTIONS 1-9: 0
SUM OF ALL RESPONSES TO PHQ9 QUESTIONS 1 & 2: 0
2. FEELING DOWN, DEPRESSED OR HOPELESS: 0
SUM OF ALL RESPONSES TO PHQ QUESTIONS 1-9: 0

## 2023-07-06 NOTE — PROGRESS NOTES
The patient was prescribed a walker boot for the patient's right foot. The patient wears a size 10 shoe and I fitted them with a M size boot. The patient was fitted and instructed on the use of prescribed walker boot. I explained how to fit themselves and that the plastic flexible piece should always be on the front of the boot and secured by the Velcro straps on top. The air bladder in the boot was adjusted according to proper fit and comfort. The patient walked a short distance and acknowledged satisfaction with current fit. I also explained that they need a heel lift or a higher heeled shoe for the uninvolved LE to help normalize gait and avoid excessive low back stress/strain due to leg length inequality created from walker boot. Patient read and signed documenting they understand and agree to [facility]'s current DME return policy.
giovana Varghese     This document was created using voice recognition software so frequent mistakes are possible. For any concerns about the wording of this document, please contact its creator for further clarification.
No

## 2023-07-06 NOTE — PROGRESS NOTES
ortho today for recent ankle fracture after fall at home/recently in ER for this. - PCP recently started on Metformin, PCP aware we are weaning steroids. - soft tissue swelling under jaw bilaterally with redness/warmth/fever ON - RX doxy BID x 10 days, if no improvement by the end of week would recommend facial imaging - RESOLVED  - Mild rash - can use Cutivate cream - not to use on the face.    - Dr. Susan Parmar reviewed CT scan from 6/2023   - LFT elevation - She will be back next week - LFTs better now x 2 weeks, will wean Pred to 10 mg and recheck CMP in 2 weeks  - Mammo in April [facility]. She had a colonoscopy/EGD recently. - Labs reviewed and reassuring. No current s/s of infection. - long use of steroid - c/w PPI, has some epigastric pain. 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 - echo with normal EF, port in place - flush q3mo  - smoking cessation strongly encouraged - pt declined at this time accepting risks   - VTE - on apixiban - no reported bleeding       RESUSCITATION DIRECTIVES/HOSPICE CARE: Full Support    RTC in 2 weeks for labs, 4 weeks for OV    MDM      Lab studies and imaging studies were personally reviewed. Pertinent old records were reviewed. Historical:   - here for consultation. We had a long discussion today about her case. We discussed the pathophysiology of malignancy, staging of RCC and current guidelines. We discussed tx options and she is interested in pursuing systemic therapy. She reviewed this with Dr Cata Monet - it will let us see if the adrenal mass and both renal masses respond to tx. - per NCCN guidelines, pt's with intermediate risk advanced disease are candidates for immuno + TKI. We discussed options, will p/w axitinib and pembrolizumab. SE reviewed in great detail going over percentages of likelihood of occurrence. Printed info was given to pt for her reference.   She was made aware of risk of heart failure, hemorrhage, HTN, Reversible posterior

## 2023-07-07 ENCOUNTER — TELEPHONE (OUTPATIENT)
Dept: INTERNAL MEDICINE CLINIC | Facility: CLINIC | Age: 58
End: 2023-07-07

## 2023-07-07 DIAGNOSIS — R79.89 ELEVATED SERUM CREATININE: Primary | ICD-10-CM

## 2023-07-07 NOTE — TELEPHONE ENCOUNTER
Please schedule MsChina Salas for labs, BMP, in 2 weeks due to elevated creatinine. Thanks!   Katelynn

## 2023-07-07 NOTE — TELEPHONE ENCOUNTER
I have talked with Ms. Omer Salas and have given her this message.  She is scheduled on July 24th for lab

## 2023-07-11 ENCOUNTER — CLINICAL DOCUMENTATION (OUTPATIENT)
Dept: ORTHOPEDIC SURGERY | Age: 58
End: 2023-07-11

## 2023-07-13 RX ORDER — BLOOD-GLUCOSE METER
1 KIT MISCELLANEOUS DAILY
Qty: 1 KIT | Refills: 0 | Status: SHIPPED | OUTPATIENT
Start: 2023-07-13

## 2023-07-13 NOTE — TELEPHONE ENCOUNTER
from [place] called to get the patient a glucometer kit sent to [facility] in [facility]   She is on metformin but she has been having low blood sugars and needs to be able to check her sugar

## 2023-07-14 ENCOUNTER — PATIENT MESSAGE (OUTPATIENT)
Dept: INTERNAL MEDICINE CLINIC | Facility: CLINIC | Age: 58
End: 2023-07-14

## 2023-07-17 ENCOUNTER — CLINICAL DOCUMENTATION (OUTPATIENT)
Dept: ORTHOPEDIC SURGERY | Age: 58
End: 2023-07-17

## 2023-07-18 RX ORDER — BLOOD SUGAR DIAGNOSTIC
1 STRIP MISCELLANEOUS DAILY
Qty: 100 EACH | Refills: 3 | Status: SHIPPED | OUTPATIENT
Start: 2023-07-18

## 2023-07-18 NOTE — TELEPHONE ENCOUNTER
From: Ghassan Buck  To: Dr. Silveira Bullocks: 7/14/2023 4:34 PM EDT  Subject: Glaucomator Strips     Can I get a script called into for the strips for the glaucomator?

## 2023-07-20 ENCOUNTER — HOSPITAL ENCOUNTER (OUTPATIENT)
Dept: LAB | Age: 58
Discharge: HOME | End: 2023-07-20
Payer: COMMERCIAL

## 2023-07-20 DIAGNOSIS — C64.9 RENAL CELL CARCINOMA, UNSPECIFIED LATERALITY (HCC): ICD-10-CM

## 2023-07-20 LAB
ALBUMIN SERPL-MCNC: 3.5 G/DL (ref 3.5–5)
ALBUMIN/GLOB SERPL: 0.9 (ref 0.4–1.6)
ALP SERPL-CCNC: 84 U/L (ref 50–136)
ALT SERPL-CCNC: 65 U/L (ref 12–65)
ANION GAP SERPL CALC-SCNC: 7 MMOL/L (ref 2–11)
AST SERPL-CCNC: 34 U/L (ref 15–37)
BILIRUB SERPL-MCNC: 0.4 MG/DL (ref 0.2–1.1)
BUN SERPL-MCNC: 8 MG/DL (ref 6–23)
CALCIUM SERPL-MCNC: 9.3 MG/DL (ref 8.3–10.4)
CHLORIDE SERPL-SCNC: 105 MMOL/L (ref 101–110)
CO2 SERPL-SCNC: 26 MMOL/L (ref 21–32)
CREAT SERPL-MCNC: 0.8 MG/DL (ref 0.6–1)
GLOBULIN SER CALC-MCNC: 4 G/DL (ref 2.8–4.5)
GLUCOSE SERPL-MCNC: 196 MG/DL (ref 65–100)
POTASSIUM SERPL-SCNC: 4.1 MMOL/L (ref 3.5–5.1)
PROT SERPL-MCNC: 7.5 G/DL (ref 6.3–8.2)
SODIUM SERPL-SCNC: 138 MMOL/L (ref 133–143)

## 2023-07-20 PROCEDURE — 36415 COLL VENOUS BLD VENIPUNCTURE: CPT

## 2023-07-20 PROCEDURE — 80053 COMPREHEN METABOLIC PANEL: CPT

## 2023-07-24 ENCOUNTER — TELEPHONE (OUTPATIENT)
Dept: INTERNAL MEDICINE CLINIC | Facility: CLINIC | Age: 58
End: 2023-07-24

## 2023-07-24 ENCOUNTER — OFFICE VISIT (OUTPATIENT)
Dept: ORTHOPEDIC SURGERY | Age: 58
End: 2023-07-24
Payer: COMMERCIAL

## 2023-07-24 ENCOUNTER — NURSE ONLY (OUTPATIENT)
Dept: INTERNAL MEDICINE CLINIC | Facility: CLINIC | Age: 58
End: 2023-07-24

## 2023-07-24 DIAGNOSIS — M84.469A INSUFFICIENCY FRACTURE OF TIBIA, INITIAL ENCOUNTER: ICD-10-CM

## 2023-07-24 DIAGNOSIS — E11.65 TYPE 2 DIABETES MELLITUS WITH HYPERGLYCEMIA, WITHOUT LONG-TERM CURRENT USE OF INSULIN (HCC): ICD-10-CM

## 2023-07-24 DIAGNOSIS — S93.402D MODERATE LEFT ANKLE SPRAIN, SUBSEQUENT ENCOUNTER: Primary | ICD-10-CM

## 2023-07-24 DIAGNOSIS — M25.562 ACUTE PAIN OF LEFT KNEE: ICD-10-CM

## 2023-07-24 DIAGNOSIS — R79.89 ELEVATED SERUM CREATININE: ICD-10-CM

## 2023-07-24 PROCEDURE — 99214 OFFICE O/P EST MOD 30 MIN: CPT | Performed by: STUDENT IN AN ORGANIZED HEALTH CARE EDUCATION/TRAINING PROGRAM

## 2023-07-24 NOTE — TELEPHONE ENCOUNTER
Can have oncologist get ha1c as ordered. This will help me know will come follow-up as indicated. Otherwise I have reviewed his lab work. Hope you are feeling well. Please to see me as scheduled and NP in 1 month related to diabetes. Thanks.   [address]

## 2023-07-24 NOTE — TELEPHONE ENCOUNTER
----- Message from Gagandeep Hilario. Corrieubaldo Rucker sent at 7/23/2023  7:07 PM EDT -----  Regarding: Blood work   Contact: [phone]  Do I still need to come in for more blood work the 24th or can u just look at the one at oncologist on the 20th? Also my blood sugar is still running high. Only one morning fasting it was 150. The rest have been 199-288. Any suggestions, or follow up plan?

## 2023-07-25 ENCOUNTER — PATIENT MESSAGE (OUTPATIENT)
Dept: INTERNAL MEDICINE CLINIC | Facility: CLINIC | Age: 58
End: 2023-07-25

## 2023-07-25 DIAGNOSIS — E11.65 TYPE 2 DIABETES MELLITUS WITH HYPERGLYCEMIA, WITHOUT LONG-TERM CURRENT USE OF INSULIN (HCC): Primary | ICD-10-CM

## 2023-07-25 LAB
ANION GAP SERPL CALC-SCNC: 10 MMOL/L (ref 2–11)
BUN SERPL-MCNC: 8 MG/DL (ref 6–23)
CALCIUM SERPL-MCNC: 9.2 MG/DL (ref 8.3–10.4)
CHLORIDE SERPL-SCNC: 108 MMOL/L (ref 101–110)
CO2 SERPL-SCNC: 23 MMOL/L (ref 21–32)
CREAT SERPL-MCNC: 0.9 MG/DL (ref 0.6–1)
EST. AVERAGE GLUCOSE BLD GHB EST-MCNC: 200 MG/DL
GLUCOSE SERPL-MCNC: 191 MG/DL (ref 65–100)
HBA1C MFR BLD: 8.6 % (ref 4.8–5.6)
POTASSIUM SERPL-SCNC: 4 MMOL/L (ref 3.5–5.1)
SODIUM SERPL-SCNC: 141 MMOL/L (ref 133–143)

## 2023-07-25 NOTE — PROGRESS NOTES
The patient was prescribed a Wraptor brace for the patient's leftfoot. The patient wears a size 8.5 shoe and I fitted the patient with a M brace. I explained how to fit the brace properly by pulling the lace tabs across top of foot first then under arch and lastly pulling the strap up firmly and attaching to the lateral Velcro strip. Thus forming a figure 8 across the ankle joint. Once the figure 8 is completed they are to secure the top (short circumferential) straps to help avoid the straps from loosening with normal wear. The patient was able to demonstrate proper fitting in office to ensure compliance with device and acknowledged satisfaction with current fit. Patient read and signed documenting they understand and agree to [facility]'s current DME return policy.
frequent mistakes are possible. For any concerns about the wording of this document, please contact its creator for further clarification.

## 2023-07-26 RX ORDER — ACYCLOVIR 400 MG/1
TABLET ORAL
Qty: 3 EACH | Refills: 11 | Status: SHIPPED | OUTPATIENT
Start: 2023-07-26

## 2023-07-26 RX ORDER — ACYCLOVIR 400 MG/1
TABLET ORAL
Qty: 1 EACH | Refills: 0 | Status: SHIPPED | OUTPATIENT
Start: 2023-07-26

## 2023-07-26 NOTE — TELEPHONE ENCOUNTER
From: Irasema Avila  Sent: 7/25/2023 4:48 PM EDT  To: [facility] Clinical Staff  Subject: lab results    That was the glaucomator. Just talked with pharmacy they don't have the [place] called in for me.

## 2023-08-03 ENCOUNTER — OFFICE VISIT (OUTPATIENT)
Dept: ONCOLOGY | Age: 58
End: 2023-08-03
Payer: COMMERCIAL

## 2023-08-03 ENCOUNTER — HOSPITAL ENCOUNTER (OUTPATIENT)
Dept: LAB | Age: 58
Discharge: HOME | End: 2023-08-03
Payer: COMMERCIAL

## 2023-08-03 VITALS
TEMPERATURE: 97.7 F | DIASTOLIC BLOOD PRESSURE: 92 MMHG | HEART RATE: 106 BPM | SYSTOLIC BLOOD PRESSURE: 128 MMHG | RESPIRATION RATE: 16 BRPM | HEIGHT: 67 IN | OXYGEN SATURATION: 95 % | WEIGHT: 236.1 LBS | BODY MASS INDEX: 37.06 KG/M2

## 2023-08-03 DIAGNOSIS — E11.9 TYPE 2 DIABETES MELLITUS WITHOUT COMPLICATION, WITHOUT LONG-TERM CURRENT USE OF INSULIN (HCC): ICD-10-CM

## 2023-08-03 DIAGNOSIS — C64.9 RENAL CELL CARCINOMA, UNSPECIFIED LATERALITY (HCC): Primary | ICD-10-CM

## 2023-08-03 DIAGNOSIS — C64.2 RENAL CELL CARCINOMA OF LEFT KIDNEY (HCC): ICD-10-CM

## 2023-08-03 DIAGNOSIS — C64.2 RENAL CELL CARCINOMA OF LEFT KIDNEY (HCC): Primary | ICD-10-CM

## 2023-08-03 LAB
ALBUMIN SERPL-MCNC: 3.8 G/DL (ref 3.5–5)
ALBUMIN/GLOB SERPL: 1 (ref 0.4–1.6)
ALP SERPL-CCNC: 78 U/L (ref 50–136)
ALT SERPL-CCNC: 75 U/L (ref 12–65)
ANION GAP SERPL CALC-SCNC: 5 MMOL/L (ref 2–11)
AST SERPL-CCNC: 59 U/L (ref 15–37)
BASOPHILS # BLD: 0 K/UL (ref 0–0.2)
BASOPHILS NFR BLD: 0 % (ref 0–2)
BILIRUB SERPL-MCNC: 0.5 MG/DL (ref 0.2–1.1)
BUN SERPL-MCNC: 10 MG/DL (ref 6–23)
CALCIUM SERPL-MCNC: 9 MG/DL (ref 8.3–10.4)
CHLORIDE SERPL-SCNC: 106 MMOL/L (ref 101–110)
CO2 SERPL-SCNC: 25 MMOL/L (ref 21–32)
CREAT SERPL-MCNC: 0.8 MG/DL (ref 0.6–1)
DIFFERENTIAL METHOD BLD: ABNORMAL
EOSINOPHIL # BLD: 0.2 K/UL (ref 0–0.8)
EOSINOPHIL NFR BLD: 2 % (ref 0.5–7.8)
ERYTHROCYTE [DISTWIDTH] IN BLOOD BY AUTOMATED COUNT: 14.7 % (ref 11.9–14.6)
GLOBULIN SER CALC-MCNC: 3.9 G/DL (ref 2.8–4.5)
GLUCOSE SERPL-MCNC: 156 MG/DL (ref 65–100)
HCT VFR BLD AUTO: 45.6 % (ref 35.8–46.3)
HGB BLD-MCNC: 14.8 G/DL (ref 11.7–15.4)
IMM GRANULOCYTES # BLD AUTO: 0.1 K/UL (ref 0–0.5)
IMM GRANULOCYTES NFR BLD AUTO: 1 % (ref 0–5)
LYMPHOCYTES # BLD: 3 K/UL (ref 0.5–4.6)
LYMPHOCYTES NFR BLD: 28 % (ref 13–44)
MCH RBC QN AUTO: 25.1 PG (ref 26.1–32.9)
MCHC RBC AUTO-ENTMCNC: 32.5 G/DL (ref 31.4–35)
MCV RBC AUTO: 77.4 FL (ref 82–102)
MONOCYTES # BLD: 0.8 K/UL (ref 0.1–1.3)
MONOCYTES NFR BLD: 8 % (ref 4–12)
NEUTS SEG # BLD: 6.8 K/UL (ref 1.7–8.2)
NEUTS SEG NFR BLD: 61 % (ref 43–78)
NRBC # BLD: 0 K/UL (ref 0–0.2)
PLATELET # BLD AUTO: 235 K/UL (ref 150–450)
PMV BLD AUTO: 10.4 FL (ref 9.4–12.3)
POTASSIUM SERPL-SCNC: 3.7 MMOL/L (ref 3.5–5.1)
PROT SERPL-MCNC: 7.7 G/DL (ref 6.3–8.2)
RBC # BLD AUTO: 5.89 M/UL (ref 4.05–5.2)
SODIUM SERPL-SCNC: 136 MMOL/L (ref 133–143)
WBC # BLD AUTO: 10.9 K/UL (ref 4.3–11.1)

## 2023-08-03 PROCEDURE — 3052F HG A1C>EQUAL 8.0%<EQUAL 9.0%: CPT

## 2023-08-03 PROCEDURE — 80053 COMPREHEN METABOLIC PANEL: CPT

## 2023-08-03 PROCEDURE — 85025 COMPLETE CBC W/AUTO DIFF WBC: CPT

## 2023-08-03 PROCEDURE — 99214 OFFICE O/P EST MOD 30 MIN: CPT

## 2023-08-03 PROCEDURE — 36415 COLL VENOUS BLD VENIPUNCTURE: CPT

## 2023-08-03 ASSESSMENT — ENCOUNTER SYMPTOMS
VOICE CHANGE: 0
CONSTIPATION: 0
ABDOMINAL PAIN: 0
ABDOMINAL DISTENTION: 0
SCLERAL ICTERUS: 0
DIARRHEA: 1
HEMOPTYSIS: 0
BACK PAIN: 1
SORE THROAT: 0
BLOOD IN STOOL: 0
NAUSEA: 0
CHEST TIGHTNESS: 0
VOMITING: 0
TROUBLE SWALLOWING: 0
WHEEZING: 0
SHORTNESS OF BREATH: 1

## 2023-08-03 NOTE — PROGRESS NOTES
week - LFTs better now x 2 weeks, will wean Pred to 10 mg and recheck CMP in 2 weeks  - Mammo in April [facility]. She had a colonoscopy/EGD recently. - Labs reviewed and reassuring. No current s/s of infection. - long use of steroid - c/w PPI, has some epigastric pain. 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 - echo with normal EF, port in place - flush q3mo  - smoking cessation strongly encouraged - pt declined at this time accepting risks   - VTE - on apixiban - no reported bleeding       RESUSCITATION DIRECTIVES/HOSPICE CARE: Full Support    RTC in 4 weeks for OV    MDM      Lab studies and imaging studies were personally reviewed. Pertinent old records were reviewed. Historical:   - here for consultation. We had a long discussion today about her case. We discussed the pathophysiology of malignancy, staging of RCC and current guidelines. We discussed tx options and she is interested in pursuing systemic therapy. She reviewed this with Dr Vincent Brownlee - it will let us see if the adrenal mass and both renal masses respond to tx. - per NCCN guidelines, pt's with intermediate risk advanced disease are candidates for immuno + TKI. We discussed options, will p/w axitinib and pembrolizumab. SE reviewed in great detail going over percentages of likelihood of occurrence. Printed info was given to pt for her reference. She was made aware of risk of heart failure, hemorrhage, HTN, Reversible posterior leukoencephalopathy syndrome, thrombosis (arterial/venous), thyroid d.o, wnd healing, diarrhea faisal in combination with pembro. - Monitor BP. Did have some episodes of lower BP, possibly contributing to LFTs abnormalities along with GI/diarrhea issues this wknd; stop acetaminophen   - today, she is here for FU-doing well. Has been seeing sx and most recently Dr Ced Fofana for another opinion 9she was felt not to be a sx candidate). She plans to see Dr Janet Smart 5/18 - med onc for another opinion.   We discussed c/w

## 2023-08-03 NOTE — PATIENT INSTRUCTIONS
Hospital Outpatient Visit on 08/03/2023   Component Date Value Ref Range Status    WBC 08/03/2023 10.9  4.3 - 11.1 K/uL Final    RBC 08/03/2023 5.89 (H)  4.05 - 5.2 M/uL Final    Hemoglobin 08/03/2023 14.8  11.7 - 15.4 g/dL Final    Hematocrit 08/03/2023 45.6  35.8 - 46.3 % Final    MCV 08/03/2023 77.4 (L)  82.0 - 102.0 FL Final    MCH 08/03/2023 25.1 (L)  26.1 - 32.9 PG Final    MCHC 08/03/2023 32.5  31.4 - 35.0 g/dL Final    RDW 08/03/2023 14.7 (H)  11.9 - 14.6 % Final    Platelets 95/05/3033 235  150 - 450 K/uL Final    MPV 08/03/2023 10.4  9.4 - 12.3 FL Final    nRBC 08/03/2023 0.00  0.0 - 0.2 K/uL Final    **Note: Absolute NRBC parameter is now reported with Hemogram**    Neutrophils % 08/03/2023 61  43 - 78 % Final    Lymphocytes % 08/03/2023 28  13 - 44 % Final    Monocytes % 08/03/2023 8  4.0 - 12.0 % Final    Eosinophils % 08/03/2023 2  0.5 - 7.8 % Final    Basophils % 08/03/2023 0  0.0 - 2.0 % Final    Immature Granulocytes 08/03/2023 1  0.0 - 5.0 % Final    Neutrophils Absolute 08/03/2023 6.8  1.7 - 8.2 K/UL Final    Lymphocytes Absolute 08/03/2023 3.0  0.5 - 4.6 K/UL Final    Monocytes Absolute 08/03/2023 0.8  0.1 - 1.3 K/UL Final    Eosinophils Absolute 08/03/2023 0.2  0.0 - 0.8 K/UL Final    Basophils Absolute 08/03/2023 0.0  0.0 - 0.2 K/UL Final    Absolute Immature Granulocyte 08/03/2023 0.1  0.0 - 0.5 K/UL Final    Differential Type 08/03/2023 AUTOMATED    Final

## 2023-08-04 DIAGNOSIS — C64.2 RENAL CELL CARCINOMA OF LEFT KIDNEY (HCC): ICD-10-CM

## 2023-08-04 DIAGNOSIS — C64.9 RENAL CELL CARCINOMA, UNSPECIFIED LATERALITY (HCC): ICD-10-CM

## 2023-08-04 DIAGNOSIS — Z86.718 HISTORY OF THROMBOSIS: ICD-10-CM

## 2023-08-04 DIAGNOSIS — C64.9 RENAL CELL CARCINOMA, UNSPECIFIED LATERALITY (HCC): Primary | ICD-10-CM

## 2023-08-04 DIAGNOSIS — I82.C11 ACUTE INTERNAL JUGULAR VEIN THROMBOSIS, RIGHT (HCC): ICD-10-CM

## 2023-08-04 DIAGNOSIS — R79.89 ELEVATED LFTS: ICD-10-CM

## 2023-08-04 NOTE — PROGRESS NOTES
fracture. On generally she has multiple risk factors for this issue. We discussed management of this injury and continued healing. I recommended an  brace, but given its expense, she wishes to continue wearing her hinged knee brace. I have advised she limit her weightbearing as much as possible if weightbearing is painful, but she can continue to partially weight-bear with crutch assist.  She needs to return to work. 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 We discussed that as her pain improves we can progress her weightbearing. We will reevaluate in 2 weeks. No orders of the defined types were placed in this encounter. Return in about 2 weeks (around 8/21/2023).      James Holley MD   [facility] and [address] [place] [address] [facility]

## 2023-08-07 ENCOUNTER — OFFICE VISIT (OUTPATIENT)
Dept: ORTHOPEDIC SURGERY | Age: 58
End: 2023-08-07
Payer: COMMERCIAL

## 2023-08-07 DIAGNOSIS — M84.469D INSUFFICIENCY FRACTURE OF TIBIA WITH ROUTINE HEALING, SUBSEQUENT ENCOUNTER: Primary | ICD-10-CM

## 2023-08-07 PROCEDURE — 99213 OFFICE O/P EST LOW 20 MIN: CPT | Performed by: STUDENT IN AN ORGANIZED HEALTH CARE EDUCATION/TRAINING PROGRAM

## 2023-08-08 ENCOUNTER — PATIENT MESSAGE (OUTPATIENT)
Dept: INTERNAL MEDICINE CLINIC | Facility: CLINIC | Age: 58
End: 2023-08-08

## 2023-08-08 DIAGNOSIS — K13.79 MOUTH PAIN: ICD-10-CM

## 2023-08-08 NOTE — TELEPHONE ENCOUNTER
From: Nandini Wilson  To: Dr. Keya Spain: 8/8/2023 12:36 PM EDT  Subject: Metformin     Do I need to stop the metformin for a contract CT?

## 2023-08-10 ENCOUNTER — HOSPITAL ENCOUNTER (OUTPATIENT)
Dept: LAB | Age: 58
Discharge: HOME | End: 2023-08-10
Payer: COMMERCIAL

## 2023-08-10 DIAGNOSIS — R79.89 ELEVATED LFTS: ICD-10-CM

## 2023-08-10 DIAGNOSIS — C64.2 RENAL CELL CARCINOMA OF LEFT KIDNEY (HCC): ICD-10-CM

## 2023-08-10 LAB
ALBUMIN SERPL-MCNC: 3.2 G/DL (ref 3.5–5)
ALBUMIN/GLOB SERPL: 0.8 (ref 0.4–1.6)
ALP SERPL-CCNC: 71 U/L (ref 50–136)
ALT SERPL-CCNC: 56 U/L (ref 12–65)
ANION GAP SERPL CALC-SCNC: 7 MMOL/L (ref 2–11)
AST SERPL-CCNC: 46 U/L (ref 15–37)
BASOPHILS # BLD: 0 K/UL (ref 0–0.2)
BASOPHILS NFR BLD: 0 % (ref 0–2)
BILIRUB SERPL-MCNC: 0.5 MG/DL (ref 0.2–1.1)
BUN SERPL-MCNC: 8 MG/DL (ref 6–23)
CALCIUM SERPL-MCNC: 8.9 MG/DL (ref 8.3–10.4)
CHLORIDE SERPL-SCNC: 109 MMOL/L (ref 101–110)
CO2 SERPL-SCNC: 24 MMOL/L (ref 21–32)
CREAT SERPL-MCNC: 0.67 MG/DL (ref 0.6–1)
DIFFERENTIAL METHOD BLD: ABNORMAL
EOSINOPHIL # BLD: 0.2 K/UL (ref 0–0.8)
EOSINOPHIL NFR BLD: 2 % (ref 0.5–7.8)
ERYTHROCYTE [DISTWIDTH] IN BLOOD BY AUTOMATED COUNT: 14.9 % (ref 11.9–14.6)
GLOBULIN SER CALC-MCNC: 3.8 G/DL (ref 2.8–4.5)
GLUCOSE SERPL-MCNC: 169 MG/DL (ref 65–100)
HCT VFR BLD AUTO: 45.9 % (ref 35.8–46.3)
HGB BLD-MCNC: 14.7 G/DL (ref 11.7–15.4)
IMM GRANULOCYTES # BLD AUTO: 0 K/UL (ref 0–0.5)
IMM GRANULOCYTES NFR BLD AUTO: 0 % (ref 0–5)
LYMPHOCYTES # BLD: 2.3 K/UL (ref 0.5–4.6)
LYMPHOCYTES NFR BLD: 31 % (ref 13–44)
MCH RBC QN AUTO: 24.8 PG (ref 26.1–32.9)
MCHC RBC AUTO-ENTMCNC: 32 G/DL (ref 31.4–35)
MCV RBC AUTO: 77.5 FL (ref 82–102)
MONOCYTES # BLD: 0.5 K/UL (ref 0.1–1.3)
MONOCYTES NFR BLD: 7 % (ref 4–12)
NEUTS SEG # BLD: 4.5 K/UL (ref 1.7–8.2)
NEUTS SEG NFR BLD: 60 % (ref 43–78)
NRBC # BLD: 0 K/UL (ref 0–0.2)
PLATELET # BLD AUTO: 226 K/UL (ref 150–450)
PMV BLD AUTO: 10.5 FL (ref 9.4–12.3)
POTASSIUM SERPL-SCNC: 3.4 MMOL/L (ref 3.5–5.1)
PROT SERPL-MCNC: 7 G/DL (ref 6.3–8.2)
RBC # BLD AUTO: 5.92 M/UL (ref 4.05–5.2)
SODIUM SERPL-SCNC: 140 MMOL/L (ref 133–143)
WBC # BLD AUTO: 7.6 K/UL (ref 4.3–11.1)

## 2023-08-10 PROCEDURE — 80053 COMPREHEN METABOLIC PANEL: CPT

## 2023-08-10 PROCEDURE — 36415 COLL VENOUS BLD VENIPUNCTURE: CPT

## 2023-08-10 PROCEDURE — 85025 COMPLETE CBC W/AUTO DIFF WBC: CPT

## 2023-08-13 DIAGNOSIS — K13.79 MOUTH PAIN: ICD-10-CM

## 2023-08-13 DIAGNOSIS — C64.9 RENAL CELL CARCINOMA, UNSPECIFIED LATERALITY (HCC): ICD-10-CM

## 2023-08-15 ENCOUNTER — HOSPITAL ENCOUNTER (OUTPATIENT)
Dept: CT IMAGING | Age: 58
Discharge: HOME | End: 2023-08-18
Attending: INTERNAL MEDICINE

## 2023-08-15 ENCOUNTER — TELEPHONE (OUTPATIENT)
Dept: ONCOLOGY | Age: 58
End: 2023-08-15

## 2023-08-15 DIAGNOSIS — K13.79 MOUTH PAIN: ICD-10-CM

## 2023-08-15 DIAGNOSIS — Z86.718 HISTORY OF THROMBOSIS: ICD-10-CM

## 2023-08-15 DIAGNOSIS — C64.9 RENAL CELL CARCINOMA, UNSPECIFIED LATERALITY (HCC): ICD-10-CM

## 2023-08-15 DIAGNOSIS — Z45.2 ENCOUNTER FOR CARE RELATED TO PORT-A-CATH: Primary | ICD-10-CM

## 2023-08-15 NOTE — TELEPHONE ENCOUNTER
Physician provider: Yumiko Pitt MD  Reason for today's call: med refill  Last office visit:8/3/2023    Patient notified that their information will be routed to the [facility] clinical triage team for review. Patient is advised that they will receive a phone call from the triage department.         Refills have been requested for the following medications:         Magic Mouthwash (MIRACLE MOUTHWASH) [Dr. Yumiko Pitt MD]         amLODIPine (NORVASC) 5 MG tablet [Dr. Yumiko Pitt MD]     Preferred pharmacy: [facility] [address] [place] [address] , [address] [place] [address] [phone] - F [phone]

## 2023-08-16 DIAGNOSIS — K13.79 MOUTH PAIN: ICD-10-CM

## 2023-08-16 DIAGNOSIS — C64.9 RENAL CELL CARCINOMA, UNSPECIFIED LATERALITY (HCC): ICD-10-CM

## 2023-08-16 RX ORDER — AMLODIPINE BESYLATE 5 MG/1
5 TABLET ORAL DAILY
Qty: 30 TABLET | Refills: 1 | Status: SHIPPED | OUTPATIENT
Start: 2023-08-16

## 2023-08-18 NOTE — PROGRESS NOTES
temperature, turgor, and texture on uninvolved extremity. ORTHO EXAM:    Left knee: Inspection: No edema, No erythema  Palpation:  No effusion  ROM: 130 flexion, 0 extension   Tenderness: Mild tenderness to proximal medial tibia  Provocative testing: (-) Ze lateral, Ze medial   Strength: Extensor mechanism intact  Sensation: intact to light touch   Capillary refill normal       Left ankle:    No edema. Intact plantarflexion, dorsiflexion, eversion and inversion. No tenderness. Normal subtalar motion. Negative anterior drawer. Negative syndesmotic squeeze and external dorsiflexion stress. 2+ DP pulse. DIAGNOSTIC IMAGING:     I have reviewed prior imaging studies. ASSESSMENT/PLAN:   1. Insufficiency fracture of tibia with routine healing, subsequent encounter    2. Moderate left ankle sprain, subsequent encounter         She was advised to continue knee bracing with ambulation. Can wear ankle brace as tolerated. She was provided home exercises for knee and ankle. Cautioned that she needs to continue limiting her WB activity as pain tolerates, and if experiencing increase in pain, that could be a sign of poor healing of the fracture and we need to decrease her WB. She understands these instructions. Orders / medications today: No orders of the defined types were placed in this encounter. Follow up: Return in about 4 weeks (around 9/18/2023). The patient expressed understanding and agreed with the plan. Abilio Alberto MD   [facility] and [address] [place] [address] [facility]     This document was created using voice recognition software so frequent mistakes are possible. For any concerns about the wording of this document, please contact its creator for further clarification.

## 2023-08-20 ENCOUNTER — PATIENT MESSAGE (OUTPATIENT)
Dept: INTERNAL MEDICINE CLINIC | Facility: CLINIC | Age: 58
End: 2023-08-20

## 2023-08-21 ENCOUNTER — OFFICE VISIT (OUTPATIENT)
Dept: ORTHOPEDIC SURGERY | Age: 58
End: 2023-08-21
Payer: COMMERCIAL

## 2023-08-21 DIAGNOSIS — M84.469D INSUFFICIENCY FRACTURE OF TIBIA WITH ROUTINE HEALING, SUBSEQUENT ENCOUNTER: Primary | ICD-10-CM

## 2023-08-21 DIAGNOSIS — S93.402D MODERATE LEFT ANKLE SPRAIN, SUBSEQUENT ENCOUNTER: ICD-10-CM

## 2023-08-21 PROCEDURE — 99213 OFFICE O/P EST LOW 20 MIN: CPT | Performed by: STUDENT IN AN ORGANIZED HEALTH CARE EDUCATION/TRAINING PROGRAM

## 2023-08-22 ENCOUNTER — TELEMEDICINE (OUTPATIENT)
Dept: INTERNAL MEDICINE CLINIC | Facility: CLINIC | Age: 58
End: 2023-08-22
Payer: COMMERCIAL

## 2023-08-22 DIAGNOSIS — L03.211 FACIAL CELLULITIS: ICD-10-CM

## 2023-08-22 DIAGNOSIS — H00.013 HORDEOLUM EXTERNUM OF RIGHT EYE, UNSPECIFIED EYELID: Primary | ICD-10-CM

## 2023-08-22 DIAGNOSIS — E11.9 TYPE 2 DIABETES MELLITUS WITHOUT COMPLICATION, WITHOUT LONG-TERM CURRENT USE OF INSULIN (HCC): ICD-10-CM

## 2023-08-22 PROCEDURE — 3052F HG A1C>EQUAL 8.0%<EQUAL 9.0%: CPT | Performed by: INTERNAL MEDICINE

## 2023-08-22 PROCEDURE — 99213 OFFICE O/P EST LOW 20 MIN: CPT | Performed by: INTERNAL MEDICINE

## 2023-08-22 RX ORDER — DOXYCYCLINE HYCLATE 100 MG
100 TABLET ORAL 2 TIMES DAILY
Qty: 14 TABLET | Refills: 0 | Status: SHIPPED | OUTPATIENT
Start: 2023-08-22 | End: 2023-08-29

## 2023-08-22 RX ORDER — SUCRALFATE 1 G/1
1 TABLET ORAL 4 TIMES DAILY
COMMUNITY
Start: 2023-08-18

## 2023-08-22 RX ORDER — PROMETHAZINE HYDROCHLORIDE 25 MG/1
25 TABLET ORAL 4 TIMES DAILY PRN
Qty: 20 TABLET | Refills: 0 | Status: SHIPPED | OUTPATIENT
Start: 2023-08-22 | End: 2023-08-29

## 2023-08-22 RX ORDER — ERYTHROMYCIN 5 MG/G
OINTMENT OPHTHALMIC EVERY 6 HOURS
Qty: 3.5 EACH | Refills: 0 | Status: SHIPPED | OUTPATIENT
Start: 2023-08-22

## 2023-08-22 ASSESSMENT — ENCOUNTER SYMPTOMS: EYE PAIN: 1

## 2023-08-22 NOTE — PROGRESS NOTES
1 tablet by mouth daily      pantoprazole (PROTONIX) 40 MG tablet Take 1 tablet by mouth every morning (before breakfast) 30 tablet 1    albuterol sulfate HFA (PROAIR HFA) 108 (90 Base) MCG/ACT inhaler Inhale 2 puffs into the lungs every 6 hours as needed for Wheezing 18 g 3    sertraline (ZOLOFT) 100 MG tablet Take 1.5 tablets by mouth at bedtime At nights 45 tablet 11    ondansetron (ZOFRAN) 4 MG tablet Take 1 tablet by mouth 3 times daily as needed for Nausea or Vomiting 30 tablet 0    prochlorperazine (COMPAZINE) 10 MG tablet Take 1 tablet by mouth every 6 hours as needed (nausea/vomiting) 120 tablet 3    Acetaminophen (TYLENOL) 325 MG CAPS As needed      EPINEPHrine (EPIPEN 2-MARY IJ) Inject as directed       No current facility-administered medications for this visit. No orders of the defined types were placed in this encounter. Orders Placed This Encounter   Medications    doxycycline hyclate (VIBRA-TABS) 100 MG tablet     Sig: Take 1 tablet by mouth 2 times daily for 7 days     Dispense:  14 tablet     Refill:  0    erythromycin (ROMYCIN) 5 MG/GM ophthalmic ointment     Sig: Place into both eyes every 6 hours     Dispense:  3.5 each     Refill:  0    promethazine (PHENERGAN) 25 MG tablet     Sig: Take 1 tablet by mouth 4 times daily as needed for Nausea     Dispense:  20 tablet     Refill:  0       Medications Discontinued During This Encounter   Medication Reason    metFORMIN (GLUCOPHAGE) 500 MG tablet Therapy completed    glucose monitoring (FREESTYLE FREEDOM) kit Therapy completed        Diagnosis Orders   1. Hordeolum externum of right eye, unspecified eyelid        2. Facial cellulitis        3. Type 2 diabetes mellitus without complication, without long-term current use of insulin ([facility])           Try antibiotics provided. In addition, providing Phenergan considering her current antinausea medications are ineffective. Also providing antibiotic ointment.   Advised her to continue warm

## 2023-08-23 ENCOUNTER — HOSPITAL ENCOUNTER (EMERGENCY)
Age: 58
Discharge: HOME | End: 2023-08-23
Attending: EMERGENCY MEDICINE
Payer: COMMERCIAL

## 2023-08-23 ENCOUNTER — PATIENT MESSAGE (OUTPATIENT)
Dept: INTERNAL MEDICINE CLINIC | Facility: CLINIC | Age: 58
End: 2023-08-23

## 2023-08-23 VITALS
OXYGEN SATURATION: 90 % | HEART RATE: 82 BPM | DIASTOLIC BLOOD PRESSURE: 80 MMHG | RESPIRATION RATE: 18 BRPM | SYSTOLIC BLOOD PRESSURE: 145 MMHG | BODY MASS INDEX: 36.96 KG/M2 | HEIGHT: 66 IN | WEIGHT: 230 LBS | TEMPERATURE: 97.9 F

## 2023-08-23 DIAGNOSIS — R11.2 NAUSEA AND VOMITING, UNSPECIFIED VOMITING TYPE: Primary | ICD-10-CM

## 2023-08-23 DIAGNOSIS — E87.6 HYPOKALEMIA: ICD-10-CM

## 2023-08-23 LAB
ALBUMIN SERPL-MCNC: 3.3 G/DL (ref 3.5–5)
ALBUMIN/GLOB SERPL: 0.8 (ref 0.4–1.6)
ALP SERPL-CCNC: 78 U/L (ref 50–136)
ALT SERPL-CCNC: 48 U/L (ref 12–65)
ANION GAP SERPL CALC-SCNC: 6 MMOL/L (ref 2–11)
AST SERPL-CCNC: 29 U/L (ref 15–37)
BASOPHILS # BLD: 0 K/UL (ref 0–0.2)
BASOPHILS NFR BLD: 0 % (ref 0–2)
BILIRUB SERPL-MCNC: 0.6 MG/DL (ref 0.2–1.1)
BUN SERPL-MCNC: 7 MG/DL (ref 6–23)
CALCIUM SERPL-MCNC: 9.3 MG/DL (ref 8.3–10.4)
CHLORIDE SERPL-SCNC: 107 MMOL/L (ref 101–110)
CO2 SERPL-SCNC: 27 MMOL/L (ref 21–32)
CREAT SERPL-MCNC: 0.7 MG/DL (ref 0.6–1)
DIFFERENTIAL METHOD BLD: ABNORMAL
EOSINOPHIL # BLD: 0.1 K/UL (ref 0–0.8)
EOSINOPHIL NFR BLD: 1 % (ref 0.5–7.8)
ERYTHROCYTE [DISTWIDTH] IN BLOOD BY AUTOMATED COUNT: 14.6 % (ref 11.9–14.6)
GLOBULIN SER CALC-MCNC: 4.2 G/DL (ref 2.8–4.5)
GLUCOSE BLD STRIP.AUTO-MCNC: 112 MG/DL (ref 65–100)
GLUCOSE SERPL-MCNC: 132 MG/DL (ref 65–100)
HCT VFR BLD AUTO: 46.8 % (ref 35.8–46.3)
HGB BLD-MCNC: 14.8 G/DL (ref 11.7–15.4)
IMM GRANULOCYTES # BLD AUTO: 0 K/UL (ref 0–0.5)
IMM GRANULOCYTES NFR BLD AUTO: 0 % (ref 0–5)
LIPASE SERPL-CCNC: 85 U/L (ref 73–393)
LYMPHOCYTES # BLD: 2.6 K/UL (ref 0.5–4.6)
LYMPHOCYTES NFR BLD: 25 % (ref 13–44)
MAGNESIUM SERPL-MCNC: 1.9 MG/DL (ref 1.8–2.4)
MCH RBC QN AUTO: 24.7 PG (ref 26.1–32.9)
MCHC RBC AUTO-ENTMCNC: 31.6 G/DL (ref 31.4–35)
MCV RBC AUTO: 78 FL (ref 82–102)
MONOCYTES # BLD: 0.6 K/UL (ref 0.1–1.3)
MONOCYTES NFR BLD: 6 % (ref 4–12)
NEUTS SEG # BLD: 7.1 K/UL (ref 1.7–8.2)
NEUTS SEG NFR BLD: 68 % (ref 43–78)
NRBC # BLD: 0 K/UL (ref 0–0.2)
PLATELET # BLD AUTO: 236 K/UL (ref 150–450)
PMV BLD AUTO: 10.9 FL (ref 9.4–12.3)
POTASSIUM SERPL-SCNC: 3.1 MMOL/L (ref 3.5–5.1)
PROT SERPL-MCNC: 7.5 G/DL (ref 6.3–8.2)
RBC # BLD AUTO: 6 M/UL (ref 4.05–5.2)
SERVICE CMNT-IMP: ABNORMAL
SODIUM SERPL-SCNC: 140 MMOL/L (ref 133–143)
WBC # BLD AUTO: 10.5 K/UL (ref 4.3–11.1)

## 2023-08-23 PROCEDURE — 83735 ASSAY OF MAGNESIUM: CPT

## 2023-08-23 PROCEDURE — 96360 HYDRATION IV INFUSION INIT: CPT

## 2023-08-23 PROCEDURE — 96372 THER/PROPH/DIAG INJ SC/IM: CPT

## 2023-08-23 PROCEDURE — 2580000003 HC RX 258

## 2023-08-23 PROCEDURE — 83690 ASSAY OF LIPASE: CPT

## 2023-08-23 PROCEDURE — 80053 COMPREHEN METABOLIC PANEL: CPT

## 2023-08-23 PROCEDURE — 85025 COMPLETE CBC W/AUTO DIFF WBC: CPT

## 2023-08-23 PROCEDURE — 82962 GLUCOSE BLOOD TEST: CPT

## 2023-08-23 PROCEDURE — 96361 HYDRATE IV INFUSION ADD-ON: CPT

## 2023-08-23 PROCEDURE — 6360000002 HC RX W HCPCS: Performed by: EMERGENCY MEDICINE

## 2023-08-23 PROCEDURE — 99284 EMERGENCY DEPT VISIT MOD MDM: CPT

## 2023-08-23 PROCEDURE — 6360000002 HC RX W HCPCS

## 2023-08-23 RX ORDER — POTASSIUM CHLORIDE 20 MEQ/1
20 TABLET, EXTENDED RELEASE ORAL 2 TIMES DAILY
Qty: 28 TABLET | Refills: 0 | Status: SHIPPED | OUTPATIENT
Start: 2023-08-23

## 2023-08-23 RX ORDER — PROMETHAZINE HYDROCHLORIDE 25 MG/ML
6.25 INJECTION, SOLUTION INTRAMUSCULAR; INTRAVENOUS EVERY 6 HOURS PRN
Status: DISCONTINUED | OUTPATIENT
Start: 2023-08-23 | End: 2023-08-23 | Stop reason: HOSPADM

## 2023-08-23 RX ORDER — MAGNESIUM HYDROXIDE/ALUMINUM HYDROXICE/SIMETHICONE 120; 1200; 1200 MG/30ML; MG/30ML; MG/30ML
30 SUSPENSION ORAL
Status: DISCONTINUED | OUTPATIENT
Start: 2023-08-23 | End: 2023-08-23

## 2023-08-23 RX ORDER — HEPARIN 100 UNIT/ML
300 SYRINGE INTRAVENOUS
Status: COMPLETED | OUTPATIENT
Start: 2023-08-23 | End: 2023-08-23

## 2023-08-23 RX ORDER — 0.9 % SODIUM CHLORIDE 0.9 %
1000 INTRAVENOUS SOLUTION INTRAVENOUS ONCE
Status: COMPLETED | OUTPATIENT
Start: 2023-08-23 | End: 2023-08-23

## 2023-08-23 RX ORDER — LIDOCAINE HYDROCHLORIDE 20 MG/ML
15 SOLUTION OROPHARYNGEAL
Status: DISCONTINUED | OUTPATIENT
Start: 2023-08-23 | End: 2023-08-23

## 2023-08-23 RX ADMIN — PROMETHAZINE HYDROCHLORIDE 6.25 MG: 25 INJECTION INTRAMUSCULAR; INTRAVENOUS at 18:43

## 2023-08-23 RX ADMIN — SODIUM CHLORIDE 1000 ML: 9 INJECTION, SOLUTION INTRAVENOUS at 18:39

## 2023-08-23 RX ADMIN — HEPARIN 300 UNITS: 100 SYRINGE at 21:08

## 2023-08-23 ASSESSMENT — PAIN DESCRIPTION - LOCATION
LOCATION: ABDOMEN
LOCATION: ABDOMEN;BACK

## 2023-08-23 ASSESSMENT — PAIN SCALES - GENERAL
PAINLEVEL_OUTOF10: 4
PAINLEVEL_OUTOF10: 3

## 2023-08-23 ASSESSMENT — PAIN DESCRIPTION - DESCRIPTORS
DESCRIPTORS: ACHING
DESCRIPTORS: SHARP

## 2023-08-23 NOTE — TELEPHONE ENCOUNTER
Due to fever, not being able to keep down antibiotics I'd like for you to head to the ER. Sorry for the inconvenience. [facility]. Thanks.   [address]

## 2023-08-23 NOTE — TELEPHONE ENCOUNTER
From: Dr. Héctor Begum  To: Tavo Santana  Sent: 8/23/2023 12:53 PM EDT  Subject: checking in    Are you feeling any better? Eye improving? If not and definitely if worsening, please go to the ER. Thanks.  [address]

## 2023-08-23 NOTE — TELEPHONE ENCOUNTER
Pt states she took antibiotic this morning and kept it down for a little bit but she has thrown up 5 or 6 times today. Has taken promethazine but it has not helped. Pt states her eye is doing a little better but she is running a fever of 100.2 right now. Please advise.

## 2023-08-23 NOTE — ED TRIAGE NOTES
Pt arrives via pov ambulatory c/o vomiting 10 times today. Pt reports abd discomfort. States started a abx for eye infection recently.

## 2023-08-23 NOTE — TELEPHONE ENCOUNTER
Please call her and determine if she is able to keep antibiotics down. In addition, is her eye and face any better?

## 2023-08-23 NOTE — ED PROVIDER NOTES
Emergency Department Provider Note       PCP: Puneet Pham MD   Age: 60 y.o. Sex: female     DISPOSITION Decision To Discharge 08/23/2023 08:55:46 PM       ICD-10-CM    1. Nausea and vomiting, unspecified vomiting type  R11.2       2. Hypokalemia  E87.6           Medical Decision Making     Complexity of Problems Addressed:  1 acute uncomplicated problem    Data Reviewed and Analyzed:   I independently ordered and reviewed each unique test.           I interpreted the labs. Discussion of management or test interpretation. Patient presented to ED with complaint of persisting nausea and vomiting not improved by p.o. Phenergan at home. Checking labs and given IV fluids here in ED course. No acute abnormalities noted on labs with exception to mild hypokalemia 3.1. Given outpatient prescription for Klor-Con 20 mEq twice daily x2 weeks. Patient has tolerated p.o. challenge in ED course with ingestion of monica crackers and water without vomiting. Advised continued home use of Phenergan as needed for nausea symptoms and follow-up with PCP. Risk of Complications and/or Morbidity of Patient Management:  Prescription drug management performed. Patient was discharged risks and benefits of hospitalization were considered. Shared medical decision making was utilized in creating the patients health plan today. ED Course as of 08/23/23 2100   Wed Aug 23, 2023   5697 Upon reassessment patient states that her nausea symptoms are improved. Will attempt p.o. challenge here in ED course. [ET]      ED Course User Index  [ET] WERNER Nair       Is this patient to be included in the SEP-1 core measure due to severe sepsis or septic shock? No Exclusion criteria - the patient is NOT to be included for SEP-1 Core Measure due to:  Infection is not suspected      History      Erick Rosas is a 60 y.o. female who presents to the Emergency Department with chief complaint of    Chief Complaint   Patient presents

## 2023-08-24 ENCOUNTER — CARE COORDINATION (OUTPATIENT)
Dept: CARE COORDINATION | Facility: CLINIC | Age: 58
End: 2023-08-24

## 2023-08-24 ENCOUNTER — TRANSCRIBE ORDERS (OUTPATIENT)
Dept: SCHEDULING | Age: 58
End: 2023-08-24

## 2023-08-24 DIAGNOSIS — R68.81 EARLY SATIETY: ICD-10-CM

## 2023-08-24 DIAGNOSIS — K21.9 CHRONIC GERD: Primary | ICD-10-CM

## 2023-08-24 DIAGNOSIS — R11.2 NAUSEA AND VOMITING IN ADULT: ICD-10-CM

## 2023-08-24 NOTE — CARE COORDINATION
Outreach attempted today on behalf of Yadira Mo RN, [facility] to discuss enrollment in ACM services. Unable to reach patient. Left HIPAA compliant voice mail. Also sent message through [address] [place] [address]. Will notify ACM.

## 2023-08-24 NOTE — DISCHARGE INSTRUCTIONS
There are no concerning findings on her labs today. Your potassium is mildly low and I will prescribe you a potassium supplement. Take twice daily for the next 2 weeks to get this level back up to normal.  Continue taking Phenergan at home as needed for nausea relief. Return to the emergency department as needed.

## 2023-08-29 ENCOUNTER — OFFICE VISIT (OUTPATIENT)
Dept: ONCOLOGY | Age: 58
End: 2023-08-29
Payer: COMMERCIAL

## 2023-08-29 ENCOUNTER — HOSPITAL ENCOUNTER (OUTPATIENT)
Dept: INFUSION THERAPY | Age: 58
Discharge: HOME | End: 2023-08-29
Payer: COMMERCIAL

## 2023-08-29 ENCOUNTER — HOSPITAL ENCOUNTER (OUTPATIENT)
Dept: LAB | Age: 58
Discharge: HOME | End: 2023-09-01
Payer: COMMERCIAL

## 2023-08-29 VITALS
WEIGHT: 222 LBS | SYSTOLIC BLOOD PRESSURE: 121 MMHG | OXYGEN SATURATION: 96 % | DIASTOLIC BLOOD PRESSURE: 78 MMHG | RESPIRATION RATE: 24 BRPM | BODY MASS INDEX: 34.84 KG/M2 | HEIGHT: 67 IN | HEART RATE: 96 BPM | TEMPERATURE: 97.8 F

## 2023-08-29 DIAGNOSIS — C64.9 RENAL CELL CARCINOMA, UNSPECIFIED LATERALITY (HCC): ICD-10-CM

## 2023-08-29 DIAGNOSIS — B37.31 YEAST INFECTION INVOLVING THE VAGINA AND SURROUNDING AREA: ICD-10-CM

## 2023-08-29 DIAGNOSIS — C64.9 RENAL CELL CARCINOMA, UNSPECIFIED LATERALITY (HCC): Primary | ICD-10-CM

## 2023-08-29 DIAGNOSIS — Z79.899 HIGH RISK MEDICATION USE: ICD-10-CM

## 2023-08-29 DIAGNOSIS — R11.2 NAUSEA AND VOMITING, UNSPECIFIED VOMITING TYPE: Primary | ICD-10-CM

## 2023-08-29 DIAGNOSIS — C64.2 RENAL CELL CARCINOMA OF LEFT KIDNEY (HCC): ICD-10-CM

## 2023-08-29 DIAGNOSIS — R79.89 ELEVATED LFTS: Primary | ICD-10-CM

## 2023-08-29 LAB
ALBUMIN SERPL-MCNC: 3.4 G/DL (ref 3.5–5)
ALBUMIN/GLOB SERPL: 0.9 (ref 0.4–1.6)
ALP SERPL-CCNC: 69 U/L (ref 50–136)
ALT SERPL-CCNC: 35 U/L (ref 12–65)
ANION GAP SERPL CALC-SCNC: 7 MMOL/L (ref 2–11)
AST SERPL-CCNC: 25 U/L (ref 15–37)
BASOPHILS # BLD: 0 K/UL (ref 0–0.2)
BASOPHILS NFR BLD: 0 % (ref 0–2)
BILIRUB SERPL-MCNC: 0.5 MG/DL (ref 0.2–1.1)
BUN SERPL-MCNC: 8 MG/DL (ref 6–23)
CALCIUM SERPL-MCNC: 8.7 MG/DL (ref 8.3–10.4)
CHLORIDE SERPL-SCNC: 108 MMOL/L (ref 101–110)
CO2 SERPL-SCNC: 23 MMOL/L (ref 21–32)
CREAT SERPL-MCNC: 0.6 MG/DL (ref 0.6–1)
DIFFERENTIAL METHOD BLD: ABNORMAL
EOSINOPHIL # BLD: 0.2 K/UL (ref 0–0.8)
EOSINOPHIL NFR BLD: 2 % (ref 0.5–7.8)
ERYTHROCYTE [DISTWIDTH] IN BLOOD BY AUTOMATED COUNT: 15.1 % (ref 11.9–14.6)
GLOBULIN SER CALC-MCNC: 3.7 G/DL (ref 2.8–4.5)
GLUCOSE SERPL-MCNC: 153 MG/DL (ref 65–100)
HCT VFR BLD AUTO: 46 % (ref 35.8–46.3)
HGB BLD-MCNC: 14.3 G/DL (ref 11.7–15.4)
IMM GRANULOCYTES # BLD AUTO: 0 K/UL (ref 0–0.5)
IMM GRANULOCYTES NFR BLD AUTO: 0 % (ref 0–5)
LYMPHOCYTES # BLD: 2.4 K/UL (ref 0.5–4.6)
LYMPHOCYTES NFR BLD: 25 % (ref 13–44)
MAGNESIUM SERPL-MCNC: 2.4 MG/DL (ref 1.8–2.4)
MCH RBC QN AUTO: 24 PG (ref 26.1–32.9)
MCHC RBC AUTO-ENTMCNC: 31.1 G/DL (ref 31.4–35)
MCV RBC AUTO: 77.2 FL (ref 82–102)
MONOCYTES # BLD: 0.9 K/UL (ref 0.1–1.3)
MONOCYTES NFR BLD: 9 % (ref 4–12)
NEUTS SEG # BLD: 6.3 K/UL (ref 1.7–8.2)
NEUTS SEG NFR BLD: 64 % (ref 43–78)
NRBC # BLD: 0 K/UL (ref 0–0.2)
PLATELET # BLD AUTO: 294 K/UL (ref 150–450)
PMV BLD AUTO: 11.4 FL (ref 9.4–12.3)
POTASSIUM SERPL-SCNC: 3.3 MMOL/L (ref 3.5–5.1)
PROT SERPL-MCNC: 7.1 G/DL (ref 6.3–8.2)
RBC # BLD AUTO: 5.96 M/UL (ref 4.05–5.2)
SODIUM SERPL-SCNC: 138 MMOL/L (ref 133–143)
WBC # BLD AUTO: 9.7 K/UL (ref 4.3–11.1)

## 2023-08-29 PROCEDURE — 80053 COMPREHEN METABOLIC PANEL: CPT

## 2023-08-29 PROCEDURE — 36591 DRAW BLOOD OFF VENOUS DEVICE: CPT

## 2023-08-29 PROCEDURE — 99214 OFFICE O/P EST MOD 30 MIN: CPT

## 2023-08-29 PROCEDURE — 83735 ASSAY OF MAGNESIUM: CPT

## 2023-08-29 PROCEDURE — 6360000002 HC RX W HCPCS

## 2023-08-29 PROCEDURE — 2580000003 HC RX 258: Performed by: INTERNAL MEDICINE

## 2023-08-29 PROCEDURE — 96375 TX/PRO/DX INJ NEW DRUG ADDON: CPT

## 2023-08-29 PROCEDURE — 96366 THER/PROPH/DIAG IV INF ADDON: CPT

## 2023-08-29 PROCEDURE — 96365 THER/PROPH/DIAG IV INF INIT: CPT

## 2023-08-29 PROCEDURE — 85025 COMPLETE CBC W/AUTO DIFF WBC: CPT

## 2023-08-29 RX ORDER — 0.9 % SODIUM CHLORIDE 0.9 %
1000 INTRAVENOUS SOLUTION INTRAVENOUS ONCE
Status: COMPLETED | OUTPATIENT
Start: 2023-08-29 | End: 2023-08-29

## 2023-08-29 RX ORDER — 0.9 % SODIUM CHLORIDE 0.9 %
1000 INTRAVENOUS SOLUTION INTRAVENOUS ONCE
Status: CANCELLED
Start: 2023-08-29 | End: 2023-08-29

## 2023-08-29 RX ORDER — PROMETHAZINE HYDROCHLORIDE 25 MG/1
25 TABLET ORAL 4 TIMES DAILY PRN
Qty: 20 TABLET | Refills: 0 | Status: SHIPPED | OUTPATIENT
Start: 2023-08-29 | End: 2023-09-05

## 2023-08-29 RX ORDER — FLUCONAZOLE 100 MG/1
100 TABLET ORAL DAILY
Qty: 7 TABLET | Refills: 0 | Status: SHIPPED | OUTPATIENT
Start: 2023-08-29 | End: 2023-09-05

## 2023-08-29 RX ORDER — ONDANSETRON 2 MG/ML
8 INJECTION INTRAMUSCULAR; INTRAVENOUS ONCE
Status: SHIPPED | OUTPATIENT
Start: 2023-08-29

## 2023-08-29 RX ORDER — HEPARIN SODIUM (PORCINE) LOCK FLUSH IV SOLN 100 UNIT/ML 100 UNIT/ML
500 SOLUTION INTRAVENOUS PRN
Status: CANCELLED | OUTPATIENT
Start: 2023-08-29

## 2023-08-29 RX ORDER — POTASSIUM CHLORIDE 7.45 MG/ML
10 INJECTION INTRAVENOUS
Status: COMPLETED | OUTPATIENT
Start: 2023-08-29 | End: 2023-08-29

## 2023-08-29 RX ORDER — PROMETHAZINE HYDROCHLORIDE 25 MG/ML
6.25 INJECTION, SOLUTION INTRAMUSCULAR; INTRAVENOUS ONCE
Status: DISCONTINUED | OUTPATIENT
Start: 2023-08-29 | End: 2023-08-29

## 2023-08-29 RX ORDER — ONDANSETRON HYDROCHLORIDE 8 MG/1
8 TABLET, FILM COATED ORAL 3 TIMES DAILY PRN
Qty: 90 TABLET | Refills: 1 | Status: SHIPPED | OUTPATIENT
Start: 2023-08-29

## 2023-08-29 RX ORDER — PROMETHAZINE HYDROCHLORIDE 25 MG/1
25 TABLET ORAL 4 TIMES DAILY PRN
Qty: 20 TABLET | Refills: 0 | Status: SHIPPED | OUTPATIENT
Start: 2023-08-29 | End: 2023-08-29

## 2023-08-29 RX ORDER — ONDANSETRON HYDROCHLORIDE 8 MG/1
8 TABLET, FILM COATED ORAL 3 TIMES DAILY PRN
Qty: 90 TABLET | Refills: 1 | Status: SHIPPED | OUTPATIENT
Start: 2023-08-29 | End: 2023-08-29

## 2023-08-29 RX ORDER — SODIUM CHLORIDE 0.9 % (FLUSH) 0.9 %
10 SYRINGE (ML) INJECTION PRN
Status: DISCONTINUED | OUTPATIENT
Start: 2023-08-29 | End: 2023-09-02 | Stop reason: HOSPADM

## 2023-08-29 RX ORDER — SODIUM CHLORIDE 9 MG/ML
5-250 INJECTION, SOLUTION INTRAVENOUS PRN
Status: CANCELLED | OUTPATIENT
Start: 2023-08-29

## 2023-08-29 RX ORDER — SODIUM CHLORIDE 0.9 % (FLUSH) 0.9 %
5-40 SYRINGE (ML) INJECTION PRN
Status: DISCONTINUED | OUTPATIENT
Start: 2023-08-29 | End: 2023-08-30 | Stop reason: HOSPADM

## 2023-08-29 RX ORDER — ONDANSETRON 2 MG/ML
8 INJECTION INTRAMUSCULAR; INTRAVENOUS ONCE
Status: COMPLETED | OUTPATIENT
Start: 2023-08-29 | End: 2023-08-29

## 2023-08-29 RX ORDER — SODIUM CHLORIDE 0.9 % (FLUSH) 0.9 %
5-40 SYRINGE (ML) INJECTION PRN
Status: CANCELLED | OUTPATIENT
Start: 2023-08-29

## 2023-08-29 RX ORDER — HEPARIN 100 UNIT/ML
500 SYRINGE INTRAVENOUS PRN
Status: CANCELLED | OUTPATIENT
Start: 2023-08-29

## 2023-08-29 RX ORDER — FLUCONAZOLE 100 MG/1
100 TABLET ORAL DAILY
Qty: 7 TABLET | Refills: 0 | Status: SHIPPED | OUTPATIENT
Start: 2023-08-29 | End: 2023-08-29

## 2023-08-29 RX ADMIN — SODIUM CHLORIDE, PRESERVATIVE FREE 10 ML: 5 INJECTION INTRAVENOUS at 14:19

## 2023-08-29 RX ADMIN — ONDANSETRON 8 MG: 2 INJECTION INTRAMUSCULAR; INTRAVENOUS at 15:58

## 2023-08-29 RX ADMIN — POTASSIUM CHLORIDE 10 MEQ: 7.46 INJECTION, SOLUTION INTRAVENOUS at 16:37

## 2023-08-29 RX ADMIN — POTASSIUM CHLORIDE 10 MEQ: 7.46 INJECTION, SOLUTION INTRAVENOUS at 15:36

## 2023-08-29 RX ADMIN — SODIUM CHLORIDE 1000 ML: 9 INJECTION, SOLUTION INTRAVENOUS at 15:20

## 2023-08-29 ASSESSMENT — ENCOUNTER SYMPTOMS
WHEEZING: 0
TROUBLE SWALLOWING: 0
VOMITING: 1
HEMOPTYSIS: 0
ABDOMINAL DISTENTION: 0
CHEST TIGHTNESS: 0
SHORTNESS OF BREATH: 1
BLOOD IN STOOL: 0
NAUSEA: 1
SCLERAL ICTERUS: 0
VOICE CHANGE: 0
SORE THROAT: 0
CONSTIPATION: 0
BACK PAIN: 1
DIARRHEA: 0
ABDOMINAL PAIN: 0

## 2023-08-29 ASSESSMENT — PATIENT HEALTH QUESTIONNAIRE - PHQ9
SUM OF ALL RESPONSES TO PHQ QUESTIONS 1-9: 0
SUM OF ALL RESPONSES TO PHQ9 QUESTIONS 1 & 2: 0
1. LITTLE INTEREST OR PLEASURE IN DOING THINGS: 0
SUM OF ALL RESPONSES TO PHQ QUESTIONS 1-9: 0
2. FEELING DOWN, DEPRESSED OR HOPELESS: 0

## 2023-08-29 NOTE — PROGRESS NOTES
Arrived to the [address] [facility]. IV zofran completed, IVF and potassium chloride currently infusing. Patient aware of next [facility] office visit on 8/31/23 (date) at 1:30pm (time). Handoff to EARNEST Jerez. to continue pt care.

## 2023-08-30 ENCOUNTER — HOSPITAL ENCOUNTER (OUTPATIENT)
Dept: NUCLEAR MEDICINE | Age: 58
Discharge: HOME | End: 2023-09-02
Attending: INTERNAL MEDICINE
Payer: COMMERCIAL

## 2023-08-30 DIAGNOSIS — R11.2 NAUSEA AND VOMITING IN ADULT: ICD-10-CM

## 2023-08-30 DIAGNOSIS — R68.81 EARLY SATIETY: ICD-10-CM

## 2023-08-30 DIAGNOSIS — K21.9 CHRONIC GERD: ICD-10-CM

## 2023-08-30 DIAGNOSIS — C64.2 RENAL CELL CARCINOMA OF LEFT KIDNEY (HCC): ICD-10-CM

## 2023-08-30 PROCEDURE — A9541 TC99M SULFUR COLLOID: HCPCS | Performed by: INTERNAL MEDICINE

## 2023-08-30 PROCEDURE — 78264 GASTRIC EMPTYING IMG STUDY: CPT

## 2023-08-30 PROCEDURE — 3430000000 HC RX DIAGNOSTIC RADIOPHARMACEUTICAL: Performed by: INTERNAL MEDICINE

## 2023-08-30 RX ADMIN — Medication 1 MILLICURIE: at 08:28

## 2023-08-31 ENCOUNTER — OFFICE VISIT (OUTPATIENT)
Dept: ONCOLOGY | Age: 58
End: 2023-08-31
Payer: COMMERCIAL

## 2023-08-31 VITALS
OXYGEN SATURATION: 97 % | TEMPERATURE: 98.1 F | HEART RATE: 92 BPM | WEIGHT: 226.3 LBS | HEIGHT: 67 IN | BODY MASS INDEX: 35.52 KG/M2 | SYSTOLIC BLOOD PRESSURE: 136 MMHG | DIASTOLIC BLOOD PRESSURE: 77 MMHG | RESPIRATION RATE: 12 BRPM

## 2023-08-31 DIAGNOSIS — K11.8 SUBMANDIBULAR GLAND MASS: ICD-10-CM

## 2023-08-31 DIAGNOSIS — R51.9 NONINTRACTABLE HEADACHE, UNSPECIFIED CHRONICITY PATTERN, UNSPECIFIED HEADACHE TYPE: ICD-10-CM

## 2023-08-31 DIAGNOSIS — R11.2 NAUSEA AND VOMITING, UNSPECIFIED VOMITING TYPE: ICD-10-CM

## 2023-08-31 DIAGNOSIS — C64.2 RENAL CELL CARCINOMA OF LEFT KIDNEY (HCC): Primary | ICD-10-CM

## 2023-08-31 DIAGNOSIS — Z95.828 PORT-A-CATH IN PLACE: ICD-10-CM

## 2023-08-31 DIAGNOSIS — I82.90 OCCLUSIVE THROMBUS: ICD-10-CM

## 2023-08-31 DIAGNOSIS — E11.9 TYPE 2 DIABETES MELLITUS WITHOUT COMPLICATION, WITHOUT LONG-TERM CURRENT USE OF INSULIN (HCC): ICD-10-CM

## 2023-08-31 PROCEDURE — 3051F HG A1C>EQUAL 7.0%<8.0%: CPT | Performed by: INTERNAL MEDICINE

## 2023-08-31 PROCEDURE — 99215 OFFICE O/P EST HI 40 MIN: CPT | Performed by: INTERNAL MEDICINE

## 2023-08-31 ASSESSMENT — PATIENT HEALTH QUESTIONNAIRE - PHQ9
SUM OF ALL RESPONSES TO PHQ QUESTIONS 1-9: 0
SUM OF ALL RESPONSES TO PHQ QUESTIONS 1-9: 0
2. FEELING DOWN, DEPRESSED OR HOPELESS: 0
SUM OF ALL RESPONSES TO PHQ QUESTIONS 1-9: 0
SUM OF ALL RESPONSES TO PHQ9 QUESTIONS 1 & 2: 0
SUM OF ALL RESPONSES TO PHQ QUESTIONS 1-9: 0
1. LITTLE INTEREST OR PLEASURE IN DOING THINGS: 0

## 2023-08-31 ASSESSMENT — ENCOUNTER SYMPTOMS
SHORTNESS OF BREATH: 1
WHEEZING: 0
ABDOMINAL DISTENTION: 0
VOMITING: 1
SCLERAL ICTERUS: 0
VOICE CHANGE: 0
ABDOMINAL PAIN: 0
BLOOD IN STOOL: 0
NAUSEA: 1
BACK PAIN: 1
CONSTIPATION: 0
HEMOPTYSIS: 0
TROUBLE SWALLOWING: 0
CHEST TIGHTNESS: 0
SORE THROAT: 0
DIARRHEA: 0

## 2023-08-31 NOTE — PATIENT INSTRUCTIONS
Patient Instructions from Today's Visit    Reason for Visit:  Follow Up    Diagnosis Information:  [email or web address] [email or web address]-[email or web address]-answers-[email or web address]-[email or web address]-[email or web address]-[email or web address]    Plan:  Symptoms reviewed. Referral to ENT. Referral to IR. CT chest abdomen pelvis due September. You can call to schedule this at [phone]. MRI brain soon. You can call to schedule this at [phone]. We will schedule you for labs next week and then 2 weeks after that and so on. Prescription for eliquis 2.5mg twice a day sent. Follow Up:  Labs next week.     Recent Lab Results:  Hospital Outpatient Visit on 08/29/2023   Component Date Value Ref Range Status    WBC 08/29/2023 9.7  4.3 - 11.1 K/uL Final    RBC 08/29/2023 5.96 (H)  4.05 - 5.2 M/uL Final    Hemoglobin 08/29/2023 14.3  11.7 - 15.4 g/dL Final    Hematocrit 08/29/2023 46.0  35.8 - 46.3 % Final    MCV 08/29/2023 77.2 (L)  82.0 - 102.0 FL Final    MCH 08/29/2023 24.0 (L)  26.1 - 32.9 PG Final    MCHC 08/29/2023 31.1 (L)  31.4 - 35.0 g/dL Final    RDW 08/29/2023 15.1 (H)  11.9 - 14.6 % Final    Platelets 64/85/0459 294  150 - 450 K/uL Final    MPV 08/29/2023 11.4  9.4 - 12.3 FL Final    nRBC 08/29/2023 0.00  0.0 - 0.2 K/uL Final    **Note: Absolute NRBC parameter is now reported with Hemogram**    Neutrophils % 08/29/2023 64  43 - 78 % Final    Lymphocytes % 08/29/2023 25  13 - 44 % Final    Monocytes % 08/29/2023 9  4.0 - 12.0 % Final    Eosinophils % 08/29/2023 2  0.5 - 7.8 % Final    Basophils % 08/29/2023 0  0.0 - 2.0 % Final    Immature Granulocytes 08/29/2023 0  0.0 - 5.0 % Final    Neutrophils Absolute 08/29/2023 6.3  1.7 - 8.2 K/UL Final    Lymphocytes Absolute 08/29/2023 2.4  0.5 - 4.6 K/UL Final    Monocytes Absolute 08/29/2023 0.9  0.1 - 1.3 K/UL Final    Eosinophils Absolute 08/29/2023 0.2  0.0 - 0.8 K/UL Final    Basophils Absolute 08/29/2023 0.0  0.0 - 0.2 K/UL Final    Absolute Immature Granulocyte 08/29/2023 0.0  0.0 - 0.5 K/UL

## 2023-08-31 NOTE — PROGRESS NOTES
mentions a very painful yeast infection in her groin. She has had this in the past and been on Diflucan so this sent in as well. She will return to see Dr. Latonya Mchugh in two days.   - Dr. Latonya Mchugh reviewed CT scan from 6/2023     All questions were asked and answered to the best of my ability. The patient verbalized understanding and agrees with the plan above.           Freddie Liu MD  [facility] Hematology and Oncology  [address]  Office : [phone]  Fax : [phone]

## 2023-09-06 RX ORDER — 0.9 % SODIUM CHLORIDE 0.9 %
1000 INTRAVENOUS SOLUTION INTRAVENOUS ONCE
Status: CANCELLED
Start: 2023-09-06 | End: 2023-09-06

## 2023-09-07 ENCOUNTER — HOSPITAL ENCOUNTER (OUTPATIENT)
Dept: LAB | Age: 58
End: 2023-09-07
Payer: COMMERCIAL

## 2023-09-07 ENCOUNTER — HOSPITAL ENCOUNTER (OUTPATIENT)
Dept: INFUSION THERAPY | Age: 58
Discharge: HOME | End: 2023-09-07
Payer: COMMERCIAL

## 2023-09-07 VITALS
OXYGEN SATURATION: 94 % | DIASTOLIC BLOOD PRESSURE: 75 MMHG | RESPIRATION RATE: 16 BRPM | SYSTOLIC BLOOD PRESSURE: 127 MMHG | HEART RATE: 87 BPM | TEMPERATURE: 97.4 F

## 2023-09-07 DIAGNOSIS — E11.9 TYPE 2 DIABETES MELLITUS WITHOUT COMPLICATION, WITHOUT LONG-TERM CURRENT USE OF INSULIN (HCC): ICD-10-CM

## 2023-09-07 DIAGNOSIS — R79.89 ELEVATED LFTS: Primary | ICD-10-CM

## 2023-09-07 DIAGNOSIS — C64.2 RENAL CELL CARCINOMA OF LEFT KIDNEY (HCC): ICD-10-CM

## 2023-09-07 LAB
ALBUMIN SERPL-MCNC: 3.2 G/DL (ref 3.5–5)
ALBUMIN/GLOB SERPL: 0.9 (ref 0.4–1.6)
ALP SERPL-CCNC: 69 U/L (ref 50–136)
ALT SERPL-CCNC: 38 U/L (ref 12–65)
ANION GAP SERPL CALC-SCNC: 6 MMOL/L (ref 2–11)
AST SERPL-CCNC: 28 U/L (ref 15–37)
BASOPHILS # BLD: 0 K/UL (ref 0–0.2)
BASOPHILS NFR BLD: 0 % (ref 0–2)
BILIRUB SERPL-MCNC: 0.4 MG/DL (ref 0.2–1.1)
BUN SERPL-MCNC: 8 MG/DL (ref 6–23)
CALCIUM SERPL-MCNC: 8.5 MG/DL (ref 8.3–10.4)
CHLORIDE SERPL-SCNC: 110 MMOL/L (ref 101–110)
CO2 SERPL-SCNC: 23 MMOL/L (ref 21–32)
CREAT SERPL-MCNC: 0.7 MG/DL (ref 0.6–1)
DIFFERENTIAL METHOD BLD: ABNORMAL
EOSINOPHIL # BLD: 0.2 K/UL (ref 0–0.8)
EOSINOPHIL NFR BLD: 2 % (ref 0.5–7.8)
ERYTHROCYTE [DISTWIDTH] IN BLOOD BY AUTOMATED COUNT: 15.7 % (ref 11.9–14.6)
EST. AVERAGE GLUCOSE BLD GHB EST-MCNC: 166 MG/DL
GLOBULIN SER CALC-MCNC: 3.7 G/DL (ref 2.8–4.5)
GLUCOSE SERPL-MCNC: 178 MG/DL (ref 65–100)
HBA1C MFR BLD: 7.4 % (ref 4.8–5.6)
HCT VFR BLD AUTO: 44.6 % (ref 35.8–46.3)
HGB BLD-MCNC: 13.4 G/DL (ref 11.7–15.4)
IMM GRANULOCYTES # BLD AUTO: 0 K/UL (ref 0–0.5)
IMM GRANULOCYTES NFR BLD AUTO: 0 % (ref 0–5)
LYMPHOCYTES # BLD: 2.2 K/UL (ref 0.5–4.6)
LYMPHOCYTES NFR BLD: 24 % (ref 13–44)
MCH RBC QN AUTO: 23.7 PG (ref 26.1–32.9)
MCHC RBC AUTO-ENTMCNC: 30 G/DL (ref 31.4–35)
MCV RBC AUTO: 78.8 FL (ref 82–102)
MONOCYTES # BLD: 0.6 K/UL (ref 0.1–1.3)
MONOCYTES NFR BLD: 6 % (ref 4–12)
NEUTS SEG # BLD: 6.1 K/UL (ref 1.7–8.2)
NEUTS SEG NFR BLD: 68 % (ref 43–78)
NRBC # BLD: 0 K/UL (ref 0–0.2)
PLATELET # BLD AUTO: 284 K/UL (ref 150–450)
PMV BLD AUTO: 11 FL (ref 9.4–12.3)
POTASSIUM SERPL-SCNC: 3.7 MMOL/L (ref 3.5–5.1)
PROT SERPL-MCNC: 6.9 G/DL (ref 6.3–8.2)
RBC # BLD AUTO: 5.66 M/UL (ref 4.05–5.2)
SODIUM SERPL-SCNC: 139 MMOL/L (ref 133–143)
WBC # BLD AUTO: 9.1 K/UL (ref 4.3–11.1)

## 2023-09-07 PROCEDURE — 36591 DRAW BLOOD OFF VENOUS DEVICE: CPT

## 2023-09-07 PROCEDURE — 85025 COMPLETE CBC W/AUTO DIFF WBC: CPT

## 2023-09-07 PROCEDURE — 2580000003 HC RX 258: Performed by: INTERNAL MEDICINE

## 2023-09-07 PROCEDURE — 80053 COMPREHEN METABOLIC PANEL: CPT

## 2023-09-07 PROCEDURE — 96360 HYDRATION IV INFUSION INIT: CPT

## 2023-09-07 PROCEDURE — 83036 HEMOGLOBIN GLYCOSYLATED A1C: CPT

## 2023-09-07 RX ORDER — 0.9 % SODIUM CHLORIDE 0.9 %
1000 INTRAVENOUS SOLUTION INTRAVENOUS ONCE
Status: CANCELLED
Start: 2023-09-07 | End: 2023-09-07

## 2023-09-07 RX ORDER — SODIUM CHLORIDE 0.9 % (FLUSH) 0.9 %
5-40 SYRINGE (ML) INJECTION PRN
Status: DISCONTINUED | OUTPATIENT
Start: 2023-09-07 | End: 2023-09-08 | Stop reason: HOSPADM

## 2023-09-07 RX ORDER — 0.9 % SODIUM CHLORIDE 0.9 %
1000 INTRAVENOUS SOLUTION INTRAVENOUS ONCE
Status: COMPLETED | OUTPATIENT
Start: 2023-09-07 | End: 2023-09-07

## 2023-09-07 RX ORDER — SODIUM CHLORIDE 0.9 % (FLUSH) 0.9 %
5-40 SYRINGE (ML) INJECTION PRN
OUTPATIENT
Start: 2023-09-07

## 2023-09-07 RX ORDER — HEPARIN 100 UNIT/ML
500 SYRINGE INTRAVENOUS PRN
OUTPATIENT
Start: 2023-09-07

## 2023-09-07 RX ORDER — SODIUM CHLORIDE 9 MG/ML
5-250 INJECTION, SOLUTION INTRAVENOUS PRN
OUTPATIENT
Start: 2023-09-07

## 2023-09-07 RX ORDER — SODIUM CHLORIDE 0.9 % (FLUSH) 0.9 %
5-40 SYRINGE (ML) INJECTION PRN
Status: DISCONTINUED | OUTPATIENT
Start: 2023-09-07 | End: 2023-09-11 | Stop reason: HOSPADM

## 2023-09-07 RX ADMIN — SODIUM CHLORIDE 1000 ML: 9 INJECTION, SOLUTION INTRAVENOUS at 08:15

## 2023-09-07 RX ADMIN — SODIUM CHLORIDE, PRESERVATIVE FREE 10 ML: 5 INJECTION INTRAVENOUS at 09:20

## 2023-09-07 RX ADMIN — SODIUM CHLORIDE, PRESERVATIVE FREE 10 ML: 5 INJECTION INTRAVENOUS at 07:40

## 2023-09-07 RX ADMIN — SODIUM CHLORIDE, PRESERVATIVE FREE 10 ML: 5 INJECTION INTRAVENOUS at 08:15

## 2023-09-07 NOTE — PROGRESS NOTES
Arrived to the [address] [facility]. 1 liter of IVF completed. Patient tolerated without difficulty. Any issues or concerns during appointment: None. Patient  does not have any further Infusions appointments at this time. She will notify MD if further hydration is needed. Patient aware of next lab and [facility] office visit on 09/21/2023 (date) at 0 with lab and again on 10/09/2023 at 1400 with lab followed by OV at 1430 with Dr. Susan Parmar. Patient instructed to call provider with temperature of 100.4 or greater or nausea/vomiting/ diarrhea or pain not controlled by medications  Discharged ambulatory to home.

## 2023-09-07 NOTE — PROGRESS NOTES
Patient arrived to port lab for port access and lab draw   [address] accessed and labs drawn per protocol   *Port remains accessed.  Patient discharged from port lab ambulatory*

## 2023-09-08 ENCOUNTER — HOSPITAL ENCOUNTER (OUTPATIENT)
Dept: INTERVENTIONAL RADIOLOGY/VASCULAR | Age: 58
End: 2023-09-08
Attending: INTERNAL MEDICINE
Payer: COMMERCIAL

## 2023-09-08 VITALS
DIASTOLIC BLOOD PRESSURE: 67 MMHG | SYSTOLIC BLOOD PRESSURE: 121 MMHG | HEIGHT: 67 IN | WEIGHT: 226 LBS | TEMPERATURE: 97.4 F | HEART RATE: 73 BPM | OXYGEN SATURATION: 94 % | RESPIRATION RATE: 16 BRPM | BODY MASS INDEX: 35.47 KG/M2

## 2023-09-08 DIAGNOSIS — C64.2 RENAL CELL CARCINOMA OF LEFT KIDNEY (HCC): ICD-10-CM

## 2023-09-08 DIAGNOSIS — I82.90 OCCLUSIVE THROMBUS: ICD-10-CM

## 2023-09-08 DIAGNOSIS — Z95.828 PORT-A-CATH IN PLACE: ICD-10-CM

## 2023-09-08 PROCEDURE — 6360000002 HC RX W HCPCS: Performed by: PHYSICIAN ASSISTANT

## 2023-09-08 PROCEDURE — 36598 INJ W/FLUOR EVAL CV DEVICE: CPT

## 2023-09-08 PROCEDURE — 2580000003 HC RX 258: Performed by: PHYSICIAN ASSISTANT

## 2023-09-08 PROCEDURE — 6360000004 HC RX CONTRAST MEDICATION: Performed by: PHYSICIAN ASSISTANT

## 2023-09-08 RX ORDER — EVEROLIMUS 5 MG/1
5 TABLET ORAL DAILY
Qty: 30 TABLET | Refills: 5 | Status: SHIPPED | OUTPATIENT
Start: 2023-09-08

## 2023-09-08 RX ADMIN — IOHEXOL 7 ML: 300 INJECTION, SOLUTION INTRAVENOUS at 08:08

## 2023-09-08 RX ADMIN — ALTEPLASE 8 MG/HR: KIT at 09:00

## 2023-09-08 ASSESSMENT — PAIN - FUNCTIONAL ASSESSMENT: PAIN_FUNCTIONAL_ASSESSMENT: 0-10

## 2023-09-08 NOTE — OR NURSING
New orders for TPA 4/50 to infuse over 30 min per Arnoldo Mattson PA-C. Arnoldo Mattson notified and aware that patient took her eliquis yesterday 8/7/23.

## 2023-09-11 ENCOUNTER — OFFICE VISIT (OUTPATIENT)
Dept: ENT CLINIC | Age: 58
End: 2023-09-11
Payer: COMMERCIAL

## 2023-09-11 VITALS — BODY MASS INDEX: 36.1 KG/M2 | WEIGHT: 230 LBS | HEIGHT: 67 IN | RESPIRATION RATE: 18 BRPM

## 2023-09-11 DIAGNOSIS — D49.0 NEOPLASM OF SUBMANDIBULAR GLAND: Primary | ICD-10-CM

## 2023-09-11 PROCEDURE — 99244 OFF/OP CNSLTJ NEW/EST MOD 40: CPT | Performed by: OTOLARYNGOLOGY

## 2023-09-11 ASSESSMENT — ENCOUNTER SYMPTOMS
SHORTNESS OF BREATH: 0
ABDOMINAL PAIN: 0
SORE THROAT: 0

## 2023-09-11 NOTE — PROGRESS NOTES
times daily as needed for Nausea or Vomiting    potassium chloride (KLOR-CON M) 20 MEQ extended release tablet Take 1 tablet by mouth 2 times daily    sucralfate (CARAFATE) 1 GM tablet Take 1 tablet by mouth 4 times daily    erythromycin (ROMYCIN) 5 MG/GM ophthalmic ointment Place into both eyes every 6 hours    empagliflozin (JARDIANCE) 25 MG tablet Take 1 tablet by mouth daily    amLODIPine (NORVASC) 5 MG tablet Take 1 tablet by mouth daily    Magic Mouthwash (MIRACLE MOUTHWASH) Swish and spit 5 mLs 4 times daily as needed for Irritation or Pain    Continuous Blood Gluc Sensor (DEXCOM G7 SENSOR) MISC Use daily. Change sensor every 10 days    Continuous Blood Gluc  (DEXCOM G7 ) YOSELIN Use daily for continuous glucose monitoring    blood glucose test strips (FREESTYLE TEST STRIPS) strip 1 each by In Vitro route daily As needed.     nicotine (NICODERM CQ) 14 MG/24HR     cetirizine (ZYRTEC) 10 MG tablet Take 1 tablet by mouth daily    pantoprazole (PROTONIX) 40 MG tablet Take 1 tablet by mouth every morning (before breakfast)    albuterol sulfate HFA (PROAIR HFA) 108 (90 Base) MCG/ACT inhaler Inhale 2 puffs into the lungs every 6 hours as needed for Wheezing    sertraline (ZOLOFT) 100 MG tablet Take 1.5 tablets by mouth at bedtime At nights    prochlorperazine (COMPAZINE) 10 MG tablet Take 1 tablet by mouth every 6 hours as needed (nausea/vomiting)    Acetaminophen (TYLENOL) 325 MG CAPS As needed    EPINEPHrine (EPIPEN 2-MARY IJ) Inject as directed     Current Facility-Administered Medications   Medication Dose Route Frequency    ondansetron (ZOFRAN) injection 8 mg  8 mg IntraVENous Once     Facility-Administered Medications Ordered in Other Visits   Medication Dose Route Frequency    iohexol (OMNIPAQUE) injection    PRN     Past Medical History:   Diagnosis Date    Anxiety 2000    Headache 1999    Sleep apnea 2002     Social History     Tobacco Use    Smoking status: Every Day     Packs/day: 1.50

## 2023-09-12 ENCOUNTER — HOSPITAL ENCOUNTER (OUTPATIENT)
Dept: CT IMAGING | Age: 58
Discharge: HOME | End: 2023-09-15
Attending: INTERNAL MEDICINE
Payer: COMMERCIAL

## 2023-09-12 DIAGNOSIS — C64.2 RENAL CELL CARCINOMA OF LEFT KIDNEY (HCC): ICD-10-CM

## 2023-09-12 PROCEDURE — 6360000004 HC RX CONTRAST MEDICATION: Performed by: INTERNAL MEDICINE

## 2023-09-12 PROCEDURE — 71260 CT THORAX DX C+: CPT

## 2023-09-12 RX ADMIN — IOPAMIDOL 100 ML: 755 INJECTION, SOLUTION INTRAVENOUS at 09:57

## 2023-09-12 RX ADMIN — DIATRIZOATE MEGLUMINE AND DIATRIZOATE SODIUM 15 ML: 660; 100 LIQUID ORAL; RECTAL at 09:58

## 2023-09-15 ENCOUNTER — TELEMEDICINE (OUTPATIENT)
Dept: ONCOLOGY | Age: 58
End: 2023-09-15

## 2023-09-15 DIAGNOSIS — Z95.828 PORT-A-CATH IN PLACE: ICD-10-CM

## 2023-09-15 DIAGNOSIS — Z79.899 HIGH RISK MEDICATION USE: ICD-10-CM

## 2023-09-15 DIAGNOSIS — C64.2 RENAL CELL CARCINOMA OF LEFT KIDNEY (HCC): Primary | ICD-10-CM

## 2023-09-15 DIAGNOSIS — N28.89 BILATERAL RENAL MASSES: ICD-10-CM

## 2023-09-15 DIAGNOSIS — I82.90 OCCLUSIVE THROMBUS: ICD-10-CM

## 2023-09-15 ASSESSMENT — ENCOUNTER SYMPTOMS
VOICE CHANGE: 0
SHORTNESS OF BREATH: 1
TROUBLE SWALLOWING: 0
DIARRHEA: 0
ABDOMINAL PAIN: 0
BACK PAIN: 1
CHEST TIGHTNESS: 0
VOMITING: 1
BLOOD IN STOOL: 0
CONSTIPATION: 0
HEMOPTYSIS: 0
ABDOMINAL DISTENTION: 0
NAUSEA: 1
SCLERAL ICTERUS: 0
SORE THROAT: 0
WHEEZING: 0

## 2023-09-15 NOTE — PROGRESS NOTES
see if the adrenal mass and both renal masses respond to tx. - per NCCN guidelines, pt's with intermediate risk advanced disease are candidates for immuno + TKI. We discussed options, will p/w axitinib and pembrolizumab. SE reviewed in great detail going over percentages of likelihood of occurrence. Printed info was given to pt for her reference. She was made aware of risk of heart failure, hemorrhage, HTN, Reversible posterior leukoencephalopathy syndrome, thrombosis (arterial/venous), thyroid d.o, wnd healing, diarrhea faisal in combination with pembro. - Monitor BP. Did have some episodes of lower BP, possibly contributing to LFTs abnormalities along with GI/diarrhea issues this wknd; stop acetaminophen   - today, she is here for FU-doing well. Has been seeing sx and most recently Dr Bartolo Pleitez for another opinion 9she was felt not to be a sx candidate). She plans to see Dr Ashley Fajardo 5/18 - med onc for another opinion. We discussed c/w axitinib for now. Mild POD on restaging noted. We reviewed 2nd line tx options can include cabozantinib +/- Nivo if she wishes to consider immunotherapy again considering SE or lenvatinib w/ everolimus. She needed to stop pembro due to LFTs rise - responded to pred. - She has not been on immunotherapy due to LFT abnormalities. She remains on 20 mg of prednisone with drop in LFTs today. Will continue at 20mg with taper in ~1 week to 10mg. - sore throat/URI - started on doxy per PCP; She continues to have some sore throat but no exudate is noted on examination today she will contact Dr. Indira Lambert for next recommendations. She does report fatigue which could be related to axitinib.  - HFS - instructed her to limit friction and try to use gloves more to decrease /hand washing, frequent use of udder cream/bag balm/etc   - in the interim, saw Dr Ashley Fajardo who started her on lenvatinib/everolimus 18/5mg ~2 wks ago. Tolerating it better than axitinib.   Per Dr Ashley Fajardo,

## 2023-09-18 ENCOUNTER — OFFICE VISIT (OUTPATIENT)
Dept: ORTHOPEDIC SURGERY | Age: 58
End: 2023-09-18
Payer: COMMERCIAL

## 2023-09-18 DIAGNOSIS — M84.469D INSUFFICIENCY FRACTURE OF TIBIA WITH ROUTINE HEALING, SUBSEQUENT ENCOUNTER: Primary | ICD-10-CM

## 2023-09-18 DIAGNOSIS — C64.2 RENAL CELL CARCINOMA OF LEFT KIDNEY (HCC): Primary | ICD-10-CM

## 2023-09-18 PROCEDURE — 99212 OFFICE O/P EST SF 10 MIN: CPT | Performed by: STUDENT IN AN ORGANIZED HEALTH CARE EDUCATION/TRAINING PROGRAM

## 2023-09-18 NOTE — PROGRESS NOTES
Name: Sofia Roach  YOB: 1965  Gender: female  MRN: [id number]  Date of Encounter:  9/18/2023       CHIEF COMPLAINT:     Chief Complaint   Patient presents with    Follow-up     Left Knee & Foot        SUBJECTIVE/OBJECTIVE:      HPI:    Patient is a 60 y.o. pleasant female who presents today for a return evaluation of her left knee. Working diagnosis:   1. Insufficiency fracture of tibia with routine healing, subsequent encounter       LOV: 8/21/2023     Recall hx:   She twisted her ankle slipping on wet floor 7/4/23. Exam consistent with lateral ankle sprain, chronic appearing avulsion off her talus seen on XR. She was put in a tall walking boot and given debilitation exercises. Her ankle pain improved, and then she started to have knee pain. There was swelling shown. Knee pain began before her fall, but worsened after her fall and ambulation. MRI was obtained for concern for insufficiency fracture of tibia. We advised limited WB. Her knee pain was improving at last visit. Advised continued limited weight bearing using crutch assist but to progress WBAT. Her knee is doing well. She has occasional pain but she has been working without much issue. She denies swelling. Ankle has improved as well. She herself is not feeling well and has had ongoing issues with vomiting. There are talks of palliative care for her. PAST HISTORY:   Past medical, surgical, family, social history and allergies reviewed by me. Unchanged from prior visit. REVIEW OF SYSTEMS:   As noted in HPI. PHYSICAL EXAMINATION:     Gen: Well-developed, no acute distress   HEENT: NC/AT, EOMI   Neck: Trachea midline, normal ROM   CV: Regular rhythm by palpation of distal pulse, normal capillary refill   Pulm: No respiratory distress, no stridor   Psychiatric: Well oriented, normal mood and affect. Skin: No rashes, lesions or ulcers, normal temperature, turgor, and texture on uninvolved extremity.

## 2023-09-19 ENCOUNTER — HOSPITAL ENCOUNTER (OUTPATIENT)
Dept: MRI IMAGING | Age: 58
Discharge: HOME | End: 2023-09-22
Attending: INTERNAL MEDICINE

## 2023-09-19 DIAGNOSIS — R11.2 NAUSEA AND VOMITING, UNSPECIFIED VOMITING TYPE: ICD-10-CM

## 2023-09-19 DIAGNOSIS — R51.9 NONINTRACTABLE HEADACHE, UNSPECIFIED CHRONICITY PATTERN, UNSPECIFIED HEADACHE TYPE: ICD-10-CM

## 2023-09-21 ENCOUNTER — HOSPITAL ENCOUNTER (OUTPATIENT)
Dept: ULTRASOUND IMAGING | Age: 58
Discharge: HOME | End: 2023-09-21
Attending: OTOLARYNGOLOGY
Payer: COMMERCIAL

## 2023-09-21 ENCOUNTER — HOSPITAL ENCOUNTER (OUTPATIENT)
Dept: LAB | Age: 58
Discharge: HOME | End: 2023-09-21
Payer: COMMERCIAL

## 2023-09-21 VITALS
HEART RATE: 82 BPM | WEIGHT: 230 LBS | SYSTOLIC BLOOD PRESSURE: 135 MMHG | RESPIRATION RATE: 18 BRPM | HEIGHT: 66 IN | DIASTOLIC BLOOD PRESSURE: 74 MMHG | BODY MASS INDEX: 36.96 KG/M2 | OXYGEN SATURATION: 92 %

## 2023-09-21 DIAGNOSIS — D49.0 NEOPLASM OF SUBMANDIBULAR GLAND: ICD-10-CM

## 2023-09-21 DIAGNOSIS — C64.2 RENAL CELL CARCINOMA OF LEFT KIDNEY (HCC): ICD-10-CM

## 2023-09-21 LAB
ALBUMIN SERPL-MCNC: 3.6 G/DL (ref 3.5–5)
ALBUMIN/GLOB SERPL: 0.9 (ref 0.4–1.6)
ALP SERPL-CCNC: 79 U/L (ref 50–136)
ALT SERPL-CCNC: 48 U/L (ref 12–65)
ANION GAP SERPL CALC-SCNC: 7 MMOL/L (ref 2–11)
AST SERPL-CCNC: 41 U/L (ref 15–37)
BASOPHILS # BLD: 0 K/UL (ref 0–0.2)
BASOPHILS NFR BLD: 0 % (ref 0–2)
BILIRUB SERPL-MCNC: 0.5 MG/DL (ref 0.2–1.1)
BUN SERPL-MCNC: 6 MG/DL (ref 6–23)
CALCIUM SERPL-MCNC: 9.2 MG/DL (ref 8.3–10.4)
CHLORIDE SERPL-SCNC: 108 MMOL/L (ref 101–110)
CO2 SERPL-SCNC: 25 MMOL/L (ref 21–32)
CREAT SERPL-MCNC: 0.6 MG/DL (ref 0.6–1)
DIFFERENTIAL METHOD BLD: ABNORMAL
EOSINOPHIL # BLD: 0.2 K/UL (ref 0–0.8)
EOSINOPHIL NFR BLD: 2 % (ref 0.5–7.8)
ERYTHROCYTE [DISTWIDTH] IN BLOOD BY AUTOMATED COUNT: 17.8 % (ref 11.9–14.6)
GLOBULIN SER CALC-MCNC: 3.9 G/DL (ref 2.8–4.5)
GLUCOSE SERPL-MCNC: 140 MG/DL (ref 65–100)
HCT VFR BLD AUTO: 50.3 % (ref 35.8–46.3)
HGB BLD-MCNC: 15.4 G/DL (ref 11.7–15.4)
IMM GRANULOCYTES # BLD AUTO: 0 K/UL (ref 0–0.5)
IMM GRANULOCYTES NFR BLD AUTO: 0 % (ref 0–5)
LYMPHOCYTES # BLD: 2.6 K/UL (ref 0.5–4.6)
LYMPHOCYTES NFR BLD: 30 % (ref 13–44)
MCH RBC QN AUTO: 23.7 PG (ref 26.1–32.9)
MCHC RBC AUTO-ENTMCNC: 30.6 G/DL (ref 31.4–35)
MCV RBC AUTO: 77.5 FL (ref 82–102)
MONOCYTES # BLD: 0.5 K/UL (ref 0.1–1.3)
MONOCYTES NFR BLD: 6 % (ref 4–12)
NEUTS SEG # BLD: 5.3 K/UL (ref 1.7–8.2)
NEUTS SEG NFR BLD: 62 % (ref 43–78)
NRBC # BLD: 0 K/UL (ref 0–0.2)
PLATELET # BLD AUTO: 271 K/UL (ref 150–450)
PMV BLD AUTO: 10.6 FL (ref 9.4–12.3)
POTASSIUM SERPL-SCNC: 3.4 MMOL/L (ref 3.5–5.1)
PROT SERPL-MCNC: 7.5 G/DL (ref 6.3–8.2)
RBC # BLD AUTO: 6.49 M/UL (ref 4.05–5.2)
SODIUM SERPL-SCNC: 140 MMOL/L (ref 133–143)
WBC # BLD AUTO: 8.5 K/UL (ref 4.3–11.1)

## 2023-09-21 PROCEDURE — 88305 TISSUE EXAM BY PATHOLOGIST: CPT

## 2023-09-21 PROCEDURE — 2500000003 HC RX 250 WO HCPCS: Performed by: PHYSICIAN ASSISTANT

## 2023-09-21 PROCEDURE — 80053 COMPREHEN METABOLIC PANEL: CPT

## 2023-09-21 PROCEDURE — 85025 COMPLETE CBC W/AUTO DIFF WBC: CPT

## 2023-09-21 PROCEDURE — 36415 COLL VENOUS BLD VENIPUNCTURE: CPT

## 2023-09-21 PROCEDURE — 88173 CYTOPATH EVAL FNA REPORT: CPT

## 2023-09-21 PROCEDURE — 10005 FNA BX W/US GDN 1ST LES: CPT

## 2023-09-21 RX ORDER — LIDOCAINE HYDROCHLORIDE 20 MG/ML
INJECTION, SOLUTION INFILTRATION; PERINEURAL PRN
Status: COMPLETED | OUTPATIENT
Start: 2023-09-21 | End: 2023-09-21

## 2023-09-21 RX ADMIN — LIDOCAINE HYDROCHLORIDE 5 ML: 20 INJECTION, SOLUTION INFILTRATION; PERINEURAL at 09:11

## 2023-09-22 LAB
CYTOLOGY-NON GYN: NORMAL
SPECIMEN SOURCE: NORMAL

## 2023-09-24 PROBLEM — Z79.899 HIGH RISK MEDICATION USE: Status: ACTIVE | Noted: 2023-09-24

## 2023-09-28 ENCOUNTER — HOSPITAL ENCOUNTER (OUTPATIENT)
Dept: GENERAL RADIOLOGY | Age: 58
End: 2023-09-28
Attending: INTERNAL MEDICINE
Payer: COMMERCIAL

## 2023-09-28 ENCOUNTER — HOSPITAL ENCOUNTER (OUTPATIENT)
Dept: CT IMAGING | Age: 58
End: 2023-09-28
Attending: INTERNAL MEDICINE
Payer: COMMERCIAL

## 2023-09-28 VITALS
BODY MASS INDEX: 36.96 KG/M2 | HEART RATE: 68 BPM | SYSTOLIC BLOOD PRESSURE: 118 MMHG | WEIGHT: 230 LBS | OXYGEN SATURATION: 96 % | HEIGHT: 66 IN | TEMPERATURE: 97.8 F | DIASTOLIC BLOOD PRESSURE: 60 MMHG | RESPIRATION RATE: 17 BRPM

## 2023-09-28 DIAGNOSIS — C64.2 RENAL CELL CARCINOMA OF LEFT KIDNEY (HCC): ICD-10-CM

## 2023-09-28 LAB
GLUCOSE BLD STRIP.AUTO-MCNC: 117 MG/DL (ref 65–100)
SERVICE CMNT-IMP: ABNORMAL

## 2023-09-28 PROCEDURE — 88305 TISSUE EXAM BY PATHOLOGIST: CPT

## 2023-09-28 PROCEDURE — 82962 GLUCOSE BLOOD TEST: CPT

## 2023-09-28 PROCEDURE — 71045 X-RAY EXAM CHEST 1 VIEW: CPT

## 2023-09-28 PROCEDURE — 32408 CORE NDL BX LNG/MED PERQ: CPT

## 2023-09-28 PROCEDURE — 2500000003 HC RX 250 WO HCPCS: Performed by: RADIOLOGY

## 2023-09-28 PROCEDURE — 88312 SPECIAL STAINS GROUP 1: CPT

## 2023-09-28 PROCEDURE — 6360000002 HC RX W HCPCS: Performed by: RADIOLOGY

## 2023-09-28 RX ORDER — LIDOCAINE HYDROCHLORIDE 10 MG/ML
INJECTION, SOLUTION EPIDURAL; INFILTRATION; INTRACAUDAL; PERINEURAL PRN
Status: COMPLETED | OUTPATIENT
Start: 2023-09-28 | End: 2023-09-28

## 2023-09-28 RX ORDER — MIDAZOLAM HYDROCHLORIDE 2 MG/2ML
INJECTION, SOLUTION INTRAMUSCULAR; INTRAVENOUS PRN
Status: COMPLETED | OUTPATIENT
Start: 2023-09-28 | End: 2023-09-28

## 2023-09-28 RX ORDER — FENTANYL CITRATE 50 UG/ML
INJECTION, SOLUTION INTRAMUSCULAR; INTRAVENOUS PRN
Status: COMPLETED | OUTPATIENT
Start: 2023-09-28 | End: 2023-09-28

## 2023-09-28 RX ADMIN — FENTANYL CITRATE 50 MCG: 50 INJECTION, SOLUTION INTRAMUSCULAR; INTRAVENOUS at 09:22

## 2023-09-28 RX ADMIN — LIDOCAINE HYDROCHLORIDE 4 ML: 10 INJECTION, SOLUTION EPIDURAL; INFILTRATION; INTRACAUDAL; PERINEURAL at 09:37

## 2023-09-28 RX ADMIN — FENTANYL CITRATE 50 MCG: 50 INJECTION, SOLUTION INTRAMUSCULAR; INTRAVENOUS at 09:31

## 2023-09-28 RX ADMIN — MIDAZOLAM HYDROCHLORIDE 1 MG: 1 INJECTION, SOLUTION INTRAMUSCULAR; INTRAVENOUS at 09:32

## 2023-09-28 RX ADMIN — MIDAZOLAM HYDROCHLORIDE 1 MG: 1 INJECTION, SOLUTION INTRAMUSCULAR; INTRAVENOUS at 09:22

## 2023-09-28 NOTE — OP NOTE
Department of Interventional Radiology  [phone]        Interventional Radiology Brief Procedure Note    Patient: Boby Sheppard MRN: [id number]  SSN: [id number]    YOB: 1965  Age: 60 y.o.   Sex: female      Date of Procedure: 9/28/2023    Pre-Procedure Diagnosis: lung nodule    Post-Procedure Diagnosis: SAME    Procedure(s): Image Guided Biopsy    Brief Description of Procedure: as above    Performed By: Melony Marie MD     Assistants: None    Anesthesia:Moderate Sedation    Estimated Blood Loss: Less than 10ml    Specimens:  Pathology    Implants:  None    Findings: LLL nodule    Complications: None    Recommendations: cxr nw and 2 hours     Follow Up: as above    Signed By: Melony Marie MD     September 28, 2023

## 2023-09-28 NOTE — OR NURSING
Recovery period without difficulty. Pt alert and oriented and denies pain. Dressing is clean, dry, and intact. Reviewed discharge instructions with patient and friend, both verbalized understanding. [facility] Vital signs and Andrey score completed.

## 2023-09-28 NOTE — H&P
Department of Interventional Radiology  [phone]    History and Physical    Patient:  Nandini Wilson MRN:  [id number]  SSN:  [id number]    YOB: 1965  Age:  60 y.o. Sex:  female      Primary Care Provider:  Karey Joseph MD  Referring Physician:  Franklin Sims MD    Subjective:     Chief Complaint: Lung mass    History of the Present Illness: The patient is a 60 y.o. female who presents for CT-guided lung biopsy under moderate sedation. Patient has a history of bilateral renal cell carcinoma. Patient also has a right adrenal enhancing lesion which was unable to be biopsied due to location. She has a new 9 mm mass in the left lower lobe of the lung seen on CT scan from 9-. Patient is n.p.o. today. She has held her Eliquis over 48 hours. Past Medical History:   Diagnosis Date    Anxiety 2000    Headache 1999    Sleep apnea 2002     Past Surgical History:   Procedure Laterality Date    CHOLECYSTECTOMY  2003    IR PORT PLACEMENT EQUAL OR GREATER THAN 5 YEARS  11/30/2022    IR PORT PLACEMENT EQUAL OR GREATER THAN 5 YEARS 11/30/2022 D RADIOLOGY SPECIALS    TUBAL LIGATION  3/91    US BIOPSY OF SALIVARY GLAND  9/21/2023    US BIOPSY OF SALIVARY GLAND 9/21/2023 Lisa Cook PA-C D RADIOLOGY US        Review of Systems:    Pertinent items are noted in HPI. Prior to Admission medications    Medication Sig Start Date End Date Taking?  Authorizing Provider   Everolimus (AFINITOR) 5 MG TABS Take 1 tablet by mouth daily 9/8/23   Franklin Sims MD   Lenvatinib, 18 MG Daily Dose, 10 MG & 2 x 4 MG CPPK Take 18 mg by mouth daily 9/8/23   Franklin Sims MD   apixaban Ghislaine Sheen) 2.5 MG TABS tablet Take 1 tablet by mouth 2 times daily 8/31/23   Franklin Sims MD   ondansetron [facility]) 8 MG tablet Take 1 tablet by mouth 3 times daily as needed for Nausea or Vomiting 8/29/23   SPIKE De Santiago - CNP   potassium chloride (KLOR-CON M) 20 MEQ extended release tablet 09/21/2023 07:13 AM    GLOB 3.7 09/07/2023 07:39 AM    ALT 48 09/21/2023 07:13 AM    ALT 38 09/07/2023 07:39 AM     Lab Results   Component Value Date/Time    WBC 8.5 09/21/2023 07:13 AM    WBC 9.1 09/07/2023 07:39 AM    HGB 15.4 09/21/2023 07:13 AM    HGB 13.4 09/07/2023 07:39 AM    HCT 50.3 09/21/2023 07:13 AM    HCT 44.6 09/07/2023 07:39 AM     09/21/2023 07:13 AM     09/07/2023 07:39 AM     Lab Results   Component Value Date/Time    APTT 30.7 01/10/2023 01:28 AM    INR 1.0 01/10/2023 01:28 AM    INR 1.0 11/23/2022 02:04 PM       Assessment:     Pleasant 58-year-old female with bilateral renal cell carcinoma and right adrenal mass as well as a new 9 mm left lower lobe lung mass concerning for metastatic disease        Plan:     Planned Procedure: CT-guided lung biopsy under moderate sedation    Risks, benefits, and alternatives reviewed with patient and she agrees to proceed with the procedure.       Signed By: WERNER Ca     September 28, 2023

## 2023-10-03 ASSESSMENT — ENCOUNTER SYMPTOMS
ABDOMINAL PAIN: 0
SCLERAL ICTERUS: 0
VOICE CHANGE: 0
TROUBLE SWALLOWING: 0
WHEEZING: 0
BLOOD IN STOOL: 0
SORE THROAT: 0
ABDOMINAL DISTENTION: 0
CHEST TIGHTNESS: 0
DIARRHEA: 0
HEMOPTYSIS: 0
SHORTNESS OF BREATH: 1
BACK PAIN: 1
NAUSEA: 1

## 2023-10-05 ENCOUNTER — OFFICE VISIT (OUTPATIENT)
Dept: INTERNAL MEDICINE CLINIC | Facility: CLINIC | Age: 58
End: 2023-10-05
Payer: COMMERCIAL

## 2023-10-05 VITALS
HEART RATE: 76 BPM | DIASTOLIC BLOOD PRESSURE: 96 MMHG | HEIGHT: 66 IN | SYSTOLIC BLOOD PRESSURE: 143 MMHG | WEIGHT: 215 LBS | TEMPERATURE: 97.4 F | RESPIRATION RATE: 17 BRPM | OXYGEN SATURATION: 98 % | BODY MASS INDEX: 34.55 KG/M2

## 2023-10-05 DIAGNOSIS — C64.2 RENAL CELL CARCINOMA OF LEFT KIDNEY (HCC): ICD-10-CM

## 2023-10-05 DIAGNOSIS — F32.0 CURRENT MILD EPISODE OF MAJOR DEPRESSIVE DISORDER, UNSPECIFIED WHETHER RECURRENT (HCC): ICD-10-CM

## 2023-10-05 DIAGNOSIS — E11.65 TYPE 2 DIABETES MELLITUS WITH HYPERGLYCEMIA, WITHOUT LONG-TERM CURRENT USE OF INSULIN (HCC): Primary | ICD-10-CM

## 2023-10-05 DIAGNOSIS — Z91.030 BEE ALLERGY STATUS: ICD-10-CM

## 2023-10-05 DIAGNOSIS — F41.8 SITUATIONAL ANXIETY: ICD-10-CM

## 2023-10-05 DIAGNOSIS — C64.9 RENAL CELL CARCINOMA, UNSPECIFIED LATERALITY (HCC): ICD-10-CM

## 2023-10-05 DIAGNOSIS — I10 PRIMARY HYPERTENSION: ICD-10-CM

## 2023-10-05 PROCEDURE — 3051F HG A1C>EQUAL 7.0%<8.0%: CPT | Performed by: NURSE PRACTITIONER

## 2023-10-05 PROCEDURE — 3077F SYST BP >= 140 MM HG: CPT | Performed by: NURSE PRACTITIONER

## 2023-10-05 PROCEDURE — 99214 OFFICE O/P EST MOD 30 MIN: CPT | Performed by: NURSE PRACTITIONER

## 2023-10-05 PROCEDURE — 3080F DIAST BP >= 90 MM HG: CPT | Performed by: NURSE PRACTITIONER

## 2023-10-05 RX ORDER — AMLODIPINE BESYLATE 5 MG/1
10 TABLET ORAL DAILY
Qty: 30 TABLET | Refills: 1
Start: 2023-10-05

## 2023-10-05 RX ORDER — EVEROLIMUS 5 MG/1
5 TABLET ORAL DAILY
Qty: 30 TABLET | Refills: 5 | Status: CANCELLED | OUTPATIENT
Start: 2023-10-05

## 2023-10-05 RX ORDER — ARIPIPRAZOLE 5 MG/1
5 TABLET ORAL DAILY
Qty: 30 TABLET | Refills: 3 | Status: SHIPPED | OUTPATIENT
Start: 2023-10-05

## 2023-10-05 RX ORDER — EPINEPHRINE 0.3 MG/.3ML
0.3 INJECTION SUBCUTANEOUS AS NEEDED
Qty: 2 EACH | Refills: 3 | Status: SHIPPED | OUTPATIENT
Start: 2023-10-05

## 2023-10-05 ASSESSMENT — ENCOUNTER SYMPTOMS
VOMITING: 1
EYES NEGATIVE: 1
RESPIRATORY NEGATIVE: 1
NAUSEA: 1
ALLERGIC/IMMUNOLOGIC NEGATIVE: 1

## 2023-10-05 NOTE — PROGRESS NOTES
September 7.4, she is taking jardiance. Continuous glucose monitoring, last 30 days with glucose ranging normal 92%. Follow up labs with oncology. 3. Situational anxiety  Discussed stress reduction, she is currently having a hard time due to illness, personal stress. We will start Abilify to complement Zoloft, follow up with PCP at scheduled visit to monitor progress. - ARIPiprazole (ABILIFY) 5 MG tablet; Take 1 tablet by mouth daily  Dispense: 30 tablet; Refill: 3    4. Bee allergy status    - EPINEPHrine (EPIPEN 2-MARY) 0.3 MG/0.3ML SOAJ injection; Inject 0.3 mLs into the muscle as needed (allergic reaction) Use as directed for allergic reaction  Dispense: 2 each; Refill: [address]      Pt seen and examined with NP student. We agreed upon above plan. DM stable/improved for now while on jardiance, cgm, a1c down to 7.4, will need recheck in 3 mos but oncology is drawing labs . CGM reveals she is within target bgl most of the time. Add abilify 2mg, titrate to 5mg after 1 week  for depression/anxiety likely 2/2 her terminal stg 4 renal cell ca. However, positively, her oncologist dr Victor M Walker at [facility] called during this visit and has a plan for her to meet with dr Carole Decker to possibly discuss surgery for the renal masses and to reassure her the lung biopsy was non cancerous, however we dont have result on salivary gland biopsy done by ENT. She is going to reach out to ENT. She continues to smoke and we discussed cessation importance. BP controlled at home on amlodipine. Labs -cbc, bmp , a1c reviewed from onc, low kcl at 3.4 but she was given replacement and has recheck labs for Monday per onc. Follow up as sched with pcp next month.  BENJAMIN THOMPSON

## 2023-10-09 ENCOUNTER — HOSPITAL ENCOUNTER (OUTPATIENT)
Dept: LAB | Age: 58
Discharge: HOME | End: 2023-10-12
Payer: COMMERCIAL

## 2023-10-09 ENCOUNTER — HOSPITAL ENCOUNTER (OUTPATIENT)
Dept: INFUSION THERAPY | Age: 58
Discharge: HOME | End: 2023-10-09
Payer: COMMERCIAL

## 2023-10-09 ENCOUNTER — OFFICE VISIT (OUTPATIENT)
Dept: PALLATIVE CARE | Age: 58
End: 2023-10-09
Payer: COMMERCIAL

## 2023-10-09 ENCOUNTER — OFFICE VISIT (OUTPATIENT)
Dept: ONCOLOGY | Age: 58
End: 2023-10-09
Payer: COMMERCIAL

## 2023-10-09 VITALS
DIASTOLIC BLOOD PRESSURE: 63 MMHG | TEMPERATURE: 97.7 F | HEIGHT: 67 IN | WEIGHT: 213.4 LBS | OXYGEN SATURATION: 94 % | RESPIRATION RATE: 16 BRPM | SYSTOLIC BLOOD PRESSURE: 136 MMHG | HEART RATE: 74 BPM | BODY MASS INDEX: 33.49 KG/M2

## 2023-10-09 DIAGNOSIS — F32.A ANXIETY AND DEPRESSION: ICD-10-CM

## 2023-10-09 DIAGNOSIS — C64.2 RENAL CELL CARCINOMA OF LEFT KIDNEY (HCC): ICD-10-CM

## 2023-10-09 DIAGNOSIS — K59.00 CONSTIPATION, UNSPECIFIED CONSTIPATION TYPE: ICD-10-CM

## 2023-10-09 DIAGNOSIS — R63.0 ANOREXIA: ICD-10-CM

## 2023-10-09 DIAGNOSIS — R53.0 NEOPLASTIC MALIGNANT RELATED FATIGUE: ICD-10-CM

## 2023-10-09 DIAGNOSIS — Z51.5 ENCOUNTER FOR PALLIATIVE CARE: ICD-10-CM

## 2023-10-09 DIAGNOSIS — F41.9 ANXIETY AND DEPRESSION: ICD-10-CM

## 2023-10-09 DIAGNOSIS — R79.89 ELEVATED LFTS: Primary | ICD-10-CM

## 2023-10-09 DIAGNOSIS — E27.8 MASS OF RIGHT ADRENAL GLAND (HCC): ICD-10-CM

## 2023-10-09 DIAGNOSIS — C64.2 RENAL CELL CARCINOMA OF LEFT KIDNEY (HCC): Primary | ICD-10-CM

## 2023-10-09 DIAGNOSIS — K59.03 THERAPEUTIC OPIOID-INDUCED CONSTIPATION (OIC): ICD-10-CM

## 2023-10-09 DIAGNOSIS — R11.2 NAUSEA AND VOMITING, UNSPECIFIED VOMITING TYPE: Primary | ICD-10-CM

## 2023-10-09 DIAGNOSIS — T40.2X5A THERAPEUTIC OPIOID-INDUCED CONSTIPATION (OIC): ICD-10-CM

## 2023-10-09 LAB
ALBUMIN SERPL-MCNC: 3.5 G/DL (ref 3.5–5)
ALBUMIN/GLOB SERPL: 0.9 (ref 0.4–1.6)
ALP SERPL-CCNC: 87 U/L (ref 50–136)
ALT SERPL-CCNC: 52 U/L (ref 12–65)
ANION GAP SERPL CALC-SCNC: 5 MMOL/L (ref 2–11)
AST SERPL-CCNC: 37 U/L (ref 15–37)
BASOPHILS # BLD: 0 K/UL (ref 0–0.2)
BASOPHILS NFR BLD: 0 % (ref 0–2)
BILIRUB SERPL-MCNC: 0.4 MG/DL (ref 0.2–1.1)
BUN SERPL-MCNC: 7 MG/DL (ref 6–23)
CALCIUM SERPL-MCNC: 9.1 MG/DL (ref 8.3–10.4)
CHLORIDE SERPL-SCNC: 109 MMOL/L (ref 101–110)
CO2 SERPL-SCNC: 26 MMOL/L (ref 21–32)
CREAT SERPL-MCNC: 0.8 MG/DL (ref 0.6–1)
DIFFERENTIAL METHOD BLD: ABNORMAL
EOSINOPHIL # BLD: 0.1 K/UL (ref 0–0.8)
EOSINOPHIL NFR BLD: 1 % (ref 0.5–7.8)
ERYTHROCYTE [DISTWIDTH] IN BLOOD BY AUTOMATED COUNT: 17.9 % (ref 11.9–14.6)
GLOBULIN SER CALC-MCNC: 3.8 G/DL (ref 2.8–4.5)
GLUCOSE SERPL-MCNC: 159 MG/DL (ref 65–100)
HCT VFR BLD AUTO: 50.2 % (ref 35.8–46.3)
HGB BLD-MCNC: 15.4 G/DL (ref 11.7–15.4)
IMM GRANULOCYTES # BLD AUTO: 0 K/UL (ref 0–0.5)
IMM GRANULOCYTES NFR BLD AUTO: 0 % (ref 0–5)
LYMPHOCYTES # BLD: 2.8 K/UL (ref 0.5–4.6)
LYMPHOCYTES NFR BLD: 27 % (ref 13–44)
MCH RBC QN AUTO: 23.7 PG (ref 26.1–32.9)
MCHC RBC AUTO-ENTMCNC: 30.7 G/DL (ref 31.4–35)
MCV RBC AUTO: 77.3 FL (ref 82–102)
MONOCYTES # BLD: 0.6 K/UL (ref 0.1–1.3)
MONOCYTES NFR BLD: 6 % (ref 4–12)
NEUTS SEG # BLD: 6.8 K/UL (ref 1.7–8.2)
NEUTS SEG NFR BLD: 66 % (ref 43–78)
NRBC # BLD: 0 K/UL (ref 0–0.2)
PLATELET # BLD AUTO: 244 K/UL (ref 150–450)
PMV BLD AUTO: 10.9 FL (ref 9.4–12.3)
POTASSIUM SERPL-SCNC: 3.4 MMOL/L (ref 3.5–5.1)
PROT SERPL-MCNC: 7.3 G/DL (ref 6.3–8.2)
RBC # BLD AUTO: 6.49 M/UL (ref 4.05–5.2)
SODIUM SERPL-SCNC: 140 MMOL/L (ref 133–143)
WBC # BLD AUTO: 10.3 K/UL (ref 4.3–11.1)

## 2023-10-09 PROCEDURE — 85025 COMPLETE CBC W/AUTO DIFF WBC: CPT

## 2023-10-09 PROCEDURE — 80053 COMPREHEN METABOLIC PANEL: CPT

## 2023-10-09 PROCEDURE — 96360 HYDRATION IV INFUSION INIT: CPT

## 2023-10-09 PROCEDURE — 2580000003 HC RX 258: Performed by: INTERNAL MEDICINE

## 2023-10-09 PROCEDURE — 36415 COLL VENOUS BLD VENIPUNCTURE: CPT

## 2023-10-09 PROCEDURE — 99215 OFFICE O/P EST HI 40 MIN: CPT | Performed by: INTERNAL MEDICINE

## 2023-10-09 PROCEDURE — 99205 OFFICE O/P NEW HI 60 MIN: CPT | Performed by: PHYSICIAN ASSISTANT

## 2023-10-09 RX ORDER — SENNA AND DOCUSATE SODIUM 50; 8.6 MG/1; MG/1
1 TABLET, FILM COATED ORAL DAILY
Qty: 30 TABLET | Refills: 2 | Status: SHIPPED | OUTPATIENT
Start: 2023-10-09

## 2023-10-09 RX ORDER — SODIUM CHLORIDE 9 MG/ML
5-250 INJECTION, SOLUTION INTRAVENOUS PRN
OUTPATIENT
Start: 2023-10-09

## 2023-10-09 RX ORDER — SODIUM CHLORIDE 0.9 % (FLUSH) 0.9 %
5-40 SYRINGE (ML) INJECTION PRN
Status: DISCONTINUED | OUTPATIENT
Start: 2023-10-09 | End: 2023-10-10 | Stop reason: HOSPADM

## 2023-10-09 RX ORDER — BUSPIRONE HYDROCHLORIDE 10 MG/1
10 TABLET ORAL 2 TIMES DAILY
Qty: 60 TABLET | Refills: 0 | Status: SHIPPED | OUTPATIENT
Start: 2023-10-09 | End: 2023-11-08

## 2023-10-09 RX ORDER — HEPARIN 100 UNIT/ML
500 SYRINGE INTRAVENOUS PRN
OUTPATIENT
Start: 2023-10-09

## 2023-10-09 RX ORDER — HEPARIN 100 UNIT/ML
500 SYRINGE INTRAVENOUS PRN
Status: DISCONTINUED | OUTPATIENT
Start: 2023-10-09 | End: 2023-10-10 | Stop reason: HOSPADM

## 2023-10-09 RX ORDER — SODIUM CHLORIDE 0.9 % (FLUSH) 0.9 %
5-40 SYRINGE (ML) INJECTION PRN
OUTPATIENT
Start: 2023-10-09

## 2023-10-09 RX ORDER — 0.9 % SODIUM CHLORIDE 0.9 %
1000 INTRAVENOUS SOLUTION INTRAVENOUS ONCE
Status: CANCELLED
Start: 2023-10-09 | End: 2023-10-09

## 2023-10-09 RX ORDER — CLONAZEPAM 1 MG/1
1 TABLET ORAL 2 TIMES DAILY PRN
Qty: 60 TABLET | Refills: 0 | Status: SHIPPED | OUTPATIENT
Start: 2023-10-09 | End: 2023-11-08

## 2023-10-09 RX ORDER — 0.9 % SODIUM CHLORIDE 0.9 %
1000 INTRAVENOUS SOLUTION INTRAVENOUS ONCE
Status: COMPLETED | OUTPATIENT
Start: 2023-10-09 | End: 2023-10-09

## 2023-10-09 RX ORDER — SODIUM CHLORIDE 9 MG/ML
5-250 INJECTION, SOLUTION INTRAVENOUS PRN
Status: DISCONTINUED | OUTPATIENT
Start: 2023-10-09 | End: 2023-10-10 | Stop reason: HOSPADM

## 2023-10-09 RX ORDER — OLANZAPINE 5 MG/1
5 TABLET ORAL NIGHTLY
Qty: 30 TABLET | Refills: 3 | Status: SHIPPED | OUTPATIENT
Start: 2023-10-09

## 2023-10-09 RX ADMIN — SODIUM CHLORIDE 1000 ML: 9 INJECTION, SOLUTION INTRAVENOUS at 16:31

## 2023-10-09 RX ADMIN — SODIUM CHLORIDE, PRESERVATIVE FREE 10 ML: 5 INJECTION INTRAVENOUS at 16:30

## 2023-10-09 RX ADMIN — SODIUM CHLORIDE, PRESERVATIVE FREE 10 ML: 5 INJECTION INTRAVENOUS at 17:36

## 2023-10-09 ASSESSMENT — ENCOUNTER SYMPTOMS
VOMITING: 1
CONSTIPATION: 1
BACK PAIN: 1
NAUSEA: 1

## 2023-10-09 ASSESSMENT — PAIN SCALES - GENERAL: PAINLEVEL_OUTOF10: 0

## 2023-10-09 NOTE — PROGRESS NOTES
Arrived to the [address] [place] [address]. [facility]. IVF completed. Patient tolerated without difficulty. Any issues or concerns during appointment: None. Patient aware of next lab and [facility] office visit on 12/08 (date) at 1200 (time). Patient instructed to call provider with temperature of 100.4 or greater or nausea/vomiting/ diarrhea or pain not controlled by medications  Discharged ambulatory.

## 2023-10-09 NOTE — PROGRESS NOTES
8.2 g/dL    Albumin 3.5 3.5 - 5.0 g/dL    Globulin 3.8 2.8 - 4.5 g/dL    Albumin/Globulin Ratio 0.9 0.4 - 1.6         Imaging:  No results found for this or any previous visit. [unfilled]    ASSESSMENT:   Diagnosis Orders   1. Nausea and vomiting, unspecified vomiting type        2. Encounter for palliative care  clonazePAM (KLONOPIN) 1 MG tablet      3. Renal cell carcinoma of left kidney (HCC)  clonazePAM (KLONOPIN) 1 MG tablet      4. Mass of right adrenal gland (HCC)  clonazePAM (KLONOPIN) 1 MG tablet      5. Anxiety and depression  clonazePAM (KLONOPIN) 1 MG tablet      6. Therapeutic opioid-induced constipation (OIC)        7. Neoplastic malignant related fatigue        8. Anorexia              PLAN:  Lab studies and imaging studies were personally reviewed. Pertinent old records were reviewed from [address] and [facility]. Case discussed with Dr. Omi Skaggs. Nausea with vomiting: After much discussion, patient agreeable to start Zyprexa 5mg qhs. Discussed that this dose will hopefully not cause excessive weight gain. She has also had significant weight loss since this summer. We discussed Marinol, which will be an alternative option if she does not find relief with Zyprexa. Suspect she also is having significant constipation/ileus contributing to her nausea. Constipation: She is not on opiates. Encouraged laxative use, even to consider suppository or enema to help restore Bms. Anxiety and depression: Pt to not take Abilify. Zyprexa also adjunctive treatment for MDD. Cont Zoloft. Rx'd Klonopin in stead of Ativan and Buspar to take during the day. Anorexia: Hopefully will improve once N/V and constipation treated. Social situation: Pt has a somewhat precarious home situation. She was offered social work support, which she politely declined at her visit with Dr. Omi Skaggs. She currently states she does not feel that she is in danger. Advanced Care Planning Discussed: None today.  Discussed role of PC and

## 2023-10-09 NOTE — PATIENT INSTRUCTIONS
You were prescribed the following today:   1) Zyprexa 5mg to take every night. This is to help with nausea. Please let us know if you start having excessive weight gain, or, if it is not helping your symptoms after a few days. 2) Klonopin 1mg tabs to take two times a day AS NEEDED for anxiety. This is replacing your Ativan. 3) Buspar 10mg twice daily. You can take this as needed. You may also take 1/2 a tab at a time. This is also for anxiety. For constipation - Dr. Manju Boogie prescribed you Pericolace. Take this daily.

## 2023-10-09 NOTE — PATIENT INSTRUCTIONS
Patient Instructions from Today's Visit    Reason for Visit:  Follow Up    Diagnosis Information:  [email or web address] net/[email or web address]-us/[email or web address]-answers-patient-education-materials/vjlo-enrdenk-bbjn-sheets    Plan:  Labs reviewed. Symptoms reviewed. Biopsies and pathology reviewed. CT of neck, chest, abdomen, and pelvis due November. You can call to schedule this at [phone]. Reviewed how to take Zofran, side effects, etc.  Zofran is taken every 8 hours as needed for nausea and vomiting. Discussed options for anti-anxiety and anti-nausea medications. Try Miralax or Pericolace (we will prescribe) - if that does not help in a couple of days, let us know. Then we will prescribe lactulose. Increase fluid intake. Follow Up: After CT scan.     Recent Lab Results:  Hospital Outpatient Visit on 10/09/2023   Component Date Value Ref Range Status    WBC 10/09/2023 10.3  4.3 - 11.1 K/uL Final    RBC 10/09/2023 6.49 (H)  4.05 - 5.2 M/uL Final    Hemoglobin 10/09/2023 15.4  11.7 - 15.4 g/dL Final    Hematocrit 10/09/2023 50.2 (H)  35.8 - 46.3 % Final    MCV 10/09/2023 77.3 (L)  82.0 - 102.0 FL Final    MCH 10/09/2023 23.7 (L)  26.1 - 32.9 PG Final    MCHC 10/09/2023 30.7 (L)  31.4 - 35.0 g/dL Final    RDW 10/09/2023 17.9 (H)  11.9 - 14.6 % Final    Platelets 00/03/5115 244  150 - 450 K/uL Final    MPV 10/09/2023 10.9  9.4 - 12.3 FL Final    nRBC 10/09/2023 0.00  0.0 - 0.2 K/uL Final    **Note: Absolute NRBC parameter is now reported with Hemogram**    Differential Type 10/09/2023 AUTOMATED    Final    Neutrophils % 10/09/2023 66  43 - 78 % Final    Lymphocytes % 10/09/2023 27  13 - 44 % Final    Monocytes % 10/09/2023 6  4.0 - 12.0 % Final    Eosinophils % 10/09/2023 1  0.5 - 7.8 % Final    Basophils % 10/09/2023 0  0.0 - 2.0 % Final    Immature Granulocytes 10/09/2023 0  0.0 - 5.0 % Final    Neutrophils Absolute 10/09/2023 6.8  1.7 - 8.2 K/UL Final    Lymphocytes Absolute 10/09/2023 2.8  0.5 - 4.6 K/UL Final

## 2023-10-16 ENCOUNTER — HOSPITAL ENCOUNTER (OUTPATIENT)
Dept: GENERAL RADIOLOGY | Age: 58
Discharge: HOME | End: 2023-10-19
Attending: INTERNAL MEDICINE
Payer: COMMERCIAL

## 2023-10-16 DIAGNOSIS — R11.2 NAUSEA AND VOMITING, UNSPECIFIED VOMITING TYPE: ICD-10-CM

## 2023-10-16 PROCEDURE — 74250 X-RAY XM SM INT 1CNTRST STD: CPT

## 2023-10-16 PROCEDURE — 2500000003 HC RX 250 WO HCPCS: Performed by: INTERNAL MEDICINE

## 2023-10-16 RX ADMIN — BARIUM SULFATE 1200 ML: 240 SUSPENSION ORAL at 09:30

## 2023-10-17 DIAGNOSIS — C64.2 RENAL CELL CARCINOMA OF LEFT KIDNEY (HCC): Primary | ICD-10-CM

## 2023-10-17 PROBLEM — K59.00 CONSTIPATION: Status: ACTIVE | Noted: 2023-10-17

## 2023-10-17 ASSESSMENT — ENCOUNTER SYMPTOMS
CONSTIPATION: 1
VOMITING: 0

## 2023-10-19 DIAGNOSIS — C64.9 RENAL CELL CARCINOMA, UNSPECIFIED LATERALITY (HCC): ICD-10-CM

## 2023-10-19 DIAGNOSIS — I10 PRIMARY HYPERTENSION: ICD-10-CM

## 2023-10-20 ENCOUNTER — TELEPHONE (OUTPATIENT)
Dept: RADIATION ONCOLOGY | Age: 58
End: 2023-10-20

## 2023-10-20 DIAGNOSIS — R11.2 CHEMOTHERAPY INDUCED NAUSEA AND VOMITING: ICD-10-CM

## 2023-10-20 DIAGNOSIS — Z51.5 ENCOUNTER FOR PALLIATIVE CARE: Primary | ICD-10-CM

## 2023-10-20 DIAGNOSIS — C64.2 RENAL CELL CARCINOMA OF LEFT KIDNEY (HCC): ICD-10-CM

## 2023-10-20 DIAGNOSIS — R11.2 NAUSEA AND VOMITING, UNSPECIFIED VOMITING TYPE: ICD-10-CM

## 2023-10-20 DIAGNOSIS — T45.1X5A CHEMOTHERAPY INDUCED NAUSEA AND VOMITING: ICD-10-CM

## 2023-10-20 RX ORDER — LENVATINIB 18 MG/DAY
KIT ORAL
OUTPATIENT
Start: 2023-10-20

## 2023-10-20 RX ORDER — DRONABINOL 2.5 MG/1
5 CAPSULE ORAL EVERY 6 HOURS PRN
Qty: 120 CAPSULE | Refills: 0 | Status: SHIPPED | OUTPATIENT
Start: 2023-10-20 | End: 2023-11-19

## 2023-10-20 NOTE — PROGRESS NOTES
Prescription for Marinol sent to pharmacy for chemo induced N/V. I have reviewed the patient's controlled substance prescription history, as maintained in the [place] prescription monitoring program, so that the prescriptions(s) for a controlled substance can be given.   Last Date Reviewed:   10/20/23    Nikki Morales PA-C

## 2023-10-23 ENCOUNTER — TELEPHONE (OUTPATIENT)
Dept: RADIATION ONCOLOGY | Age: 58
End: 2023-10-23

## 2023-10-24 RX ORDER — AMLODIPINE BESYLATE 5 MG/1
5 TABLET ORAL DAILY
Qty: 30 TABLET | Refills: 0 | OUTPATIENT
Start: 2023-10-24

## 2023-10-26 DIAGNOSIS — T45.1X5A CHEMOTHERAPY INDUCED NAUSEA AND VOMITING: ICD-10-CM

## 2023-10-26 DIAGNOSIS — R11.2 CHEMOTHERAPY INDUCED NAUSEA AND VOMITING: ICD-10-CM

## 2023-10-26 DIAGNOSIS — Z51.5 ENCOUNTER FOR PALLIATIVE CARE: ICD-10-CM

## 2023-10-26 DIAGNOSIS — C64.2 RENAL CELL CARCINOMA OF LEFT KIDNEY (HCC): ICD-10-CM

## 2023-10-26 DIAGNOSIS — R11.2 NAUSEA AND VOMITING, UNSPECIFIED VOMITING TYPE: ICD-10-CM

## 2023-10-26 NOTE — TELEPHONE ENCOUNTER
Physician provider: Jerson Leone MD  Reason for today's call: Medication refill  Last office visit: n/a    Patient notified that their information will be routed to the [facility] clinical triage team for review. Patient is advised that they will receive a phone call from the triage department. If symptoms worsen before receiving a call back, the patient has been advised to proceed to the nearest ED. Pt called requesting to have Rx for: Marinol 2.5 to be sent to: [facility] to be filled.

## 2023-10-26 NOTE — TELEPHONE ENCOUNTER
Unsuccessful attempt to contact patient. LVM informing Marinol cannot be sent to a mail order pharmacy. Instructed to call be for any questions or concerns.

## 2023-10-27 DIAGNOSIS — C64.9 RENAL CELL CARCINOMA, UNSPECIFIED LATERALITY (HCC): ICD-10-CM

## 2023-10-27 DIAGNOSIS — K13.79 MOUTH PAIN: ICD-10-CM

## 2023-10-27 DIAGNOSIS — I10 PRIMARY HYPERTENSION: ICD-10-CM

## 2023-10-27 RX ORDER — AMLODIPINE BESYLATE 5 MG/1
10 TABLET ORAL DAILY
Qty: 180 TABLET | Refills: 3 | Status: SHIPPED | OUTPATIENT
Start: 2023-10-27

## 2023-10-27 NOTE — TELEPHONE ENCOUNTER
Physician provider: Miguel Dunn MD  Reason for today's call: medication  Last office visit:n/a    Patient notified that their information will be routed to the [facility] clinical triage team for review. Patient is advised that they will receive a phone call from the triage department. Pt state she called [facility] prior to calling our office & they didn't have medication Marinol but state they have Pre authorization but they need the Order for prescription. Please contact pt with update.       [address] [facility], [address] [place] [address]

## 2023-10-28 RX ORDER — DRONABINOL 2.5 MG/1
5 CAPSULE ORAL EVERY 6 HOURS PRN
Qty: 120 CAPSULE | Refills: 0 | Status: SHIPPED | OUTPATIENT
Start: 2023-10-28 | End: 2023-11-27

## 2023-10-30 RX ORDER — AMLODIPINE BESYLATE 5 MG/1
5 TABLET ORAL DAILY
Qty: 30 TABLET | Refills: 1 | OUTPATIENT
Start: 2023-10-30

## 2023-10-31 ENCOUNTER — TELEMEDICINE (OUTPATIENT)
Dept: PALLATIVE CARE | Age: 58
End: 2023-10-31
Payer: COMMERCIAL

## 2023-10-31 DIAGNOSIS — Z51.5 ENCOUNTER FOR PALLIATIVE CARE: ICD-10-CM

## 2023-10-31 DIAGNOSIS — C64.2 RENAL CELL CARCINOMA OF LEFT KIDNEY (HCC): ICD-10-CM

## 2023-10-31 DIAGNOSIS — R11.2 NAUSEA AND VOMITING, UNSPECIFIED VOMITING TYPE: Primary | ICD-10-CM

## 2023-10-31 DIAGNOSIS — F32.A ANXIETY AND DEPRESSION: ICD-10-CM

## 2023-10-31 DIAGNOSIS — F41.9 ANXIETY AND DEPRESSION: ICD-10-CM

## 2023-10-31 DIAGNOSIS — K13.79 MOUTH PAIN: ICD-10-CM

## 2023-10-31 DIAGNOSIS — G89.3 CANCER ASSOCIATED PAIN: ICD-10-CM

## 2023-10-31 PROCEDURE — 99214 OFFICE O/P EST MOD 30 MIN: CPT | Performed by: PHYSICIAN ASSISTANT

## 2023-10-31 RX ORDER — OXYCODONE HYDROCHLORIDE 5 MG/1
5 TABLET ORAL EVERY 8 HOURS PRN
Qty: 90 TABLET | Refills: 0 | Status: SHIPPED | OUTPATIENT
Start: 2023-10-31 | End: 2023-11-30

## 2023-10-31 RX ORDER — PROMETHAZINE HYDROCHLORIDE 25 MG/1
25 TABLET ORAL EVERY 6 HOURS PRN
Qty: 60 TABLET | Refills: 1 | Status: SHIPPED | OUTPATIENT
Start: 2023-10-31

## 2023-10-31 ASSESSMENT — ENCOUNTER SYMPTOMS
NAUSEA: 1
VOMITING: 1
DIARRHEA: 1
CONSTIPATION: 1
ABDOMINAL PAIN: 1
BACK PAIN: 1

## 2023-10-31 NOTE — PROGRESS NOTES
Dot Martinez, was evaluated through a synchronous (real-time) audio-video encounter. The patient (or guardian if applicable) is aware that this is a billable service, which includes applicable co-pays. This Virtual Visit was conducted with patient's (and/or legal guardian's) consent. Patient identification was verified, and a caregiver was present when appropriate. The patient was located at Home: [address] [facility]  Provider was located at Health system (Appt Dept): [address] [facility],  [address] [facility]     Total time spent for this encounter: Not billed by time    --Yesenia Garber PA-C on 10/31/2023 at 2:52 PM    An electronic signature was used to authenticate this note. Outpatient Palliative Care at the  [address] [place] [address]: Office Visit Established Patient    Diagnosis: Metastatic renal cell carcinoma     Treatment Plan: axitinib/pebrolizumab -> lenvatinib/everolimus      Treatment Intent: Palliative     Medical Oncologist: Dr. Shelton Pérez, Dr. Dany Lyons Oncologist: None     Navigator: None      Chief Complaint:    Chief Complaint   Patient presents with    Follow-up         History of Present Illness:  Ms. Cruz Rangel is a 60 y.o. female who presents today for evaluation regarding establishing care with palliative care. 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 Despite attending physical therapy since the accident, she continued to experience the same low back pain. She was seen at [address] [place] [address] on 10/4/22. MRI of the lumbar spine on 10/15/22 showed mild degenerative disc disease at L3-4 through L5-S1 and a 5.7 x 6.1 x 7 cm lower pole solid left renal mass. Urgent referral was placed to [facility], who referred the patient to [facility] for uro/onc evaluation and treatment of renal mass.   CT AP on 10/26/22 showed

## 2023-11-01 RX ORDER — BUSPIRONE HYDROCHLORIDE 10 MG/1
10 TABLET ORAL 2 TIMES DAILY
Qty: 60 TABLET | Refills: 0 | Status: SHIPPED | OUTPATIENT
Start: 2023-11-01

## 2023-11-07 RX ORDER — PANTOPRAZOLE SODIUM 40 MG/1
40 TABLET, DELAYED RELEASE ORAL
Qty: 30 TABLET | Refills: 1 | OUTPATIENT
Start: 2023-11-07

## 2023-11-07 RX ORDER — AXITINIB 5 MG/1
TABLET, FILM COATED ORAL
Qty: 60 TABLET | Refills: 1 | OUTPATIENT
Start: 2023-11-07

## 2023-11-07 RX ORDER — PREDNISONE 10 MG/1
TABLET ORAL
Qty: 30 TABLET | Refills: 0 | OUTPATIENT
Start: 2023-11-07

## 2023-11-07 RX ORDER — PANTOPRAZOLE SODIUM 40 MG/1
TABLET, DELAYED RELEASE ORAL
Qty: 30 TABLET | Refills: 0 | OUTPATIENT
Start: 2023-11-07

## 2023-11-07 ASSESSMENT — ENCOUNTER SYMPTOMS
BACK PAIN: 1
HEMOPTYSIS: 0
ABDOMINAL DISTENTION: 0
CONSTIPATION: 1
SCLERAL ICTERUS: 0
DIARRHEA: 0
CHEST TIGHTNESS: 0
WHEEZING: 0
BLOOD IN STOOL: 0
VOMITING: 0
NAUSEA: 1
SHORTNESS OF BREATH: 1
VOICE CHANGE: 0
TROUBLE SWALLOWING: 0
SORE THROAT: 0
ABDOMINAL PAIN: 0

## 2023-11-07 NOTE — PROGRESS NOTES
here for VV follow-up. She is feeling a little better in terms of nausea. Seeing Dr Mandi Mack - started on Ativan to help with nausea. + pain - will refer to [facility] with her approval for symptom management. Saw ENT re submandibular LN - bx planned 9/21. Discussed recent scans - POD concern. Will plan for bx of LLL lesion. She is doing ok otherwise. She will c/w tx.    -  here today for follow-up after recent biopsies. She underwent a submandibular lymph node biopsy on 9/21 but cytology was nondiagnostic. ENT felt that this could be repeated or we could wait for repeat imaging and reassess. Pt wishes to wait and repeat imaging. Left lower lung biopsy negative for malignancy. Brain MRI negative for evidence of lesions. She continues on lenvatinib/everolimus and tolerating it relatively okay. She continues to suffer from nausea which is now exacerbated by constipation. At last visit we discussed the possibility of Zyprexa which she declined. Today she is being seen by palliative care nurse practitioner Eloise Oneil. We discussed the possibility of doing Zyprexa again or Marinol. Patient has concern about Zyprexa due to previous use and significant weight gain when she took it for bipolar disease. She wishes to proceed with Marinol. I defer this to PCP management of her symptoms. She will be seeing Dr. Dorene Canavan (s) on 10/25 for evaluation regarding eligibility of renal tumors resection. We will plan for restaging imaging with CT neck chest abdomen pelvis in mid November. Patient with worsening anxiety as well as depression. She shared that she had a family member kill themselves in front of her couple of years ago. She has some stressors with her son who lives with her. She feels relatively safe at home. Ativan is no longer working, she is considering changing back to Klonopin with her PCP. We also discussed BuSpar use versus Abilify.   She wishes to proceed with BuSpar and can use it as needed with

## 2023-11-08 ENCOUNTER — HOSPITAL ENCOUNTER (OUTPATIENT)
Dept: LAB | Age: 58
Discharge: HOME | End: 2023-11-11
Payer: COMMERCIAL

## 2023-11-08 ENCOUNTER — TELEMEDICINE (OUTPATIENT)
Dept: ONCOLOGY | Age: 58
End: 2023-11-08

## 2023-11-08 DIAGNOSIS — I82.90 OCCLUSIVE THROMBUS: ICD-10-CM

## 2023-11-08 DIAGNOSIS — C64.2 RENAL CELL CARCINOMA OF LEFT KIDNEY (HCC): Primary | ICD-10-CM

## 2023-11-08 DIAGNOSIS — E87.6 HYPOKALEMIA: ICD-10-CM

## 2023-11-08 DIAGNOSIS — Z79.899 HIGH RISK MEDICATION USE: ICD-10-CM

## 2023-11-08 DIAGNOSIS — C64.2 RENAL CELL CARCINOMA OF LEFT KIDNEY (HCC): ICD-10-CM

## 2023-11-08 LAB
ALBUMIN SERPL-MCNC: 3.4 G/DL (ref 3.5–5)
ALBUMIN/GLOB SERPL: 0.9 (ref 0.4–1.6)
ALP SERPL-CCNC: 89 U/L (ref 50–136)
ALT SERPL-CCNC: 37 U/L (ref 12–65)
ANION GAP SERPL CALC-SCNC: 8 MMOL/L (ref 2–11)
AST SERPL-CCNC: 29 U/L (ref 15–37)
BASOPHILS # BLD: 0 K/UL (ref 0–0.2)
BASOPHILS NFR BLD: 0 % (ref 0–2)
BILIRUB SERPL-MCNC: 0.3 MG/DL (ref 0.2–1.1)
BUN SERPL-MCNC: 9 MG/DL (ref 6–23)
CALCIUM SERPL-MCNC: 8.6 MG/DL (ref 8.3–10.4)
CHLORIDE SERPL-SCNC: 105 MMOL/L (ref 101–110)
CO2 SERPL-SCNC: 26 MMOL/L (ref 21–32)
CREAT SERPL-MCNC: 0.7 MG/DL (ref 0.6–1)
DIFFERENTIAL METHOD BLD: ABNORMAL
EOSINOPHIL # BLD: 0.1 K/UL (ref 0–0.8)
EOSINOPHIL NFR BLD: 1 % (ref 0.5–7.8)
ERYTHROCYTE [DISTWIDTH] IN BLOOD BY AUTOMATED COUNT: 17.4 % (ref 11.9–14.6)
GLOBULIN SER CALC-MCNC: 3.6 G/DL (ref 2.8–4.5)
GLUCOSE SERPL-MCNC: 200 MG/DL (ref 65–100)
HCT VFR BLD AUTO: 51 % (ref 35.8–46.3)
HGB BLD-MCNC: 15.8 G/DL (ref 11.7–15.4)
IMM GRANULOCYTES # BLD AUTO: 0 K/UL (ref 0–0.5)
IMM GRANULOCYTES NFR BLD AUTO: 0 % (ref 0–5)
LYMPHOCYTES # BLD: 2.8 K/UL (ref 0.5–4.6)
LYMPHOCYTES NFR BLD: 31 % (ref 13–44)
MCH RBC QN AUTO: 23.4 PG (ref 26.1–32.9)
MCHC RBC AUTO-ENTMCNC: 31 G/DL (ref 31.4–35)
MCV RBC AUTO: 75.6 FL (ref 82–102)
MONOCYTES # BLD: 0.5 K/UL (ref 0.1–1.3)
MONOCYTES NFR BLD: 5 % (ref 4–12)
NEUTS SEG # BLD: 5.5 K/UL (ref 1.7–8.2)
NEUTS SEG NFR BLD: 63 % (ref 43–78)
NRBC # BLD: 0 K/UL (ref 0–0.2)
PLATELET # BLD AUTO: 219 K/UL (ref 150–450)
PMV BLD AUTO: 10.7 FL (ref 9.4–12.3)
POTASSIUM SERPL-SCNC: 3.2 MMOL/L (ref 3.5–5.1)
PROT SERPL-MCNC: 7 G/DL (ref 6.3–8.2)
RBC # BLD AUTO: 6.75 M/UL (ref 4.05–5.2)
SODIUM SERPL-SCNC: 139 MMOL/L (ref 133–143)
WBC # BLD AUTO: 9 K/UL (ref 4.3–11.1)

## 2023-11-08 PROCEDURE — 85025 COMPLETE CBC W/AUTO DIFF WBC: CPT

## 2023-11-08 PROCEDURE — 80053 COMPREHEN METABOLIC PANEL: CPT

## 2023-11-08 PROCEDURE — 36415 COLL VENOUS BLD VENIPUNCTURE: CPT

## 2023-11-08 RX ORDER — POTASSIUM CHLORIDE 750 MG/1
10 CAPSULE, EXTENDED RELEASE ORAL DAILY
Qty: 15 CAPSULE | Refills: 0 | Status: SHIPPED | OUTPATIENT
Start: 2023-11-08

## 2023-11-08 NOTE — PATIENT INSTRUCTIONS
Patient Instructions from Today's Visit    Reason for Visit:  Follow Up    Diagnosis Information:  [email or web address]    Plan:  Labs reviewed. Symptoms reviewed. We will send prescription for potassium - 2 capsules today, 2 capsules tomorrow, and then one capsule daily. Follow Up:  Labs in December.     Recent Lab Results:  Hospital Outpatient Visit on 11/08/2023   Component Date Value Ref Range Status    WBC 11/08/2023 9.0  4.3 - 11.1 K/uL Final    RBC 11/08/2023 6.75 (H)  4.05 - 5.2 M/uL Final    Hemoglobin 11/08/2023 15.8 (H)  11.7 - 15.4 g/dL Final    Hematocrit 11/08/2023 51.0 (H)  35.8 - 46.3 % Final    MCV 11/08/2023 75.6 (L)  82.0 - 102.0 FL Final    MCH 11/08/2023 23.4 (L)  26.1 - 32.9 PG Final    MCHC 11/08/2023 31.0 (L)  31.4 - 35.0 g/dL Final    RDW 11/08/2023 17.4 (H)  11.9 - 14.6 % Final    Platelets 62/82/8423 219  150 - 450 K/uL Final    MPV 11/08/2023 10.7  9.4 - 12.3 FL Final    nRBC 11/08/2023 0.00  0.0 - 0.2 K/uL Final    **Note: Absolute NRBC parameter is now reported with Hemogram**    Neutrophils % 11/08/2023 63  43 - 78 % Final    Lymphocytes % 11/08/2023 31  13 - 44 % Final    Monocytes % 11/08/2023 5  4.0 - 12.0 % Final    Eosinophils % 11/08/2023 1  0.5 - 7.8 % Final    Basophils % 11/08/2023 0  0.0 - 2.0 % Final    Immature Granulocytes 11/08/2023 0  0.0 - 5.0 % Final    Neutrophils Absolute 11/08/2023 5.5  1.7 - 8.2 K/UL Final    Lymphocytes Absolute 11/08/2023 2.8  0.5 - 4.6 K/UL Final    Monocytes Absolute 11/08/2023 0.5  0.1 - 1.3 K/UL Final    Eosinophils Absolute 11/08/2023 0.1  0.0 - 0.8 K/UL Final    Basophils Absolute 11/08/2023 0.0  0.0 - 0.2 K/UL Final    Absolute Immature Granulocyte 11/08/2023 0.0  0.0 - 0.5 K/UL Final    Differential Type 11/08/2023 AUTOMATED    Final    Sodium 11/08/2023 139  133 - 143 mmol/L Final    Potassium 11/08/2023 3.2 (L)  3.5 - 5.1 mmol/L Final    Chloride

## 2023-11-12 PROBLEM — E87.6 HYPOKALEMIA: Status: ACTIVE | Noted: 2023-11-12

## 2023-11-13 RX ORDER — AXITINIB 1 MG/1
TABLET, FILM COATED ORAL
Qty: 120 TABLET | Refills: 1 | OUTPATIENT
Start: 2023-11-13

## 2023-11-13 RX ORDER — AXITINIB 5 MG/1
TABLET, FILM COATED ORAL
Qty: 60 TABLET | Refills: 1 | OUTPATIENT
Start: 2023-11-13

## 2023-11-14 ENCOUNTER — TELEMEDICINE (OUTPATIENT)
Dept: PALLATIVE CARE | Age: 58
End: 2023-11-14
Payer: COMMERCIAL

## 2023-11-14 ENCOUNTER — TELEPHONE (OUTPATIENT)
Dept: ONCOLOGY | Age: 58
End: 2023-11-14

## 2023-11-14 DIAGNOSIS — F41.9 ANXIETY AND DEPRESSION: ICD-10-CM

## 2023-11-14 DIAGNOSIS — R11.2 NAUSEA AND VOMITING, UNSPECIFIED VOMITING TYPE: Primary | ICD-10-CM

## 2023-11-14 DIAGNOSIS — E27.8 MASS OF RIGHT ADRENAL GLAND (HCC): ICD-10-CM

## 2023-11-14 DIAGNOSIS — C64.2 RENAL CELL CARCINOMA OF LEFT KIDNEY (HCC): ICD-10-CM

## 2023-11-14 DIAGNOSIS — R63.0 ANOREXIA: ICD-10-CM

## 2023-11-14 DIAGNOSIS — G89.3 CANCER ASSOCIATED PAIN: ICD-10-CM

## 2023-11-14 DIAGNOSIS — Z51.5 ENCOUNTER FOR PALLIATIVE CARE: ICD-10-CM

## 2023-11-14 DIAGNOSIS — F32.A ANXIETY AND DEPRESSION: ICD-10-CM

## 2023-11-14 PROCEDURE — 99213 OFFICE O/P EST LOW 20 MIN: CPT | Performed by: PHYSICIAN ASSISTANT

## 2023-11-14 RX ORDER — CLONAZEPAM 1 MG/1
1 TABLET ORAL 2 TIMES DAILY PRN
Qty: 60 TABLET | Refills: 0 | Status: SHIPPED | OUTPATIENT
Start: 2023-11-14 | End: 2023-12-14

## 2023-11-14 RX ORDER — DRONABINOL 5 MG/1
5 CAPSULE ORAL EVERY 6 HOURS PRN
Qty: 120 CAPSULE | Refills: 0 | Status: SHIPPED | OUTPATIENT
Start: 2023-11-14 | End: 2023-12-14

## 2023-11-14 ASSESSMENT — ENCOUNTER SYMPTOMS
CONSTIPATION: 1
NAUSEA: 1
BACK PAIN: 1
ABDOMINAL PAIN: 1

## 2023-11-14 NOTE — TELEPHONE ENCOUNTER
Physician provider: Nicci Tejada MD  Reason for today's call: PA needed  Last office visit:n/a    Patient notified that their information will be routed to the [facility] clinical triage team for review. Patient is advised that they will receive a phone call from the triage department. If symptoms worsen before receiving a call back, the patient has been advised to proceed to the nearest ED. [facility] called stating Pt's new Rx for: Dronabinol 5 MG requires a PA to process (prior dosage did not require a PA). Kemal [facility] asks for call to update when this has been done to fill Rx.

## 2023-11-14 NOTE — PROGRESS NOTES
have reviewed the patient's controlled substance prescription history, as maintained in the [place] prescription monitoring program, so that the prescriptions(s) for a controlled substance can be given.   Last Date Reviewed: 11/14/23         Elfego Zacarias PA-C  Outpatient Palliative Care at the  [address]  Office : [phone]  Fax : [phone]

## 2023-11-15 ENCOUNTER — CLINICAL DOCUMENTATION (OUTPATIENT)
Dept: ONCOLOGY | Age: 58
End: 2023-11-15

## 2023-11-16 NOTE — PROGRESS NOTES
Patient has requested that a prior authorization request be completed for her dronabinol 5mg capsules. Prior authorization request initiated through [employer] under Key Code [place]. Patient demographics and clinical information submitted through the portal and sent to [employer] for medical review. The oncology office visit note dated 10/9/23 was uploaded to the portal for medical review. Pending PA Case ID [id number].    11/16/23  Telephone encounter received regarding clinical documentation to support the PA request that was initiated. Call returned to [employer] at #[phone]. Spoke with representative Ree.  Clinical questions reviewed via phone. Per Ree, there is an authorization on file for dronabinol 2.5mg capsules, but due to the dosing increase, a new PA is required and the case will need to go for medical review. Per Ree, no additional documentation is required at this time. The clinical pharmacist will reach out if there are additional questions once the documentation has been reviewed. Pending PA Case ID [id number].    11/18/23  Approval letter has been received and indexed under the media tab in Epic. [address] #[phone] and spoke with pharmacist, Mackenzie Brown. She ran a test claim and stated the prescription was processed through the insurance company for coverage without issues. Mackenzie Brown will notify the patient once the Rx is ready for .     Name of Medication: Dronabinol 5mg capsules  Authorization #[id number]  Validity Dates: 11/16/23 - 4/23/24

## 2023-11-22 DIAGNOSIS — I82.90 OCCLUSIVE THROMBUS: ICD-10-CM

## 2023-11-26 ASSESSMENT — PATIENT HEALTH QUESTIONNAIRE - PHQ9
SUM OF ALL RESPONSES TO PHQ QUESTIONS 1-9: 2
2. FEELING DOWN, DEPRESSED OR HOPELESS: 1
SUM OF ALL RESPONSES TO PHQ QUESTIONS 1-9: 2
1. LITTLE INTEREST OR PLEASURE IN DOING THINGS: SEVERAL DAYS
SUM OF ALL RESPONSES TO PHQ QUESTIONS 1-9: 2
1. LITTLE INTEREST OR PLEASURE IN DOING THINGS: 1
SUM OF ALL RESPONSES TO PHQ9 QUESTIONS 1 & 2: 2
2. FEELING DOWN, DEPRESSED OR HOPELESS: SEVERAL DAYS
SUM OF ALL RESPONSES TO PHQ QUESTIONS 1-9: 2
SUM OF ALL RESPONSES TO PHQ9 QUESTIONS 1 & 2: 2

## 2023-11-27 ENCOUNTER — HOSPITAL ENCOUNTER (OUTPATIENT)
Dept: CT IMAGING | Age: 58
Discharge: HOME | End: 2023-11-30
Attending: INTERNAL MEDICINE
Payer: COMMERCIAL

## 2023-11-27 DIAGNOSIS — C64.2 RENAL CELL CARCINOMA OF LEFT KIDNEY (HCC): ICD-10-CM

## 2023-11-27 PROCEDURE — 71260 CT THORAX DX C+: CPT

## 2023-11-27 PROCEDURE — 6360000004 HC RX CONTRAST MEDICATION: Performed by: INTERNAL MEDICINE

## 2023-11-27 RX ADMIN — IOPAMIDOL 100 ML: 755 INJECTION, SOLUTION INTRAVENOUS at 10:29

## 2023-11-27 RX ADMIN — DIATRIZOATE MEGLUMINE AND DIATRIZOATE SODIUM 15 ML: 660; 100 LIQUID ORAL; RECTAL at 10:29

## 2023-11-28 RX ORDER — APIXABAN 2.5 MG/1
2.5 TABLET, FILM COATED ORAL 2 TIMES DAILY
Qty: 60 TABLET | Refills: 0 | OUTPATIENT
Start: 2023-11-28

## 2023-11-29 ENCOUNTER — OFFICE VISIT (OUTPATIENT)
Dept: INTERNAL MEDICINE CLINIC | Facility: CLINIC | Age: 58
End: 2023-11-29
Payer: COMMERCIAL

## 2023-11-29 ENCOUNTER — TELEPHONE (OUTPATIENT)
Dept: INTERNAL MEDICINE CLINIC | Facility: CLINIC | Age: 58
End: 2023-11-29

## 2023-11-29 VITALS
TEMPERATURE: 97.8 F | DIASTOLIC BLOOD PRESSURE: 79 MMHG | WEIGHT: 210.6 LBS | SYSTOLIC BLOOD PRESSURE: 140 MMHG | OXYGEN SATURATION: 96 % | HEART RATE: 69 BPM | HEIGHT: 67 IN | BODY MASS INDEX: 33.06 KG/M2 | RESPIRATION RATE: 18 BRPM

## 2023-11-29 DIAGNOSIS — E11.65 TYPE 2 DIABETES MELLITUS WITH HYPERGLYCEMIA, WITHOUT LONG-TERM CURRENT USE OF INSULIN (HCC): Primary | ICD-10-CM

## 2023-11-29 DIAGNOSIS — C64.2 RENAL CELL CARCINOMA OF LEFT KIDNEY (HCC): ICD-10-CM

## 2023-11-29 DIAGNOSIS — I10 PRIMARY HYPERTENSION: ICD-10-CM
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
## 2023-11-29 DIAGNOSIS — R22.0 MASS OF LEFT SUBMANDIBULAR REGION: Primary | ICD-10-CM

## 2023-11-29 LAB
APPEARANCE UR: CLEAR
BILIRUB UR QL: NEGATIVE
COLOR UR: ABNORMAL
GLUCOSE UR STRIP.AUTO-MCNC: >1000 MG/DL
HGB UR QL STRIP: NEGATIVE
KETONES UR QL STRIP.AUTO: NEGATIVE MG/DL
LEUKOCYTE ESTERASE UR QL STRIP.AUTO: NEGATIVE
NITRITE UR QL STRIP.AUTO: NEGATIVE
PH UR STRIP: 5.5 (ref 5–9)
PROT UR STRIP-MCNC: NEGATIVE MG/DL
SP GR UR REFRACTOMETRY: >1.035 (ref 1–1.02)
UROBILINOGEN UR QL STRIP.AUTO: 1 EU/DL (ref 0.2–1)

## 2023-11-29 PROCEDURE — 99214 OFFICE O/P EST MOD 30 MIN: CPT | Performed by: INTERNAL MEDICINE

## 2023-11-29 PROCEDURE — 3051F HG A1C>EQUAL 7.0%<8.0%: CPT | Performed by: INTERNAL MEDICINE

## 2023-11-29 PROCEDURE — 3078F DIAST BP <80 MM HG: CPT | Performed by: INTERNAL MEDICINE

## 2023-11-29 PROCEDURE — 3077F SYST BP >= 140 MM HG: CPT | Performed by: INTERNAL MEDICINE

## 2023-11-29 RX ORDER — CEPHALEXIN 500 MG/1
500 CAPSULE ORAL 3 TIMES DAILY
Qty: 15 CAPSULE | Refills: 0 | Status: SHIPPED | OUTPATIENT
Start: 2023-11-29 | End: 2023-12-04

## 2023-11-29 RX ORDER — PANTOPRAZOLE SODIUM 40 MG/1
40 TABLET, DELAYED RELEASE ORAL 2 TIMES DAILY
COMMUNITY
Start: 2023-11-24

## 2023-11-29 ASSESSMENT — ENCOUNTER SYMPTOMS
VOMITING: 0
DIARRHEA: 0
CONSTIPATION: 0
BLOOD IN STOOL: 0
NAUSEA: 0
COUGH: 0
SHORTNESS OF BREATH: 0
WHEEZING: 0

## 2023-11-29 NOTE — PROGRESS NOTES
Frequency Provider Last Rate Last Admin    ondansetron (ZOFRAN) injection 8 mg  8 mg IntraVENous Once Lonell Joceline, APRN - CNP         Facility-Administered Medications Ordered in Other Visits   Medication Dose Route Frequency Provider Last Rate Last Admin    diatrizoate meglumine-sodium (GASTROGRAFIN) 66-10 % solution 15 mL  15 mL Oral ONCE PRN Jerson Leone MD   15 mL at 11/27/23 1029       Orders Placed This Encounter   Procedures    Culture, Urine     Standing Status:   Future     Number of Occurrences:   1     Standing Expiration Date:   11/29/2024     Order Specific Question:   Specify (ex-cath, midstream, cysto, etc)? Answer:   midstream    Urinalysis     Standing Status:   Future     Number of Occurrences:   1     Standing Expiration Date:   11/29/2024    Hemoglobin A1C     Standing Status:   Future     Standing Expiration Date:   12/1/2023       Orders Placed This Encounter   Medications    empagliflozin (JARDIANCE) 25 MG tablet     Sig: Take 1 tablet by mouth daily     Dispense:  30 tablet     Refill:  11     Unable to tolerate metformin due to diarrhea    cephALEXin (KEFLEX) 500 MG capsule     Sig: Take 1 capsule by mouth 3 times daily for 5 days     Dispense:  15 capsule     Refill:  0       Medications Discontinued During This Encounter   Medication Reason    erythromycin (ROMYCIN) 5 MG/GM ophthalmic ointment LIST CLEANUP    ondansetron (ZOFRAN) 8 MG tablet LIST CLEANUP    potassium chloride (MICRO-K) 10 MEQ extended release capsule LIST CLEANUP    prochlorperazine (COMPAZINE) 10 MG tablet LIST CLEANUP    sucralfate (CARAFATE) 1 GM tablet LIST CLEANUP    EPINEPHrine (EPIPEN 2-MARY IJ) LIST CLEANUP    empagliflozin (JARDIANCE) 25 MG tablet REORDER        Diagnosis Orders   1. Type 2 diabetes mellitus with hyperglycemia, without long-term current use of insulin (HCC)  Hemoglobin A1C      2. Primary hypertension        3.  Renal cell carcinoma of left kidney (HCC)  Culture, Urine    Urinalysis

## 2023-11-29 NOTE — TELEPHONE ENCOUNTER
Patient was here for appointment and I spoke with her urologist on the phone , he needs a culture done, and once the results are in please fax results to [phone]

## 2023-12-01 ENCOUNTER — TELEPHONE (OUTPATIENT)
Dept: INTERNAL MEDICINE CLINIC | Facility: CLINIC | Age: 58
End: 2023-12-01

## 2023-12-01 ENCOUNTER — HOSPITAL ENCOUNTER (OUTPATIENT)
Dept: LAB | Age: 58
Discharge: HOME | End: 2023-12-01
Payer: COMMERCIAL

## 2023-12-01 DIAGNOSIS — E11.65 TYPE 2 DIABETES MELLITUS WITH HYPERGLYCEMIA, WITHOUT LONG-TERM CURRENT USE OF INSULIN (HCC): ICD-10-CM

## 2023-12-01 DIAGNOSIS — C64.2 RENAL CELL CARCINOMA OF LEFT KIDNEY (HCC): ICD-10-CM

## 2023-12-01 LAB
ALBUMIN SERPL-MCNC: 3.5 G/DL (ref 3.5–5)
ALBUMIN/GLOB SERPL: 0.8 (ref 0.4–1.6)
ALP SERPL-CCNC: 97 U/L (ref 50–136)
ALT SERPL-CCNC: 26 U/L (ref 12–65)
ANION GAP SERPL CALC-SCNC: 6 MMOL/L (ref 2–11)
AST SERPL-CCNC: 17 U/L (ref 15–37)
BACTERIA SPEC CULT: NORMAL
BACTERIA SPEC CULT: NORMAL
BASOPHILS # BLD: 0 K/UL (ref 0–0.2)
BASOPHILS NFR BLD: 0 % (ref 0–2)
BILIRUB SERPL-MCNC: 0.3 MG/DL (ref 0.2–1.1)
BUN SERPL-MCNC: 9 MG/DL (ref 6–23)
CALCIUM SERPL-MCNC: 9.2 MG/DL (ref 8.3–10.4)
CHLORIDE SERPL-SCNC: 107 MMOL/L (ref 101–110)
CO2 SERPL-SCNC: 25 MMOL/L (ref 21–32)
CREAT SERPL-MCNC: 0.8 MG/DL (ref 0.6–1)
DIFFERENTIAL METHOD BLD: ABNORMAL
EOSINOPHIL # BLD: 0.1 K/UL (ref 0–0.8)
EOSINOPHIL NFR BLD: 1 % (ref 0.5–7.8)
ERYTHROCYTE [DISTWIDTH] IN BLOOD BY AUTOMATED COUNT: 18.4 % (ref 11.9–14.6)
EST. AVERAGE GLUCOSE BLD GHB EST-MCNC: 151 MG/DL
GLOBULIN SER CALC-MCNC: 4.2 G/DL (ref 2.8–4.5)
GLUCOSE SERPL-MCNC: 212 MG/DL (ref 65–100)
HBA1C MFR BLD: 6.9 % (ref 4.8–5.6)
HCT VFR BLD AUTO: 51.8 % (ref 35.8–46.3)
HGB BLD-MCNC: 15.8 G/DL (ref 11.7–15.4)
IMM GRANULOCYTES # BLD AUTO: 0 K/UL (ref 0–0.5)
IMM GRANULOCYTES NFR BLD AUTO: 0 % (ref 0–5)
LYMPHOCYTES # BLD: 2.7 K/UL (ref 0.5–4.6)
LYMPHOCYTES NFR BLD: 22 % (ref 13–44)
MCH RBC QN AUTO: 23 PG (ref 26.1–32.9)
MCHC RBC AUTO-ENTMCNC: 30.5 G/DL (ref 31.4–35)
MCV RBC AUTO: 75.4 FL (ref 82–102)
MONOCYTES # BLD: 0.7 K/UL (ref 0.1–1.3)
MONOCYTES NFR BLD: 6 % (ref 4–12)
NEUTS SEG # BLD: 8.7 K/UL (ref 1.7–8.2)
NEUTS SEG NFR BLD: 71 % (ref 43–78)
NRBC # BLD: 0 K/UL (ref 0–0.2)
PLATELET # BLD AUTO: 303 K/UL (ref 150–450)
PMV BLD AUTO: 10.4 FL (ref 9.4–12.3)
POTASSIUM SERPL-SCNC: 3.7 MMOL/L (ref 3.5–5.1)
PROT SERPL-MCNC: 7.7 G/DL (ref 6.3–8.2)
RBC # BLD AUTO: 6.87 M/UL (ref 4.05–5.2)
SERVICE CMNT-IMP: NORMAL
SODIUM SERPL-SCNC: 138 MMOL/L (ref 133–143)
WBC # BLD AUTO: 12.3 K/UL (ref 4.3–11.1)

## 2023-12-01 PROCEDURE — 36415 COLL VENOUS BLD VENIPUNCTURE: CPT

## 2023-12-01 PROCEDURE — 85025 COMPLETE CBC W/AUTO DIFF WBC: CPT

## 2023-12-01 PROCEDURE — 80053 COMPREHEN METABOLIC PANEL: CPT

## 2023-12-01 PROCEDURE — 83036 HEMOGLOBIN GLYCOSYLATED A1C: CPT

## 2023-12-01 NOTE — TELEPHONE ENCOUNTER
It appears that you are going to have to repeat urine collection due to multiple bacteria being in the sample. You'll need to be very careful about how you collect the urine to prevent contamination. Not sure urologist will accept this sample. Thanks.   [address]

## 2023-12-01 NOTE — RESULT ENCOUNTER NOTE
Sugars are better. Continue your current doses of medications. Keep up the good work. Thanks.   [address]

## 2023-12-04 ENCOUNTER — TELEPHONE (OUTPATIENT)
Dept: INTERNAL MEDICINE CLINIC | Facility: CLINIC | Age: 58
End: 2023-12-04

## 2023-12-04 DIAGNOSIS — C64.2 RENAL CELL CARCINOMA OF LEFT KIDNEY (HCC): Primary | ICD-10-CM

## 2023-12-04 NOTE — TELEPHONE ENCOUNTER
Dr. Shruti Lacey from urology has called requesting Ms. Hall's last urine culture be faxed to him. These are the numbers he has given me to fax to  [phone] and [phone].

## 2023-12-06 ENCOUNTER — NURSE ONLY (OUTPATIENT)
Dept: INTERNAL MEDICINE CLINIC | Facility: CLINIC | Age: 58
End: 2023-12-06

## 2023-12-06 DIAGNOSIS — C64.2 RENAL CELL CARCINOMA OF LEFT KIDNEY (HCC): ICD-10-CM

## 2023-12-09 LAB
BACTERIA SPEC CULT: NORMAL
SERVICE CMNT-IMP: NORMAL

## 2023-12-12 ENCOUNTER — TELEMEDICINE (OUTPATIENT)
Dept: PALLATIVE CARE | Age: 58
End: 2023-12-12
Payer: COMMERCIAL

## 2023-12-12 DIAGNOSIS — F41.9 ANXIETY AND DEPRESSION: ICD-10-CM

## 2023-12-12 DIAGNOSIS — Z51.5 ENCOUNTER FOR PALLIATIVE CARE: ICD-10-CM

## 2023-12-12 DIAGNOSIS — G89.3 CANCER ASSOCIATED PAIN: Primary | ICD-10-CM

## 2023-12-12 DIAGNOSIS — E27.8 MASS OF RIGHT ADRENAL GLAND (HCC): ICD-10-CM

## 2023-12-12 DIAGNOSIS — F32.A ANXIETY AND DEPRESSION: ICD-10-CM

## 2023-12-12 DIAGNOSIS — C64.2 RENAL CELL CARCINOMA OF LEFT KIDNEY (HCC): ICD-10-CM

## 2023-12-12 PROCEDURE — 99214 OFFICE O/P EST MOD 30 MIN: CPT | Performed by: PHYSICIAN ASSISTANT

## 2023-12-12 RX ORDER — CLONAZEPAM 1 MG/1
1 TABLET ORAL 2 TIMES DAILY PRN
Qty: 60 TABLET | Refills: 0 | Status: SHIPPED | OUTPATIENT
Start: 2023-12-12 | End: 2024-01-11

## 2023-12-12 RX ORDER — OXYCODONE HYDROCHLORIDE 5 MG/1
5 TABLET ORAL EVERY 4 HOURS PRN
Qty: 180 TABLET | Refills: 0 | Status: SHIPPED | OUTPATIENT
Start: 2023-12-12 | End: 2024-01-11

## 2023-12-12 ASSESSMENT — ENCOUNTER SYMPTOMS
NAUSEA: 1
CONSTIPATION: 1
BACK PAIN: 1
ABDOMINAL PAIN: 1

## 2023-12-12 NOTE — PROGRESS NOTES
Pagelian Larson, was evaluated through a synchronous (real-time) audio-video encounter. The patient (or guardian if applicable) is aware that this is a billable service, which includes applicable co-pays. This Virtual Visit was conducted with patient's (and/or legal guardian's) consent. Patient identification was verified, and a caregiver was present when appropriate. The patient was located at Home: [address] [facility]  Provider was located at [facility] (Appt Dept): [address],  [address]     Total time spent for this encounter: Not billed by time    --Chhaya Villalobos PA-C on 12/12/2023 at 3:07 PM    An electronic signature was used to authenticate this note. Outpatient Palliative Care at the  [address] [place] [address]: Office Visit Established Patient    Diagnosis: Metastatic renal cell carcinoma     Treatment Plan: axitinib/pebrolizumab -> lenvatinib/everolimus      Treatment Intent: Palliative     Medical Oncologist: Dr. Shannon Carranza, Dr. Anais Godoy Oncologist: None     Navigator: None      Chief Complaint:    Chief Complaint   Patient presents with    Follow-up         History of Present Illness:  Ms. Herber Londono is a 60 y.o. female who presents today for evaluation regarding establishing care with palliative care. 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 Despite attending physical therapy since the accident, she continued to experience the same low back pain. She was seen at [address] [place] [address] on 10/4/22. MRI of the lumbar spine on 10/15/22 showed mild degenerative disc disease at L3-4 through L5-S1 and a 5.7 x 6.1 x 7 cm lower pole solid left renal mass. Urgent referral was placed to [facility], who referred the patient to [facility] for uro/onc evaluation and treatment of renal mass.   CT AP on 10/26/22 showed

## 2023-12-19 NOTE — PROGRESS NOTES
Fri for naother lab check, omit hepatotoxic meds    4/2023 mammogram [facility]   5/2023 FU after restaging imaging, mild POD noted; remains on axitinib; seeing Dr Bry Garcia soon; we reviewed 2nd line recs   2nd opinion w Dr Bry Garcia - agreed w lenvatinib/everolimus   6/2023 FU on lenvatinib/everolimus  -tolerating well thus far, CT CAP   6/20/23 work in d/t mouth sores/soft tissue swelling and pain below jaw bilaterally. Would recommend facial imaging if no improvement on ABX. Recheck LFTs next week as mildly elevated again today. 7/6/23 FU -  prior facial swelling resolved; recently broke a bone in her ankle and seeing ortho today. 8/3/23 FU- Now out of L foot cast; L knee pain awaiting MRI.  8/29/23 Peristent N/V; IVF today with K replacement. NM gastric emptying study tomorrow. 8/31/23 FU - N better; imaging sched reviewed   9/2023 CT CAP - Bilateral renal masses consistent with renal cell carcinoma. Left-sided mass is slightly larger. There is also increased left perinephric stranding and  a slightly larger left periaortic lymph node. 2.  Right adrenal mass is stable. .  New 9 mm mass in the left lower lobe adjacent to a calcified granuloma. Metastatic disease versus inflammatory change. The other pulmonary nodules are stable. 9/15/23 VV reviewed CT; plan LN bx and LLL bx; MR brain.     9/21/23 salivary gland bx planned   10/9/23 FU - here w friend; discussed tx/labs/symptoms; seeing sx in Oct for eval and if candidate for resection; restaging in Nov 11/8/23 VV FU - plan for sx first week of Dec; FU after sx  12/2023 FU after sx at [facility] [address] - healing well - has sx FU in early Jan     Family History   Problem Relation Age of Onset    Diabetes Mother     High Blood Pressure Mother     High Cholesterol Mother     COPD Mother     Cancer Father         prostate    Diabetes Sister     Stroke Sister     High Cholesterol Brother     Breast Cancer Paternal Aunt       Social History     Socioeconomic History

## 2023-12-22 ENCOUNTER — HOSPITAL ENCOUNTER (OUTPATIENT)
Dept: INFUSION THERAPY | Age: 58
Setting detail: INFUSION SERIES
Discharge: HOME | End: 2023-12-22
Payer: COMMERCIAL

## 2023-12-22 ENCOUNTER — HOSPITAL ENCOUNTER (OUTPATIENT)
Dept: LAB | Age: 58
Discharge: HOME | End: 2023-12-25
Payer: COMMERCIAL

## 2023-12-22 ENCOUNTER — OFFICE VISIT (OUTPATIENT)
Dept: ONCOLOGY | Age: 58
End: 2023-12-22
Payer: COMMERCIAL

## 2023-12-22 VITALS
OXYGEN SATURATION: 96 % | HEIGHT: 67 IN | TEMPERATURE: 97.7 F | BODY MASS INDEX: 32.1 KG/M2 | RESPIRATION RATE: 16 BRPM | HEART RATE: 85 BPM | WEIGHT: 204.5 LBS | DIASTOLIC BLOOD PRESSURE: 52 MMHG | SYSTOLIC BLOOD PRESSURE: 96 MMHG

## 2023-12-22 DIAGNOSIS — R79.89 ELEVATED LFTS: ICD-10-CM

## 2023-12-22 DIAGNOSIS — E86.0 DEHYDRATION: ICD-10-CM

## 2023-12-22 DIAGNOSIS — C64.2 RENAL CELL CARCINOMA OF LEFT KIDNEY (HCC): ICD-10-CM

## 2023-12-22 DIAGNOSIS — C64.2 RENAL CELL CARCINOMA OF LEFT KIDNEY (HCC): Primary | ICD-10-CM

## 2023-12-22 DIAGNOSIS — E86.0 DEHYDRATION: Primary | ICD-10-CM

## 2023-12-22 LAB
ALBUMIN SERPL-MCNC: 3.3 G/DL (ref 3.5–5)
ALBUMIN/GLOB SERPL: 0.8 (ref 0.4–1.6)
ALP SERPL-CCNC: 89 U/L (ref 50–136)
ALT SERPL-CCNC: 17 U/L (ref 12–65)
ANION GAP SERPL CALC-SCNC: 3 MMOL/L (ref 2–11)
AST SERPL-CCNC: 17 U/L (ref 15–37)
BASOPHILS # BLD: 0.1 K/UL (ref 0–0.2)
BASOPHILS NFR BLD: 0 % (ref 0–2)
BILIRUB SERPL-MCNC: 0.4 MG/DL (ref 0.2–1.1)
BUN SERPL-MCNC: 8 MG/DL (ref 6–23)
CALCIUM SERPL-MCNC: 8.8 MG/DL (ref 8.3–10.4)
CHLORIDE SERPL-SCNC: 107 MMOL/L (ref 103–113)
CO2 SERPL-SCNC: 28 MMOL/L (ref 21–32)
CREAT SERPL-MCNC: 0.8 MG/DL (ref 0.6–1)
DIFFERENTIAL METHOD BLD: ABNORMAL
EOSINOPHIL # BLD: 0.4 K/UL (ref 0–0.8)
EOSINOPHIL NFR BLD: 3 % (ref 0.5–7.8)
ERYTHROCYTE [DISTWIDTH] IN BLOOD BY AUTOMATED COUNT: 19.9 % (ref 11.9–14.6)
GLOBULIN SER CALC-MCNC: 4.2 G/DL (ref 2.8–4.5)
GLUCOSE SERPL-MCNC: 140 MG/DL (ref 65–100)
HCT VFR BLD AUTO: 47.9 % (ref 35.8–46.3)
HGB BLD-MCNC: 14.5 G/DL (ref 11.7–15.4)
IMM GRANULOCYTES # BLD AUTO: 0.1 K/UL (ref 0–0.5)
IMM GRANULOCYTES NFR BLD AUTO: 1 % (ref 0–5)
LYMPHOCYTES # BLD: 3.2 K/UL (ref 0.5–4.6)
LYMPHOCYTES NFR BLD: 26 % (ref 13–44)
MCH RBC QN AUTO: 23.3 PG (ref 26.1–32.9)
MCHC RBC AUTO-ENTMCNC: 30.3 G/DL (ref 31.4–35)
MCV RBC AUTO: 77 FL (ref 82–102)
MONOCYTES # BLD: 0.8 K/UL (ref 0.1–1.3)
MONOCYTES NFR BLD: 6 % (ref 4–12)
NEUTS SEG # BLD: 8 K/UL (ref 1.7–8.2)
NEUTS SEG NFR BLD: 64 % (ref 43–78)
NRBC # BLD: 0 K/UL (ref 0–0.2)
PLATELET # BLD AUTO: 429 K/UL (ref 150–450)
PMV BLD AUTO: 9.8 FL (ref 9.4–12.3)
POTASSIUM SERPL-SCNC: 3.8 MMOL/L (ref 3.5–5.1)
PROT SERPL-MCNC: 7.5 G/DL (ref 6.3–8.2)
RBC # BLD AUTO: 6.22 M/UL (ref 4.05–5.2)
SODIUM SERPL-SCNC: 138 MMOL/L (ref 136–146)
WBC # BLD AUTO: 12.4 K/UL (ref 4.3–11.1)

## 2023-12-22 PROCEDURE — 96360 HYDRATION IV INFUSION INIT: CPT

## 2023-12-22 PROCEDURE — 2580000003 HC RX 258: Performed by: INTERNAL MEDICINE

## 2023-12-22 PROCEDURE — 99214 OFFICE O/P EST MOD 30 MIN: CPT | Performed by: INTERNAL MEDICINE

## 2023-12-22 PROCEDURE — 6360000002 HC RX W HCPCS: Performed by: INTERNAL MEDICINE

## 2023-12-22 PROCEDURE — 85025 COMPLETE CBC W/AUTO DIFF WBC: CPT

## 2023-12-22 PROCEDURE — 36415 COLL VENOUS BLD VENIPUNCTURE: CPT

## 2023-12-22 PROCEDURE — 80053 COMPREHEN METABOLIC PANEL: CPT

## 2023-12-22 RX ORDER — HEPARIN 100 UNIT/ML
500 SYRINGE INTRAVENOUS PRN
Status: DISCONTINUED | OUTPATIENT
Start: 2023-12-22 | End: 2023-12-23 | Stop reason: HOSPADM

## 2023-12-22 RX ORDER — SODIUM CHLORIDE 0.9 % (FLUSH) 0.9 %
5-40 SYRINGE (ML) INJECTION PRN
Status: DISCONTINUED | OUTPATIENT
Start: 2023-12-22 | End: 2023-12-23 | Stop reason: HOSPADM

## 2023-12-22 RX ORDER — 0.9 % SODIUM CHLORIDE 0.9 %
1000 INTRAVENOUS SOLUTION INTRAVENOUS ONCE
Status: CANCELLED
Start: 2023-12-22

## 2023-12-22 RX ORDER — SODIUM CHLORIDE 9 MG/ML
5-250 INJECTION, SOLUTION INTRAVENOUS PRN
OUTPATIENT
Start: 2023-12-22

## 2023-12-22 RX ORDER — 0.9 % SODIUM CHLORIDE 0.9 %
1000 INTRAVENOUS SOLUTION INTRAVENOUS ONCE
Status: CANCELLED
Start: 2023-12-22 | End: 2023-12-22

## 2023-12-22 RX ORDER — 0.9 % SODIUM CHLORIDE 0.9 %
1000 INTRAVENOUS SOLUTION INTRAVENOUS ONCE
Status: COMPLETED | OUTPATIENT
Start: 2023-12-22 | End: 2023-12-22

## 2023-12-22 RX ORDER — HEPARIN 100 UNIT/ML
500 SYRINGE INTRAVENOUS PRN
OUTPATIENT
Start: 2023-12-22

## 2023-12-22 RX ORDER — SODIUM CHLORIDE 0.9 % (FLUSH) 0.9 %
5-40 SYRINGE (ML) INJECTION PRN
OUTPATIENT
Start: 2023-12-22

## 2023-12-22 RX ADMIN — SODIUM CHLORIDE 1000 ML: 9 INJECTION, SOLUTION INTRAVENOUS at 15:05

## 2023-12-22 RX ADMIN — SODIUM CHLORIDE, PRESERVATIVE FREE 10 ML: 5 INJECTION INTRAVENOUS at 15:05

## 2023-12-22 RX ADMIN — HEPARIN 500 UNITS: 100 SYRINGE at 16:13

## 2023-12-22 NOTE — PATIENT INSTRUCTIONS
4.05 - 5.2 M/uL Final    Hemoglobin 12/22/2023 14.5  11.7 - 15.4 g/dL Final    Hematocrit 12/22/2023 47.9 (H)  35.8 - 46.3 % Final    MCV 12/22/2023 77.0 (L)  82.0 - 102.0 FL Final    MCH 12/22/2023 23.3 (L)  26.1 - 32.9 PG Final    MCHC 12/22/2023 30.3 (L)  31.4 - 35.0 g/dL Final    RDW 12/22/2023 19.9 (H)  11.9 - 14.6 % Final    Platelets 55/30/9188 429  150 - 450 K/uL Final    MPV 12/22/2023 9.8  9.4 - 12.3 FL Final    nRBC 12/22/2023 0.00  0.0 - 0.2 K/uL Final    **Note: Absolute NRBC parameter is now reported with Hemogram**    Neutrophils % 12/22/2023 64  43 - 78 % Final    Lymphocytes % 12/22/2023 26  13 - 44 % Final    Monocytes % 12/22/2023 6  4.0 - 12.0 % Final    Eosinophils % 12/22/2023 3  0.5 - 7.8 % Final    Basophils % 12/22/2023 0  0.0 - 2.0 % Final    Immature Granulocytes 12/22/2023 1  0.0 - 5.0 % Final    Neutrophils Absolute 12/22/2023 8.0  1.7 - 8.2 K/UL Final    Lymphocytes Absolute 12/22/2023 3.2  0.5 - 4.6 K/UL Final    Monocytes Absolute 12/22/2023 0.8  0.1 - 1.3 K/UL Final    Eosinophils Absolute 12/22/2023 0.4  0.0 - 0.8 K/UL Final    Basophils Absolute 12/22/2023 0.1  0.0 - 0.2 K/UL Final    Absolute Immature Granulocyte 12/22/2023 0.1  0.0 - 0.5 K/UL Final    Differential Type 12/22/2023 AUTOMATED    Final         Treatment Summary has been discussed and given to patient: n/a        -------------------------------------------------------------------------------------------------------------------  Please call our office at [phone] if you have any  of the following symptoms:   Fever of 100.5 or greater  Chills  Shortness of breath  Swelling or pain in one leg    After office hours an answering service is available and will contact a provider for emergencies or if you are experiencing any of the above symptoms. Patient did express an interest in My Chart. My Chart log in information explained on the after visit summary printout at the [address] desk.     Tavon Guthrie RN

## 2023-12-22 NOTE — PROGRESS NOTES
Arrived to the [facility]. Assessment completed. 1 liter of NS completed. Patient tolerated without problems. Any issues or concerns during appointment: None  Instructed to call Dr Shelton Pérez with any side effects or concerns  Patient aware no future appointment.   Discharged ambulatory

## 2023-12-25 ENCOUNTER — HOSPITAL ENCOUNTER (EMERGENCY)
Age: 58
Discharge: HOME | End: 2023-12-25
Attending: EMERGENCY MEDICINE
Payer: COMMERCIAL

## 2023-12-25 VITALS
DIASTOLIC BLOOD PRESSURE: 70 MMHG | HEART RATE: 87 BPM | WEIGHT: 204 LBS | BODY MASS INDEX: 32.78 KG/M2 | HEIGHT: 66 IN | SYSTOLIC BLOOD PRESSURE: 128 MMHG | TEMPERATURE: 97.6 F | OXYGEN SATURATION: 92 % | RESPIRATION RATE: 16 BRPM

## 2023-12-25 DIAGNOSIS — E86.0 DEHYDRATION: Primary | ICD-10-CM

## 2023-12-25 LAB
ANION GAP SERPL CALC-SCNC: 1 MMOL/L (ref 2–11)
BUN SERPL-MCNC: 9 MG/DL (ref 6–23)
CALCIUM SERPL-MCNC: 8.9 MG/DL (ref 8.3–10.4)
CHLORIDE SERPL-SCNC: 110 MMOL/L (ref 103–113)
CO2 SERPL-SCNC: 29 MMOL/L (ref 21–32)
CREAT SERPL-MCNC: 0.8 MG/DL (ref 0.6–1)
ERYTHROCYTE [DISTWIDTH] IN BLOOD BY AUTOMATED COUNT: 19.8 % (ref 11.9–14.6)
GLUCOSE SERPL-MCNC: 101 MG/DL (ref 65–100)
HCT VFR BLD AUTO: 45.5 % (ref 35.8–46.3)
HGB BLD-MCNC: 13.5 G/DL (ref 11.7–15.4)
MAGNESIUM SERPL-MCNC: 2.3 MG/DL (ref 1.8–2.4)
MCH RBC QN AUTO: 23.1 PG (ref 26.1–32.9)
MCHC RBC AUTO-ENTMCNC: 29.7 G/DL (ref 31.4–35)
MCV RBC AUTO: 77.9 FL (ref 82–102)
NRBC # BLD: 0 K/UL (ref 0–0.2)
PLATELET # BLD AUTO: 392 K/UL (ref 150–450)
PMV BLD AUTO: 9.7 FL (ref 9.4–12.3)
POTASSIUM SERPL-SCNC: 3.9 MMOL/L (ref 3.5–5.1)
RBC # BLD AUTO: 5.84 M/UL (ref 4.05–5.2)
SODIUM SERPL-SCNC: 140 MMOL/L (ref 136–146)
WBC # BLD AUTO: 12 K/UL (ref 4.3–11.1)

## 2023-12-25 PROCEDURE — 99284 EMERGENCY DEPT VISIT MOD MDM: CPT

## 2023-12-25 PROCEDURE — 80048 BASIC METABOLIC PNL TOTAL CA: CPT

## 2023-12-25 PROCEDURE — 96361 HYDRATE IV INFUSION ADD-ON: CPT

## 2023-12-25 PROCEDURE — 2580000003 HC RX 258: Performed by: EMERGENCY MEDICINE

## 2023-12-25 PROCEDURE — 83735 ASSAY OF MAGNESIUM: CPT

## 2023-12-25 PROCEDURE — 6360000002 HC RX W HCPCS: Performed by: EMERGENCY MEDICINE

## 2023-12-25 PROCEDURE — 96360 HYDRATION IV INFUSION INIT: CPT

## 2023-12-25 PROCEDURE — 85027 COMPLETE CBC AUTOMATED: CPT

## 2023-12-25 RX ORDER — HEPARIN 100 UNIT/ML
300 SYRINGE INTRAVENOUS
Status: DISCONTINUED | OUTPATIENT
Start: 2023-12-25 | End: 2023-12-25 | Stop reason: HOSPADM

## 2023-12-25 RX ORDER — 0.9 % SODIUM CHLORIDE 0.9 %
1000 INTRAVENOUS SOLUTION INTRAVENOUS ONCE
Status: COMPLETED | OUTPATIENT
Start: 2023-12-25 | End: 2023-12-25

## 2023-12-25 RX ADMIN — SODIUM CHLORIDE 1000 ML: 9 INJECTION, SOLUTION INTRAVENOUS at 16:38

## 2023-12-25 NOTE — ED TRIAGE NOTES
Pt arrives to ed via car. Pt has had low bp last few days, is onc pt. Called on and onc told to come to ed for fluids. Pt is normally on htn meds if over 140, pt hasn't taken in over 3 weeks. Lowest bp pt had 81/52 at home.

## 2023-12-25 NOTE — DISCHARGE INSTRUCTIONS
As we discussed, it is important for you to follow-up with your oncologist tomorrow for reevaluation.     Please return with any fevers, vomiting, worsening symptoms, or additional concerns

## 2024-01-05 ENCOUNTER — OFFICE VISIT (OUTPATIENT)
Dept: ENT CLINIC | Age: 59
End: 2024-01-05
Payer: COMMERCIAL

## 2024-01-05 ENCOUNTER — PREP FOR PROCEDURE (OUTPATIENT)
Dept: ENT CLINIC | Age: 59
End: 2024-01-05

## 2024-01-05 VITALS — BODY MASS INDEX: 32.95 KG/M2 | HEIGHT: 66 IN | WEIGHT: 205 LBS

## 2024-01-05 DIAGNOSIS — D49.0 NEOPLASM OF SUBMANDIBULAR GLAND: Primary | ICD-10-CM

## 2024-01-05 DIAGNOSIS — D49.0: ICD-10-CM

## 2024-01-05 PROCEDURE — 99213 OFFICE O/P EST LOW 20 MIN: CPT | Performed by: OTOLARYNGOLOGY

## 2024-01-05 ASSESSMENT — ENCOUNTER SYMPTOMS
SHORTNESS OF BREATH: 0
SORE THROAT: 0
ABDOMINAL PAIN: 0

## 2024-01-05 NOTE — PROGRESS NOTES
performed for confirmation.  Remaining salivary glands are unremarkable. No enlarged cervical lymph nodes.  2. Bilateral renal cortical masses, stable if not slightly smaller compared to  prior exam. No new renal lesions. No hydronephrosis.  3. Stable right adrenal nodule.  4. Multiple bilateral calcified lung nodules with stable left lower lobe and  right lower lobe lung nodules. These are indeterminate as they are on a  background of old granulomatous disease. 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  Her previous FNA was nondiagnostic.  She has healed up well since her recent nephrectomy earlier this month.  At this time, I recommend proceeding with left submandibular gland excision.  I reviewed all the risks of surgery including bleeding, hematoma, skin numbness, lower facial weakness, recurrence, need for further procedures, and she would like to proceed.    Melvin Mcclure MD  1/5/2024    Electronically signed by Melvin Mcclure MD on 1/5/2024 at 11:56 AM

## 2024-01-16 DIAGNOSIS — C64.2 RENAL CELL CARCINOMA OF LEFT KIDNEY (HCC): Primary | ICD-10-CM

## 2024-01-16 ASSESSMENT — ENCOUNTER SYMPTOMS
SCLERAL ICTERUS: 0
DIARRHEA: 0
BLOOD IN STOOL: 0
TROUBLE SWALLOWING: 0
SORE THROAT: 0
VOICE CHANGE: 0
SHORTNESS OF BREATH: 1
CHEST TIGHTNESS: 0
CONSTIPATION: 1
WHEEZING: 0
VOMITING: 0
ABDOMINAL DISTENTION: 0
ABDOMINAL PAIN: 0
BACK PAIN: 1
HEMOPTYSIS: 0

## 2024-01-16 NOTE — PROGRESS NOTES
102.0 FL    MCH 23.9 (L) 26.1 - 32.9 PG    MCHC 30.5 (L) 31.4 - 35.0 g/dL    RDW 20.5 (H) 11.9 - 14.6 %    Platelets 328 150 - 450 K/uL    MPV 9.9 9.4 - 12.3 FL    nRBC 0.00 0.0 - 0.2 K/uL    Differential Type AUTOMATED      Neutrophils % 63 43 - 78 %    Lymphocytes % 27 13 - 44 %    Monocytes % 7 4.0 - 12.0 %    Eosinophils % 3 0.5 - 7.8 %    Basophils % 0 0.0 - 2.0 %    Immature Granulocytes 1 0.0 - 5.0 %    Neutrophils Absolute 6.0 1.7 - 8.2 K/UL    Lymphocytes Absolute 2.5 0.5 - 4.6 K/UL    Monocytes Absolute 0.6 0.1 - 1.3 K/UL    Eosinophils Absolute 0.2 0.0 - 0.8 K/UL    Basophils Absolute 0.0 0.0 - 0.2 K/UL    Absolute Immature Granulocyte 0.1 0.0 - 0.5 K/UL   Comprehensive Metabolic Panel    Collection Time: 01/18/24  7:31 AM   Result Value Ref Range    Sodium 139 136 - 146 mmol/L    Potassium 4.0 3.5 - 5.1 mmol/L    Chloride 110 103 - 113 mmol/L    CO2 27 21 - 32 mmol/L    Anion Gap 2 2 - 11 mmol/L    Glucose 171 (H) 65 - 100 mg/dL    BUN 10 6 - 23 MG/DL    Creatinine 0.80 0.6 - 1.0 MG/DL    Est, Glom Filt Rate >60 >60 ml/min/1.73m2    Calcium 8.4 8.3 - 10.4 MG/DL    Total Bilirubin 0.2 0.2 - 1.1 MG/DL    ALT 16 12 - 65 U/L    AST 13 (L) 15 - 37 U/L    Alk Phosphatase 89 50 - 136 U/L    Total Protein 6.9 6.3 - 8.2 g/dL    Albumin 3.3 (L) 3.5 - 5.0 g/dL    Globulin 3.6 2.8 - 4.5 g/dL    Albumin/Globulin Ratio 0.9 0.4 - 1.6       Imaging: reviewed     PATHOLOGY:         9/2023 LLL bx       ASSESSMENT:   Diagnosis Orders   1. Renal cell carcinoma of left kidney (HCC)  CT CHEST ABDOMEN PELVIS W WO CONTRAST Additional Contrast? Radiologist Recommendation      2. RBC microcytosis  Ferritin    Transferrin Saturation      3. Neoplasm of submandibular salivary gland        4. Port-A-Cath in place          Oral Chemotherapy Adherence:     Current Regimen: on hold periop   Drug Name: lenvatinib 18mg/everolimus 5mg   Dose: per above, daily - currently on hold periop    Barriers to care identified including (financial,

## 2024-01-18 ENCOUNTER — HOSPITAL ENCOUNTER (OUTPATIENT)
Dept: LAB | Age: 59
Discharge: HOME | End: 2024-01-18
Payer: COMMERCIAL

## 2024-01-18 ENCOUNTER — OFFICE VISIT (OUTPATIENT)
Dept: ONCOLOGY | Age: 59
End: 2024-01-18
Payer: COMMERCIAL

## 2024-01-18 VITALS
HEART RATE: 83 BPM | RESPIRATION RATE: 16 BRPM | DIASTOLIC BLOOD PRESSURE: 65 MMHG | HEIGHT: 67 IN | WEIGHT: 210.6 LBS | TEMPERATURE: 97.8 F | BODY MASS INDEX: 33.06 KG/M2 | OXYGEN SATURATION: 96 % | SYSTOLIC BLOOD PRESSURE: 99 MMHG

## 2024-01-18 DIAGNOSIS — D49.0: ICD-10-CM

## 2024-01-18 DIAGNOSIS — D64.9 ANEMIA, UNSPECIFIED TYPE: ICD-10-CM

## 2024-01-18 DIAGNOSIS — C64.2 RENAL CELL CARCINOMA OF LEFT KIDNEY (HCC): Primary | ICD-10-CM

## 2024-01-18 DIAGNOSIS — R71.8 RBC MICROCYTOSIS: ICD-10-CM

## 2024-01-18 DIAGNOSIS — C64.2 RENAL CELL CARCINOMA OF LEFT KIDNEY (HCC): ICD-10-CM

## 2024-01-18 DIAGNOSIS — Z95.828 PORT-A-CATH IN PLACE: ICD-10-CM

## 2024-01-18 DIAGNOSIS — D50.9 IRON DEFICIENCY ANEMIA, UNSPECIFIED IRON DEFICIENCY ANEMIA TYPE: Primary | ICD-10-CM

## 2024-01-18 LAB
ALBUMIN SERPL-MCNC: 3.3 G/DL (ref 3.5–5)
ALBUMIN/GLOB SERPL: 0.9 (ref 0.4–1.6)
ALP SERPL-CCNC: 89 U/L (ref 50–136)
ALT SERPL-CCNC: 16 U/L (ref 12–65)
ANION GAP SERPL CALC-SCNC: 2 MMOL/L (ref 2–11)
AST SERPL-CCNC: 13 U/L (ref 15–37)
BASOPHILS # BLD: 0 K/UL (ref 0–0.2)
BASOPHILS NFR BLD: 0 % (ref 0–2)
BILIRUB SERPL-MCNC: 0.2 MG/DL (ref 0.2–1.1)
BUN SERPL-MCNC: 10 MG/DL (ref 6–23)
CALCIUM SERPL-MCNC: 8.4 MG/DL (ref 8.3–10.4)
CHLORIDE SERPL-SCNC: 110 MMOL/L (ref 103–113)
CO2 SERPL-SCNC: 27 MMOL/L (ref 21–32)
CREAT SERPL-MCNC: 0.8 MG/DL (ref 0.6–1)
DIFFERENTIAL METHOD BLD: ABNORMAL
EOSINOPHIL # BLD: 0.2 K/UL (ref 0–0.8)
EOSINOPHIL NFR BLD: 3 % (ref 0.5–7.8)
ERYTHROCYTE [DISTWIDTH] IN BLOOD BY AUTOMATED COUNT: 20.5 % (ref 11.9–14.6)
FERRITIN SERPL-MCNC: 30 NG/ML (ref 8–388)
GLOBULIN SER CALC-MCNC: 3.6 G/DL (ref 2.8–4.5)
GLUCOSE SERPL-MCNC: 171 MG/DL (ref 65–100)
HCT VFR BLD AUTO: 45.2 % (ref 35.8–46.3)
HGB BLD-MCNC: 13.8 G/DL (ref 11.7–15.4)
IMM GRANULOCYTES # BLD AUTO: 0.1 K/UL (ref 0–0.5)
IMM GRANULOCYTES NFR BLD AUTO: 1 % (ref 0–5)
IRON SATN MFR SERPL: 12 %
IRON SERPL-MCNC: 36 UG/DL (ref 35–150)
LYMPHOCYTES # BLD: 2.5 K/UL (ref 0.5–4.6)
LYMPHOCYTES NFR BLD: 27 % (ref 13–44)
MCH RBC QN AUTO: 23.9 PG (ref 26.1–32.9)
MCHC RBC AUTO-ENTMCNC: 30.5 G/DL (ref 31.4–35)
MCV RBC AUTO: 78.2 FL (ref 82–102)
MONOCYTES # BLD: 0.6 K/UL (ref 0.1–1.3)
MONOCYTES NFR BLD: 7 % (ref 4–12)
NEUTS SEG # BLD: 6 K/UL (ref 1.7–8.2)
NEUTS SEG NFR BLD: 63 % (ref 43–78)
NRBC # BLD: 0 K/UL (ref 0–0.2)
PLATELET # BLD AUTO: 328 K/UL (ref 150–450)
PMV BLD AUTO: 9.9 FL (ref 9.4–12.3)
POTASSIUM SERPL-SCNC: 4 MMOL/L (ref 3.5–5.1)
PROT SERPL-MCNC: 6.9 G/DL (ref 6.3–8.2)
RBC # BLD AUTO: 5.78 M/UL (ref 4.05–5.2)
SODIUM SERPL-SCNC: 139 MMOL/L (ref 136–146)
TIBC SERPL-MCNC: 294 UG/DL (ref 250–450)
WBC # BLD AUTO: 9.4 K/UL (ref 4.3–11.1)

## 2024-01-18 PROCEDURE — 99214 OFFICE O/P EST MOD 30 MIN: CPT | Performed by: INTERNAL MEDICINE

## 2024-01-18 PROCEDURE — 83540 ASSAY OF IRON: CPT

## 2024-01-18 PROCEDURE — 85025 COMPLETE CBC W/AUTO DIFF WBC: CPT

## 2024-01-18 PROCEDURE — 82728 ASSAY OF FERRITIN: CPT

## 2024-01-18 PROCEDURE — 83550 IRON BINDING TEST: CPT

## 2024-01-18 PROCEDURE — 36415 COLL VENOUS BLD VENIPUNCTURE: CPT

## 2024-01-18 PROCEDURE — 80053 COMPREHEN METABOLIC PANEL: CPT

## 2024-01-18 RX ORDER — ALBUTEROL SULFATE 90 UG/1
4 AEROSOL, METERED RESPIRATORY (INHALATION) PRN
OUTPATIENT
Start: 2024-01-19

## 2024-01-18 RX ORDER — HEPARIN 100 UNIT/ML
500 SYRINGE INTRAVENOUS PRN
OUTPATIENT
Start: 2024-01-19

## 2024-01-18 RX ORDER — ONDANSETRON 2 MG/ML
8 INJECTION INTRAMUSCULAR; INTRAVENOUS
OUTPATIENT
Start: 2024-01-19

## 2024-01-18 RX ORDER — ACETAMINOPHEN 325 MG/1
650 TABLET ORAL
OUTPATIENT
Start: 2024-01-19

## 2024-01-18 RX ORDER — SODIUM CHLORIDE 0.9 % (FLUSH) 0.9 %
5-40 SYRINGE (ML) INJECTION PRN
OUTPATIENT
Start: 2024-01-19

## 2024-01-18 RX ORDER — SODIUM CHLORIDE 9 MG/ML
5-250 INJECTION, SOLUTION INTRAVENOUS PRN
OUTPATIENT
Start: 2024-01-19

## 2024-01-18 RX ORDER — EPINEPHRINE 1 MG/ML
0.3 INJECTION, SOLUTION, CONCENTRATE INTRAVENOUS PRN
OUTPATIENT
Start: 2024-01-19

## 2024-01-18 RX ORDER — DIPHENHYDRAMINE HYDROCHLORIDE 50 MG/ML
50 INJECTION INTRAMUSCULAR; INTRAVENOUS
OUTPATIENT
Start: 2024-01-19

## 2024-01-18 RX ORDER — SODIUM CHLORIDE 9 MG/ML
INJECTION, SOLUTION INTRAVENOUS CONTINUOUS
OUTPATIENT
Start: 2024-01-19

## 2024-01-18 ASSESSMENT — PATIENT HEALTH QUESTIONNAIRE - PHQ9
SUM OF ALL RESPONSES TO PHQ9 QUESTIONS 1 & 2: 0
SUM OF ALL RESPONSES TO PHQ QUESTIONS 1-9: 0
2. FEELING DOWN, DEPRESSED OR HOPELESS: 0
SUM OF ALL RESPONSES TO PHQ QUESTIONS 1-9: 0
1. LITTLE INTEREST OR PLEASURE IN DOING THINGS: 0
SUM OF ALL RESPONSES TO PHQ QUESTIONS 1-9: 0
SUM OF ALL RESPONSES TO PHQ QUESTIONS 1-9: 0

## 2024-01-18 ASSESSMENT — ENCOUNTER SYMPTOMS: NAUSEA: 0

## 2024-01-18 NOTE — PATIENT INSTRUCTIONS
Patient Instructions from Today's Visit    Reason for Visit:  Follow Up    Diagnosis Information:  [email or web address]-[email or web address]-answers-[email or web address]-[email or web address]    Plan:  Labs reviewed.  Symptoms reviewed.  CT chest abdomen and pelvis now.  You can call to schedule this at [phone].  You wish to discontinue Eliquis.  Hydrate!    Follow Up:  About 2 months.    Recent Lab Results:  Hospital Outpatient Visit on 01/18/2024   Component Date Value Ref Range Status    WBC 01/18/2024 9.4  4.3 - 11.1 K/uL Final    RBC 01/18/2024 5.78 (H)  4.05 - 5.2 M/uL Final    Hemoglobin 01/18/2024 13.8  11.7 - 15.4 g/dL Final    Hematocrit 01/18/2024 45.2  35.8 - 46.3 % Final    MCV 01/18/2024 78.2 (L)  82.0 - 102.0 FL Final    MCH 01/18/2024 23.9 (L)  26.1 - 32.9 PG Final    MCHC 01/18/2024 30.5 (L)  31.4 - 35.0 g/dL Final    RDW 01/18/2024 20.5 (H)  11.9 - 14.6 % Final    Platelets 01/18/2024 328  150 - 450 K/uL Final    MPV 01/18/2024 9.9  9.4 - 12.3 FL Final    nRBC 01/18/2024 0.00  0.0 - 0.2 K/uL Final    **Note: Absolute NRBC parameter is now reported with Hemogram**    Differential Type 01/18/2024 AUTOMATED    Final    Neutrophils % 01/18/2024 63  43 - 78 % Final    Lymphocytes % 01/18/2024 27  13 - 44 % Final    Monocytes % 01/18/2024 7  4.0 - 12.0 % Final    Eosinophils % 01/18/2024 3  0.5 - 7.8 % Final    Basophils % 01/18/2024 0  0.0 - 2.0 % Final    Immature Granulocytes 01/18/2024 1  0.0 - 5.0 % Final    Neutrophils Absolute 01/18/2024 6.0  1.7 - 8.2 K/UL Final    Lymphocytes Absolute 01/18/2024 2.5  0.5 - 4.6 K/UL Final    Monocytes Absolute 01/18/2024 0.6  0.1 - 1.3 K/UL Final    Eosinophils Absolute 01/18/2024 0.2  0.0 - 0.8 K/UL Final    Basophils Absolute 01/18/2024 0.0  0.0 - 0.2 K/UL Final    Absolute Immature Granulocyte 01/18/2024 0.1  0.0 - 0.5 K/UL Final    Sodium 01/18/2024 139  136 - 146 mmol/L Final    Potassium 01/18/2024 4.0  3.5 - 5.1 mmol/L Final    Chloride

## 2024-01-30 ENCOUNTER — HOSPITAL ENCOUNTER (OUTPATIENT)
Dept: CT IMAGING | Age: 59
Discharge: HOME | End: 2024-02-02
Attending: INTERNAL MEDICINE
Payer: COMMERCIAL

## 2024-01-30 ENCOUNTER — CLINICAL DOCUMENTATION (OUTPATIENT)
Dept: ONCOLOGY | Age: 59
End: 2024-01-30

## 2024-01-30 DIAGNOSIS — C64.2 RENAL CELL CARCINOMA OF LEFT KIDNEY (HCC): ICD-10-CM

## 2024-01-30 PROCEDURE — 71270 CT THORAX DX C-/C+: CPT | Performed by: RADIOLOGY

## 2024-01-30 PROCEDURE — 6360000002 HC RX W HCPCS: Performed by: INTERNAL MEDICINE

## 2024-01-30 PROCEDURE — 74178 CT ABD&PLV WO CNTR FLWD CNTR: CPT

## 2024-01-30 PROCEDURE — 74178 CT ABD&PLV WO CNTR FLWD CNTR: CPT | Performed by: RADIOLOGY

## 2024-01-30 PROCEDURE — 6360000004 HC RX CONTRAST MEDICATION: Performed by: INTERNAL MEDICINE

## 2024-01-30 RX ORDER — HEPARIN 100 UNIT/ML
SYRINGE INTRAVENOUS PRN
Status: DISCONTINUED | OUTPATIENT
Start: 2024-01-30 | End: 2024-02-03 | Stop reason: HOSPADM

## 2024-01-30 RX ADMIN — HEPARIN 5 ML: 100 SYRINGE at 08:00

## 2024-01-30 RX ADMIN — IOPAMIDOL 100 ML: 755 INJECTION, SOLUTION INTRAVENOUS at 07:42

## 2024-01-30 NOTE — PROGRESS NOTES
Call placed to [facility] in [place] at #[phone] to inquire if prior authorization is required for Oxycodone 15mg ER tablets.  Per pharmacist, a PA is required and should be completed by calling #[phone].  Call placed to the number provided by the pharmacy. Spoke with representative, Dee.  PA request completed via phone.  Clinical questions were answered and a verbal approval was issued.    Name of Medication:  Oxycodone 15mg ER Tablets  Quantity Approved: 30 tablets / 30 days  Validity Dates: 1/30/24 - 1/30/25  PA Case # [id number]    Follow up call placed to [facility] #[phone].  Message left for the pharmacist notifying of the approval.

## 2024-01-31 ENCOUNTER — HOSPITAL ENCOUNTER (OUTPATIENT)
Dept: INFUSION THERAPY | Age: 59
Discharge: HOME | End: 2024-01-31
Payer: COMMERCIAL

## 2024-01-31 ENCOUNTER — TELEPHONE (OUTPATIENT)
Dept: ONCOLOGY | Age: 59
End: 2024-01-31

## 2024-01-31 VITALS
SYSTOLIC BLOOD PRESSURE: 119 MMHG | TEMPERATURE: 97.9 F | OXYGEN SATURATION: 98 % | RESPIRATION RATE: 16 BRPM | DIASTOLIC BLOOD PRESSURE: 64 MMHG | HEART RATE: 63 BPM

## 2024-01-31 DIAGNOSIS — D50.9 IRON DEFICIENCY ANEMIA, UNSPECIFIED IRON DEFICIENCY ANEMIA TYPE: Primary | ICD-10-CM

## 2024-01-31 PROCEDURE — 2580000003 HC RX 258: Performed by: INTERNAL MEDICINE

## 2024-01-31 PROCEDURE — 96365 THER/PROPH/DIAG IV INF INIT: CPT

## 2024-01-31 PROCEDURE — 6360000002 HC RX W HCPCS: Performed by: INTERNAL MEDICINE

## 2024-01-31 RX ORDER — ONDANSETRON 2 MG/ML
8 INJECTION INTRAMUSCULAR; INTRAVENOUS
OUTPATIENT
Start: 2024-02-07

## 2024-01-31 RX ORDER — SODIUM CHLORIDE 9 MG/ML
5-250 INJECTION, SOLUTION INTRAVENOUS PRN
OUTPATIENT
Start: 2024-02-07

## 2024-01-31 RX ORDER — DIPHENHYDRAMINE HYDROCHLORIDE 50 MG/ML
50 INJECTION INTRAMUSCULAR; INTRAVENOUS
OUTPATIENT
Start: 2024-02-07

## 2024-01-31 RX ORDER — HEPARIN 100 UNIT/ML
500 SYRINGE INTRAVENOUS PRN
OUTPATIENT
Start: 2024-02-07

## 2024-01-31 RX ORDER — SODIUM CHLORIDE 0.9 % (FLUSH) 0.9 %
5-40 SYRINGE (ML) INJECTION PRN
Status: DISCONTINUED | OUTPATIENT
Start: 2024-01-31 | End: 2024-02-01 | Stop reason: HOSPADM

## 2024-01-31 RX ORDER — SODIUM CHLORIDE 9 MG/ML
5-250 INJECTION, SOLUTION INTRAVENOUS PRN
Status: DISCONTINUED | OUTPATIENT
Start: 2024-01-31 | End: 2024-02-01 | Stop reason: HOSPADM

## 2024-01-31 RX ORDER — EPINEPHRINE 1 MG/ML
0.3 INJECTION, SOLUTION, CONCENTRATE INTRAVENOUS PRN
OUTPATIENT
Start: 2024-02-07

## 2024-01-31 RX ORDER — ALBUTEROL SULFATE 90 UG/1
4 AEROSOL, METERED RESPIRATORY (INHALATION) PRN
OUTPATIENT
Start: 2024-02-07

## 2024-01-31 RX ORDER — SODIUM CHLORIDE 0.9 % (FLUSH) 0.9 %
5-40 SYRINGE (ML) INJECTION PRN
OUTPATIENT
Start: 2024-02-07

## 2024-01-31 RX ORDER — ACETAMINOPHEN 325 MG/1
650 TABLET ORAL
OUTPATIENT
Start: 2024-02-07

## 2024-01-31 RX ORDER — SODIUM CHLORIDE 9 MG/ML
INJECTION, SOLUTION INTRAVENOUS CONTINUOUS
OUTPATIENT
Start: 2024-02-07

## 2024-01-31 RX ADMIN — SODIUM CHLORIDE 100 ML/HR: 9 INJECTION, SOLUTION INTRAVENOUS at 08:27

## 2024-01-31 RX ADMIN — FERRIC CARBOXYMALTOSE INJECTION 750 MG: 50 INJECTION, SOLUTION INTRAVENOUS at 08:06

## 2024-01-31 RX ADMIN — SODIUM CHLORIDE, PRESERVATIVE FREE 10 ML: 5 INJECTION INTRAVENOUS at 08:04

## 2024-01-31 ASSESSMENT — PAIN SCALES - GENERAL: PAINLEVEL_OUTOF10: 0

## 2024-01-31 NOTE — TELEPHONE ENCOUNTER
Physician provider: Anne Lopez MD  Reason for today's call: CT results  Last office visit: n/a    Pt called asking for call back or virtual visit to review recent CT results.

## 2024-01-31 NOTE — PROGRESS NOTES
Pt arrived ambulatory, injectafer completed, pt tolerated well, monitored for 30 min, no adverse side effects, discharged home ambulatory, next appt 2/2 at 0715

## 2024-01-31 NOTE — TELEPHONE ENCOUNTER
Returned call to patient.  Patient informed request will be sent to pod nurse.  Verbalized understanding.

## 2024-02-01 ENCOUNTER — TELEPHONE (OUTPATIENT)
Dept: ONCOLOGY | Age: 59
End: 2024-02-01

## 2024-02-01 RX ORDER — CIPROFLOXACIN 500 MG/1
500 TABLET, FILM COATED ORAL 2 TIMES DAILY
Qty: 20 TABLET | Refills: 0 | Status: SHIPPED | OUTPATIENT
Start: 2024-02-01 | End: 2024-02-11

## 2024-02-01 RX ORDER — LEVOFLOXACIN 500 MG/1
500 TABLET, FILM COATED ORAL DAILY
Qty: 7 TABLET | Refills: 0 | Status: CANCELLED | OUTPATIENT
Start: 2024-02-01 | End: 2024-02-08

## 2024-02-01 NOTE — TELEPHONE ENCOUNTER
Physician provider: Anne Lopez MD  Reason for today's call: PA F/U  Last office visit: n/a    Patient notified that their information will be routed to the [facility] clinical triage team for review. Patient is advised that they will receive a phone call from the triage department. If symptoms worsen before receiving a call back, the patient has been advised to proceed to the nearest ED.     [facility] called to F/U on PA request for: Oxycontin 15 MG. Pharm stated office can call: [phone] to provide info for auth.

## 2024-02-01 NOTE — TELEPHONE ENCOUNTER
Call to patient by MD to review recent CT CAP concerning for progression.  Will present pt at GI/Gen tumor board to determine eval/possibility of bx of new pancreas findings.  Also, discussed possibility of resuming previous tx as well.  Pt VU and was appreciative of call.

## 2024-02-02 ENCOUNTER — OFFICE VISIT (OUTPATIENT)
Dept: PALLATIVE CARE | Age: 59
End: 2024-02-02
Payer: COMMERCIAL

## 2024-02-02 VITALS
TEMPERATURE: 98 F | BODY MASS INDEX: 32.54 KG/M2 | DIASTOLIC BLOOD PRESSURE: 68 MMHG | HEART RATE: 76 BPM | RESPIRATION RATE: 12 BRPM | HEIGHT: 67 IN | SYSTOLIC BLOOD PRESSURE: 120 MMHG | OXYGEN SATURATION: 95 % | WEIGHT: 207.3 LBS

## 2024-02-02 DIAGNOSIS — F41.9 ANXIETY AND DEPRESSION: ICD-10-CM

## 2024-02-02 DIAGNOSIS — C64.2 RENAL CELL CARCINOMA OF LEFT KIDNEY (HCC): ICD-10-CM

## 2024-02-02 DIAGNOSIS — Z51.5 ENCOUNTER FOR PALLIATIVE CARE: ICD-10-CM

## 2024-02-02 DIAGNOSIS — G89.3 CANCER ASSOCIATED PAIN: Primary | ICD-10-CM

## 2024-02-02 DIAGNOSIS — F32.A ANXIETY AND DEPRESSION: ICD-10-CM

## 2024-02-02 DIAGNOSIS — R11.2 NAUSEA AND VOMITING, UNSPECIFIED VOMITING TYPE: ICD-10-CM

## 2024-02-02 DIAGNOSIS — E27.8 MASS OF RIGHT ADRENAL GLAND (HCC): ICD-10-CM

## 2024-02-02 PROCEDURE — 99497 ADVNCD CARE PLAN 30 MIN: CPT

## 2024-02-02 PROCEDURE — 99215 OFFICE O/P EST HI 40 MIN: CPT

## 2024-02-02 RX ORDER — CLONAZEPAM 1 MG/1
1 TABLET ORAL 2 TIMES DAILY PRN
Qty: 60 TABLET | Refills: 2 | Status: SHIPPED | OUTPATIENT
Start: 2024-02-02 | End: 2024-05-02

## 2024-02-02 RX ORDER — DRONABINOL 5 MG/1
5 CAPSULE ORAL
Qty: 90 CAPSULE | Refills: 2 | Status: SHIPPED | OUTPATIENT
Start: 2024-02-02 | End: 2024-05-02

## 2024-02-02 RX ORDER — MORPHINE SULFATE 15 MG/1
15 TABLET, FILM COATED, EXTENDED RELEASE ORAL NIGHTLY
Qty: 30 TABLET | Refills: 0 | Status: SHIPPED | OUTPATIENT
Start: 2024-02-02 | End: 2024-03-03

## 2024-02-02 ASSESSMENT — ENCOUNTER SYMPTOMS
NAUSEA: 1
ABDOMINAL PAIN: 1
BACK PAIN: 1

## 2024-02-02 NOTE — PROGRESS NOTES
Outpatient Palliative Care at the  [facility]: Office Visit Established Patient    Diagnosis: Metastatic renal cell carcinoma     Treatment Plan: axitinib/pebrolizumab -> lenvatinib/everolimus      Treatment Intent: Palliative     Medical Oncologist: Dr. Lopez, Dr. Mccloud @ [facility]     Radiation Oncologist: None     Navigator: None      Chief Complaint:    Chief Complaint   Patient presents with    Follow-up       History of Present Illness:  Ms. Hall is a 58 y.o. female who presents today for evaluation regarding establishing care with palliative care. 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  Despite attending physical therapy since the accident, she continued to experience the same low back pain.  She was seen at [facility] on 10/4/22.  MRI of the lumbar spine on 10/15/22 showed mild degenerative disc disease at L3-4 through L5-S1 and a 5.7 x 6.1 x 7 cm lower pole solid left renal mass. Urgent referral was placed to [facility], who referred the patient to [facility] for uro/onc evaluation and treatment of renal mass.  CT AP on 10/26/22 showed bilateral enhancing renal masses, concerning for renal cell carcinoma Also noted was an enhancing 1.5 cm right adrenal nodule, concerning for a metastatic nodule.  S/p left renal biopsy on 11/2022, clear cell RCC.  She was started on pembrolizumab and axitinib - could not c/w immunotherapy due to LFT abnormalities. Started lenvatinib/everolimus. She had left partial nephrectomy at [facility] on 12/8/23.  Plan for right nephrectomy pending.   Her PMHx includes anxiety, tobacco abuse, cervical dysplasia, DDD, sleep apnea and lap liv. She is employed at [employer] as a CNA. She lives at home with her son, who she states has drug addiction issues.     Interval History:  Pt seen today in clinic as a HO only visit. Pt recently got

## 2024-02-06 RX ORDER — AMLODIPINE BESYLATE 5 MG/1
5 TABLET ORAL DAILY PRN
COMMUNITY

## 2024-02-06 NOTE — PRE-PROCEDURE INSTRUCTIONS
Patient verified name and .  Order for consent not found in EHR and  patient verifies procedure.   Type lB surgery, phone assessment complete.  Orders were not received.  Labs per surgeon: none at present time  Labs per anesthesia protocol: POC Glucose    Patient answered medical/surgical history questions at their best of ability. All prior to admission medications documented in EPIC.    Patient instructed to take the following medications the day of surgery according to anesthesia guidelines with a small sip of water: bring albuterol inhaler DOS, Amlodipine if needed, Buspar, Cipro, Klonopin if needed, Marinol if needed, Oxycodone if needed, Protonix, Phenergan if needed    Patient instructed on the following:  Hold all vitamins 7 days prior to surgery and NSAIDS 5 days prior to surgery.   Prescription meds to hold: Jardiance and stool softner am of surgery    > Arrive at [facility] main Entrance, time of arrival to be called the day before by 1700  > NPO after midnight, unless otherwise indicated, including gum, mints, and ice chips  > Responsible adult must drive patient to the hospital, stay during surgery, and patient will need supervision 24 hours after anesthesia  > Use non moisturizing soap in shower the night before surgery and on the morning of surgery  > All piercings must be removed prior to arrival.    > Leave all valuables (money and jewelry) at home but bring insurance card and ID on DOS.   > You may be required to pay a deductible or co-pay on the day of your procedure. You can pre-pay by calling [phone] if your surgery is at the [facility] or [phone] if your surgery is at the [facility].  > Do not wear make-up, nail polish, lotions, cologne, perfumes, powders, or oil on skin. Artificial nails are not permitted.

## 2024-02-07 ENCOUNTER — HOSPITAL ENCOUNTER (OUTPATIENT)
Dept: INFUSION THERAPY | Age: 59
Discharge: HOME | End: 2024-02-07
Payer: COMMERCIAL

## 2024-02-07 ENCOUNTER — ANESTHESIA EVENT (OUTPATIENT)
Dept: SURGERY | Age: 59
End: 2024-02-07
Payer: COMMERCIAL

## 2024-02-07 VITALS
TEMPERATURE: 98.7 F | OXYGEN SATURATION: 97 % | RESPIRATION RATE: 15 BRPM | SYSTOLIC BLOOD PRESSURE: 124 MMHG | DIASTOLIC BLOOD PRESSURE: 69 MMHG | HEART RATE: 67 BPM

## 2024-02-07 DIAGNOSIS — D50.9 IRON DEFICIENCY ANEMIA, UNSPECIFIED IRON DEFICIENCY ANEMIA TYPE: Primary | ICD-10-CM

## 2024-02-07 PROCEDURE — 2580000003 HC RX 258: Performed by: INTERNAL MEDICINE

## 2024-02-07 PROCEDURE — 96365 THER/PROPH/DIAG IV INF INIT: CPT

## 2024-02-07 PROCEDURE — 6360000002 HC RX W HCPCS: Performed by: INTERNAL MEDICINE

## 2024-02-07 RX ORDER — HEPARIN 100 UNIT/ML
500 SYRINGE INTRAVENOUS PRN
OUTPATIENT
Start: 2024-02-07

## 2024-02-07 RX ORDER — SODIUM CHLORIDE 9 MG/ML
INJECTION, SOLUTION INTRAVENOUS CONTINUOUS
OUTPATIENT
Start: 2024-02-07

## 2024-02-07 RX ORDER — SODIUM CHLORIDE 9 MG/ML
5-250 INJECTION, SOLUTION INTRAVENOUS PRN
Status: CANCELLED | OUTPATIENT
Start: 2024-02-07

## 2024-02-07 RX ORDER — ACETAMINOPHEN 325 MG/1
650 TABLET ORAL
OUTPATIENT
Start: 2024-02-07

## 2024-02-07 RX ORDER — SODIUM CHLORIDE 9 MG/ML
INJECTION, SOLUTION INTRAVENOUS CONTINUOUS
Status: ACTIVE | OUTPATIENT
Start: 2024-02-07 | End: 2024-02-07

## 2024-02-07 RX ORDER — ONDANSETRON 2 MG/ML
8 INJECTION INTRAMUSCULAR; INTRAVENOUS
OUTPATIENT
Start: 2024-02-07

## 2024-02-07 RX ORDER — SODIUM CHLORIDE 9 MG/ML
5-250 INJECTION, SOLUTION INTRAVENOUS PRN
OUTPATIENT
Start: 2024-02-07

## 2024-02-07 RX ORDER — SODIUM CHLORIDE 0.9 % (FLUSH) 0.9 %
5-40 SYRINGE (ML) INJECTION PRN
OUTPATIENT
Start: 2024-02-07

## 2024-02-07 RX ORDER — DIPHENHYDRAMINE HYDROCHLORIDE 50 MG/ML
50 INJECTION INTRAMUSCULAR; INTRAVENOUS
OUTPATIENT
Start: 2024-02-07

## 2024-02-07 RX ORDER — SODIUM CHLORIDE 9 MG/ML
5-250 INJECTION, SOLUTION INTRAVENOUS PRN
Status: DISCONTINUED | OUTPATIENT
Start: 2024-02-07 | End: 2024-02-08 | Stop reason: HOSPADM

## 2024-02-07 RX ORDER — ACETAMINOPHEN 325 MG/1
650 TABLET ORAL
Status: DISCONTINUED | OUTPATIENT
Start: 2024-02-07 | End: 2024-02-08 | Stop reason: HOSPADM

## 2024-02-07 RX ORDER — ALBUTEROL SULFATE 90 UG/1
4 AEROSOL, METERED RESPIRATORY (INHALATION) PRN
Status: ACTIVE | OUTPATIENT
Start: 2024-02-07 | End: 2024-02-07

## 2024-02-07 RX ORDER — EPINEPHRINE 1 MG/ML
0.3 INJECTION, SOLUTION, CONCENTRATE INTRAVENOUS PRN
Status: DISCONTINUED | OUTPATIENT
Start: 2024-02-07 | End: 2024-02-08 | Stop reason: HOSPADM

## 2024-02-07 RX ORDER — EPINEPHRINE 1 MG/ML
0.3 INJECTION, SOLUTION, CONCENTRATE INTRAVENOUS PRN
OUTPATIENT
Start: 2024-02-07

## 2024-02-07 RX ORDER — SODIUM CHLORIDE 0.9 % (FLUSH) 0.9 %
5-40 SYRINGE (ML) INJECTION PRN
Status: DISCONTINUED | OUTPATIENT
Start: 2024-02-07 | End: 2024-02-08 | Stop reason: HOSPADM

## 2024-02-07 RX ORDER — ALBUTEROL SULFATE 90 UG/1
4 AEROSOL, METERED RESPIRATORY (INHALATION) PRN
OUTPATIENT
Start: 2024-02-07

## 2024-02-07 RX ORDER — ONDANSETRON 2 MG/ML
8 INJECTION INTRAMUSCULAR; INTRAVENOUS
Status: DISCONTINUED | OUTPATIENT
Start: 2024-02-07 | End: 2024-02-08 | Stop reason: HOSPADM

## 2024-02-07 RX ORDER — DIPHENHYDRAMINE HYDROCHLORIDE 50 MG/ML
50 INJECTION INTRAMUSCULAR; INTRAVENOUS
Status: DISCONTINUED | OUTPATIENT
Start: 2024-02-07 | End: 2024-02-08 | Stop reason: HOSPADM

## 2024-02-07 RX ADMIN — SODIUM CHLORIDE, PRESERVATIVE FREE 10 ML: 5 INJECTION INTRAVENOUS at 08:45

## 2024-02-07 RX ADMIN — FERRIC CARBOXYMALTOSE INJECTION 750 MG: 50 INJECTION, SOLUTION INTRAVENOUS at 07:50

## 2024-02-07 RX ADMIN — SODIUM CHLORIDE, PRESERVATIVE FREE 10 ML: 5 INJECTION INTRAVENOUS at 07:37

## 2024-02-07 RX ADMIN — SODIUM CHLORIDE 200 ML/HR: 9 INJECTION, SOLUTION INTRAVENOUS at 07:38

## 2024-02-07 NOTE — PROGRESS NOTES
Arrived to the Infusion Center. Injectafer completed. Patient tolerated well.   Any issues or concerns during appointment: none.  Discharged ambulatory.

## 2024-02-08 ENCOUNTER — HOSPITAL ENCOUNTER (OUTPATIENT)
Age: 59
Discharge: HOME | End: 2024-02-09
Attending: OTOLARYNGOLOGY | Admitting: OTOLARYNGOLOGY
Payer: COMMERCIAL

## 2024-02-08 ENCOUNTER — CLINICAL DOCUMENTATION (OUTPATIENT)
Dept: ONCOLOGY | Age: 59
End: 2024-02-08

## 2024-02-08 ENCOUNTER — ANESTHESIA (OUTPATIENT)
Dept: SURGERY | Age: 59
End: 2024-02-08
Payer: COMMERCIAL

## 2024-02-08 LAB
GLUCOSE BLD STRIP.AUTO-MCNC: 123 MG/DL (ref 65–100)
GLUCOSE BLD STRIP.AUTO-MCNC: 135 MG/DL (ref 65–100)
GLUCOSE BLD STRIP.AUTO-MCNC: 202 MG/DL (ref 65–100)
SERVICE CMNT-IMP: ABNORMAL

## 2024-02-08 PROCEDURE — 88305 TISSUE EXAM BY PATHOLOGIST: CPT

## 2024-02-08 PROCEDURE — 3700000001 HC ADD 15 MINUTES (ANESTHESIA): Performed by: OTOLARYNGOLOGY

## 2024-02-08 PROCEDURE — 2709999900 HC NON-CHARGEABLE SUPPLY: Performed by: OTOLARYNGOLOGY

## 2024-02-08 PROCEDURE — 82962 GLUCOSE BLOOD TEST: CPT

## 2024-02-08 PROCEDURE — 6370000000 HC RX 637 (ALT 250 FOR IP): Performed by: ANESTHESIOLOGY

## 2024-02-08 PROCEDURE — 3600000012 HC SURGERY LEVEL 2 ADDTL 15MIN: Performed by: OTOLARYNGOLOGY

## 2024-02-08 PROCEDURE — 3600000002 HC SURGERY LEVEL 2 BASE: Performed by: OTOLARYNGOLOGY

## 2024-02-08 PROCEDURE — 6360000002 HC RX W HCPCS: Performed by: STUDENT IN AN ORGANIZED HEALTH CARE EDUCATION/TRAINING PROGRAM

## 2024-02-08 PROCEDURE — 2720000010 HC SURG SUPPLY STERILE: Performed by: OTOLARYNGOLOGY

## 2024-02-08 PROCEDURE — 88307 TISSUE EXAM BY PATHOLOGIST: CPT

## 2024-02-08 PROCEDURE — 7100000001 HC PACU RECOVERY - ADDTL 15 MIN: Performed by: OTOLARYNGOLOGY

## 2024-02-08 PROCEDURE — 2580000003 HC RX 258: Performed by: ANESTHESIOLOGY

## 2024-02-08 PROCEDURE — 2580000003 HC RX 258: Performed by: OTOLARYNGOLOGY

## 2024-02-08 PROCEDURE — 6370000000 HC RX 637 (ALT 250 FOR IP): Performed by: FAMILY MEDICINE

## 2024-02-08 PROCEDURE — 6370000000 HC RX 637 (ALT 250 FOR IP): Performed by: OTOLARYNGOLOGY

## 2024-02-08 PROCEDURE — 2500000003 HC RX 250 WO HCPCS: Performed by: OTOLARYNGOLOGY

## 2024-02-08 PROCEDURE — 2500000003 HC RX 250 WO HCPCS: Performed by: STUDENT IN AN ORGANIZED HEALTH CARE EDUCATION/TRAINING PROGRAM

## 2024-02-08 PROCEDURE — 42440 EXCISE SUBMAXILLARY GLAND: CPT | Performed by: OTOLARYNGOLOGY

## 2024-02-08 PROCEDURE — 2580000003 HC RX 258: Performed by: STUDENT IN AN ORGANIZED HEALTH CARE EDUCATION/TRAINING PROGRAM

## 2024-02-08 PROCEDURE — 7100000000 HC PACU RECOVERY - FIRST 15 MIN: Performed by: OTOLARYNGOLOGY

## 2024-02-08 PROCEDURE — 6360000002 HC RX W HCPCS: Performed by: OTOLARYNGOLOGY

## 2024-02-08 PROCEDURE — 3700000000 HC ANESTHESIA ATTENDED CARE: Performed by: OTOLARYNGOLOGY

## 2024-02-08 RX ORDER — SODIUM CHLORIDE 0.9 % (FLUSH) 0.9 %
5-40 SYRINGE (ML) INJECTION EVERY 12 HOURS SCHEDULED
Status: DISCONTINUED | OUTPATIENT
Start: 2024-02-08 | End: 2024-02-09 | Stop reason: HOSPADM

## 2024-02-08 RX ORDER — ALBUTEROL SULFATE 90 UG/1
2 AEROSOL, METERED RESPIRATORY (INHALATION) EVERY 6 HOURS PRN
Status: DISCONTINUED | OUTPATIENT
Start: 2024-02-08 | End: 2024-02-09 | Stop reason: HOSPADM

## 2024-02-08 RX ORDER — PROPOFOL 10 MG/ML
INJECTION, EMULSION INTRAVENOUS PRN
Status: DISCONTINUED | OUTPATIENT
Start: 2024-02-08 | End: 2024-02-08 | Stop reason: SDUPTHER

## 2024-02-08 RX ORDER — ONDANSETRON 2 MG/ML
INJECTION INTRAMUSCULAR; INTRAVENOUS PRN
Status: DISCONTINUED | OUTPATIENT
Start: 2024-02-08 | End: 2024-02-08 | Stop reason: SDUPTHER

## 2024-02-08 RX ORDER — OXYCODONE HYDROCHLORIDE 5 MG/1
5 TABLET ORAL PRN
Status: DISCONTINUED | OUTPATIENT
Start: 2024-02-08 | End: 2024-02-08 | Stop reason: HOSPADM

## 2024-02-08 RX ORDER — SODIUM CHLORIDE 0.9 % (FLUSH) 0.9 %
5-40 SYRINGE (ML) INJECTION EVERY 12 HOURS SCHEDULED
Status: DISCONTINUED | OUTPATIENT
Start: 2024-02-08 | End: 2024-02-08 | Stop reason: HOSPADM

## 2024-02-08 RX ORDER — SODIUM CHLORIDE 0.9 % (FLUSH) 0.9 %
5-40 SYRINGE (ML) INJECTION PRN
Status: DISCONTINUED | OUTPATIENT
Start: 2024-02-08 | End: 2024-02-08 | Stop reason: HOSPADM

## 2024-02-08 RX ORDER — DEXMEDETOMIDINE HYDROCHLORIDE 100 UG/ML
INJECTION, SOLUTION INTRAVENOUS PRN
Status: DISCONTINUED | OUTPATIENT
Start: 2024-02-08 | End: 2024-02-08 | Stop reason: SDUPTHER

## 2024-02-08 RX ORDER — SUCCINYLCHOLINE CHLORIDE 20 MG/ML
INJECTION INTRAMUSCULAR; INTRAVENOUS PRN
Status: DISCONTINUED | OUTPATIENT
Start: 2024-02-08 | End: 2024-02-08 | Stop reason: SDUPTHER

## 2024-02-08 RX ORDER — BUSPIRONE HYDROCHLORIDE 10 MG/1
10 TABLET ORAL 2 TIMES DAILY
Status: DISCONTINUED | OUTPATIENT
Start: 2024-02-08 | End: 2024-02-09 | Stop reason: HOSPADM

## 2024-02-08 RX ORDER — HYDROMORPHONE HYDROCHLORIDE 2 MG/ML
0.5 INJECTION, SOLUTION INTRAMUSCULAR; INTRAVENOUS; SUBCUTANEOUS EVERY 5 MIN PRN
Status: DISCONTINUED | OUTPATIENT
Start: 2024-02-08 | End: 2024-02-08 | Stop reason: SDUPTHER

## 2024-02-08 RX ORDER — SODIUM CHLORIDE 9 MG/ML
INJECTION, SOLUTION INTRAVENOUS PRN
Status: DISCONTINUED | OUTPATIENT
Start: 2024-02-08 | End: 2024-02-08 | Stop reason: HOSPADM

## 2024-02-08 RX ORDER — LIDOCAINE HYDROCHLORIDE 10 MG/ML
1 INJECTION, SOLUTION INFILTRATION; PERINEURAL
Status: DISCONTINUED | OUTPATIENT
Start: 2024-02-08 | End: 2024-02-08 | Stop reason: HOSPADM

## 2024-02-08 RX ORDER — CLONAZEPAM 1 MG/1
1 TABLET ORAL 2 TIMES DAILY PRN
Status: DISCONTINUED | OUTPATIENT
Start: 2024-02-08 | End: 2024-02-09 | Stop reason: HOSPADM

## 2024-02-08 RX ORDER — EPHEDRINE SULFATE/0.9% NACL/PF 50 MG/5 ML
SYRINGE (ML) INTRAVENOUS PRN
Status: DISCONTINUED | OUTPATIENT
Start: 2024-02-08 | End: 2024-02-08 | Stop reason: SDUPTHER

## 2024-02-08 RX ORDER — SODIUM CHLORIDE 0.9 % (FLUSH) 0.9 %
5-40 SYRINGE (ML) INJECTION PRN
Status: DISCONTINUED | OUTPATIENT
Start: 2024-02-08 | End: 2024-02-09 | Stop reason: HOSPADM

## 2024-02-08 RX ORDER — AMLODIPINE BESYLATE 5 MG/1
5 TABLET ORAL DAILY PRN
Status: DISCONTINUED | OUTPATIENT
Start: 2024-02-08 | End: 2024-02-09 | Stop reason: HOSPADM

## 2024-02-08 RX ORDER — PROCHLORPERAZINE EDISYLATE 5 MG/ML
5 INJECTION INTRAMUSCULAR; INTRAVENOUS
Status: DISCONTINUED | OUTPATIENT
Start: 2024-02-08 | End: 2024-02-08 | Stop reason: HOSPADM

## 2024-02-08 RX ORDER — GLYCOPYRROLATE 0.2 MG/ML
INJECTION INTRAMUSCULAR; INTRAVENOUS PRN
Status: DISCONTINUED | OUTPATIENT
Start: 2024-02-08 | End: 2024-02-08 | Stop reason: SDUPTHER

## 2024-02-08 RX ORDER — OXYCODONE HYDROCHLORIDE 5 MG/1
10 TABLET ORAL PRN
Status: DISCONTINUED | OUTPATIENT
Start: 2024-02-08 | End: 2024-02-08 | Stop reason: HOSPADM

## 2024-02-08 RX ORDER — ONDANSETRON 2 MG/ML
4 INJECTION INTRAMUSCULAR; INTRAVENOUS
Status: DISCONTINUED | OUTPATIENT
Start: 2024-02-08 | End: 2024-02-08 | Stop reason: HOSPADM

## 2024-02-08 RX ORDER — ENOXAPARIN SODIUM 100 MG/ML
40 INJECTION SUBCUTANEOUS EVERY 24 HOURS
Status: DISCONTINUED | OUTPATIENT
Start: 2024-02-08 | End: 2024-02-09 | Stop reason: HOSPADM

## 2024-02-08 RX ORDER — SODIUM CHLORIDE, SODIUM LACTATE, POTASSIUM CHLORIDE, CALCIUM CHLORIDE 600; 310; 30; 20 MG/100ML; MG/100ML; MG/100ML; MG/100ML
INJECTION, SOLUTION INTRAVENOUS CONTINUOUS
Status: DISCONTINUED | OUTPATIENT
Start: 2024-02-08 | End: 2024-02-08

## 2024-02-08 RX ORDER — BISACODYL 5 MG/1
5 TABLET, DELAYED RELEASE ORAL DAILY PRN
Status: DISCONTINUED | OUTPATIENT
Start: 2024-02-08 | End: 2024-02-09 | Stop reason: HOSPADM

## 2024-02-08 RX ORDER — PROMETHAZINE HYDROCHLORIDE 25 MG/1
25 TABLET ORAL EVERY 6 HOURS PRN
Status: DISCONTINUED | OUTPATIENT
Start: 2024-02-08 | End: 2024-02-09 | Stop reason: HOSPADM

## 2024-02-08 RX ORDER — OXYCODONE HYDROCHLORIDE 5 MG/1
5 TABLET ORAL EVERY 4 HOURS PRN
Status: DISCONTINUED | OUTPATIENT
Start: 2024-02-08 | End: 2024-02-09 | Stop reason: HOSPADM

## 2024-02-08 RX ORDER — MIDAZOLAM HYDROCHLORIDE 2 MG/2ML
2 INJECTION, SOLUTION INTRAMUSCULAR; INTRAVENOUS
Status: DISCONTINUED | OUTPATIENT
Start: 2024-02-08 | End: 2024-02-08 | Stop reason: HOSPADM

## 2024-02-08 RX ORDER — NEOSTIGMINE METHYLSULFATE 1 MG/ML
INJECTION, SOLUTION INTRAVENOUS PRN
Status: DISCONTINUED | OUTPATIENT
Start: 2024-02-08 | End: 2024-02-08 | Stop reason: SDUPTHER

## 2024-02-08 RX ORDER — ONDANSETRON 4 MG/1
4 TABLET, ORALLY DISINTEGRATING ORAL EVERY 8 HOURS PRN
Status: DISCONTINUED | OUTPATIENT
Start: 2024-02-08 | End: 2024-02-09 | Stop reason: HOSPADM

## 2024-02-08 RX ORDER — MORPHINE SULFATE 15 MG/1
15 TABLET, FILM COATED, EXTENDED RELEASE ORAL NIGHTLY
Status: DISCONTINUED | OUTPATIENT
Start: 2024-02-08 | End: 2024-02-09 | Stop reason: HOSPADM

## 2024-02-08 RX ORDER — ONDANSETRON 2 MG/ML
4 INJECTION INTRAMUSCULAR; INTRAVENOUS EVERY 6 HOURS PRN
Status: DISCONTINUED | OUTPATIENT
Start: 2024-02-08 | End: 2024-02-09 | Stop reason: HOSPADM

## 2024-02-08 RX ORDER — SODIUM CHLORIDE, SODIUM LACTATE, POTASSIUM CHLORIDE, CALCIUM CHLORIDE 600; 310; 30; 20 MG/100ML; MG/100ML; MG/100ML; MG/100ML
INJECTION, SOLUTION INTRAVENOUS CONTINUOUS
Status: DISCONTINUED | OUTPATIENT
Start: 2024-02-08 | End: 2024-02-09 | Stop reason: HOSPADM

## 2024-02-08 RX ORDER — NICOTINE 21 MG/24HR
1 PATCH, TRANSDERMAL 24 HOURS TRANSDERMAL DAILY
Status: DISCONTINUED | OUTPATIENT
Start: 2024-02-08 | End: 2024-02-09 | Stop reason: HOSPADM

## 2024-02-08 RX ORDER — ACETAMINOPHEN 500 MG
1000 TABLET ORAL ONCE
Status: COMPLETED | OUTPATIENT
Start: 2024-02-08 | End: 2024-02-08

## 2024-02-08 RX ORDER — LIDOCAINE HYDROCHLORIDE 20 MG/ML
INJECTION, SOLUTION EPIDURAL; INFILTRATION; INTRACAUDAL; PERINEURAL PRN
Status: DISCONTINUED | OUTPATIENT
Start: 2024-02-08 | End: 2024-02-08 | Stop reason: SDUPTHER

## 2024-02-08 RX ORDER — PANTOPRAZOLE SODIUM 40 MG/1
40 TABLET, DELAYED RELEASE ORAL 2 TIMES DAILY
Status: DISCONTINUED | OUTPATIENT
Start: 2024-02-08 | End: 2024-02-09 | Stop reason: HOSPADM

## 2024-02-08 RX ORDER — HYDROMORPHONE HYDROCHLORIDE 2 MG/ML
0.5 INJECTION, SOLUTION INTRAMUSCULAR; INTRAVENOUS; SUBCUTANEOUS EVERY 5 MIN PRN
Status: DISCONTINUED | OUTPATIENT
Start: 2024-02-08 | End: 2024-02-08 | Stop reason: HOSPADM

## 2024-02-08 RX ORDER — SENNA AND DOCUSATE SODIUM 50; 8.6 MG/1; MG/1
1 TABLET, FILM COATED ORAL DAILY
Status: DISCONTINUED | OUTPATIENT
Start: 2024-02-08 | End: 2024-02-09 | Stop reason: HOSPADM

## 2024-02-08 RX ORDER — FENTANYL CITRATE 50 UG/ML
INJECTION, SOLUTION INTRAMUSCULAR; INTRAVENOUS PRN
Status: DISCONTINUED | OUTPATIENT
Start: 2024-02-08 | End: 2024-02-08 | Stop reason: SDUPTHER

## 2024-02-08 RX ORDER — SODIUM CHLORIDE 9 MG/ML
INJECTION, SOLUTION INTRAVENOUS PRN
Status: DISCONTINUED | OUTPATIENT
Start: 2024-02-08 | End: 2024-02-09 | Stop reason: HOSPADM

## 2024-02-08 RX ORDER — DIPHENHYDRAMINE HYDROCHLORIDE 50 MG/ML
12.5 INJECTION INTRAMUSCULAR; INTRAVENOUS
Status: DISCONTINUED | OUTPATIENT
Start: 2024-02-08 | End: 2024-02-08 | Stop reason: HOSPADM

## 2024-02-08 RX ORDER — DEXAMETHASONE SODIUM PHOSPHATE 4 MG/ML
INJECTION, SOLUTION INTRA-ARTICULAR; INTRALESIONAL; INTRAMUSCULAR; INTRAVENOUS; SOFT TISSUE PRN
Status: DISCONTINUED | OUTPATIENT
Start: 2024-02-08 | End: 2024-02-08 | Stop reason: SDUPTHER

## 2024-02-08 RX ORDER — ROCURONIUM BROMIDE 10 MG/ML
INJECTION, SOLUTION INTRAVENOUS PRN
Status: DISCONTINUED | OUTPATIENT
Start: 2024-02-08 | End: 2024-02-08 | Stop reason: SDUPTHER

## 2024-02-08 RX ORDER — SODIUM CHLORIDE, SODIUM LACTATE, POTASSIUM CHLORIDE, CALCIUM CHLORIDE 600; 310; 30; 20 MG/100ML; MG/100ML; MG/100ML; MG/100ML
INJECTION, SOLUTION INTRAVENOUS CONTINUOUS
Status: DISCONTINUED | OUTPATIENT
Start: 2024-02-08 | End: 2024-02-08 | Stop reason: HOSPADM

## 2024-02-08 RX ADMIN — PHENYLEPHRINE HYDROCHLORIDE 100 MCG: 10 INJECTION INTRAVENOUS at 07:27

## 2024-02-08 RX ADMIN — PHENYLEPHRINE HYDROCHLORIDE 100 MCG: 10 INJECTION INTRAVENOUS at 09:31

## 2024-02-08 RX ADMIN — OXYCODONE HYDROCHLORIDE 5 MG: 5 TABLET ORAL at 18:18

## 2024-02-08 RX ADMIN — PROPOFOL 160 MG: 10 INJECTION, EMULSION INTRAVENOUS at 07:05

## 2024-02-08 RX ADMIN — ENOXAPARIN SODIUM 40 MG: 100 INJECTION SUBCUTANEOUS at 16:18

## 2024-02-08 RX ADMIN — Medication 10 MG: at 07:30

## 2024-02-08 RX ADMIN — ROCURONIUM BROMIDE 30 MG: 50 INJECTION, SOLUTION INTRAVENOUS at 07:05

## 2024-02-08 RX ADMIN — Medication 3 MG: at 08:45

## 2024-02-08 RX ADMIN — SODIUM CHLORIDE, SODIUM LACTATE, POTASSIUM CHLORIDE, AND CALCIUM CHLORIDE: 600; 310; 30; 20 INJECTION, SOLUTION INTRAVENOUS at 06:26

## 2024-02-08 RX ADMIN — PROPOFOL 40 MG: 10 INJECTION, EMULSION INTRAVENOUS at 07:48

## 2024-02-08 RX ADMIN — Medication 5 MG: at 07:39

## 2024-02-08 RX ADMIN — FENTANYL CITRATE 25 MCG: 50 INJECTION, SOLUTION INTRAMUSCULAR; INTRAVENOUS at 08:00

## 2024-02-08 RX ADMIN — LIDOCAINE HYDROCHLORIDE 40 MG: 20 INJECTION, SOLUTION EPIDURAL; INFILTRATION; INTRACAUDAL; PERINEURAL at 09:30

## 2024-02-08 RX ADMIN — MORPHINE SULFATE 15 MG: 15 TABLET, FILM COATED, EXTENDED RELEASE ORAL at 20:53

## 2024-02-08 RX ADMIN — SODIUM CHLORIDE, PRESERVATIVE FREE 10 ML: 5 INJECTION INTRAVENOUS at 21:05

## 2024-02-08 RX ADMIN — BUSPIRONE HYDROCHLORIDE 10 MG: 10 TABLET ORAL at 20:53

## 2024-02-08 RX ADMIN — PROPOFOL 40 MG: 10 INJECTION, EMULSION INTRAVENOUS at 08:00

## 2024-02-08 RX ADMIN — DEXMEDETOMIDINE 8 MCG: 100 INJECTION, SOLUTION, CONCENTRATE INTRAVENOUS at 08:26

## 2024-02-08 RX ADMIN — PHENYLEPHRINE HYDROCHLORIDE 100 MCG: 10 INJECTION INTRAVENOUS at 07:45

## 2024-02-08 RX ADMIN — ONDANSETRON 4 MG: 2 INJECTION INTRAMUSCULAR; INTRAVENOUS at 08:40

## 2024-02-08 RX ADMIN — PHENYLEPHRINE HYDROCHLORIDE 100 MCG: 10 INJECTION INTRAVENOUS at 07:21

## 2024-02-08 RX ADMIN — SODIUM CHLORIDE, SODIUM LACTATE, POTASSIUM CHLORIDE, AND CALCIUM CHLORIDE: 600; 310; 30; 20 INJECTION, SOLUTION INTRAVENOUS at 09:53

## 2024-02-08 RX ADMIN — ACETAMINOPHEN 1000 MG: 500 TABLET ORAL at 06:25

## 2024-02-08 RX ADMIN — PROPOFOL 50 MG: 10 INJECTION, EMULSION INTRAVENOUS at 08:53

## 2024-02-08 RX ADMIN — PROPOFOL 20 MG: 10 INJECTION, EMULSION INTRAVENOUS at 08:11

## 2024-02-08 RX ADMIN — FENTANYL CITRATE 50 MCG: 50 INJECTION, SOLUTION INTRAMUSCULAR; INTRAVENOUS at 07:05

## 2024-02-08 RX ADMIN — PANTOPRAZOLE SODIUM 40 MG: 40 TABLET, DELAYED RELEASE ORAL at 20:53

## 2024-02-08 RX ADMIN — Medication 5 MG: at 07:57

## 2024-02-08 RX ADMIN — DEXAMETHASONE SODIUM PHOSPHATE 4 MG: 4 INJECTION, SOLUTION INTRAMUSCULAR; INTRAVENOUS at 09:49

## 2024-02-08 RX ADMIN — SERTRALINE HYDROCHLORIDE 150 MG: 50 TABLET ORAL at 21:05

## 2024-02-08 RX ADMIN — PROPOFOL 150 MG: 10 INJECTION, EMULSION INTRAVENOUS at 09:30

## 2024-02-08 RX ADMIN — DEXAMETHASONE SODIUM PHOSPHATE 4 MG: 4 INJECTION, SOLUTION INTRAMUSCULAR; INTRAVENOUS at 07:13

## 2024-02-08 RX ADMIN — GLYCOPYRROLATE 0.3 MG: 0.2 INJECTION INTRAMUSCULAR; INTRAVENOUS at 08:45

## 2024-02-08 RX ADMIN — DOCUSATE SODIUM 50 MG AND SENNOSIDES 8.6 MG 1 TABLET: 8.6; 5 TABLET, FILM COATED ORAL at 16:18

## 2024-02-08 RX ADMIN — PHENYLEPHRINE HYDROCHLORIDE 100 MCG: 10 INJECTION INTRAVENOUS at 07:39

## 2024-02-08 RX ADMIN — Medication 5 MG: at 07:45

## 2024-02-08 RX ADMIN — Medication 140 MG: at 09:42

## 2024-02-08 RX ADMIN — PHENYLEPHRINE HYDROCHLORIDE 200 MCG: 10 INJECTION INTRAVENOUS at 07:18

## 2024-02-08 RX ADMIN — LIDOCAINE HYDROCHLORIDE 50 MG: 20 INJECTION, SOLUTION EPIDURAL; INFILTRATION; INTRACAUDAL; PERINEURAL at 07:05

## 2024-02-08 RX ADMIN — PROPOFOL 40 MG: 10 INJECTION, EMULSION INTRAVENOUS at 07:13

## 2024-02-08 RX ADMIN — PHENYLEPHRINE HYDROCHLORIDE 200 MCG: 10 INJECTION INTRAVENOUS at 08:57

## 2024-02-08 RX ADMIN — PHENYLEPHRINE HYDROCHLORIDE 100 MCG: 10 INJECTION INTRAVENOUS at 07:58

## 2024-02-08 RX ADMIN — DEXMEDETOMIDINE 8 MCG: 100 INJECTION, SOLUTION, CONCENTRATE INTRAVENOUS at 08:36

## 2024-02-08 RX ADMIN — PROPOFOL 50 MG: 10 INJECTION, EMULSION INTRAVENOUS at 09:41

## 2024-02-08 ASSESSMENT — PAIN SCALES - GENERAL
PAINLEVEL_OUTOF10: 6
PAINLEVEL_OUTOF10: 0
PAINLEVEL_OUTOF10: 0

## 2024-02-08 ASSESSMENT — PAIN DESCRIPTION - ORIENTATION: ORIENTATION: ANTERIOR

## 2024-02-08 ASSESSMENT — PAIN DESCRIPTION - DESCRIPTORS: DESCRIPTORS: ACHING

## 2024-02-08 ASSESSMENT — PAIN DESCRIPTION - LOCATION: LOCATION: NECK

## 2024-02-08 ASSESSMENT — PAIN - FUNCTIONAL ASSESSMENT: PAIN_FUNCTIONAL_ASSESSMENT: 0-10

## 2024-02-08 ASSESSMENT — LIFESTYLE VARIABLES: SMOKING_STATUS: 1

## 2024-02-08 NOTE — PROGRESS NOTES
Call to patient regarding tumor board recommendations.  No answer.  Left HIPAA compliant voicemail for patient to call back.  Per discussion in tumor board pancreatic lesions likely too small to biopsy.  Would not currently change her treatment management and recommendation is to resume systemic therapy.  The board does not feel she would benefit from additional surgery if there is concern for metastatic disease.  Submandibular lesion also discussed and has been addressed by Dr. Mcclure.  Will await pathology.  Bean placed to Dr Mccloud at [facility] - awaiting a call back.

## 2024-02-08 NOTE — DISCHARGE INSTRUCTIONS
-There are steri-strips in place over your neck incision. All of the sutures are deep to these steri strips. You may shower and bathe as long as these steri strips remain clean and dry  -No strenuous activity or heavy lifting for 1 week  -Please start with soft foods and advance diet  -Please replace the small bandage over the drain site several times a day until it fully heals.

## 2024-02-08 NOTE — ANESTHESIA POSTPROCEDURE EVALUATION
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])    Providers: Melvin Mcclure MD Responsible Provider: Michael Elias MD    Anesthesia Type: General ASA Status: 3            Anesthesia Type: General    Andrey Phase I: Andrey Score: 8    Andrey Phase II:      Anesthesia Post Evaluation    Patient location during evaluation: PACU  Patient participation: complete - patient participated  Level of consciousness: awake and alert  Airway patency: patent  Nausea & Vomiting: no nausea and no vomiting  Cardiovascular status: hemodynamically stable  Respiratory status: acceptable, nonlabored ventilation and spontaneous ventilation  Hydration status: euvolemic  Comments: BP (!) 116/54   Pulse 81   Temp 97.6 °F (36.4 °C) (Skin)   Resp 12   Ht 1.689 m (5' 6.5\")   Wt 95.3 kg (210 lb)   LMP  (LMP Unknown)   SpO2 98%   BMI 33.39 kg/m²     Multimodal analgesia pain management approach  Pain management: adequate and satisfactory to patient    No notable events documented.

## 2024-02-08 NOTE — PROGRESS NOTES
attempted to visit patient before her procedure as requested.  However patient was being transported to OR as  arrived.   provided prayer and is available to follow up as needed.    Peace be with you,  Signed by  DALILA BarksdaleDiv.   [phone]

## 2024-02-08 NOTE — H&P
57 yo female w/ h/o L SMG mass. She has a history of renal cell carcinoma and underwent chemotherapy and then partial nephrectomy about 1 month ago. I had worked her up initially with an FNA which was nondiagnostic. She denies any increase in swelling or pain along the left jawline or upper neck. She has no other new salivary symptoms.     Past Medical History:   Diagnosis Date    Anxiety 2000    Cancer (HCC)     Bilateral  renal cell see Dr Lopez and [facility]    Headache 1999    Hx of blood clots     treated with Eliquis x 1 year discontinued in 01/2024    Sleep apnea 2002    no c pap     Past Surgical History:   Procedure Laterality Date    CHOLECYSTECTOMY  2003    CT NEEDLE BIOPSY LUNG PERCUTANEOUS  09/28/2023    CT NEEDLE BIOPSY LUNG PERCUTANEOUS 9/28/2023 [facility] CT SCAN    IR PORT PLACEMENT EQUAL OR GREATER THAN 5 YEARS  11/30/2022    IR PORT PLACEMENT EQUAL OR GREATER THAN 5 YEARS 11/30/2022 [facility] SPECIALS    PARTIAL NEPHRECTOMY Left 12/08/2023    TUBAL LIGATION  3/91    US BIOPSY OF SALIVARY GLAND  09/21/2023    US BIOPSY OF SALIVARY GLAND 9/21/2023 Kisha Newberry PA-C [facility] US     Social History     Socioeconomic History    Marital status:      Spouse name: Not on file    Number of children: Not on file    Years of education: Not on file    Highest education level: Not on file   Occupational History    Occupation: CNA     Employer: [employer]   Tobacco Use    Smoking status: Every Day     Current packs/day: 1.50     Average packs/day: 1.5 packs/day for 15.0 years (22.5 ttl pk-yrs)     Types: Cigarettes     Passive exposure: Past    Smokeless tobacco: Never    Tobacco comments:     Began smoking age 13   Substance and Sexual Activity    Alcohol use: Yes     Alcohol/week: 2.0 standard drinks of alcohol     Types: 2 Shots of liquor per week    Drug use: Never    Sexual activity: Not Currently     Partners: Male   Other Topics Concern    Not on file   Social History Narrative     Not on file     Social Determinants of Health     Financial Resource Strain: Not on file   Food Insecurity: Not on file (4/18/2023)   Transportation Needs: Not on file   Physical Activity: Unknown (10/25/2022)    Exercise Vital Sign     Days of Exercise per Week: Patient declined     Minutes of Exercise per Session: Not on file   Stress: Not on file   Social Connections: Not on file   Intimate Partner Violence: Not At Risk (10/25/2022)    Humiliation, Afraid, Rape, and Kick questionnaire     Fear of Current or Ex-Partner: No     Emotionally Abused: No     Physically Abused: No     Sexually Abused: No   Housing Stability: Not on file     Family History   Problem Relation Age of Onset    Diabetes Mother     High Blood Pressure Mother     High Cholesterol Mother     COPD Mother     Cancer Father         prostate    Diabetes Sister     Stroke Sister     High Cholesterol Brother     Breast Cancer Paternal Aunt      Allergies   Allergen Reactions    Latex Hives, Itching, Shortness Of Breath and Other (See Comments)    Bee Venom Shortness Of Breath and Hives    Penicillins Anaphylaxis    Sulfa Antibiotics Hives, Itching and Rash    Wasp Venom Protein Hives and Shortness Of Breath    Penicillin G     Sulfamethoxazole-Trimethoprim Hives    Wellbutrin [Bupropion] Other (See Comments)     suicidal     No current facility-administered medications on file prior to encounter.     Current Outpatient Medications on File Prior to Encounter   Medication Sig Dispense Refill    amLODIPine (NORVASC) 5 MG tablet Take 1 tablet by mouth daily as needed (If SBP greater than 140)      hyoscyamine (LEVSIN/SL) 125 MCG sublingual tablet DISSOLVE 1 TABLET ON TONGUE EVERY 6 HOURS AS NEEDED FOR STOMACH PAIN      oxyCODONE (ROXICODONE) 5 MG immediate release tablet Take 1 tablet by mouth every 4 hours as needed for Pain for up to 30 days. Max Daily Amount: 30 mg 180 tablet 0    busPIRone (BUSPAR) 10 MG tablet Take 1 tablet by mouth 2 times daily 60

## 2024-02-08 NOTE — OP NOTE
[facility]  OPERATIVE REPORT    Name:  TRYONE HA  MR#:  [id number]  :  1965  ACCOUNT #:  [id number]  DATE OF SERVICE:  2024    PREOPERATIVE DIAGNOSIS:  Left submandibular gland neoplasm.    POSTOPERATIVE DIAGNOSES:  1.  Left submandibular gland neoplasm.  2.  Postop neck hematoma.    PROCEDURES PERFORMED:  1.  Left submandibular gland excision.  2.  Evacuation of left neck postop hematoma.    SURGEON:  Melvin Mcclure MD    ASSISTANT:  none    ANESTHESIA:  General endotracheal.    ANESTHESIOLOGIST:  Gil Elias MD    COMPLICATIONS:  Postop neck hematoma requiring reoperation.    SPECIMENS REMOVED:  1.  Left level one lymph node - permanent.  2.  Left submandibular gland - permanent.    IMPLANTS:  none.    ESTIMATED BLOOD LOSS:  50 mL.    OPERATIVE FINDINGS:  1.  There was a large left submandibular gland with a 1-cm mass within the superior aspect of the gland.  Left submandibular gland excision was performed.  2.  Upon extubation, there was increased swelling of the neck and evidence of a postop neck hematoma.  Evacuation was performed and CECELIA drain was placed.    IV FLUID:  800 mL crystalloid.    DRAINS:  CECELIA x1.    DISPOSITION:  PACU then home.    CONDITION:  Stable.    BRIEF HISTORY:  The patient is a 58-year-old female who presented to my office with a mass within the left submandibular gland.  She has a history of renal cell carcinoma, and this mass was noted on routine surveillance imaging.  She underwent FNA, which was indeterminate.  The decision was made to take her to the operating room for excision of this left submandibular gland.    PROCEDURE:  The patient was brought back to the operating room, placed on the table in the supine position.  General endotracheal anesthesia was inducted without any complications.  Once the patient was adequately sedated, a total 8 mL of 1% lidocaine with 1:100,000 epinephrine was injected along the planned incision.  She was then  sterilely prepped and draped in the usual fashion.    I began by designing a 3-cm incision along the natural neck skin crease, approximately two fingerbreadths below the angle of the mandible.  I incised through the skin and dermis with a 15-blade and then dissected through some subcutaneous fat and platysma muscle using Bovie electrocautery.  Next, I dissected down onto the level two neck contents.  There was some fibrofatty tissue with several lymph nodes just overlying and slightly anterior to the submandibular gland.  These were excised and peeled away from the anterior digastric and passed off as a separate specimen labeled level one neck contents.  Just deep to this fibrofatty tissue, I identified the inferior border of the submandibular gland.  I incised through the fascia and stayed along the inferior border to preserve the marginal mandibular branch of the facial nerve.  I used careful blunt dissection and bipolar electrocautery and began dissecting out the left submandibular gland in all directions.  The posterior facial vein and facial artery were ligated and divided using silk sutures.  As I moved more superiorly, there was 1 cm slightly darkened mass within the superior aspect of the gland.  This mass was included in the dissection.  Anteriorly, I dissected down onto the mylohyoid muscle.  I retracted the mylohyoid muscle superiorly and then ligated and divided the [place]'s duct.  The parasympathetic branch from the lingual nerve was also ligated and divided during this dissection.  The left submandibular gland was removed en bloc and passed off for permanent pathology.    I irrigated out the wound bed and ensured adequate hemostasis using bipolar electrocautery.  I had the Anesthesia perform several Valsalva maneuvers to ensure no further bleeding.  Once hemostasis was ensured, I closed the incision in layers using 3-0 undyed Vicryl deep suture, reapproximated the platysma and dermis followed by

## 2024-02-08 NOTE — PERIOP NOTE
TRANSFER - OUT REPORT:    Verbal report given to Katie RN on Mechelle Hall  being transferred to [facility] for routine post-op       Report consisted of patient’s Situation, Background, Assessment and   Recommendations(SBAR).     Information from the following report(s) Nurse Handoff Report, Adult Overview, Surgery Report, and Intake/Output was reviewed with the receiving nurse.    Opportunity for questions and clarification was provided.      Patient transported with:   Tech

## 2024-02-08 NOTE — ANESTHESIA PROCEDURE NOTES
Airway  Date/Time: 2/8/2024 7:09 AM  Urgency: elective    Airway not difficult    General Information and Staff    Patient location during procedure: OR  Resident/CRNA: Terwilliger, Chelsea, APRN - CRNA  Performed: resident/CRNA   Performed by: Terwilliger, Chelsea, APRN - CRNA  Authorized by: Michael Elias MD      Indications and Patient Condition  Indications for airway management: anesthesia  Spontaneous Ventilation: absent  Sedation level: deep  Preoxygenated: yes  Patient position: sniffing  MILS not maintained throughout  Mask difficulty assessment: vent by bag mask + OA or adjuvant +/- NMBA    Final Airway Details  Final airway type: endotracheal airway      Successful airway: ETT  Cuffed: yes   Successful intubation technique: direct laryngoscopy  Facilitating devices/methods: intubating stylet  Endotracheal tube insertion site: oral  Blade: Nazanin  Blade size: #3  ETT size (mm): 7.0  Cormack-Lehane Classification: grade I - full view of glottis  Placement verified by: chest auscultation and capnometry   Measured from: lips  ETT to lips (cm): 22  Number of attempts at approach: 1  Ventilation between attempts: bag mask    Additional Comments  Atraumatic insertion, lips and teeth as preeval

## 2024-02-08 NOTE — PERIOP NOTE
Phone call attempted for Yahir agudelo, [phone]. Unable to leave voicemail to inform of patient room number.
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
## 2024-02-08 NOTE — BRIEF OP NOTE
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]    Post-Op Diagnosis: Same       Procedure(s):  LEFT SUBMANDIBULAR GLAND EXCISION    Surgeon(s):  Melvin Mcclure MD    Assistant:  * No surgical staff found *    Anesthesia: General    Estimated Blood Loss (mL): 15 cc    IVF: 800 cc    Complications: None    Specimens:   ID Type Source Tests Collected by Time Destination   A : Left Level 1 Lymph Node Tissue Neck SURGICAL PATHOLOGY Melvin Mcclure MD 2/8/2024 0805    B : Left submandibular gland Tissue Neck SURGICAL PATHOLOGY Melvin Mcclure MD 2/8/2024 0840        Implants:  * No implants in log *      Drains: * No LDAs found *    Findings: large L SMG w/ small neoplasm, also removed overlying L level 1 nodes, bleeding noted after closure upon awakening and hematoma evacuated before extubation      Electronically signed by Melvin Mcclure MD on 2/8/2024 at 8:49 AM

## 2024-02-08 NOTE — ANESTHESIA PROCEDURE NOTES
Airway  Date/Time: 2/8/2024 9:44 AM  Urgency: elective    Airway not difficult    General Information and Staff    Patient location during procedure: OR  Resident/CRNA: Terwilliger, Chelsea, APRN - CRNA  Performed: resident/CRNA   Performed by: Terwilliger, Chelsea, APRN - CRNA  Authorized by: Michael Elias MD      Indications and Patient Condition  Indications for airway management: anesthesia  Spontaneous Ventilation: absent  Sedation level: deep  Preoxygenated: yes  Patient position: sniffing  MILS not maintained throughout  Mask difficulty assessment: not attempted    Final Airway Details  Final airway type: endotracheal airway      Successful airway: ETT  Cuffed: yes   Successful intubation technique: video laryngoscopy  Facilitating devices/methods: intubating stylet  Endotracheal tube insertion site: oral  Blade: Nazanin  Blade size: #3  ETT size (mm): 7.0  Placement verified by: chest auscultation and capnometry   Measured from: lips  ETT to lips (cm): 23  Number of attempts at approach: 1  Ventilation between attempts: bag mask    Additional Comments  Atraumatic insertion, lips and teeth as preeval

## 2024-02-08 NOTE — ANESTHESIA PRE PROCEDURE
Department of Anesthesiology  Preprocedure Note       Name:  Mechelle Hall   Age:  58 y.o.  :  1965                                          MRN:  [id number]         Date:  2024      Surgeon: Surgeon(s):  Melvin Mcclure MD    Procedure: Procedure(s):  SALIVARY GLAND BIOPSY EXCISION    Medications prior to admission:   Prior to Admission medications    Medication Sig Start Date End Date Taking? Authorizing Provider   amLODIPine (NORVASC) 5 MG tablet Take 1 tablet by mouth daily as needed (If SBP greater than 140)   Yes ProviderHugo MD   morphine (MS CONTIN) 15 MG extended release tablet Take 1 tablet by mouth nightly for 30 days. Max Daily Amount: 15 mg 2/2/24 3/3/24  Melony Jama APRN - NP   clonazePAM (KLONOPIN) 1 MG tablet Take 1 tablet by mouth 2 times daily as needed for Anxiety for up to 90 days. Max Daily Amount: 2 mg 24  Melony Jama APRN - NP   droNABinol (MARINOL) 5 MG capsule Take 1 capsule by mouth every 6-8 hours as needed (nausea) for up to 90 days. Max Daily Amount: 20 mg 24  Melony Jama APRN - NP   ciprofloxacin (CIPRO) 500 MG tablet Take 1 tablet by mouth 2 times daily for 10 days  Patient not taking: Reported on 2024  Melvin Mcclure MD   hyoscyamine (LEVSIN/SL) 125 MCG sublingual tablet DISSOLVE 1 TABLET ON TONGUE EVERY 6 HOURS AS NEEDED FOR STOMACH PAIN 23   ProviderHugo MD   oxyCODONE (ROXICODONE) 5 MG immediate release tablet Take 1 tablet by mouth every 4 hours as needed for Pain for up to 30 days. Max Daily Amount: 30 mg 24  Yudi Elliott APRN - CNP   busPIRone (BUSPAR) 10 MG tablet Take 1 tablet by mouth 2 times daily 23   Yudi Elliott APRN - CNP   pantoprazole (PROTONIX) 40 MG tablet Take 1 tablet by mouth in the morning and at bedtime 23   Hugo Marie MD   empagliflozin (JARDIANCE) 25 MG tablet Take 1 tablet by mouth daily

## 2024-02-09 VITALS
OXYGEN SATURATION: 93 % | HEIGHT: 67 IN | TEMPERATURE: 97.5 F | RESPIRATION RATE: 17 BRPM | BODY MASS INDEX: 32.96 KG/M2 | DIASTOLIC BLOOD PRESSURE: 83 MMHG | HEART RATE: 54 BPM | SYSTOLIC BLOOD PRESSURE: 115 MMHG | WEIGHT: 210 LBS

## 2024-02-09 LAB
GLUCOSE BLD STRIP.AUTO-MCNC: 179 MG/DL (ref 65–100)
SERVICE CMNT-IMP: ABNORMAL

## 2024-02-09 PROCEDURE — 82962 GLUCOSE BLOOD TEST: CPT

## 2024-02-09 PROCEDURE — 6370000000 HC RX 637 (ALT 250 FOR IP): Performed by: OTOLARYNGOLOGY

## 2024-02-09 PROCEDURE — 6370000000 HC RX 637 (ALT 250 FOR IP): Performed by: FAMILY MEDICINE

## 2024-02-09 PROCEDURE — 2580000003 HC RX 258: Performed by: OTOLARYNGOLOGY

## 2024-02-09 RX ADMIN — SODIUM CHLORIDE, PRESERVATIVE FREE 5 ML: 5 INJECTION INTRAVENOUS at 08:59

## 2024-02-09 RX ADMIN — OXYCODONE HYDROCHLORIDE 5 MG: 5 TABLET ORAL at 06:14

## 2024-02-09 RX ADMIN — BUSPIRONE HYDROCHLORIDE 10 MG: 10 TABLET ORAL at 08:57

## 2024-02-09 RX ADMIN — PANTOPRAZOLE SODIUM 40 MG: 40 TABLET, DELAYED RELEASE ORAL at 08:57

## 2024-02-09 ASSESSMENT — PAIN DESCRIPTION - DESCRIPTORS
DESCRIPTORS: DISCOMFORT
DESCRIPTORS: ACHING

## 2024-02-09 ASSESSMENT — PAIN DESCRIPTION - ORIENTATION
ORIENTATION: LEFT;MID
ORIENTATION: MID

## 2024-02-09 ASSESSMENT — PAIN - FUNCTIONAL ASSESSMENT
PAIN_FUNCTIONAL_ASSESSMENT: ACTIVITIES ARE NOT PREVENTED
PAIN_FUNCTIONAL_ASSESSMENT: ACTIVITIES ARE NOT PREVENTED

## 2024-02-09 ASSESSMENT — PAIN SCALES - GENERAL
PAINLEVEL_OUTOF10: 0
PAINLEVEL_OUTOF10: 0
PAINLEVEL_OUTOF10: 6

## 2024-02-09 ASSESSMENT — PAIN DESCRIPTION - PAIN TYPE
TYPE: SURGICAL PAIN
TYPE: SURGICAL PAIN

## 2024-02-09 ASSESSMENT — PAIN DESCRIPTION - FREQUENCY: FREQUENCY: INTERMITTENT

## 2024-02-09 ASSESSMENT — PAIN DESCRIPTION - LOCATION: LOCATION: NECK

## 2024-02-09 ASSESSMENT — PAIN DESCRIPTION - ONSET: ONSET: PROGRESSIVE

## 2024-02-09 NOTE — PLAN OF CARE
Problem: Chronic Conditions and Co-morbidities  Goal: Patient's chronic conditions and co-morbidity symptoms are monitored and maintained or improved  2/8/2024 2201 by Mary Ellen Holley RN  Outcome: Progressing  Flowsheets (Taken 2/8/2024 1920)  Care Plan - Patient's Chronic Conditions and Co-Morbidity Symptoms are Monitored and Maintained or Improved: Monitor and assess patient's chronic conditions and comorbid symptoms for stability, deterioration, or improvement  2/8/2024 1452 by Katie Almeida RN  Outcome: Progressing     Problem: Pain  Goal: Verbalizes/displays adequate comfort level or baseline comfort level  2/8/2024 2201 by Mary Ellen Holley RN  Outcome: Progressing  2/8/2024 1452 by Katie Almieda RN  Outcome: Progressing     Problem: Discharge Planning  Goal: Discharge to home or other facility with appropriate resources  2/8/2024 2201 by Mary Ellen Holley RN  Outcome: Progressing  Flowsheets (Taken 2/8/2024 1920)  Discharge to home or other facility with appropriate resources: Identify barriers to discharge with patient and caregiver  2/8/2024 1452 by Katie Almeida RN  Outcome: Progressing

## 2024-02-09 NOTE — PROGRESS NOTES
58-year-old female postop day 1 after undergoing left submandibular and excision.  Her surgery was complicated by postop hematoma which was noted in the operating room and required evacuation.  CECELIA drain was left in place and she was kept overnight for observation.  Pain is well-controlled this morning and she is tolerating liquid diet.    /83   Pulse 54   Temp 97.5 °F (36.4 °C) (Axillary)   Resp 17   Ht 1.689 m (5' 6.5\")   Wt 95.3 kg (210 lb)   LMP  (LMP Unknown)   SpO2 93%   BMI 33.39 kg/m²   -Neck incision healing well, moderate ecchymosis but no hematoma or fluid collection, Steri-Strips in place  -CECELIA drain with serosanguineous drainage-removed    A/P: Left submandibular gland neoplasm-doing well after excision yesterday.  I removed the CECELIA drain and she is stable for discharge home.  RTC in 1 week.    HTC

## 2024-02-09 NOTE — DISCHARGE SUMMARY
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  Her surgery was complicated by postop hematoma which was noted in the operating room.  Her neck was reopened and the hematoma was evacuated and a CECELIA drain was placed.  The decision was made to monitor her overnight for observation.  She did well afterwards with adequate pain control.  There was minimal drainage from her CECELIA drain and it was removed the following morning.  She was deemed stable for discharge home.    Procedures: Left submandibular gland excision-Kami (2/8/2024)    Consults: none    Treatments: routine post-op pain control    Discharge exam:   -No acute distress, well-appearing  -No cranial nerve deficits  -Steri-Strips in place over neck incision, moderate ecchymosis but no hematoma or fluid collection  -Healing drain site with pressure dressing in place  -No palpable cervical lymphadenopathy    Disposition: home    Discharge medications:  Current Discharge Medication List        CONTINUE these medications which have NOT CHANGED    Details   amLODIPine (NORVASC) 5 MG tablet Take 1 tablet by mouth daily as needed (If SBP greater than 140)      morphine (MS CONTIN)           Discharge instructions:   -There are steri-strips in place over your neck incision. All of the sutures are deep to these steri strips. You may shower and bathe as long as these steri strips remain clean and dry  -No strenuous activity or heavy lifting for 1 week  -Please start with soft foods and advance diet  -Please replace the small dressing over the drain site until it fully heals    Diet: regular    Follow-up appt: 1 week with Dr. Mcclure at [facility]      Melvin Mcclure MD  2/9/2024

## 2024-02-09 NOTE — PLAN OF CARE
Problem: Chronic Conditions and Co-morbidities  Goal: Patient's chronic conditions and co-morbidity symptoms are monitored and maintained or improved  2/9/2024 0944 by Zeenat Calle RN  Outcome: Adequate for Discharge  Flowsheets (Taken 2/9/2024 0722)  Care Plan - Patient's Chronic Conditions and Co-Morbidity Symptoms are Monitored and Maintained or Improved: Monitor and assess patient's chronic conditions and comorbid symptoms for stability, deterioration, or improvement  2/8/2024 2201 by Mary Ellen Holley RN  Outcome: Progressing  Flowsheets (Taken 2/8/2024 1920)  Care Plan - Patient's Chronic Conditions and Co-Morbidity Symptoms are Monitored and Maintained or Improved: Monitor and assess patient's chronic conditions and comorbid symptoms for stability, deterioration, or improvement     Problem: Pain  Goal: Verbalizes/displays adequate comfort level or baseline comfort level  2/9/2024 0944 by Zeenat Calle RN  Outcome: Adequate for Discharge  2/8/2024 2201 by Mary Ellen Holley RN  Outcome: Progressing     Problem: Discharge Planning  Goal: Discharge to home or other facility with appropriate resources  2/9/2024 0944 by Zeenat Calle RN  Outcome: Adequate for Discharge  Flowsheets (Taken 2/9/2024 0722)  Discharge to home or other facility with appropriate resources:   Identify barriers to discharge with patient and caregiver   Arrange for needed discharge resources and transportation as appropriate   Identify discharge learning needs (meds, wound care, etc)  2/8/2024 2201 by Mary Ellen Holley RN  Outcome: Progressing  Flowsheets (Taken 2/8/2024 1920)  Discharge to home or other facility with appropriate resources: Identify barriers to discharge with patient and caregiver     Problem: Safety - Adult  Goal: Free from fall injury  Outcome: Adequate for Discharge  Flowsheets (Taken 2/9/2024 0722)  Free From Fall Injury: Instruct family/caregiver on patient safety

## 2024-02-10 ENCOUNTER — PATIENT MESSAGE (OUTPATIENT)
Dept: ONCOLOGY | Age: 59
End: 2024-02-10

## 2024-02-12 ENCOUNTER — TELEPHONE (OUTPATIENT)
Dept: ONCOLOGY | Age: 59
End: 2024-02-12

## 2024-02-12 NOTE — TELEPHONE ENCOUNTER
Call to patient re tumor board recs.  Discussed alternative meds that Dr. Mccloud at [facility] was recommending as well for systemic therapy.  Will review with Dr. Lopez and once she discusses with Dr. Mccloud, will reach back out with final plan for systemic therapy.  Pt VU and appreciated call.

## 2024-02-14 ENCOUNTER — OFFICE VISIT (OUTPATIENT)
Dept: ENT CLINIC | Age: 59
End: 2024-02-14

## 2024-02-14 DIAGNOSIS — D49.0: Primary | ICD-10-CM

## 2024-02-14 DIAGNOSIS — C64.9 METASTATIC RENAL CELL CARCINOMA, UNSPECIFIED LATERALITY (HCC): ICD-10-CM

## 2024-02-14 PROCEDURE — 99024 POSTOP FOLLOW-UP VISIT: CPT | Performed by: OTOLARYNGOLOGY

## 2024-02-14 NOTE — PROGRESS NOTES
Mechelle Hall is a 58 y.o. female seen today now 6 days post-op after undergoing left submandibular gland excision back on 2/8/2024.  I had admitted her overnight as she developed postop neck hematoma while still in the operating room, and a CECELIA drain was placed.  Doing fairly well since then, but she has noted some increase swelling in the left upper neck since discharge.  Her pain has been adequately controlled.  She is tolerating a regular diet.    -Neck incision healing well, Steri-Strips removed, no wound dehiscence  -Significant edema of submental region.  I aspirated 5 cc of serosanguineous drainage using an 18-gauge needle.  -No facial weakness.    PATH: Final report is pending, but preliminary report revealed metastatic renal cell carcinoma    A/P:   Diagnosis Orders   1. Neoplasm of submandibular salivary gland        2. Metastatic renal cell carcinoma, unspecified laterality (HCC)          Doing well now 1 week out from excision of her left submandibular gland.  She does have significant edema and likely an old hematoma within the wound bed.  I aspirated 5 cc of serosanguineous drainage with a needle today in the office.  Her preliminary pathology report was positive for metastatic renal cell carcinoma.  I will speak to Dr. Lopez about these findings to see if this alters her treatment plan.  RTC in 1 week to reevaluate her neck incision.  I will keep her out of work another week due to the increased swelling.      Melvin Mcclure MD

## 2024-02-15 ENCOUNTER — CLINICAL DOCUMENTATION (OUTPATIENT)
Dept: ONCOLOGY | Age: 59
End: 2024-02-15

## 2024-02-15 ENCOUNTER — PATIENT MESSAGE (OUTPATIENT)
Dept: ONCOLOGY | Age: 59
End: 2024-02-15

## 2024-02-15 DIAGNOSIS — C64.2 RENAL CELL CARCINOMA OF LEFT KIDNEY (HCC): Primary | ICD-10-CM

## 2024-02-15 NOTE — PROGRESS NOTES
Call to Dr Mccloud - reviewed prelim path from submandibular LN - c/w RCC.  Pt is aware.  Will plan for cabozantinib 60mg daily.  Unable to do combination therapy as she has hx of immune therapy related hepatitis.  At POD will plan for belzutifan.  Pt is aware of the plan and SE/MOA reviewed per package insert.  She will notify Min once she has the medication so we can sched FU/labs.  She appreciated by call and all qs were answered.      >10%:  Cardiovascular: Hypertension (30% to 61%, including hypertensive crisis)  Dermatologic: Alopecia (16%), erythema of skin (11%), hair discoloration (34%), palmar-plantar erythrodysesthesia (42% to 50%), skin rash (19% to 23%), xeroderma (11% to 19%)  Endocrine & metabolic: Hyperglycemia (37%), hypoalbuminemia (19% to 51%), hypocalcemia (8% to 52%), hypokalemia (18% to 23%), hypomagnesemia (19% to 31%), hyponatremia (10% to 30%), hypophosphatemia (25% to 48%), hypothyroidism (8% to 21%), increased lactate dehydrogenase (84% to 90%), increased serum triglycerides (53%), weight loss (17% to 48%)  Gastrointestinal: Abdominal pain (23% to 27%), constipation (25% to 27%), decreased appetite (23% to 48%), diarrhea (51% to 74%; grades 3/4: 7% to 16%), dysgeusia (10% to 34%), dyspepsia (10% to 12%), dysphagia (13%), increased serum amylase (16%), mouth pain (36%), mucosal swelling (14% to 19%), nausea (24% to 50%; grades 3/4: 1% to 4%), stomatitis (13% to 51%; grades 3/4: 2% to 5%), vomiting (14% to 32%; grades 3/4: ?2%)  Genitourinary: Proteinuria (2% to 15%)  Hematologic & oncologic: Anemia (17% to 31%; grades 3/4: 4% to 5%), leukopenia (35%; grades 3/4: <1%), lymphocytopenia (25% to 53%; grades 3/4: 7% to 16%), neutropenia (31% to 43%; grades 3/4: 2% to 7%), thrombocytopenia (25% to 35%; grades 3/4: <1%)  Hepatic: Hyperbilirubinemia (12% to 25%), increased gamma-glutamyl transferase (26% to 27%), increased serum alanine aminotransferase (66% to 86%), increased serum alkaline

## 2024-02-19 RX ORDER — SERTRALINE HYDROCHLORIDE 100 MG/1
150 TABLET, FILM COATED ORAL NIGHTLY
Qty: 45 TABLET | Refills: 11 | Status: SHIPPED | OUTPATIENT
Start: 2024-02-19

## 2024-02-21 ENCOUNTER — OFFICE VISIT (OUTPATIENT)
Dept: ENT CLINIC | Age: 59
End: 2024-02-21

## 2024-02-21 DIAGNOSIS — D49.0: Primary | ICD-10-CM

## 2024-02-21 PROCEDURE — 99024 POSTOP FOLLOW-UP VISIT: CPT | Performed by: OTOLARYNGOLOGY

## 2024-02-21 NOTE — PROGRESS NOTES
Mechelle Hall is a 58 y.o. female seen today now 2 weeks postop after undergoing left submandibular gland excision back on 2/8/2024.  I had admitted her overnight as she developed postop neck hematoma while still in the operating room, and a CECELIA drain was placed.  She had fairly significant persistent swelling at her last visit.  Today, she is doing slightly better with improved swelling overall.  She still reports some tenderness of the submental region.  She has been using warm compresses which have helped.  She denies any pain along the floor the mouth, and she has returned to a normal diet.    -Neck incision well-healed, no wound dehiscence, there is moderate edema still of the submental region, above the incision line.  No palpable underlying masses.  No fluctuance.  No facial weakness.    PATH:  DIAGNOSIS       A:  \"LEFT LEVEL 1 LYMPH NODE\":  ONE BENIGN LYMPH NODE, NEGATIVE FOR   TUMOR (0/1).        B:  \"LEFT SUBMANDIBULAR GLAND\":  CLEAR CELL CARCINOMA, CONSISTENT   WITH METASTASIS FROM RENAL CELL CARCINOMA ([id number]), GRADE 2 OF 4.       A/P:   Diagnosis Orders   1. Neoplasm of submandibular salivary gland          There was less swelling overall today, and she appears to have a resolving hematoma after recent left submandibular gland excision.  Her pathology did confirm metastatic renal cell carcinoma, I spoke to Dr. Lopez already about these findings.  There are plans to initiate new chemotherapeutic regimen, and I wished her best of luck with her treatment.  I will see her back in 2-3 weeks for another wound check.  She will plan to return to work tomorrow without any restrictions.    Melvin Mcclure MD

## 2024-02-22 DIAGNOSIS — C64.2 RENAL CELL CARCINOMA OF LEFT KIDNEY (HCC): Primary | ICD-10-CM

## 2024-02-23 ENCOUNTER — HOSPITAL ENCOUNTER (OUTPATIENT)
Dept: LAB | Age: 59
Discharge: HOME | End: 2024-02-23
Payer: COMMERCIAL

## 2024-02-23 DIAGNOSIS — C64.2 RENAL CELL CARCINOMA OF LEFT KIDNEY (HCC): ICD-10-CM

## 2024-02-23 LAB
ALBUMIN SERPL-MCNC: 3.5 G/DL (ref 3.5–5)
ALBUMIN/GLOB SERPL: 0.9 (ref 0.4–1.6)
ALP SERPL-CCNC: 98 U/L (ref 50–136)
ALT SERPL-CCNC: 21 U/L (ref 12–65)
ANION GAP SERPL CALC-SCNC: 5 MMOL/L (ref 2–11)
AST SERPL-CCNC: 16 U/L (ref 15–37)
BASOPHILS # BLD: 0 K/UL (ref 0–0.2)
BASOPHILS NFR BLD: 1 % (ref 0–2)
BILIRUB SERPL-MCNC: 0.5 MG/DL (ref 0.2–1.1)
BUN SERPL-MCNC: 10 MG/DL (ref 6–23)
CALCIUM SERPL-MCNC: 9 MG/DL (ref 8.3–10.4)
CHLORIDE SERPL-SCNC: 106 MMOL/L (ref 103–113)
CO2 SERPL-SCNC: 27 MMOL/L (ref 21–32)
CREAT SERPL-MCNC: 0.8 MG/DL (ref 0.6–1)
DIFFERENTIAL METHOD BLD: ABNORMAL
EOSINOPHIL # BLD: 0.2 K/UL (ref 0–0.8)
EOSINOPHIL NFR BLD: 3 % (ref 0.5–7.8)
ERYTHROCYTE [DISTWIDTH] IN BLOOD BY AUTOMATED COUNT: 21.7 % (ref 11.9–14.6)
GLOBULIN SER CALC-MCNC: 3.7 G/DL (ref 2.8–4.5)
GLUCOSE SERPL-MCNC: 150 MG/DL (ref 65–100)
HCT VFR BLD AUTO: 46.5 % (ref 35.8–46.3)
HGB BLD-MCNC: 14.6 G/DL (ref 11.7–15.4)
IMM GRANULOCYTES # BLD AUTO: 0 K/UL (ref 0–0.5)
IMM GRANULOCYTES NFR BLD AUTO: 0 % (ref 0–5)
LYMPHOCYTES # BLD: 1.6 K/UL (ref 0.5–4.6)
LYMPHOCYTES NFR BLD: 19 % (ref 13–44)
MCH RBC QN AUTO: 25.9 PG (ref 26.1–32.9)
MCHC RBC AUTO-ENTMCNC: 31.4 G/DL (ref 31.4–35)
MCV RBC AUTO: 82.6 FL (ref 82–102)
MONOCYTES # BLD: 1 K/UL (ref 0.1–1.3)
MONOCYTES NFR BLD: 12 % (ref 4–12)
NEUTS SEG # BLD: 5.5 K/UL (ref 1.7–8.2)
NEUTS SEG NFR BLD: 66 % (ref 43–78)
NRBC # BLD: 0 K/UL (ref 0–0.2)
PLATELET # BLD AUTO: 234 K/UL (ref 150–450)
PMV BLD AUTO: 10.2 FL (ref 9.4–12.3)
POTASSIUM SERPL-SCNC: 3.9 MMOL/L (ref 3.5–5.1)
PROT SERPL-MCNC: 7.2 G/DL (ref 6.3–8.2)
RBC # BLD AUTO: 5.63 M/UL (ref 4.05–5.2)
SODIUM SERPL-SCNC: 138 MMOL/L (ref 136–146)
WBC # BLD AUTO: 8.4 K/UL (ref 4.3–11.1)

## 2024-02-23 PROCEDURE — 85025 COMPLETE CBC W/AUTO DIFF WBC: CPT

## 2024-02-23 PROCEDURE — 36415 COLL VENOUS BLD VENIPUNCTURE: CPT

## 2024-02-23 PROCEDURE — 80053 COMPREHEN METABOLIC PANEL: CPT

## 2024-02-26 ENCOUNTER — TELEMEDICINE (OUTPATIENT)
Dept: INTERNAL MEDICINE CLINIC | Facility: CLINIC | Age: 59
End: 2024-02-26
Payer: COMMERCIAL

## 2024-02-26 DIAGNOSIS — J40 BRONCHITIS: ICD-10-CM

## 2024-02-26 DIAGNOSIS — R06.2 WHEEZING: ICD-10-CM

## 2024-02-26 DIAGNOSIS — R53.83 FATIGUE, UNSPECIFIED TYPE: ICD-10-CM

## 2024-02-26 DIAGNOSIS — R05.1 ACUTE COUGH: ICD-10-CM

## 2024-02-26 DIAGNOSIS — J01.90 ACUTE SINUSITIS, RECURRENCE NOT SPECIFIED, UNSPECIFIED LOCATION: Primary | ICD-10-CM

## 2024-02-26 DIAGNOSIS — R50.9 FEVER, UNSPECIFIED FEVER CAUSE: ICD-10-CM

## 2024-02-26 LAB
EXP DATE SOLUTION: NORMAL
EXP DATE SWAB: NORMAL
EXPIRATION DATE: NORMAL
INFLUENZA A ANTIGEN, POC: NEGATIVE
INFLUENZA B ANTIGEN, POC: NEGATIVE
LOT NUMBER POC: NORMAL
LOT NUMBER SOLUTION: NORMAL
LOT NUMBER SWAB: NORMAL
RSV, POC: NEGATIVE
SARS-COV-2 RNA, POC: NEGATIVE
VALID INTERNAL CONTROL, POC: YES
VALID INTERNAL CONTROL: YES

## 2024-02-26 PROCEDURE — 99214 OFFICE O/P EST MOD 30 MIN: CPT | Performed by: NURSE PRACTITIONER

## 2024-02-26 PROCEDURE — 87804 INFLUENZA ASSAY W/OPTIC: CPT | Performed by: NURSE PRACTITIONER

## 2024-02-26 PROCEDURE — 87635 SARS-COV-2 COVID-19 AMP PRB: CPT | Performed by: NURSE PRACTITIONER

## 2024-02-26 PROCEDURE — 87420 RESP SYNCYTIAL VIRUS AG IA: CPT | Performed by: NURSE PRACTITIONER

## 2024-02-26 RX ORDER — ALBUTEROL SULFATE 90 UG/1
2 AEROSOL, METERED RESPIRATORY (INHALATION) EVERY 6 HOURS PRN
Qty: 18 G | Refills: 0 | Status: SHIPPED | OUTPATIENT
Start: 2024-02-26

## 2024-02-26 RX ORDER — OXYCODONE HYDROCHLORIDE 15 MG/1
TABLET, FILM COATED, EXTENDED RELEASE ORAL
COMMUNITY
Start: 2024-02-19

## 2024-02-26 RX ORDER — OXYCODONE HYDROCHLORIDE 5 MG/1
5 CAPSULE ORAL EVERY 4 HOURS PRN
COMMUNITY

## 2024-02-26 RX ORDER — BENZONATATE 200 MG/1
200 CAPSULE ORAL 3 TIMES DAILY PRN
Qty: 21 CAPSULE | Refills: 0 | Status: SHIPPED | OUTPATIENT
Start: 2024-02-26 | End: 2024-03-04

## 2024-02-26 RX ORDER — AZITHROMYCIN 250 MG/1
TABLET, FILM COATED ORAL
Qty: 6 TABLET | Refills: 0 | Status: SHIPPED | OUTPATIENT
Start: 2024-02-26 | End: 2024-03-07

## 2024-02-26 ASSESSMENT — ENCOUNTER SYMPTOMS
SORE THROAT: 1
WHEEZING: 1
COUGH: 1
SHORTNESS OF BREATH: 1
RHINORRHEA: 1

## 2024-02-26 NOTE — PROGRESS NOTES
Abnormal -   Sclera  [x]  Normal    [] Abnormal -          Discharge [x]  None visible   [] Abnormal -     HENT: [x] Normocephalic, atraumatic   Abnormal - hoarse voice  [x] Mouth/Throat: Mucous membranes are moist    External Ears [x] Normal  [] Abnormal -    Neck: [x] No visualized mass [] Abnormal -     Pulmonary/Chest: [x] Respiratory effort normal   [x] No visualized signs of difficulty breathing or respiratory distress        [x] Abnormal - cough     Musculoskeletal:   [x] Normal gait with no signs of ataxia         [x] Normal range of motion of neck        [] Abnormal -     Neurological:        [x] No Facial Asymmetry (Cranial nerve 7 motor function) (limited exam due to video visit)          [x] No gaze palsy        [] Abnormal -          Skin:        [x] No significant exanthematous lesions or discoloration noted on facial skin         [] Abnormal -            Psychiatric:       [x] Normal Affect [] Abnormal -        [x] No Hallucinations    Other pertinent observable physical exam findings:-         On this date 2/26/2024 I have spent 35 minutes reviewing previous notes, test results and face to face (virtual) with the patient discussing the diagnosis and importance of compliance with the treatment plan as well as documenting on the day of the visit.    --SPIKE Albert - NP

## 2024-03-01 ENCOUNTER — HOSPITAL ENCOUNTER (OUTPATIENT)
Dept: LAB | Age: 59
Discharge: HOME | End: 2024-03-01
Payer: COMMERCIAL

## 2024-03-01 DIAGNOSIS — C64.2 RENAL CELL CARCINOMA OF LEFT KIDNEY (HCC): ICD-10-CM

## 2024-03-01 LAB
ALBUMIN SERPL-MCNC: 3.8 G/DL (ref 3.5–5)
ALBUMIN/GLOB SERPL: 1 (ref 0.4–1.6)
ALP SERPL-CCNC: 122 U/L (ref 50–136)
ALT SERPL-CCNC: 34 U/L (ref 12–65)
ANION GAP SERPL CALC-SCNC: 4 MMOL/L (ref 2–11)
AST SERPL-CCNC: 21 U/L (ref 15–37)
BASOPHILS # BLD: 0.1 K/UL (ref 0–0.2)
BASOPHILS NFR BLD: 1 % (ref 0–2)
BILIRUB SERPL-MCNC: 0.3 MG/DL (ref 0.2–1.1)
BUN SERPL-MCNC: 11 MG/DL (ref 6–23)
CALCIUM SERPL-MCNC: 8.6 MG/DL (ref 8.3–10.4)
CHLORIDE SERPL-SCNC: 107 MMOL/L (ref 103–113)
CO2 SERPL-SCNC: 27 MMOL/L (ref 21–32)
CREAT SERPL-MCNC: 0.9 MG/DL (ref 0.6–1)
DIFFERENTIAL METHOD BLD: ABNORMAL
EOSINOPHIL # BLD: 0.4 K/UL (ref 0–0.8)
EOSINOPHIL NFR BLD: 4 % (ref 0.5–7.8)
ERYTHROCYTE [DISTWIDTH] IN BLOOD BY AUTOMATED COUNT: 20.7 % (ref 11.9–14.6)
GLOBULIN SER CALC-MCNC: 3.9 G/DL (ref 2.8–4.5)
GLUCOSE SERPL-MCNC: 133 MG/DL (ref 65–100)
HCT VFR BLD AUTO: 51.5 % (ref 35.8–46.3)
HGB BLD-MCNC: 16.3 G/DL (ref 11.7–15.4)
IMM GRANULOCYTES # BLD AUTO: 0 K/UL (ref 0–0.5)
IMM GRANULOCYTES NFR BLD AUTO: 0 % (ref 0–5)
LYMPHOCYTES # BLD: 4 K/UL (ref 0.5–4.6)
LYMPHOCYTES NFR BLD: 40 % (ref 13–44)
MCH RBC QN AUTO: 25.9 PG (ref 26.1–32.9)
MCHC RBC AUTO-ENTMCNC: 31.7 G/DL (ref 31.4–35)
MCV RBC AUTO: 81.7 FL (ref 82–102)
MONOCYTES # BLD: 0.7 K/UL (ref 0.1–1.3)
MONOCYTES NFR BLD: 7 % (ref 4–12)
NEUTS SEG # BLD: 4.9 K/UL (ref 1.7–8.2)
NEUTS SEG NFR BLD: 49 % (ref 43–78)
NRBC # BLD: 0 K/UL (ref 0–0.2)
PLATELET # BLD AUTO: 313 K/UL (ref 150–450)
PMV BLD AUTO: 9.9 FL (ref 9.4–12.3)
POTASSIUM SERPL-SCNC: 3.8 MMOL/L (ref 3.5–5.1)
PROT SERPL-MCNC: 7.7 G/DL (ref 6.3–8.2)
RBC # BLD AUTO: 6.3 M/UL (ref 4.05–5.2)
SODIUM SERPL-SCNC: 138 MMOL/L (ref 136–146)
WBC # BLD AUTO: 10 K/UL (ref 4.3–11.1)

## 2024-03-01 PROCEDURE — 80053 COMPREHEN METABOLIC PANEL: CPT

## 2024-03-01 PROCEDURE — 36415 COLL VENOUS BLD VENIPUNCTURE: CPT

## 2024-03-01 PROCEDURE — 85025 COMPLETE CBC W/AUTO DIFF WBC: CPT

## 2024-03-04 ENCOUNTER — OFFICE VISIT (OUTPATIENT)
Dept: ENT CLINIC | Age: 59
End: 2024-03-04

## 2024-03-04 DIAGNOSIS — D49.0 NEOPLASM OF SUBMANDIBULAR GLAND: Primary | ICD-10-CM

## 2024-03-04 PROCEDURE — 99024 POSTOP FOLLOW-UP VISIT: CPT | Performed by: OTOLARYNGOLOGY

## 2024-03-04 NOTE — PROGRESS NOTES
Mechelle Hall is a 58 y.o. female seen today now 1 month postop after undergoing left submandibular gland excision back on 2/8/2024.  I had admitted her overnight as she developed postop neck hematoma while still in the operating room, and a CECELIA drain was placed.  Doing much better since her last visit with less swelling overall and minimal pain.  She only reports some mild tenderness when she touches the left side of her neck.  Her pathology had confirmed metastatic renal cell carcinoma.  She started a new cancer therapy over the weekend and tolerated her first dose fairly well other than mild GI symptoms.    -Left neck incision well-healed, moderate supra-incisional edema, but no palpable hematoma or fluid collection, no further ecchymosis  -No facial weakness  -No other palpable neck masses    A/P:   Diagnosis Orders   1. Neoplasm of submandibular gland          Doing well now 1 month out from her left submandibular gland excision.  Her incision is healed up well, although there remains some moderate supra-incisional edema which will continue improve over time.  I wished her best of luck with her upcoming cancer treatment and we will see her back as needed.    Melvin Mcclure MD

## 2024-03-08 ENCOUNTER — HOSPITAL ENCOUNTER (OUTPATIENT)
Dept: LAB | Age: 59
Discharge: HOME | End: 2024-03-08
Payer: COMMERCIAL

## 2024-03-08 DIAGNOSIS — C64.2 RENAL CELL CARCINOMA OF LEFT KIDNEY (HCC): ICD-10-CM

## 2024-03-08 LAB
ALBUMIN SERPL-MCNC: 3.6 G/DL (ref 3.5–5)
ALBUMIN/GLOB SERPL: 0.8 (ref 0.4–1.6)
ALP SERPL-CCNC: 144 U/L (ref 50–136)
ALT SERPL-CCNC: 61 U/L (ref 12–65)
ANION GAP SERPL CALC-SCNC: 5 MMOL/L (ref 2–11)
AST SERPL-CCNC: 35 U/L (ref 15–37)
BASOPHILS # BLD: 0 K/UL (ref 0–0.2)
BASOPHILS NFR BLD: 0 % (ref 0–2)
BILIRUB SERPL-MCNC: 0.4 MG/DL (ref 0.2–1.1)
BUN SERPL-MCNC: 9 MG/DL (ref 6–23)
CALCIUM SERPL-MCNC: 9.2 MG/DL (ref 8.3–10.4)
CHLORIDE SERPL-SCNC: 104 MMOL/L (ref 103–113)
CO2 SERPL-SCNC: 27 MMOL/L (ref 21–32)
CREAT SERPL-MCNC: 0.9 MG/DL (ref 0.6–1)
DIFFERENTIAL METHOD BLD: ABNORMAL
EOSINOPHIL # BLD: 0.3 K/UL (ref 0–0.8)
EOSINOPHIL NFR BLD: 3 % (ref 0.5–7.8)
ERYTHROCYTE [DISTWIDTH] IN BLOOD BY AUTOMATED COUNT: 20.7 % (ref 11.9–14.6)
GLOBULIN SER CALC-MCNC: 4.3 G/DL (ref 2.8–4.5)
GLUCOSE SERPL-MCNC: 143 MG/DL (ref 65–100)
HCT VFR BLD AUTO: 53.6 % (ref 35.8–46.3)
HGB BLD-MCNC: 17.2 G/DL (ref 11.7–15.4)
IMM GRANULOCYTES # BLD AUTO: 0 K/UL (ref 0–0.5)
IMM GRANULOCYTES NFR BLD AUTO: 0 % (ref 0–5)
LYMPHOCYTES # BLD: 3.3 K/UL (ref 0.5–4.6)
LYMPHOCYTES NFR BLD: 30 % (ref 13–44)
MCH RBC QN AUTO: 25.9 PG (ref 26.1–32.9)
MCHC RBC AUTO-ENTMCNC: 32.1 G/DL (ref 31.4–35)
MCV RBC AUTO: 80.7 FL (ref 82–102)
MONOCYTES # BLD: 0.6 K/UL (ref 0.1–1.3)
MONOCYTES NFR BLD: 5 % (ref 4–12)
NEUTS SEG # BLD: 6.9 K/UL (ref 1.7–8.2)
NEUTS SEG NFR BLD: 62 % (ref 43–78)
NRBC # BLD: 0 K/UL (ref 0–0.2)
PLATELET # BLD AUTO: 256 K/UL (ref 150–450)
PMV BLD AUTO: 9.9 FL (ref 9.4–12.3)
POTASSIUM SERPL-SCNC: 3.8 MMOL/L (ref 3.5–5.1)
PROT SERPL-MCNC: 7.9 G/DL (ref 6.3–8.2)
RBC # BLD AUTO: 6.64 M/UL (ref 4.05–5.2)
SODIUM SERPL-SCNC: 136 MMOL/L (ref 136–146)
WBC # BLD AUTO: 11.2 K/UL (ref 4.3–11.1)

## 2024-03-08 PROCEDURE — 85025 COMPLETE CBC W/AUTO DIFF WBC: CPT

## 2024-03-08 PROCEDURE — 80053 COMPREHEN METABOLIC PANEL: CPT

## 2024-03-08 PROCEDURE — 36415 COLL VENOUS BLD VENIPUNCTURE: CPT

## 2024-03-15 ENCOUNTER — HOSPITAL ENCOUNTER (OUTPATIENT)
Dept: LAB | Age: 59
Discharge: HOME | End: 2024-03-15
Payer: COMMERCIAL

## 2024-03-15 DIAGNOSIS — F32.A ANXIETY AND DEPRESSION: ICD-10-CM

## 2024-03-15 DIAGNOSIS — C64.2 RENAL CELL CARCINOMA OF LEFT KIDNEY (HCC): ICD-10-CM

## 2024-03-15 DIAGNOSIS — F41.9 ANXIETY AND DEPRESSION: ICD-10-CM

## 2024-03-15 DIAGNOSIS — Z51.5 ENCOUNTER FOR PALLIATIVE CARE: ICD-10-CM

## 2024-03-15 LAB
ALBUMIN SERPL-MCNC: 3.7 G/DL (ref 3.5–5)
ALBUMIN/GLOB SERPL: 0.9 (ref 0.4–1.6)
ALP SERPL-CCNC: 169 U/L (ref 50–136)
ALT SERPL-CCNC: 65 U/L (ref 12–65)
ANION GAP SERPL CALC-SCNC: 4 MMOL/L (ref 2–11)
AST SERPL-CCNC: 42 U/L (ref 15–37)
BASOPHILS # BLD: 0 K/UL (ref 0–0.2)
BASOPHILS NFR BLD: 0 % (ref 0–2)
BILIRUB SERPL-MCNC: 0.4 MG/DL (ref 0.2–1.1)
BUN SERPL-MCNC: 7 MG/DL (ref 6–23)
CALCIUM SERPL-MCNC: 8.8 MG/DL (ref 8.3–10.4)
CHLORIDE SERPL-SCNC: 108 MMOL/L (ref 103–113)
CO2 SERPL-SCNC: 28 MMOL/L (ref 21–32)
CREAT SERPL-MCNC: 0.8 MG/DL (ref 0.6–1)
DIFFERENTIAL METHOD BLD: ABNORMAL
EOSINOPHIL # BLD: 0.4 K/UL (ref 0–0.8)
EOSINOPHIL NFR BLD: 4 % (ref 0.5–7.8)
ERYTHROCYTE [DISTWIDTH] IN BLOOD BY AUTOMATED COUNT: 21 % (ref 11.9–14.6)
GLOBULIN SER CALC-MCNC: 3.9 G/DL (ref 2.8–4.5)
GLUCOSE SERPL-MCNC: 108 MG/DL (ref 65–100)
HCT VFR BLD AUTO: 56.6 % (ref 35.8–46.3)
HGB BLD-MCNC: 17.6 G/DL (ref 11.7–15.4)
IMM GRANULOCYTES # BLD AUTO: 0 K/UL (ref 0–0.5)
IMM GRANULOCYTES NFR BLD AUTO: 0 % (ref 0–5)
LYMPHOCYTES # BLD: 2.7 K/UL (ref 0.5–4.6)
LYMPHOCYTES NFR BLD: 29 % (ref 13–44)
MCH RBC QN AUTO: 25.3 PG (ref 26.1–32.9)
MCHC RBC AUTO-ENTMCNC: 31.1 G/DL (ref 31.4–35)
MCV RBC AUTO: 81.4 FL (ref 82–102)
MONOCYTES # BLD: 0.6 K/UL (ref 0.1–1.3)
MONOCYTES NFR BLD: 7 % (ref 4–12)
NEUTS SEG # BLD: 5.3 K/UL (ref 1.7–8.2)
NEUTS SEG NFR BLD: 60 % (ref 43–78)
NRBC # BLD: 0 K/UL (ref 0–0.2)
PLATELET # BLD AUTO: 231 K/UL (ref 150–450)
PMV BLD AUTO: 9.9 FL (ref 9.4–12.3)
POTASSIUM SERPL-SCNC: 3.8 MMOL/L (ref 3.5–5.1)
PROT SERPL-MCNC: 7.6 G/DL (ref 6.3–8.2)
RBC # BLD AUTO: 6.95 M/UL (ref 4.05–5.2)
SODIUM SERPL-SCNC: 140 MMOL/L (ref 136–146)
WBC # BLD AUTO: 9 K/UL (ref 4.3–11.1)

## 2024-03-15 PROCEDURE — 36415 COLL VENOUS BLD VENIPUNCTURE: CPT

## 2024-03-15 PROCEDURE — 85025 COMPLETE CBC W/AUTO DIFF WBC: CPT

## 2024-03-15 PROCEDURE — 80053 COMPREHEN METABOLIC PANEL: CPT

## 2024-03-15 RX ORDER — BUSPIRONE HYDROCHLORIDE 10 MG/1
10 TABLET ORAL 2 TIMES DAILY
Qty: 60 TABLET | Refills: 0 | Status: SHIPPED | OUTPATIENT
Start: 2024-03-15

## 2024-03-22 ENCOUNTER — HOSPITAL ENCOUNTER (OUTPATIENT)
Dept: LAB | Age: 59
Discharge: HOME | End: 2024-03-22
Payer: COMMERCIAL

## 2024-03-22 DIAGNOSIS — C64.2 RENAL CELL CARCINOMA OF LEFT KIDNEY (HCC): ICD-10-CM

## 2024-03-22 LAB
ALBUMIN SERPL-MCNC: 3.8 G/DL (ref 3.5–5)
ALBUMIN/GLOB SERPL: 1 (ref 0.4–1.6)
ALP SERPL-CCNC: 160 U/L (ref 50–136)
ALT SERPL-CCNC: 69 U/L (ref 12–65)
ANION GAP SERPL CALC-SCNC: 5 MMOL/L (ref 2–11)
AST SERPL-CCNC: 40 U/L (ref 15–37)
BASOPHILS # BLD: 0 K/UL (ref 0–0.2)
BASOPHILS NFR BLD: 0 % (ref 0–2)
BILIRUB SERPL-MCNC: 0.5 MG/DL (ref 0.2–1.1)
BUN SERPL-MCNC: 8 MG/DL (ref 6–23)
CALCIUM SERPL-MCNC: 8.9 MG/DL (ref 8.3–10.4)
CHLORIDE SERPL-SCNC: 108 MMOL/L (ref 103–113)
CO2 SERPL-SCNC: 27 MMOL/L (ref 21–32)
CREAT SERPL-MCNC: 0.8 MG/DL (ref 0.6–1)
DIFFERENTIAL METHOD BLD: ABNORMAL
EOSINOPHIL # BLD: 0.3 K/UL (ref 0–0.8)
EOSINOPHIL NFR BLD: 4 % (ref 0.5–7.8)
ERYTHROCYTE [DISTWIDTH] IN BLOOD BY AUTOMATED COUNT: 21 % (ref 11.9–14.6)
GLOBULIN SER CALC-MCNC: 3.7 G/DL (ref 2.8–4.5)
GLUCOSE SERPL-MCNC: 106 MG/DL (ref 65–100)
HCT VFR BLD AUTO: 55.5 % (ref 35.8–46.3)
HGB BLD-MCNC: 18.3 G/DL (ref 11.7–15.4)
IMM GRANULOCYTES # BLD AUTO: 0 K/UL (ref 0–0.5)
IMM GRANULOCYTES NFR BLD AUTO: 0 % (ref 0–5)
LYMPHOCYTES # BLD: 3.6 K/UL (ref 0.5–4.6)
LYMPHOCYTES NFR BLD: 46 % (ref 13–44)
MCH RBC QN AUTO: 26.2 PG (ref 26.1–32.9)
MCHC RBC AUTO-ENTMCNC: 33 G/DL (ref 31.4–35)
MCV RBC AUTO: 79.5 FL (ref 82–102)
MONOCYTES # BLD: 0.4 K/UL (ref 0.1–1.3)
MONOCYTES NFR BLD: 5 % (ref 4–12)
NEUTS SEG # BLD: 3.5 K/UL (ref 1.7–8.2)
NEUTS SEG NFR BLD: 45 % (ref 43–78)
NRBC # BLD: 0 K/UL (ref 0–0.2)
PLATELET # BLD AUTO: 162 K/UL (ref 150–450)
PMV BLD AUTO: 9.6 FL (ref 9.4–12.3)
POTASSIUM SERPL-SCNC: 3.8 MMOL/L (ref 3.5–5.1)
PROT SERPL-MCNC: 7.5 G/DL (ref 6.3–8.2)
RBC # BLD AUTO: 6.98 M/UL (ref 4.05–5.2)
SODIUM SERPL-SCNC: 140 MMOL/L (ref 136–146)
WBC # BLD AUTO: 7.9 K/UL (ref 4.3–11.1)

## 2024-03-22 PROCEDURE — 85025 COMPLETE CBC W/AUTO DIFF WBC: CPT

## 2024-03-22 PROCEDURE — 36415 COLL VENOUS BLD VENIPUNCTURE: CPT

## 2024-03-22 PROCEDURE — 80053 COMPREHEN METABOLIC PANEL: CPT

## 2024-03-27 ENCOUNTER — APPOINTMENT (OUTPATIENT)
Dept: GENERAL RADIOLOGY | Age: 59
End: 2024-03-27
Payer: COMMERCIAL

## 2024-03-27 ENCOUNTER — HOSPITAL ENCOUNTER (EMERGENCY)
Age: 59
Discharge: HOME | End: 2024-03-28
Attending: STUDENT IN AN ORGANIZED HEALTH CARE EDUCATION/TRAINING PROGRAM
Payer: COMMERCIAL

## 2024-03-27 ENCOUNTER — APPOINTMENT (OUTPATIENT)
Dept: CT IMAGING | Age: 59
End: 2024-03-27
Payer: COMMERCIAL

## 2024-03-27 DIAGNOSIS — R11.2 NAUSEA AND VOMITING, UNSPECIFIED VOMITING TYPE: ICD-10-CM

## 2024-03-27 DIAGNOSIS — R10.13 ABDOMINAL PAIN, EPIGASTRIC: Primary | ICD-10-CM

## 2024-03-27 LAB
ALBUMIN SERPL-MCNC: 3.7 G/DL (ref 3.5–5)
ALBUMIN/GLOB SERPL: 1.1 (ref 0.4–1.6)
ALP SERPL-CCNC: 157 U/L (ref 50–136)
ALT SERPL-CCNC: 66 U/L (ref 12–65)
ANION GAP SERPL CALC-SCNC: 5 MMOL/L (ref 2–11)
AST SERPL-CCNC: 36 U/L (ref 15–37)
BASOPHILS # BLD: 0 K/UL (ref 0–0.2)
BASOPHILS NFR BLD: 0 % (ref 0–2)
BILIRUB DIRECT SERPL-MCNC: <0.1 MG/DL
BILIRUB SERPL-MCNC: 0.6 MG/DL (ref 0.2–1.1)
BUN SERPL-MCNC: 7 MG/DL (ref 6–23)
CALCIUM SERPL-MCNC: 9 MG/DL (ref 8.3–10.4)
CHLORIDE SERPL-SCNC: 107 MMOL/L (ref 103–113)
CO2 SERPL-SCNC: 27 MMOL/L (ref 21–32)
CREAT SERPL-MCNC: 0.8 MG/DL (ref 0.6–1)
D DIMER PPP FEU-MCNC: 0.78 UG/ML(FEU)
DIFFERENTIAL METHOD BLD: ABNORMAL
EOSINOPHIL # BLD: 0.3 K/UL (ref 0–0.8)
EOSINOPHIL NFR BLD: 3 % (ref 0.5–7.8)
ERYTHROCYTE [DISTWIDTH] IN BLOOD BY AUTOMATED COUNT: 20.7 % (ref 11.9–14.6)
GLOBULIN SER CALC-MCNC: 3.5 G/DL (ref 2.8–4.5)
GLUCOSE SERPL-MCNC: 89 MG/DL (ref 65–100)
HCT VFR BLD AUTO: 52.2 % (ref 35.8–46.3)
HGB BLD-MCNC: 16.8 G/DL (ref 11.7–15.4)
IMM GRANULOCYTES # BLD AUTO: 0 K/UL (ref 0–0.5)
IMM GRANULOCYTES NFR BLD AUTO: 0 % (ref 0–5)
LIPASE SERPL-CCNC: 138 U/L (ref 73–393)
LYMPHOCYTES # BLD: 5.1 K/UL (ref 0.5–4.6)
LYMPHOCYTES NFR BLD: 51 % (ref 13–44)
MCH RBC QN AUTO: 25.9 PG (ref 26.1–32.9)
MCHC RBC AUTO-ENTMCNC: 32.2 G/DL (ref 31.4–35)
MCV RBC AUTO: 80.6 FL (ref 82–102)
MONOCYTES # BLD: 0.5 K/UL (ref 0.1–1.3)
MONOCYTES NFR BLD: 5 % (ref 4–12)
NEUTS SEG # BLD: 4.2 K/UL (ref 1.7–8.2)
NEUTS SEG NFR BLD: 41 % (ref 43–78)
NRBC # BLD: 0 K/UL (ref 0–0.2)
PLATELET # BLD AUTO: 160 K/UL (ref 150–450)
PMV BLD AUTO: 9.6 FL (ref 9.4–12.3)
POTASSIUM SERPL-SCNC: 3.7 MMOL/L (ref 3.5–5.1)
PROT SERPL-MCNC: 7.2 G/DL (ref 6.3–8.2)
RBC # BLD AUTO: 6.48 M/UL (ref 4.05–5.2)
SODIUM SERPL-SCNC: 139 MMOL/L (ref 136–146)
TROPONIN I SERPL HS-MCNC: 6.2 PG/ML (ref 0–14)
TROPONIN I SERPL HS-MCNC: 7.5 PG/ML (ref 0–14)
WBC # BLD AUTO: 10.1 K/UL (ref 4.3–11.1)

## 2024-03-27 PROCEDURE — 83690 ASSAY OF LIPASE: CPT

## 2024-03-27 PROCEDURE — 71046 X-RAY EXAM CHEST 2 VIEWS: CPT

## 2024-03-27 PROCEDURE — 2580000003 HC RX 258: Performed by: STUDENT IN AN ORGANIZED HEALTH CARE EDUCATION/TRAINING PROGRAM

## 2024-03-27 PROCEDURE — 93005 ELECTROCARDIOGRAM TRACING: CPT | Performed by: STUDENT IN AN ORGANIZED HEALTH CARE EDUCATION/TRAINING PROGRAM

## 2024-03-27 PROCEDURE — 99285 EMERGENCY DEPT VISIT HI MDM: CPT

## 2024-03-27 PROCEDURE — 71260 CT THORAX DX C+: CPT

## 2024-03-27 PROCEDURE — 80076 HEPATIC FUNCTION PANEL: CPT

## 2024-03-27 PROCEDURE — 6360000004 HC RX CONTRAST MEDICATION: Performed by: STUDENT IN AN ORGANIZED HEALTH CARE EDUCATION/TRAINING PROGRAM

## 2024-03-27 PROCEDURE — 84484 ASSAY OF TROPONIN QUANT: CPT

## 2024-03-27 PROCEDURE — 85379 FIBRIN DEGRADATION QUANT: CPT

## 2024-03-27 PROCEDURE — 80048 BASIC METABOLIC PNL TOTAL CA: CPT

## 2024-03-27 PROCEDURE — 85025 COMPLETE CBC W/AUTO DIFF WBC: CPT

## 2024-03-27 RX ORDER — 0.9 % SODIUM CHLORIDE 0.9 %
100 INTRAVENOUS SOLUTION INTRAVENOUS
Status: DISCONTINUED | OUTPATIENT
Start: 2024-03-27 | End: 2024-03-28 | Stop reason: HOSPADM

## 2024-03-27 RX ORDER — SODIUM CHLORIDE 0.9 % (FLUSH) 0.9 %
10 SYRINGE (ML) INJECTION
Status: DISCONTINUED | OUTPATIENT
Start: 2024-03-27 | End: 2024-03-28 | Stop reason: HOSPADM

## 2024-03-27 RX ORDER — 0.9 % SODIUM CHLORIDE 0.9 %
1000 INTRAVENOUS SOLUTION INTRAVENOUS
Status: COMPLETED | OUTPATIENT
Start: 2024-03-27 | End: 2024-03-27

## 2024-03-27 RX ORDER — ONDANSETRON 2 MG/ML
4 INJECTION INTRAMUSCULAR; INTRAVENOUS
Status: DISCONTINUED | OUTPATIENT
Start: 2024-03-27 | End: 2024-03-28 | Stop reason: HOSPADM

## 2024-03-27 RX ADMIN — IOPAMIDOL 100 ML: 755 INJECTION, SOLUTION INTRAVENOUS at 23:32

## 2024-03-27 RX ADMIN — SODIUM CHLORIDE 1000 ML: 9 INJECTION, SOLUTION INTRAVENOUS at 21:24

## 2024-03-27 ASSESSMENT — PAIN SCALES - GENERAL: PAINLEVEL_OUTOF10: 8

## 2024-03-27 ASSESSMENT — PAIN - FUNCTIONAL ASSESSMENT: PAIN_FUNCTIONAL_ASSESSMENT: 0-10

## 2024-03-27 ASSESSMENT — PAIN DESCRIPTION - LOCATION: LOCATION: CHEST

## 2024-03-27 NOTE — ED TRIAGE NOTES
Pt to triage in WC. Pt states that she has mid chest pain that began earlier today. Also some nausea. Pt being treated for kidney cancer.

## 2024-03-28 ENCOUNTER — OFFICE VISIT (OUTPATIENT)
Dept: PALLATIVE CARE | Age: 59
End: 2024-03-28
Payer: COMMERCIAL

## 2024-03-28 ENCOUNTER — PATIENT MESSAGE (OUTPATIENT)
Dept: INTERNAL MEDICINE CLINIC | Facility: CLINIC | Age: 59
End: 2024-03-28

## 2024-03-28 ENCOUNTER — OFFICE VISIT (OUTPATIENT)
Dept: ONCOLOGY | Age: 59
End: 2024-03-28
Payer: COMMERCIAL

## 2024-03-28 VITALS
RESPIRATION RATE: 14 BRPM | HEIGHT: 67 IN | BODY MASS INDEX: 31.39 KG/M2 | DIASTOLIC BLOOD PRESSURE: 81 MMHG | OXYGEN SATURATION: 92 % | WEIGHT: 200 LBS | TEMPERATURE: 98 F | HEART RATE: 58 BPM | SYSTOLIC BLOOD PRESSURE: 144 MMHG

## 2024-03-28 VITALS
DIASTOLIC BLOOD PRESSURE: 73 MMHG | BODY MASS INDEX: 33.74 KG/M2 | TEMPERATURE: 97.5 F | SYSTOLIC BLOOD PRESSURE: 134 MMHG | RESPIRATION RATE: 16 BRPM | HEIGHT: 67 IN | HEART RATE: 62 BPM | WEIGHT: 215 LBS | OXYGEN SATURATION: 94 %

## 2024-03-28 DIAGNOSIS — Z51.5 ENCOUNTER FOR PALLIATIVE CARE: ICD-10-CM

## 2024-03-28 DIAGNOSIS — Z79.899 HIGH RISK MEDICATION USE: ICD-10-CM

## 2024-03-28 DIAGNOSIS — F41.9 ANXIETY AND DEPRESSION: ICD-10-CM

## 2024-03-28 DIAGNOSIS — E11.65 TYPE 2 DIABETES MELLITUS WITH HYPERGLYCEMIA, WITHOUT LONG-TERM CURRENT USE OF INSULIN (HCC): Primary | ICD-10-CM

## 2024-03-28 DIAGNOSIS — K12.30 MUCOSITIS: ICD-10-CM

## 2024-03-28 DIAGNOSIS — C64.2 RENAL CELL CARCINOMA OF LEFT KIDNEY (HCC): Primary | ICD-10-CM

## 2024-03-28 DIAGNOSIS — F32.A ANXIETY AND DEPRESSION: ICD-10-CM

## 2024-03-28 DIAGNOSIS — R11.2 NAUSEA AND VOMITING, UNSPECIFIED VOMITING TYPE: ICD-10-CM

## 2024-03-28 DIAGNOSIS — C64.2 RENAL CELL CARCINOMA OF LEFT KIDNEY (HCC): ICD-10-CM

## 2024-03-28 DIAGNOSIS — R23.8 SKIN IRRITATION: ICD-10-CM

## 2024-03-28 DIAGNOSIS — G89.3 CANCER ASSOCIATED PAIN: Primary | ICD-10-CM

## 2024-03-28 LAB
EKG ATRIAL RATE: 67 BPM
EKG DIAGNOSIS: NORMAL
EKG P AXIS: 56 DEGREES
EKG P-R INTERVAL: 180 MS
EKG Q-T INTERVAL: 412 MS
EKG QRS DURATION: 110 MS
EKG QTC CALCULATION (BAZETT): 435 MS
EKG R AXIS: 78 DEGREES
EKG T AXIS: 119 DEGREES
EKG VENTRICULAR RATE: 67 BPM

## 2024-03-28 PROCEDURE — 99215 OFFICE O/P EST HI 40 MIN: CPT | Performed by: INTERNAL MEDICINE

## 2024-03-28 PROCEDURE — 93010 ELECTROCARDIOGRAM REPORT: CPT | Performed by: INTERNAL MEDICINE

## 2024-03-28 PROCEDURE — 99215 OFFICE O/P EST HI 40 MIN: CPT

## 2024-03-28 PROCEDURE — 6360000002 HC RX W HCPCS: Performed by: EMERGENCY MEDICINE

## 2024-03-28 RX ORDER — OXYCODONE HYDROCHLORIDE 15 MG/1
1 TABLET, FILM COATED, EXTENDED RELEASE ORAL EVERY 12 HOURS
Qty: 60 TABLET | Refills: 0 | Status: SHIPPED | OUTPATIENT
Start: 2024-03-28 | End: 2024-04-27

## 2024-03-28 RX ORDER — HEPARIN 100 UNIT/ML
500 SYRINGE INTRAVENOUS ONCE
Status: COMPLETED | OUTPATIENT
Start: 2024-03-28 | End: 2024-03-28

## 2024-03-28 RX ORDER — OXYCODONE HYDROCHLORIDE 10 MG/1
10 TABLET ORAL EVERY 4 HOURS PRN
Qty: 180 TABLET | Refills: 0 | Status: SHIPPED | OUTPATIENT
Start: 2024-03-28 | End: 2024-04-27

## 2024-03-28 RX ORDER — SUCRALFATE 1 G/1
1 TABLET ORAL 4 TIMES DAILY
Qty: 120 TABLET | Refills: 3 | Status: SHIPPED | OUTPATIENT
Start: 2024-03-28

## 2024-03-28 RX ORDER — ONDANSETRON 4 MG/1
4 TABLET, FILM COATED ORAL 3 TIMES DAILY PRN
Qty: 15 TABLET | Refills: 2 | Status: SHIPPED | OUTPATIENT
Start: 2024-03-28

## 2024-03-28 RX ORDER — UREA 40 %
CREAM (GRAM) TOPICAL
Qty: 120 G | Refills: 0 | Status: SHIPPED | OUTPATIENT
Start: 2024-03-28

## 2024-03-28 RX ADMIN — HEPARIN 500 UNITS: 100 SYRINGE at 01:01

## 2024-03-28 ASSESSMENT — PATIENT HEALTH QUESTIONNAIRE - PHQ9
SUM OF ALL RESPONSES TO PHQ QUESTIONS 1-9: 0
2. FEELING DOWN, DEPRESSED OR HOPELESS: NOT AT ALL
SUM OF ALL RESPONSES TO PHQ QUESTIONS 1-9: 0
SUM OF ALL RESPONSES TO PHQ9 QUESTIONS 1 & 2: 0
1. LITTLE INTEREST OR PLEASURE IN DOING THINGS: NOT AT ALL
SUM OF ALL RESPONSES TO PHQ QUESTIONS 1-9: 0
SUM OF ALL RESPONSES TO PHQ QUESTIONS 1-9: 0

## 2024-03-28 ASSESSMENT — ENCOUNTER SYMPTOMS
ABDOMINAL PAIN: 1
NAUSEA: 1
BACK PAIN: 1

## 2024-03-28 NOTE — PROGRESS NOTES
Range    WBC 10.1 4.3 - 11.1 K/uL    RBC 6.48 (H) 4.05 - 5.2 M/uL    Hemoglobin 16.8 (H) 11.7 - 15.4 g/dL    Hematocrit 52.2 (H) 35.8 - 46.3 %    MCV 80.6 (L) 82 - 102 FL    MCH 25.9 (L) 26.1 - 32.9 PG    MCHC 32.2 31.4 - 35.0 g/dL    RDW 20.7 (H) 11.9 - 14.6 %    Platelets 160 150 - 450 K/uL    MPV 9.6 9.4 - 12.3 FL    nRBC 0.00 0.0 - 0.2 K/uL    Differential Type AUTOMATED      Neutrophils % 41 (L) 43 - 78 %    Lymphocytes % 51 (H) 13 - 44 %    Monocytes % 5 4.0 - 12.0 %    Eosinophils % 3 0.5 - 7.8 %    Basophils % 0 0.0 - 2.0 %    Immature Granulocytes 0 0.0 - 5.0 %    Neutrophils Absolute 4.2 1.7 - 8.2 K/UL    Lymphocytes Absolute 5.1 (H) 0.5 - 4.6 K/UL    Monocytes Absolute 0.5 0.1 - 1.3 K/UL    Eosinophils Absolute 0.3 0.0 - 0.8 K/UL    Basophils Absolute 0.0 0.0 - 0.2 K/UL    Absolute Immature Granulocyte 0.0 0.0 - 0.5 K/UL   Troponin    Collection Time: 03/27/24  8:19 PM   Result Value Ref Range    Troponin, High Sensitivity 6.2 0 - 14 pg/mL   Troponin    Collection Time: 03/27/24 10:19 PM   Result Value Ref Range    Troponin, High Sensitivity 7.5 0 - 14 pg/mL   D-Dimer, Quantitative    Collection Time: 03/27/24 10:19 PM   Result Value Ref Range    D-Dimer, Quant 0.78 (H) <0.56 ug/ml(FEU)         Imaging:  No valid procedures specified.    ASSESSMENT:   Diagnosis Orders   1. Cancer associated pain  oxyCODONE HCl (OXY-IR) 10 MG immediate release tablet      2. Anxiety and depression        3. Nausea and vomiting, unspecified vomiting type        4. Renal cell carcinoma of left kidney (HCC)  oxyCODONE HCl (OXY-IR) 10 MG immediate release tablet      5. Encounter for palliative care  oxyCODONE HCl (OXY-IR) 10 MG immediate release tablet          PLAN:  Lab studies and imaging studies were personally reviewed.     Pertinent old records were reviewed from [facility] and [facility].     Case discussed with Dr. Lopez.     Nausea with vomiting: Continue Marinol 5mg Q6-8hr PRN. Can continue Phenergan at night (cannot use

## 2024-03-28 NOTE — DISCHARGE INSTRUCTIONS
Your evaluation today shows no evidence of emergent cardiac issue.  Take the medication prescribed as needed for nausea and your discomfort.  Keep your follow-up appoint with Dr. Palmer of service as discussed.  Return for worsening symptoms, concerns or questions

## 2024-03-28 NOTE — ED NOTES
I have reviewed discharge instructions with the patient.  The patient verbalized understanding.    Patient left ED via Discharge Method: wheelchair to Home with family members.    Opportunity for questions and clarification provided.       Patient given 2 scripts. E-scribed to La Nena Devries RN  03/28/24 0107

## 2024-03-28 NOTE — TELEPHONE ENCOUNTER
From: Mechelle Hall  To: Dr. Dawna Jewell  Sent: 3/28/2024 2:45 PM EDT  Subject: A1c    Can u write order for my A1c when I have labs scheduled on April 11 for violet

## 2024-03-28 NOTE — PATIENT INSTRUCTIONS
Patient Information from Today's Visit    Labs reviewed.  Symptoms reviewed.  Prescription sent for Urea cream and magic mouthwash.  You can also use Voltaren gel along with Urea cream.  We will check your labs once every 2 weeks for the next month or so.  Next CT scan due in 2 months (end of May 2024).  You can call to schedule this at [phone].    Treatment Summary has been discussed and given to patient:N/A    Follow Up: 1 month.    Please refer to After Visit Summary or [employer] for upcoming appointment information. If you have any questions regarding your upcoming schedule please reach out to your care team through [employer] or call [phone].          -------------------------------------------------------------------------------------------------------------------  Please call our office at [phone] if you have any  of the following symptoms:   Fever of 100.5 or greater  Chills  Shortness of breath  Swelling or pain in one leg    After office hours an answering service is available and will contact a provider for emergencies or if you are experiencing any of the above symptoms.    Patient did express an interest in My Chart.  My Chart log in information explained on the after visit summary printout at the check-out desk.    LENNOX BAUGH RN

## 2024-03-28 NOTE — PROGRESS NOTES
[facility]: Established patient - follow up     Chief Complaint   Patient presents with    Follow-up     Dx; RCC - bilateral with likely met to adrenal +/- lungs   [place] [facility] [place]     Fam hx: father - prostate cancer   Paternal aunt - hx of breast cancer     History of Present Illness:  Ms. Hall is a 58 y.o. female who presents today for FU regarding RCC.  The past medical history is significant for anxiety, HAs and sleep apnea, cervical dysplasia but no carcinoma, current smoker (~1/2PPD), lap liv and tubal reversal, I&D of left breast abscess.  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  Despite attending physical therapy since the accident, she continued to experience the same low back pain.  She was seen at [facility] [place] on 10/4/22.  MRI of the lumbar spine on 10/15/22 showed mild degenerative disc disease at L3-4 through L5-S1 and a 5.7 x 6.1 x 7 cm lower pole solid left renal mass.  Urgent referral was placed to [facility], who referred the patient to [facility] for uro/onc evaluation and treatment of renal mass.  CT AP on 10/26/22 showed bilateral enhancing renal masses, concerning for renal cell carcinoma with the left lower pole renal mass measuring up to 6.5 cm and extending along Gerota's fascia and the right midpole renal mass measuring up to 4.6 cm. Also noted was an enhancing 1.5 cm right adrenal nodule, concerning for a metastatic nodule.  No recent wt loss.  S/p left renal biopsy on 11/2022, Nidia grade 2 of 4, clear cell RCC.  CT chest on 11/7 showed multiple nodules that appeared to be calcified, most c/w granulomatous disease, but she's aware that metastatic disease cannot be ruled out.  Not on AC.  Cr 0.89.    - Pt presented for consultation regarding systemic therapy.  She was here with her sister and friend.

## 2024-03-28 NOTE — ED PROVIDER NOTES
Phosphatase 157 (H) 50 - 136 U/L    AST 36 15 - 37 U/L    ALT 66 (H) 12 - 65 U/L   D-Dimer, Quantitative   Result Value Ref Range    D-Dimer, Quant 0.78 (H) <0.56 ug/ml(FEU)   EKG 12 Lead   Result Value Ref Range    Ventricular Rate 67 BPM    Atrial Rate 67 BPM    P-R Interval 180 ms    QRS Duration 110 ms    Q-T Interval 412 ms    QTc Calculation (Bazett) 435 ms    P Axis 56 degrees    R Axis 78 degrees    T Axis 119 degrees    Diagnosis       Normal sinus rhythm  Anterior infarct (cited on or before 16-APR-2023)  Abnormal ECG  When compared with ECG of 16-APR-2023 16:41,  QRS duration has increased  T wave inversion no longer evident in Inferior leads           CT CHEST PULMONARY EMBOLISM W CONTRAST   Final Result   1.  No pulmonary embolus.   2.  Innumerable bilateral enhancing pulmonary nodules some of which demonstrate   calcifications. The largest 2 nodules at each lung base have moderately   increased in size. There does appear to been increase in number of nodules as   well. Metastatic disease should be considered in this patient with a history of   renal cell carcinoma. The previously noted renal lesions have not been included   within the study and are not identified given the phase of the contrast   injection.   3.  The previously noted hyperenhancing nodules in the pancreas are not visible   on this evaluation either.   4.  Right adrenal lesion, indeterminate.            XR CHEST (2 VW)   Final Result   No acute cardiopulmonary disease. Increase in number of multiple   lung metastasis.                      No results for input(s): \"COVID19\" in the last 72 hours.    Voice dictation software was used during the making of this note.  This software is not perfect and grammatical and other typographical errors may be present.  This note has not been completely proofread for errors.        Alexi Kwok, DO  03/28/24 0017       Alexi Kwok, DO  03/28/24 0019

## 2024-03-28 NOTE — ED NOTES
Pt returned from CT at this time. Pt ambulated to restroom with assistance from family member.     La Nena Price, RN  03/27/24 2182

## 2024-03-30 ENCOUNTER — CLINICAL DOCUMENTATION (OUTPATIENT)
Dept: ONCOLOGY | Age: 59
End: 2024-03-30

## 2024-03-30 NOTE — PROGRESS NOTES
Prior authorization request for Urea 40% cream received through [employer] under Key Code [id number].  Patient demographics and clinical information submitted through the portal and sent to [employer] for medical review.  The most recent palliative care office visit note dated 3/28/24 was uploaded to the portal for medical review.  Note that Dr. Lopez's progress note from the 3/28/24 office visit was not completed at the time of PA submission; therefore, it was not uploaded to the portal for medical review.  Pending PA Case ID [id number].

## 2024-04-02 PROBLEM — R23.8 SKIN IRRITATION: Status: ACTIVE | Noted: 2024-04-02

## 2024-04-02 PROBLEM — K13.79 MOUTH PAIN: Status: ACTIVE | Noted: 2024-04-02

## 2024-04-11 ENCOUNTER — HOSPITAL ENCOUNTER (OUTPATIENT)
Dept: LAB | Age: 59
Discharge: HOME | End: 2024-04-11
Payer: COMMERCIAL

## 2024-04-11 DIAGNOSIS — F41.9 ANXIETY AND DEPRESSION: ICD-10-CM

## 2024-04-11 DIAGNOSIS — F32.A ANXIETY AND DEPRESSION: ICD-10-CM

## 2024-04-11 DIAGNOSIS — C64.2 RENAL CELL CARCINOMA OF LEFT KIDNEY (HCC): ICD-10-CM

## 2024-04-11 DIAGNOSIS — E11.65 TYPE 2 DIABETES MELLITUS WITH HYPERGLYCEMIA, WITHOUT LONG-TERM CURRENT USE OF INSULIN (HCC): ICD-10-CM

## 2024-04-11 DIAGNOSIS — Z51.5 ENCOUNTER FOR PALLIATIVE CARE: ICD-10-CM

## 2024-04-11 LAB
ALBUMIN SERPL-MCNC: 4 G/DL (ref 3.5–5)
ALBUMIN/GLOB SERPL: 1.1 (ref 0.4–1.6)
ALP SERPL-CCNC: 138 U/L (ref 50–136)
ALT SERPL-CCNC: 76 U/L (ref 12–65)
ANION GAP SERPL CALC-SCNC: 5 MMOL/L (ref 2–11)
AST SERPL-CCNC: 39 U/L (ref 15–37)
BASOPHILS # BLD: 0 K/UL (ref 0–0.2)
BASOPHILS NFR BLD: 0 % (ref 0–2)
BILIRUB SERPL-MCNC: 0.5 MG/DL (ref 0.2–1.1)
BUN SERPL-MCNC: 6 MG/DL (ref 6–23)
CALCIUM SERPL-MCNC: 9.5 MG/DL (ref 8.3–10.4)
CHLORIDE SERPL-SCNC: 106 MMOL/L (ref 103–113)
CO2 SERPL-SCNC: 25 MMOL/L (ref 21–32)
CREAT SERPL-MCNC: 0.8 MG/DL (ref 0.6–1)
DIFFERENTIAL METHOD BLD: ABNORMAL
EOSINOPHIL # BLD: 0.2 K/UL (ref 0–0.8)
EOSINOPHIL NFR BLD: 2 % (ref 0.5–7.8)
ERYTHROCYTE [DISTWIDTH] IN BLOOD BY AUTOMATED COUNT: 20.9 % (ref 11.9–14.6)
GLOBULIN SER CALC-MCNC: 3.6 G/DL (ref 2.8–4.5)
GLUCOSE SERPL-MCNC: 121 MG/DL (ref 65–100)
HCT VFR BLD AUTO: 57.1 % (ref 35.8–46.3)
HGB BLD-MCNC: 18.7 G/DL (ref 11.7–15.4)
IMM GRANULOCYTES # BLD AUTO: 0 K/UL (ref 0–0.5)
IMM GRANULOCYTES NFR BLD AUTO: 0 % (ref 0–5)
LYMPHOCYTES # BLD: 3.7 K/UL (ref 0.5–4.6)
LYMPHOCYTES NFR BLD: 42 % (ref 13–44)
MCH RBC QN AUTO: 26.3 PG (ref 26.1–32.9)
MCHC RBC AUTO-ENTMCNC: 32.7 G/DL (ref 31.4–35)
MCV RBC AUTO: 80.4 FL (ref 82–102)
MONOCYTES # BLD: 0.5 K/UL (ref 0.1–1.3)
MONOCYTES NFR BLD: 5 % (ref 4–12)
NEUTS SEG # BLD: 4.4 K/UL (ref 1.7–8.2)
NEUTS SEG NFR BLD: 51 % (ref 43–78)
NRBC # BLD: 0 K/UL (ref 0–0.2)
PLATELET # BLD AUTO: 176 K/UL (ref 150–450)
PMV BLD AUTO: 9.7 FL (ref 9.4–12.3)
POTASSIUM SERPL-SCNC: 3.9 MMOL/L (ref 3.5–5.1)
PROT SERPL-MCNC: 7.6 G/DL (ref 6.3–8.2)
RBC # BLD AUTO: 7.1 M/UL (ref 4.05–5.2)
SODIUM SERPL-SCNC: 136 MMOL/L (ref 136–146)
WBC # BLD AUTO: 8.9 K/UL (ref 4.3–11.1)

## 2024-04-11 PROCEDURE — 80053 COMPREHEN METABOLIC PANEL: CPT

## 2024-04-11 PROCEDURE — 36415 COLL VENOUS BLD VENIPUNCTURE: CPT

## 2024-04-11 PROCEDURE — 83036 HEMOGLOBIN GLYCOSYLATED A1C: CPT

## 2024-04-11 PROCEDURE — 85025 COMPLETE CBC W/AUTO DIFF WBC: CPT

## 2024-04-11 RX ORDER — BUSPIRONE HYDROCHLORIDE 10 MG/1
10 TABLET ORAL 2 TIMES DAILY
Qty: 60 TABLET | Refills: 0 | Status: SHIPPED | OUTPATIENT
Start: 2024-04-11

## 2024-04-12 LAB
EST. AVERAGE GLUCOSE BLD GHB EST-MCNC: 131 MG/DL
HBA1C MFR BLD: 6.2 % (ref 4.8–5.6)

## 2024-04-14 ENCOUNTER — HOSPITAL ENCOUNTER (EMERGENCY)
Age: 59
Discharge: HOME | End: 2024-04-15
Payer: COMMERCIAL

## 2024-04-14 ENCOUNTER — APPOINTMENT (OUTPATIENT)
Dept: GENERAL RADIOLOGY | Age: 59
End: 2024-04-14
Payer: COMMERCIAL

## 2024-04-14 DIAGNOSIS — J18.9 PNEUMONIA OF LEFT LOWER LOBE DUE TO INFECTIOUS ORGANISM: Primary | ICD-10-CM

## 2024-04-14 LAB
FLUAV RNA SPEC QL NAA+PROBE: NOT DETECTED
FLUBV RNA SPEC QL NAA+PROBE: NOT DETECTED
SARS-COV-2 RDRP RESP QL NAA+PROBE: NOT DETECTED
SOURCE: NORMAL
STREP, MOLECULAR: NOT DETECTED

## 2024-04-14 PROCEDURE — 99284 EMERGENCY DEPT VISIT MOD MDM: CPT

## 2024-04-14 PROCEDURE — 71046 X-RAY EXAM CHEST 2 VIEWS: CPT

## 2024-04-14 PROCEDURE — 87651 STREP A DNA AMP PROBE: CPT

## 2024-04-14 PROCEDURE — 87502 INFLUENZA DNA AMP PROBE: CPT

## 2024-04-14 PROCEDURE — 87635 SARS-COV-2 COVID-19 AMP PRB: CPT

## 2024-04-14 PROCEDURE — 6370000000 HC RX 637 (ALT 250 FOR IP)

## 2024-04-14 RX ORDER — ACETAMINOPHEN 325 MG/1
650 TABLET ORAL
Status: COMPLETED | OUTPATIENT
Start: 2024-04-14 | End: 2024-04-14

## 2024-04-14 RX ORDER — IBUPROFEN 600 MG/1
600 TABLET ORAL
Status: COMPLETED | OUTPATIENT
Start: 2024-04-14 | End: 2024-04-14

## 2024-04-14 RX ADMIN — IBUPROFEN 600 MG: 600 TABLET, FILM COATED ORAL at 23:04

## 2024-04-14 RX ADMIN — ACETAMINOPHEN 650 MG: 325 TABLET ORAL at 23:04

## 2024-04-14 ASSESSMENT — PAIN - FUNCTIONAL ASSESSMENT: PAIN_FUNCTIONAL_ASSESSMENT: 0-10

## 2024-04-14 ASSESSMENT — PAIN DESCRIPTION - DESCRIPTORS: DESCRIPTORS: ACHING

## 2024-04-14 ASSESSMENT — PAIN SCALES - GENERAL: PAINLEVEL_OUTOF10: 5

## 2024-04-15 VITALS
OXYGEN SATURATION: 94 % | RESPIRATION RATE: 16 BRPM | BODY MASS INDEX: 32.14 KG/M2 | TEMPERATURE: 97.5 F | HEART RATE: 73 BPM | HEIGHT: 66 IN | WEIGHT: 200 LBS | DIASTOLIC BLOOD PRESSURE: 64 MMHG | SYSTOLIC BLOOD PRESSURE: 121 MMHG

## 2024-04-15 LAB
ALBUMIN SERPL-MCNC: 3.7 G/DL (ref 3.5–5)
ALBUMIN/GLOB SERPL: 1.1 (ref 0.4–1.6)
ALP SERPL-CCNC: 126 U/L (ref 50–136)
ALT SERPL-CCNC: 53 U/L (ref 12–65)
ANION GAP SERPL CALC-SCNC: 3 MMOL/L (ref 2–11)
AST SERPL-CCNC: 31 U/L (ref 15–37)
BASOPHILS # BLD: 0 K/UL (ref 0–0.2)
BASOPHILS NFR BLD: 0 % (ref 0–2)
BILIRUB SERPL-MCNC: 0.6 MG/DL (ref 0.2–1.1)
BUN SERPL-MCNC: 6 MG/DL (ref 6–23)
CALCIUM SERPL-MCNC: 8.8 MG/DL (ref 8.3–10.4)
CHLORIDE SERPL-SCNC: 105 MMOL/L (ref 103–113)
CO2 SERPL-SCNC: 29 MMOL/L (ref 21–32)
CREAT SERPL-MCNC: 0.8 MG/DL (ref 0.6–1)
DIFFERENTIAL METHOD BLD: ABNORMAL
EOSINOPHIL # BLD: 0.3 K/UL (ref 0–0.8)
EOSINOPHIL NFR BLD: 2 % (ref 0.5–7.8)
ERYTHROCYTE [DISTWIDTH] IN BLOOD BY AUTOMATED COUNT: 20.6 % (ref 11.9–14.6)
GLOBULIN SER CALC-MCNC: 3.3 G/DL (ref 2.8–4.5)
GLUCOSE SERPL-MCNC: 111 MG/DL (ref 65–100)
HCT VFR BLD AUTO: 52.2 % (ref 35.8–46.3)
HGB BLD-MCNC: 17.1 G/DL (ref 11.7–15.4)
IMM GRANULOCYTES # BLD AUTO: 0 K/UL (ref 0–0.5)
IMM GRANULOCYTES NFR BLD AUTO: 0 % (ref 0–5)
LACTATE SERPL-SCNC: 1.2 MMOL/L (ref 0.4–2)
LYMPHOCYTES # BLD: 3.8 K/UL (ref 0.5–4.6)
LYMPHOCYTES NFR BLD: 33 % (ref 13–44)
MCH RBC QN AUTO: 26.6 PG (ref 26.1–32.9)
MCHC RBC AUTO-ENTMCNC: 32.8 G/DL (ref 31.4–35)
MCV RBC AUTO: 81.2 FL (ref 82–102)
MONOCYTES # BLD: 0.8 K/UL (ref 0.1–1.3)
MONOCYTES NFR BLD: 7 % (ref 4–12)
NEUTS SEG # BLD: 6.6 K/UL (ref 1.7–8.2)
NEUTS SEG NFR BLD: 57 % (ref 43–78)
NRBC # BLD: 0 K/UL (ref 0–0.2)
PLATELET # BLD AUTO: 157 K/UL (ref 150–450)
PMV BLD AUTO: 9.7 FL (ref 9.4–12.3)
POTASSIUM SERPL-SCNC: 3.2 MMOL/L (ref 3.5–5.1)
PROCALCITONIN SERPL-MCNC: <0.05 NG/ML (ref 0–0.49)
PROT SERPL-MCNC: 7 G/DL (ref 6.3–8.2)
RBC # BLD AUTO: 6.43 M/UL (ref 4.05–5.2)
SODIUM SERPL-SCNC: 137 MMOL/L (ref 136–146)
WBC # BLD AUTO: 11.6 K/UL (ref 4.3–11.1)

## 2024-04-15 PROCEDURE — 85025 COMPLETE CBC W/AUTO DIFF WBC: CPT

## 2024-04-15 PROCEDURE — 80053 COMPREHEN METABOLIC PANEL: CPT

## 2024-04-15 PROCEDURE — 84145 PROCALCITONIN (PCT): CPT

## 2024-04-15 PROCEDURE — 83605 ASSAY OF LACTIC ACID: CPT

## 2024-04-15 PROCEDURE — 6360000002 HC RX W HCPCS: Performed by: EMERGENCY MEDICINE

## 2024-04-15 RX ORDER — CEFDINIR 300 MG/1
300 CAPSULE ORAL 2 TIMES DAILY
Qty: 14 CAPSULE | Refills: 0 | Status: SHIPPED | OUTPATIENT
Start: 2024-04-15 | End: 2024-04-22

## 2024-04-15 RX ORDER — HEPARIN 100 UNIT/ML
300 SYRINGE INTRAVENOUS PRN
Status: DISCONTINUED | OUTPATIENT
Start: 2024-04-15 | End: 2024-04-15 | Stop reason: HOSPADM

## 2024-04-15 RX ORDER — FLUCONAZOLE 150 MG/1
150 TABLET ORAL
Qty: 2 TABLET | Refills: 0 | Status: SHIPPED | OUTPATIENT
Start: 2024-04-15 | End: 2024-04-21

## 2024-04-15 RX ORDER — AZITHROMYCIN 250 MG/1
TABLET, FILM COATED ORAL
Qty: 6 TABLET | Refills: 0 | Status: SHIPPED | OUTPATIENT
Start: 2024-04-15 | End: 2024-04-25

## 2024-04-15 RX ADMIN — HEPARIN 300 UNITS: 100 SYRINGE at 02:29

## 2024-04-15 NOTE — ED NOTES
Patient mobility status  with no difficulty. Provider aware     I have reviewed discharge instructions with the patient.  The patient verbalized understanding.    Patient left ED via Discharge Method: ambulatory to Home with family    Opportunity for questions and clarification provided.     Patient given 3 scripts.           Neli Mulligan, RN  04/15/24 0233

## 2024-04-15 NOTE — RESULT ENCOUNTER NOTE
Your sugars are controlled.  Liver enzymes are elevated.  We'll discuss this at your upcoming office visit. Hope you are feeling well. 
negative

## 2024-04-15 NOTE — DISCHARGE INSTRUCTIONS
Please begin taking the antibiotics as prescribed.  I have also provided you with Diflucan in case you develop a yeast infection.  Please follow-up with your primary care provider at your scheduled visit on Tuesday.  Return to the ED immediately if you begin to experience any shortness of breath, high fevers, chest pain, or any new or worsening symptoms.

## 2024-04-15 NOTE — ED PROVIDER NOTES
Emergency Department Provider Note       PCP: Dawna Jewell MD   Age: 58 y.o.   Sex: female     DISPOSITION Decision To Discharge 04/15/2024 02:06:26 AM       ICD-10-CM    1. Pneumonia of left lower lobe due to infectious organism  J18.9           Medical Decision Making     Patient is a 58-year-old female with past medical history of renal cell carcinoma of the left kidney with some possible metastatic disease to bilateral lungs presenting with cough, congestion, body aches, and sore throat for the past 2 days.    On presentation, patient is afebrile without tachycardia, O2 saturation is stable ambulatory between 93 and 95% on room air.  She is ill-appearing and coughing in the exam room.    Rapid flu, strep, and COVID are negative.  Chest x-ray reveals left lung base atelectasis or mild infiltrates, no significant increase in bilateral lung zone infiltrates with reported history of metastases.    Patient did not initially want blood work, however with the evidence of pneumonia on her chest x-ray and her history of cancer, I do believe we should obtain blood work at today's visit.  Patient is agreeable with this plan.  Will obtain CBC, CMP, Pro-Bean, and lactic.  Will plan to begin on oral antibiotics, if lab work appears stable, she is anaphylactically allergic to penicillins but has had cephalosporins in the past, she states she does not tolerate doxycycline well so we will begin on cefdinir and azithromycin given the most recent up-to-date guidelines.    CBC with mild leukocytosis of 11.6, stable H&H compared to patient's previous.  CMP was stable electrolytes, kidney, and hepatic function.  Lactic and Pro-Bean are not elevated.    Due to stable vital signs, nontoxic appearance, no evidence of hypoxia or sepsis plan for this patient as continued outpatient management for a left lower lobe pneumonia.  Will begin on cefdinir, patient is allergic to penicillins but has had cefepime in the past as well as

## 2024-04-15 NOTE — ED TRIAGE NOTES
Patient arrives with adult son from home. Patient states she has had a cough, body aches, sore throat, and fatigue for the last few days. Patient states her symptoms have been getting progressively worse.

## 2024-04-15 NOTE — ED NOTES
Ambulatory O2 performed per PA order. O2 stable between 93-95     Cherie Gamez LPN  04/15/24 0010

## 2024-04-16 ENCOUNTER — OFFICE VISIT (OUTPATIENT)
Dept: INTERNAL MEDICINE CLINIC | Facility: CLINIC | Age: 59
End: 2024-04-16
Payer: COMMERCIAL

## 2024-04-16 VITALS
HEART RATE: 97 BPM | BODY MASS INDEX: 33.75 KG/M2 | HEIGHT: 66 IN | SYSTOLIC BLOOD PRESSURE: 115 MMHG | DIASTOLIC BLOOD PRESSURE: 55 MMHG | WEIGHT: 210 LBS | RESPIRATION RATE: 20 BRPM | OXYGEN SATURATION: 91 %

## 2024-04-16 DIAGNOSIS — C64.2 RENAL CELL CARCINOMA OF LEFT KIDNEY (HCC): ICD-10-CM

## 2024-04-16 DIAGNOSIS — J18.9 PNEUMONIA OF LEFT LOWER LOBE DUE TO INFECTIOUS ORGANISM: ICD-10-CM

## 2024-04-16 DIAGNOSIS — E11.9 TYPE 2 DIABETES MELLITUS WITHOUT COMPLICATION, WITHOUT LONG-TERM CURRENT USE OF INSULIN (HCC): ICD-10-CM

## 2024-04-16 DIAGNOSIS — Z09 HOSPITAL DISCHARGE FOLLOW-UP: Primary | ICD-10-CM

## 2024-04-16 LAB
ALBUMIN SERPL-MCNC: 3.5 G/DL (ref 3.5–5)
ALBUMIN/GLOB SERPL: 1.1 (ref 0.4–1.6)
ALP SERPL-CCNC: 121 U/L (ref 50–136)
ALT SERPL-CCNC: 52 U/L (ref 12–65)
ANION GAP SERPL CALC-SCNC: 7 MMOL/L (ref 2–11)
AST SERPL-CCNC: 28 U/L (ref 15–37)
BASOPHILS # BLD: 0 K/UL (ref 0–0.2)
BASOPHILS NFR BLD: 0 % (ref 0–2)
BILIRUB SERPL-MCNC: 0.4 MG/DL (ref 0.2–1.1)
BUN SERPL-MCNC: 6 MG/DL (ref 6–23)
CALCIUM SERPL-MCNC: 8.9 MG/DL (ref 8.3–10.4)
CHLORIDE SERPL-SCNC: 107 MMOL/L (ref 103–113)
CO2 SERPL-SCNC: 27 MMOL/L (ref 21–32)
CREAT SERPL-MCNC: 0.7 MG/DL (ref 0.6–1)
DIFFERENTIAL METHOD BLD: ABNORMAL
EOSINOPHIL # BLD: 0.2 K/UL (ref 0–0.8)
EOSINOPHIL NFR BLD: 2 % (ref 0.5–7.8)
ERYTHROCYTE [DISTWIDTH] IN BLOOD BY AUTOMATED COUNT: 20.9 % (ref 11.9–14.6)
GLOBULIN SER CALC-MCNC: 3.1 G/DL (ref 2.8–4.5)
GLUCOSE SERPL-MCNC: 128 MG/DL (ref 65–100)
HCT VFR BLD AUTO: 53.2 % (ref 35.8–46.3)
HGB BLD-MCNC: 17.2 G/DL (ref 11.7–15.4)
IMM GRANULOCYTES # BLD AUTO: 0 K/UL (ref 0–0.5)
IMM GRANULOCYTES NFR BLD AUTO: 0 % (ref 0–5)
LYMPHOCYTES # BLD: 3 K/UL (ref 0.5–4.6)
LYMPHOCYTES NFR BLD: 29 % (ref 13–44)
MCH RBC QN AUTO: 26.5 PG (ref 26.1–32.9)
MCHC RBC AUTO-ENTMCNC: 32.3 G/DL (ref 31.4–35)
MCV RBC AUTO: 81.8 FL (ref 82–102)
MONOCYTES # BLD: 0.7 K/UL (ref 0.1–1.3)
MONOCYTES NFR BLD: 6 % (ref 4–12)
NEUTS SEG # BLD: 6.4 K/UL (ref 1.7–8.2)
NEUTS SEG NFR BLD: 63 % (ref 43–78)
NRBC # BLD: 0 K/UL (ref 0–0.2)
PLATELET # BLD AUTO: 182 K/UL (ref 150–450)
PMV BLD AUTO: 10 FL (ref 9.4–12.3)
POTASSIUM SERPL-SCNC: 3.6 MMOL/L (ref 3.5–5.1)
PROT SERPL-MCNC: 6.6 G/DL (ref 6.3–8.2)
RBC # BLD AUTO: 6.5 M/UL (ref 4.05–5.2)
SODIUM SERPL-SCNC: 141 MMOL/L (ref 136–146)
WBC # BLD AUTO: 10.3 K/UL (ref 4.3–11.1)

## 2024-04-16 PROCEDURE — 1111F DSCHRG MED/CURRENT MED MERGE: CPT | Performed by: INTERNAL MEDICINE

## 2024-04-16 PROCEDURE — 3044F HG A1C LEVEL LT 7.0%: CPT | Performed by: INTERNAL MEDICINE

## 2024-04-16 PROCEDURE — 99214 OFFICE O/P EST MOD 30 MIN: CPT | Performed by: INTERNAL MEDICINE

## 2024-04-16 ASSESSMENT — ENCOUNTER SYMPTOMS
ABDOMINAL PAIN: 0
NAUSEA: 1
SHORTNESS OF BREATH: 1
VOMITING: 0
COUGH: 1

## 2024-04-16 NOTE — PROGRESS NOTES
tablet 2    EPINEPHrine (EPIPEN 2-MARY) 0.3 MG/0.3ML SOAJ injection Inject 0.3 mLs into the muscle as needed (allergic reaction) Use as directed for allergic reaction 2 each 3    Continuous Blood Gluc Sensor (DEXCOM G7 SENSOR) MISC Use daily. Change sensor every 10 days 3 each 11    blood glucose test strips (FREESTYLE TEST STRIPS) strip 1 each by In Vitro route daily As needed. 100 each 3    cetirizine (ZYRTEC) 10 MG tablet Take 1 tablet by mouth at bedtime      albuterol sulfate HFA (PROAIR HFA) 108 (90 Base) MCG/ACT inhaler Inhale 2 puffs into the lungs every 6 hours as needed for Wheezing 18 g 3    Acetaminophen (TYLENOL) 325 MG CAPS every 6 hours as needed As needed       No current facility-administered medications for this visit.       Orders Placed This Encounter   Procedures    Comprehensive Metabolic Panel     Standing Status:   Future     Number of Occurrences:   1     Standing Expiration Date:   4/16/2025    CBC with Auto Differential     Standing Status:   Future     Number of Occurrences:   1     Standing Expiration Date:   4/16/2025    [place] DISCHARGE MEDS RECONCILED W/ CURRENT OUTPATIENT MED LIST       No orders of the defined types were placed in this encounter.    Xray Result (most recent):  XR CHEST STANDARD TWO VW 04/14/2024    Narrative  Chest X-ray    INDICATION: Cough    COMPARISON: X-ray chest March 27, 2024    TECHNIQUE: PA and lateral views of the chest were obtained.    FINDINGS. Right chest valentin catheter in stable position. Stable appearance of  bilateral predominantly lower lung zone nodules. There left lung base  atelectasis and or mild infiltrate..  The heart size is normal.  The bony thorax  is intact.    Impression  Left lung base atelectasis and or mild infiltrate.    No significant interval change in predominantly bilateral lower lung zone  infiltrates with reported history of metastasis.     Medications Discontinued During This Encounter   Medication Reason    sucralfate (CARAFATE) 1

## 2024-04-25 ENCOUNTER — OFFICE VISIT (OUTPATIENT)
Dept: ONCOLOGY | Age: 59
End: 2024-04-25
Payer: COMMERCIAL

## 2024-04-25 ENCOUNTER — HOSPITAL ENCOUNTER (OUTPATIENT)
Dept: INFUSION THERAPY | Age: 59
Setting detail: INFUSION SERIES
Discharge: HOME | End: 2024-04-25
Payer: COMMERCIAL

## 2024-04-25 ENCOUNTER — HOSPITAL ENCOUNTER (OUTPATIENT)
Dept: LAB | Age: 59
Discharge: HOME | End: 2024-04-25
Payer: COMMERCIAL

## 2024-04-25 ENCOUNTER — OFFICE VISIT (OUTPATIENT)
Dept: PALLATIVE CARE | Age: 59
End: 2024-04-25
Payer: COMMERCIAL

## 2024-04-25 VITALS
TEMPERATURE: 97.5 F | HEART RATE: 72 BPM | DIASTOLIC BLOOD PRESSURE: 80 MMHG | HEIGHT: 67 IN | BODY MASS INDEX: 32.8 KG/M2 | WEIGHT: 209 LBS | RESPIRATION RATE: 16 BRPM | SYSTOLIC BLOOD PRESSURE: 114 MMHG | OXYGEN SATURATION: 95 %

## 2024-04-25 DIAGNOSIS — R05.8 PRODUCTIVE COUGH: ICD-10-CM

## 2024-04-25 DIAGNOSIS — R06.02 SOB (SHORTNESS OF BREATH): ICD-10-CM

## 2024-04-25 DIAGNOSIS — K12.30 MUCOSITIS: ICD-10-CM

## 2024-04-25 DIAGNOSIS — R11.2 NAUSEA AND VOMITING, UNSPECIFIED VOMITING TYPE: ICD-10-CM

## 2024-04-25 DIAGNOSIS — Z79.899 HIGH RISK MEDICATION USE: ICD-10-CM

## 2024-04-25 DIAGNOSIS — G47.33 OSA (OBSTRUCTIVE SLEEP APNEA): ICD-10-CM

## 2024-04-25 DIAGNOSIS — F41.9 ANXIETY AND DEPRESSION: ICD-10-CM

## 2024-04-25 DIAGNOSIS — G89.3 CANCER ASSOCIATED PAIN: Primary | ICD-10-CM

## 2024-04-25 DIAGNOSIS — Z72.0 CURRENT NICOTINE USE: ICD-10-CM

## 2024-04-25 DIAGNOSIS — F32.A ANXIETY AND DEPRESSION: ICD-10-CM

## 2024-04-25 DIAGNOSIS — R91.8 PULMONARY NODULES: ICD-10-CM

## 2024-04-25 DIAGNOSIS — C64.2 RENAL CELL CARCINOMA OF LEFT KIDNEY (HCC): Primary | ICD-10-CM

## 2024-04-25 DIAGNOSIS — C64.2 RENAL CELL CARCINOMA OF LEFT KIDNEY (HCC): ICD-10-CM

## 2024-04-25 DIAGNOSIS — Z51.5 ENCOUNTER FOR PALLIATIVE CARE: ICD-10-CM

## 2024-04-25 DIAGNOSIS — R19.7 DIARRHEA, UNSPECIFIED TYPE: ICD-10-CM

## 2024-04-25 LAB
ALBUMIN SERPL-MCNC: 3.8 G/DL (ref 3.5–5)
ALBUMIN/GLOB SERPL: 1.1 (ref 1–1.9)
ALP SERPL-CCNC: 108 U/L (ref 35–104)
ALT SERPL-CCNC: 42 U/L (ref 12–65)
ANION GAP SERPL CALC-SCNC: 14 MMOL/L (ref 9–18)
AST SERPL-CCNC: 35 U/L (ref 15–37)
BASOPHILS # BLD: 0 K/UL (ref 0–0.2)
BASOPHILS NFR BLD: 0 % (ref 0–2)
BILIRUB SERPL-MCNC: 0.4 MG/DL (ref 0–1.2)
BUN SERPL-MCNC: 5 MG/DL (ref 6–23)
CALCIUM SERPL-MCNC: 9.2 MG/DL (ref 8.8–10.2)
CHLORIDE SERPL-SCNC: 104 MMOL/L (ref 98–107)
CO2 SERPL-SCNC: 22 MMOL/L (ref 20–28)
CREAT SERPL-MCNC: 0.77 MG/DL (ref 0.6–1.1)
DIFFERENTIAL METHOD BLD: ABNORMAL
EOSINOPHIL # BLD: 0.2 K/UL (ref 0–0.8)
EOSINOPHIL NFR BLD: 3 % (ref 0.5–7.8)
ERYTHROCYTE [DISTWIDTH] IN BLOOD BY AUTOMATED COUNT: 21 % (ref 11.9–14.6)
GLOBULIN SER CALC-MCNC: 3.5 G/DL (ref 2.3–3.5)
GLUCOSE SERPL-MCNC: 109 MG/DL (ref 70–99)
HCT VFR BLD AUTO: 52 % (ref 35.8–46.3)
HGB BLD-MCNC: 17.3 G/DL (ref 11.7–15.4)
IMM GRANULOCYTES # BLD AUTO: 0 K/UL (ref 0–0.5)
IMM GRANULOCYTES NFR BLD AUTO: 0 % (ref 0–5)
LYMPHOCYTES # BLD: 3.1 K/UL (ref 0.5–4.6)
LYMPHOCYTES NFR BLD: 41 % (ref 13–44)
MCH RBC QN AUTO: 27.2 PG (ref 26.1–32.9)
MCHC RBC AUTO-ENTMCNC: 33.3 G/DL (ref 31.4–35)
MCV RBC AUTO: 81.8 FL (ref 82–102)
MONOCYTES # BLD: 0.6 K/UL (ref 0.1–1.3)
MONOCYTES NFR BLD: 8 % (ref 4–12)
NEUTS SEG # BLD: 3.7 K/UL (ref 1.7–8.2)
NEUTS SEG NFR BLD: 48 % (ref 43–78)
NRBC # BLD: 0 K/UL (ref 0–0.2)
PLATELET # BLD AUTO: 215 K/UL (ref 150–450)
PMV BLD AUTO: 10.2 FL (ref 9.4–12.3)
POTASSIUM SERPL-SCNC: 3.7 MMOL/L (ref 3.5–5.1)
PROT SERPL-MCNC: 7.3 G/DL (ref 6.3–8.2)
RBC # BLD AUTO: 6.36 M/UL (ref 4.05–5.2)
SODIUM SERPL-SCNC: 140 MMOL/L (ref 136–145)
WBC # BLD AUTO: 7.6 K/UL (ref 4.3–11.1)

## 2024-04-25 PROCEDURE — 99214 OFFICE O/P EST MOD 30 MIN: CPT

## 2024-04-25 PROCEDURE — 36415 COLL VENOUS BLD VENIPUNCTURE: CPT

## 2024-04-25 PROCEDURE — 99215 OFFICE O/P EST HI 40 MIN: CPT

## 2024-04-25 PROCEDURE — 87070 CULTURE OTHR SPECIMN AEROBIC: CPT

## 2024-04-25 PROCEDURE — 85025 COMPLETE CBC W/AUTO DIFF WBC: CPT

## 2024-04-25 PROCEDURE — 99417 PROLNG OP E/M EACH 15 MIN: CPT

## 2024-04-25 PROCEDURE — 80053 COMPREHEN METABOLIC PANEL: CPT

## 2024-04-25 PROCEDURE — 87205 SMEAR GRAM STAIN: CPT

## 2024-04-25 RX ORDER — OXYCODONE HYDROCHLORIDE 15 MG/1
1 TABLET, FILM COATED, EXTENDED RELEASE ORAL EVERY 12 HOURS
Qty: 60 TABLET | Refills: 0 | Status: SHIPPED | OUTPATIENT
Start: 2024-04-28 | End: 2024-05-28

## 2024-04-25 RX ORDER — OXYCODONE HYDROCHLORIDE 10 MG/1
10 TABLET ORAL EVERY 4 HOURS PRN
Qty: 180 TABLET | Refills: 0 | Status: SHIPPED | OUTPATIENT
Start: 2024-05-12 | End: 2024-06-11

## 2024-04-25 ASSESSMENT — PATIENT HEALTH QUESTIONNAIRE - PHQ9
SUM OF ALL RESPONSES TO PHQ QUESTIONS 1-9: 16
SUM OF ALL RESPONSES TO PHQ QUESTIONS 1-9: 16
3. TROUBLE FALLING OR STAYING ASLEEP: NEARLY EVERY DAY
SUM OF ALL RESPONSES TO PHQ QUESTIONS 1-9: 16
9. THOUGHTS THAT YOU WOULD BE BETTER OFF DEAD, OR OF HURTING YOURSELF: NOT AT ALL
7. TROUBLE CONCENTRATING ON THINGS, SUCH AS READING THE NEWSPAPER OR WATCHING TELEVISION: NOT AT ALL
8. MOVING OR SPEAKING SO SLOWLY THAT OTHER PEOPLE COULD HAVE NOTICED. OR THE OPPOSITE, BEING SO FIGETY OR RESTLESS THAT YOU HAVE BEEN MOVING AROUND A LOT MORE THAN USUAL: NOT AT ALL
SUM OF ALL RESPONSES TO PHQ9 QUESTIONS 1 & 2: 6
SUM OF ALL RESPONSES TO PHQ QUESTIONS 1-9: 16
6. FEELING BAD ABOUT YOURSELF - OR THAT YOU ARE A FAILURE OR HAVE LET YOURSELF OR YOUR FAMILY DOWN: SEVERAL DAYS
2. FEELING DOWN, DEPRESSED OR HOPELESS: NEARLY EVERY DAY
4. FEELING TIRED OR HAVING LITTLE ENERGY: NEARLY EVERY DAY
10. IF YOU CHECKED OFF ANY PROBLEMS, HOW DIFFICULT HAVE THESE PROBLEMS MADE IT FOR YOU TO DO YOUR WORK, TAKE CARE OF THINGS AT HOME, OR GET ALONG WITH OTHER PEOPLE: SOMEWHAT DIFFICULT
5. POOR APPETITE OR OVEREATING: NEARLY EVERY DAY
1. LITTLE INTEREST OR PLEASURE IN DOING THINGS: NEARLY EVERY DAY

## 2024-04-25 ASSESSMENT — ENCOUNTER SYMPTOMS
BACK PAIN: 1
SHORTNESS OF BREATH: 1
DIARRHEA: 1
NAUSEA: 1
NAUSEA: 0
ABDOMINAL PAIN: 1
BACK PAIN: 1
BLOOD IN STOOL: 0
HEMOPTYSIS: 0
WHEEZING: 0
ABDOMINAL DISTENTION: 0
SCLERAL ICTERUS: 0
VOMITING: 0
CHEST TIGHTNESS: 0
SORE THROAT: 0
VOICE CHANGE: 0
ABDOMINAL PAIN: 0
TROUBLE SWALLOWING: 0

## 2024-04-25 NOTE — PROGRESS NOTES
Outpatient Palliative Care at the  [facility]: Office Visit Established Patient    Diagnosis: Metastatic renal cell carcinoma     Treatment Plan: axitinib/pebrolizumab -> lenvatinib/everolimus -> cabozantinib     Treatment Intent: Palliative     Medical Oncologist: Dr. Lopez, Dr. Mccloud @ [facility]     Radiation Oncologist: None     Navigator: None      Chief Complaint:    Chief Complaint   Patient presents with    Follow-up       History of Present Illness:  Ms. Hall is a 58 y.o. female who presents today for evaluation regarding establishing care with palliative care. 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  Despite attending physical therapy since the accident, she continued to experience the same low back pain.  She was seen at [facility] [place] on 10/4/22.  MRI of the lumbar spine on 10/15/22 showed mild degenerative disc disease at L3-4 through L5-S1 and a 5.7 x 6.1 x 7 cm lower pole solid left renal mass. Urgent referral was placed to [facility], who referred the patient to [facility] for uro/onc evaluation and treatment of renal mass.  CT AP on 10/26/22 showed bilateral enhancing renal masses, concerning for renal cell carcinoma Also noted was an enhancing 1.5 cm right adrenal nodule, concerning for a metastatic nodule.  S/p left renal biopsy on 11/2022, clear cell RCC.  She was started on pembrolizumab and axitinib - could not c/w immunotherapy due to LFT abnormalities. Started lenvatinib/everolimus. She had left partial nephrectomy at [facility] on 12/8/23.  Plan for right nephrectomy pending.   Her PMHx includes anxiety, tobacco abuse, cervical dysplasia, DDD, sleep apnea and lap liv. She is employed at [employer] as a CNA. She lives at home with her son, who she states has drug addiction issues.     Interval History:  Pt seen today in follow-up in coordination with

## 2024-04-25 NOTE — PROGRESS NOTES
patient verbalized understanding and agrees with the plan above.      Charla Hendrix, SPIKE - CNP  [facility]  [address] [place] [address]  Office : [phone]  Fax : [phone]

## 2024-04-25 NOTE — PROGRESS NOTES
Arrived to the Infusion Center.  Sputum sample completed.   Patient instructed to report any side affects to ordering provider.  Patient tolerated well.   Any issues or concerns during appointment: none.  Discharged ambulatory.

## 2024-04-26 ENCOUNTER — HOSPITAL ENCOUNTER (OUTPATIENT)
Dept: GENERAL RADIOLOGY | Age: 59
Discharge: HOME | End: 2024-04-26
Payer: COMMERCIAL

## 2024-04-26 DIAGNOSIS — R05.8 PRODUCTIVE COUGH: ICD-10-CM

## 2024-04-26 DIAGNOSIS — R06.02 SOB (SHORTNESS OF BREATH): ICD-10-CM

## 2024-04-26 PROCEDURE — 71046 X-RAY EXAM CHEST 2 VIEWS: CPT

## 2024-04-27 LAB
BACTERIA SPEC CULT: NORMAL
GRAM STN SPEC: NORMAL
SERVICE CMNT-IMP: NORMAL

## 2024-04-30 ENCOUNTER — TELEPHONE (OUTPATIENT)
Dept: PULMONOLOGY | Age: 59
End: 2024-04-30

## 2024-04-30 NOTE — TELEPHONE ENCOUNTER
Note:  Ref by SPIKE Mendenhall for SOB, productive cough, pulmonary nodules. CT chest 3/27/24 LUNGS: There are innumerable bilateral pulmonary nodules that are enhancing.  Largest 2 previously measured were 8 mm at the base of each lung, and now the  left lower lobe nodule measures 1.2 cm in size and right lower lobe measures 1.1  cm in the greatest dimension. There is furthermore increase in the number of  pulmonary nodules.  A second referral was placed for sleep center to address the RADHA and study.            Date Obtained:   2/8/2024   DIAGNOSIS       A:  \"LEFT LEVEL 1 LYMPH NODE\":  ONE BENIGN LYMPH NODE, NEGATIVE FOR   TUMOR (0/1).        B:  \"LEFT SUBMANDIBULAR GLAND\":  CLEAR CELL CARCINOMA, CONSISTENT   WITH METASTASIS FROM RENAL CELL CARCINOMA ([id number]), GRADE 2 OF 4.      Date Obtained:   9/28/2023   CT guided BX:  History: History significant for renal neoplasms with an enlarging left lower  lobe pulmonary nodule. History of granulomas.  DIAGNOSIS       \"LUNG, NEEDLE BIOPSY\":  BRONCHIAL MUCOSA AND ALVEOLI HAVING MIXED   INFLAMMATORY INFILTRATE INCLUDING GRANULOMATOUS INFLAMMATION.  SPECIAL   STAINS FOR ACID-FAST BACILLI AND FUNGI ARE NEGATIVE.         I have pushed recent CT chest to [place].     Sputum in EPIC collected 4/25/2024    I have reviewed patient chart and imaging directly with Dr Martinez who does not order any additional testing prior to appointment. I have called patient and cannot leave her a message.  LM on VM of grandchild for a return call.  Sent patient a [employer] message for a return call.

## 2024-05-02 ENCOUNTER — TELEPHONE (OUTPATIENT)
Dept: PULMONOLOGY | Age: 59
End: 2024-05-02

## 2024-05-02 NOTE — TELEPHONE ENCOUNTER
Called patient to schedule appointment. She is scheduled to see Dr Reardon on 5/10 at 0920 arrival.

## 2024-05-06 DIAGNOSIS — Z51.5 ENCOUNTER FOR PALLIATIVE CARE: ICD-10-CM

## 2024-05-06 DIAGNOSIS — R19.7 DIARRHEA, UNSPECIFIED TYPE: Primary | ICD-10-CM

## 2024-05-06 DIAGNOSIS — F41.9 ANXIETY AND DEPRESSION: ICD-10-CM

## 2024-05-06 DIAGNOSIS — F32.A ANXIETY AND DEPRESSION: ICD-10-CM

## 2024-05-06 RX ORDER — BUSPIRONE HYDROCHLORIDE 10 MG/1
10 TABLET ORAL 2 TIMES DAILY
Qty: 60 TABLET | Refills: 0 | Status: SHIPPED | OUTPATIENT
Start: 2024-05-06

## 2024-05-06 RX ORDER — LOPERAMIDE HYDROCHLORIDE 2 MG/1
2 CAPSULE ORAL EVERY 6 HOURS PRN
Qty: 30 CAPSULE | Refills: 0 | Status: SHIPPED | OUTPATIENT
Start: 2024-05-06

## 2024-05-10 ENCOUNTER — OFFICE VISIT (OUTPATIENT)
Dept: PULMONOLOGY | Age: 59
End: 2024-05-10

## 2024-05-10 VITALS
SYSTOLIC BLOOD PRESSURE: 120 MMHG | WEIGHT: 211 LBS | BODY MASS INDEX: 33.12 KG/M2 | HEART RATE: 71 BPM | OXYGEN SATURATION: 94 % | RESPIRATION RATE: 20 BRPM | DIASTOLIC BLOOD PRESSURE: 80 MMHG | TEMPERATURE: 98 F | HEIGHT: 67 IN

## 2024-05-10 DIAGNOSIS — R91.8 PULMONARY NODULES: ICD-10-CM

## 2024-05-10 DIAGNOSIS — Z87.891 HISTORY OF CIGARETTE SMOKING: ICD-10-CM

## 2024-05-10 DIAGNOSIS — R05.3 CHRONIC COUGH: Primary | ICD-10-CM

## 2024-05-10 DIAGNOSIS — C64.2 RENAL CELL CARCINOMA OF LEFT KIDNEY (HCC): ICD-10-CM

## 2024-05-10 LAB
EXPIRATORY TIME: NORMAL
FEF 25-75% %PRED-PRE: NORMAL
FEF 25-75% PRED: NORMAL
FEF 25-75-PRE: NORMAL
FEV1 %PRED-PRE: 85 %
FEV1 PRED: NORMAL
FEV1/FVC %PRED-PRE: NORMAL
FEV1/FVC PRED: NORMAL
FEV1/FVC: 84 %
FEV1: 2.32 L
FVC %PRED-PRE: 80 %
FVC PRED: NORMAL
FVC: 2.76 L
PEF %PRED-PRE: NORMAL
PEF PRED: NORMAL
PEF-PRE: NORMAL

## 2024-05-10 ASSESSMENT — PULMONARY FUNCTION TESTS
FEV1/FVC: 84
FVC: 2.76
FEV1_PERCENT_PREDICTED_PRE: 85
FEV1: 2.32
FVC_PERCENT_PREDICTED_PRE: 80

## 2024-05-10 NOTE — PROGRESS NOTES
[facility]  [address]  [place] [address]  [phone]    Patient Name:  Mechelle Hall  YOB: 1965      Office Visit 5/14/2024    CHIEF COMPLAINT:    Chief Complaint   Patient presents with    New Patient     Fatigue, can't  stay awake        HISTORY OF PRESENT ILLNESS:      The patient presents in outpatient consultation at the request of Dr. Jewell for management of obstructive sleep apnea.  Significant PMH of renal cancer, diabetes, RADHA, tension, and anxiety/depression.     She reports that she was diagnosed with RADHA and wore a CPAP about 20 years ago.  States that she tolerated CPAP well and it definitely made her feel better.  States that she could just no longer afford to keep up with the supplies and stopped using CPAP.  She is currently on chemotherapy for renal cancer.  Reports that she never awakens in the morning feeling fully rested and is exhausted throughout most days of the week.  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  We did discuss limiting driving due to her excessive sleepiness and Edison score of 19/24.  She states that she does not have a set bedtime at night and will often just fall asleep in the living room when she returns home from work.  States that if she sits still during the day she will often fall asleep.  She is usually up for her day around 6 AM but states that she spends most of the night awakening and having difficulty going back to sleep.  She does report a history of being told that she snores and being told that she stops breathing while she is sleeping.  States that she has awoken herself during the night due to the need to gasp for air.  She denies ever having any symptoms of restless leg syndrome.  Reports that her weight has consistently been around 210 pounds.  Her blood pressure is controlled today.      Sleepiness Scale:       5/14/2024     9:17 AM   Sleep Medicine   Sitting and reading

## 2024-05-10 NOTE — PATIENT INSTRUCTIONS
Nicotine Cessation and Management Program    The Nicotine Cessation Program is a 5-cession class that utilized the QuickSmart Kit plus group support.  QuitSmart combines several powerful treatment elements - including mindfulness/hypnosis, medication recommendations and a patented simulated cigarette - to produce a potent stop - smoking treatment.  The program was developed by Dr. Prashanth Cueto,  of the [facility].  Participants are welcome to continue with monthly unlimited group support meetings upon graduation.  At graduation, a certificate of completion will be provided.      This program will combine powerful treatments to help you break free from cigarettes.    Ease off nicotine    Switch off to cigarette brands that deliver less and less nicotine.  We call it warm chicken quitting.     Consider stop-smoking medicine    Choose from 7 medicines that can keep you comfortable as you quit.    Take a new look at the patch    Discover a new way to use nicotine patches that dramatically increases your change of success...    Use your mind to help    Relax as you develop the respect for your body that naturally lends to freedom from cigarettes.    Break the smoking habit.    Your smoking habit may be strong, but you can outsmart it with six simple techniques.    You will also receive the QuickSmart Kit which includes an informative guidebook, a relaxing hypnosis CD and a patented cigarette substitute.    Program is covered by Medicare and most insurance providers.     Learn More:    For more information or to sign up for the [facility] Nicotine Cessation and Management Program, please call [phone]    Classes are held at [address], [id number] [place] [address].

## 2024-05-10 NOTE — PROGRESS NOTES
ondansetron (ZOFRAN) 4 mg, Oral, 3 TIMES DAILY PRN    oxyCODONE (OXYCONTIN) 15 mg, Oral, EVERY 12 HOURS    [START ON 5/12/2024] oxyCODONE HCl (OXY-IR) 10 mg, Oral, EVERY 4 HOURS PRN, Intended supply: 30 days    pantoprazole (PROTONIX) 40 mg, Oral, 2 times daily    promethazine (PHENERGAN) 25 mg, Oral, EVERY 6 HOURS PRN    sennosides-docusate sodium (SENOKOT-S) 8.6-50 MG tablet 1 tablet, Oral, DAILY    sertraline (ZOLOFT) 150 mg, Oral, Nightly, At nights    Urea (CARMOL) 40 % cream Apply to hands and feet as needed.

## 2024-05-14 ENCOUNTER — OFFICE VISIT (OUTPATIENT)
Dept: SLEEP MEDICINE | Age: 59
End: 2024-05-14
Payer: COMMERCIAL

## 2024-05-14 VITALS
DIASTOLIC BLOOD PRESSURE: 86 MMHG | HEART RATE: 75 BPM | HEIGHT: 67 IN | SYSTOLIC BLOOD PRESSURE: 128 MMHG | OXYGEN SATURATION: 93 % | BODY MASS INDEX: 32.65 KG/M2 | WEIGHT: 208 LBS

## 2024-05-14 DIAGNOSIS — E66.9 OBESITY (BMI 30.0-34.9): ICD-10-CM

## 2024-05-14 DIAGNOSIS — G47.10 HYPERSOMNIA: ICD-10-CM

## 2024-05-14 DIAGNOSIS — G47.8 NON-RESTORATIVE SLEEP: ICD-10-CM

## 2024-05-14 DIAGNOSIS — R06.83 SNORING: ICD-10-CM

## 2024-05-14 DIAGNOSIS — G47.00 PERSISTENT DISORDER OF INITIATING OR MAINTAINING SLEEP: ICD-10-CM

## 2024-05-14 DIAGNOSIS — G47.33 OSA (OBSTRUCTIVE SLEEP APNEA): Primary | ICD-10-CM

## 2024-05-14 DIAGNOSIS — R06.81 WITNESSED APNEIC SPELLS: ICD-10-CM

## 2024-05-14 DIAGNOSIS — Z72.821 POOR SLEEP HYGIENE: ICD-10-CM

## 2024-05-14 PROBLEM — E66.811 OBESITY (BMI 30.0-34.9): Status: ACTIVE | Noted: 2023-01-16

## 2024-05-14 PROCEDURE — 99203 OFFICE O/P NEW LOW 30 MIN: CPT | Performed by: NURSE PRACTITIONER

## 2024-05-14 ASSESSMENT — SLEEP AND FATIGUE QUESTIONNAIRES
HOW LIKELY ARE YOU TO NOD OFF OR FALL ASLEEP WHILE SITTING AND TALKING TO SOMEONE: MODERATE CHANCE OF DOZING
HOW LIKELY ARE YOU TO NOD OFF OR FALL ASLEEP WHILE SITTING INACTIVE IN A PUBLIC PLACE: HIGH CHANCE OF DOZING
HOW LIKELY ARE YOU TO NOD OFF OR FALL ASLEEP WHILE LYING DOWN TO REST IN THE AFTERNOON WHEN CIRCUMSTANCES PERMIT: HIGH CHANCE OF DOZING
HOW LIKELY ARE YOU TO NOD OFF OR FALL ASLEEP WHILE SITTING QUIETLY AFTER LUNCH WITHOUT ALCOHOL: MODERATE CHANCE OF DOZING
HOW LIKELY ARE YOU TO NOD OFF OR FALL ASLEEP IN A CAR, WHILE STOPPED FOR A FEW MINUTES IN TRAFFIC: SLIGHT CHANCE OF DOZING
HOW LIKELY ARE YOU TO NOD OFF OR FALL ASLEEP WHILE SITTING AND READING: HIGH CHANCE OF DOZING
ESS TOTAL SCORE: 19
HOW LIKELY ARE YOU TO NOD OFF OR FALL ASLEEP WHILE WATCHING TV: HIGH CHANCE OF DOZING
HOW LIKELY ARE YOU TO NOD OFF OR FALL ASLEEP WHEN YOU ARE A PASSENGER IN A CAR FOR AN HOUR WITHOUT A BREAK: MODERATE CHANCE OF DOZING

## 2024-05-14 NOTE — PATIENT INSTRUCTIONS
Split-night sleep study ordered to be done urgently  Recommendations as above  Follow-up after sleep study or 3 months after starting CPAP or sooner if needed

## 2024-05-17 ENCOUNTER — HOSPITAL ENCOUNTER (OUTPATIENT)
Dept: CT IMAGING | Age: 59
Discharge: HOME | End: 2024-05-17
Attending: INTERNAL MEDICINE
Payer: COMMERCIAL

## 2024-05-17 DIAGNOSIS — C64.2 RENAL CELL CARCINOMA OF LEFT KIDNEY (HCC): ICD-10-CM

## 2024-05-17 PROCEDURE — 6360000002 HC RX W HCPCS: Performed by: INTERNAL MEDICINE

## 2024-05-17 PROCEDURE — 6360000004 HC RX CONTRAST MEDICATION: Performed by: INTERNAL MEDICINE

## 2024-05-17 PROCEDURE — 74177 CT ABD & PELVIS W/CONTRAST: CPT

## 2024-05-17 RX ORDER — HEPARIN 100 UNIT/ML
SYRINGE INTRAVENOUS PRN
Status: DISCONTINUED | OUTPATIENT
Start: 2024-05-17 | End: 2024-05-21 | Stop reason: HOSPADM

## 2024-05-17 RX ADMIN — DIATRIZOATE MEGLUMINE AND DIATRIZOATE SODIUM 15 ML: 660; 100 LIQUID ORAL; RECTAL at 08:48

## 2024-05-17 RX ADMIN — HEPARIN 500 UNITS: 100 SYRINGE at 09:08

## 2024-05-17 RX ADMIN — IOPAMIDOL 100 ML: 755 INJECTION, SOLUTION INTRAVENOUS at 08:48

## 2024-05-20 ENCOUNTER — E-VISIT (OUTPATIENT)
Dept: INTERNAL MEDICINE CLINIC | Facility: CLINIC | Age: 59
End: 2024-05-20
Payer: COMMERCIAL

## 2024-05-20 DIAGNOSIS — L03.119 CELLULITIS OF UPPER EXTREMITY, UNSPECIFIED LATERALITY: Primary | ICD-10-CM

## 2024-05-20 PROCEDURE — 99421 OL DIG E/M SVC 5-10 MIN: CPT | Performed by: INTERNAL MEDICINE

## 2024-05-20 RX ORDER — DOXYCYCLINE HYCLATE 100 MG
100 TABLET ORAL 2 TIMES DAILY
Qty: 14 TABLET | Refills: 0 | Status: SHIPPED | OUTPATIENT
Start: 2024-05-20 | End: 2024-05-27

## 2024-05-20 NOTE — PROGRESS NOTES
Mechelle Hall (1965) initiated an asynchronous digital communication through [employer].    HPI: per patient questionnaire     Exam: not applicable    Diagnoses and all orders for this visit:  Diagnoses and all orders for this visit:    Cellulitis of upper extremity, unspecified laterality    Other orders  -     doxycycline hyclate (VIBRA-TABS) 100 MG tablet; Take 1 tablet by mouth 2 times daily for 7 days  -     mupirocin (BACTROBAN) 2 % ointment; Apply topically 3 times daily.          Time: EV1 - 5-10 minutes were spent on the digital evaluation and management of this patient.    Dawna Jewell MD

## 2024-05-21 ENCOUNTER — PATIENT MESSAGE (OUTPATIENT)
Dept: SLEEP MEDICINE | Age: 59
End: 2024-05-21

## 2024-05-21 NOTE — TELEPHONE ENCOUNTER
LOOKED IN LAB R. IT STATES SHE WAS SENT A WELCOME TEXT   CALLED PT GAVE HER THE NUMBER TO [facility] LET HER KNOW TO GIVE THEM A CALL. SHE AGREED AND VOICED UNDERSTANDING.

## 2024-05-21 NOTE — TELEPHONE ENCOUNTER
From: Mechelle Hall  To: Tadeo Riggs  Sent: 5/21/2024 1:43 PM EDT  Subject: Sleep study    I still haven't heard from anyone on scheduling the sleep study

## 2024-05-23 ENCOUNTER — OFFICE VISIT (OUTPATIENT)
Dept: ONCOLOGY | Age: 59
End: 2024-05-23
Payer: COMMERCIAL

## 2024-05-23 ENCOUNTER — OFFICE VISIT (OUTPATIENT)
Dept: PALLATIVE CARE | Age: 59
End: 2024-05-23
Payer: COMMERCIAL

## 2024-05-23 ENCOUNTER — HOSPITAL ENCOUNTER (OUTPATIENT)
Dept: LAB | Age: 59
End: 2024-05-23
Payer: COMMERCIAL

## 2024-05-23 ENCOUNTER — TELEPHONE (OUTPATIENT)
Dept: INTERNAL MEDICINE CLINIC | Facility: CLINIC | Age: 59
End: 2024-05-23

## 2024-05-23 ENCOUNTER — HOSPITAL ENCOUNTER (OUTPATIENT)
Dept: INFUSION THERAPY | Age: 59
Setting detail: INFUSION SERIES
End: 2024-05-23
Payer: COMMERCIAL

## 2024-05-23 VITALS
DIASTOLIC BLOOD PRESSURE: 66 MMHG | OXYGEN SATURATION: 93 % | HEART RATE: 62 BPM | BODY MASS INDEX: 32.8 KG/M2 | HEIGHT: 67 IN | SYSTOLIC BLOOD PRESSURE: 104 MMHG | RESPIRATION RATE: 16 BRPM | TEMPERATURE: 97.5 F | WEIGHT: 209 LBS

## 2024-05-23 DIAGNOSIS — E86.0 DEHYDRATION: ICD-10-CM

## 2024-05-23 DIAGNOSIS — Z51.5 ENCOUNTER FOR PALLIATIVE CARE: ICD-10-CM

## 2024-05-23 DIAGNOSIS — R26.89 BALANCE PROBLEM: ICD-10-CM

## 2024-05-23 DIAGNOSIS — R19.7 DIARRHEA, UNSPECIFIED TYPE: ICD-10-CM

## 2024-05-23 DIAGNOSIS — C64.2 RENAL CELL CARCINOMA OF LEFT KIDNEY (HCC): ICD-10-CM

## 2024-05-23 DIAGNOSIS — Z79.899 HIGH RISK MEDICATION USE: ICD-10-CM

## 2024-05-23 DIAGNOSIS — C64.2 RENAL CELL CARCINOMA OF LEFT KIDNEY (HCC): Primary | ICD-10-CM

## 2024-05-23 DIAGNOSIS — G62.9 NEUROPATHY: ICD-10-CM

## 2024-05-23 DIAGNOSIS — R79.89 ELEVATED LFTS: ICD-10-CM

## 2024-05-23 DIAGNOSIS — R11.2 NAUSEA AND VOMITING, UNSPECIFIED VOMITING TYPE: ICD-10-CM

## 2024-05-23 DIAGNOSIS — E27.8 MASS OF RIGHT ADRENAL GLAND (HCC): ICD-10-CM

## 2024-05-23 DIAGNOSIS — R41.82 ALTERED MENTAL STATUS, UNSPECIFIED ALTERED MENTAL STATUS TYPE: ICD-10-CM

## 2024-05-23 DIAGNOSIS — G89.3 CANCER ASSOCIATED PAIN: Primary | ICD-10-CM

## 2024-05-23 DIAGNOSIS — E11.649 TYPE 2 DIABETES MELLITUS WITH HYPOGLYCEMIA WITHOUT COMA, WITHOUT LONG-TERM CURRENT USE OF INSULIN (HCC): ICD-10-CM

## 2024-05-23 LAB
ALBUMIN SERPL-MCNC: 3.7 G/DL (ref 3.5–5)
ALBUMIN/GLOB SERPL: 1.4 (ref 1–1.9)
ALP SERPL-CCNC: 106 U/L (ref 35–104)
ALT SERPL-CCNC: 68 U/L (ref 12–65)
ANION GAP SERPL CALC-SCNC: 8 MMOL/L (ref 9–18)
AST SERPL-CCNC: 41 U/L (ref 15–37)
BASOPHILS # BLD: 0 K/UL (ref 0–0.2)
BASOPHILS NFR BLD: 0 % (ref 0–2)
BILIRUB SERPL-MCNC: 0.4 MG/DL (ref 0–1.2)
BUN SERPL-MCNC: 7 MG/DL (ref 6–23)
CALCIUM SERPL-MCNC: 8.9 MG/DL (ref 8.8–10.2)
CHLORIDE SERPL-SCNC: 103 MMOL/L (ref 98–107)
CO2 SERPL-SCNC: 29 MMOL/L (ref 20–28)
CREAT SERPL-MCNC: 0.71 MG/DL (ref 0.6–1.1)
DIFFERENTIAL METHOD BLD: ABNORMAL
EOSINOPHIL # BLD: 0.2 K/UL (ref 0–0.8)
EOSINOPHIL NFR BLD: 2 % (ref 0.5–7.8)
ERYTHROCYTE [DISTWIDTH] IN BLOOD BY AUTOMATED COUNT: 21.2 % (ref 11.9–14.6)
GLOBULIN SER CALC-MCNC: 2.7 G/DL (ref 2.3–3.5)
GLUCOSE SERPL-MCNC: 88 MG/DL (ref 70–99)
HCT VFR BLD AUTO: 52 % (ref 35.8–46.3)
HGB BLD-MCNC: 17.7 G/DL (ref 11.7–15.4)
IMM GRANULOCYTES # BLD AUTO: 0 K/UL (ref 0–0.5)
IMM GRANULOCYTES NFR BLD AUTO: 0 % (ref 0–5)
LYMPHOCYTES # BLD: 4.4 K/UL (ref 0.5–4.6)
LYMPHOCYTES NFR BLD: 45 % (ref 13–44)
MCH RBC QN AUTO: 28.5 PG (ref 26.1–32.9)
MCHC RBC AUTO-ENTMCNC: 34 G/DL (ref 31.4–35)
MCV RBC AUTO: 83.6 FL (ref 82–102)
MONOCYTES # BLD: 0.5 K/UL (ref 0.1–1.3)
MONOCYTES NFR BLD: 5 % (ref 4–12)
NEUTS SEG # BLD: 4.7 K/UL (ref 1.7–8.2)
NEUTS SEG NFR BLD: 48 % (ref 43–78)
NRBC # BLD: 0 K/UL (ref 0–0.2)
PLATELET # BLD AUTO: 201 K/UL (ref 150–450)
PMV BLD AUTO: 10.5 FL (ref 9.4–12.3)
POTASSIUM SERPL-SCNC: 3.6 MMOL/L (ref 3.5–5.1)
PROT SERPL-MCNC: 6.4 G/DL (ref 6.3–8.2)
RBC # BLD AUTO: 6.22 M/UL (ref 4.05–5.2)
SODIUM SERPL-SCNC: 140 MMOL/L (ref 136–145)
WBC # BLD AUTO: 9.8 K/UL (ref 4.3–11.1)

## 2024-05-23 PROCEDURE — 99215 OFFICE O/P EST HI 40 MIN: CPT | Performed by: INTERNAL MEDICINE

## 2024-05-23 PROCEDURE — 2580000003 HC RX 258: Performed by: INTERNAL MEDICINE

## 2024-05-23 PROCEDURE — 80053 COMPREHEN METABOLIC PANEL: CPT

## 2024-05-23 PROCEDURE — 85025 COMPLETE CBC W/AUTO DIFF WBC: CPT

## 2024-05-23 PROCEDURE — 96360 HYDRATION IV INFUSION INIT: CPT

## 2024-05-23 PROCEDURE — 36415 COLL VENOUS BLD VENIPUNCTURE: CPT

## 2024-05-23 PROCEDURE — 99215 OFFICE O/P EST HI 40 MIN: CPT

## 2024-05-23 PROCEDURE — 3044F HG A1C LEVEL LT 7.0%: CPT | Performed by: INTERNAL MEDICINE

## 2024-05-23 RX ORDER — DRONABINOL 5 MG/1
5 CAPSULE ORAL
Qty: 90 CAPSULE | Refills: 2 | Status: SHIPPED | OUTPATIENT
Start: 2024-05-23 | End: 2024-08-21

## 2024-05-23 RX ORDER — SODIUM CHLORIDE 9 MG/ML
5-250 INJECTION, SOLUTION INTRAVENOUS PRN
OUTPATIENT
Start: 2024-05-23

## 2024-05-23 RX ORDER — SODIUM CHLORIDE 0.9 % (FLUSH) 0.9 %
5-40 SYRINGE (ML) INJECTION PRN
Status: ACTIVE | OUTPATIENT
Start: 2024-05-23 | End: 2024-05-24

## 2024-05-23 RX ORDER — 0.9 % SODIUM CHLORIDE 0.9 %
1000 INTRAVENOUS SOLUTION INTRAVENOUS ONCE
Status: CANCELLED
Start: 2024-05-23 | End: 2024-05-23

## 2024-05-23 RX ORDER — HEPARIN 100 UNIT/ML
500 SYRINGE INTRAVENOUS PRN
OUTPATIENT
Start: 2024-05-23

## 2024-05-23 RX ORDER — GABAPENTIN 300 MG/1
300 CAPSULE ORAL 3 TIMES DAILY
Qty: 270 CAPSULE | Refills: 1 | Status: SHIPPED | OUTPATIENT
Start: 2024-05-23 | End: 2024-11-19

## 2024-05-23 RX ORDER — SODIUM CHLORIDE 0.9 % (FLUSH) 0.9 %
5-40 SYRINGE (ML) INJECTION PRN
Status: CANCELLED | OUTPATIENT
Start: 2024-05-23

## 2024-05-23 RX ORDER — SODIUM CHLORIDE 0.9 % (FLUSH) 0.9 %
5-40 SYRINGE (ML) INJECTION PRN
OUTPATIENT
Start: 2024-05-23

## 2024-05-23 RX ORDER — 0.9 % SODIUM CHLORIDE 0.9 %
1000 INTRAVENOUS SOLUTION INTRAVENOUS ONCE
Status: COMPLETED | OUTPATIENT
Start: 2024-05-23 | End: 2024-05-23

## 2024-05-23 RX ORDER — OXYCODONE HYDROCHLORIDE 15 MG/1
1 TABLET, FILM COATED, EXTENDED RELEASE ORAL EVERY 12 HOURS
Qty: 60 TABLET | Refills: 0 | Status: SHIPPED | OUTPATIENT
Start: 2024-05-29 | End: 2024-06-28

## 2024-05-23 RX ORDER — SODIUM CHLORIDE 9 MG/ML
5-250 INJECTION, SOLUTION INTRAVENOUS PRN
Status: CANCELLED | OUTPATIENT
Start: 2024-05-23

## 2024-05-23 RX ORDER — HEPARIN 100 UNIT/ML
500 SYRINGE INTRAVENOUS PRN
Status: CANCELLED | OUTPATIENT
Start: 2024-05-23

## 2024-05-23 RX ADMIN — SODIUM CHLORIDE, PRESERVATIVE FREE 10 ML: 5 INJECTION INTRAVENOUS at 10:35

## 2024-05-23 RX ADMIN — SODIUM CHLORIDE 1000 ML: 9 INJECTION, SOLUTION INTRAVENOUS at 10:38

## 2024-05-23 ASSESSMENT — PATIENT HEALTH QUESTIONNAIRE - PHQ9
3. TROUBLE FALLING OR STAYING ASLEEP: NEARLY EVERY DAY
6. FEELING BAD ABOUT YOURSELF - OR THAT YOU ARE A FAILURE OR HAVE LET YOURSELF OR YOUR FAMILY DOWN: SEVERAL DAYS
10. IF YOU CHECKED OFF ANY PROBLEMS, HOW DIFFICULT HAVE THESE PROBLEMS MADE IT FOR YOU TO DO YOUR WORK, TAKE CARE OF THINGS AT HOME, OR GET ALONG WITH OTHER PEOPLE: EXTREMELY DIFFICULT
SUM OF ALL RESPONSES TO PHQ QUESTIONS 1-9: 14
9. THOUGHTS THAT YOU WOULD BE BETTER OFF DEAD, OR OF HURTING YOURSELF: NOT AT ALL
SUM OF ALL RESPONSES TO PHQ QUESTIONS 1-9: 14
8. MOVING OR SPEAKING SO SLOWLY THAT OTHER PEOPLE COULD HAVE NOTICED. OR THE OPPOSITE, BEING SO FIGETY OR RESTLESS THAT YOU HAVE BEEN MOVING AROUND A LOT MORE THAN USUAL: NOT AT ALL
4. FEELING TIRED OR HAVING LITTLE ENERGY: NEARLY EVERY DAY
SUM OF ALL RESPONSES TO PHQ9 QUESTIONS 1 & 2: 3
2. FEELING DOWN, DEPRESSED OR HOPELESS: NOT AT ALL
1. LITTLE INTEREST OR PLEASURE IN DOING THINGS: NEARLY EVERY DAY
5. POOR APPETITE OR OVEREATING: NEARLY EVERY DAY
7. TROUBLE CONCENTRATING ON THINGS, SUCH AS READING THE NEWSPAPER OR WATCHING TELEVISION: SEVERAL DAYS

## 2024-05-23 ASSESSMENT — ENCOUNTER SYMPTOMS
NAUSEA: 1
DIARRHEA: 1
BACK PAIN: 1
ABDOMINAL PAIN: 1

## 2024-05-23 NOTE — PROGRESS NOTES
Arrived to the Infusion Center.  IVF completed. Patient tolerated without complication.   Any issues or concerns during appointment: No. Patient discharged with hat and specimen cup to collect stool sample.  Patient aware of next lab and [facility] office visit on 06/20/24 (date) at 0810 (time).  Patient instructed to call provider with temperature of 100.4 or greater or nausea/vomiting/ diarrhea or pain not controlled by medications  Discharged ambulatory.

## 2024-05-23 NOTE — PROGRESS NOTES
Outpatient Palliative Care at the  [facility]: Office Visit Established Patient    Diagnosis: Metastatic renal cell carcinoma     Treatment Plan: axitinib/pebrolizumab -> lenvatinib/everolimus -> cabozantinib     Treatment Intent: Palliative     Medical Oncologist: Dr. Lopez, Dr. Mccloud @ [facility]     Radiation Oncologist: None     Navigator: None      Chief Complaint:    Chief Complaint   Patient presents with    Follow-up       History of Present Illness:  Ms. Hall is a 58 y.o. female who presents today for evaluation regarding establishing care with palliative care. 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  Despite attending physical therapy since the accident, she continued to experience the same low back pain.  She was seen at [facility] [place] on 10/4/22.  MRI of the lumbar spine on 10/15/22 showed mild degenerative disc disease at L3-4 through L5-S1 and a 5.7 x 6.1 x 7 cm lower pole solid left renal mass. Urgent referral was placed to [facility], who referred the patient to [facility] for uro/onc evaluation and treatment of renal mass.  CT AP on 10/26/22 showed bilateral enhancing renal masses, concerning for renal cell carcinoma Also noted was an enhancing 1.5 cm right adrenal nodule, concerning for a metastatic nodule.  S/p left renal biopsy on 11/2022, clear cell RCC.  She was started on pembrolizumab and axitinib - could not c/w immunotherapy due to LFT abnormalities. Started lenvatinib/everolimus. She had left partial nephrectomy at [facility] on 12/8/23.  Plan for right nephrectomy pending.   Her PMHx includes anxiety, tobacco abuse, cervical dysplasia, DDD, sleep apnea and lap liv. She is employed at [employer] as a CNA. She lives at home with her son, who she states has drug addiction issues.     Interval History:  Pt seen today in follow-up prior to her office

## 2024-05-23 NOTE — PATIENT INSTRUCTIONS
Patient Information from Today's Visit    The members of your [facility] are listed below:    Physician Provider: Anne Lopez, Medical Oncologist  Advanced Practice Clinician: Charla Anglin NP  Registered Nurse: Mirtha FREDERICK RN  Navigator: N/A  Medical Assistant: Raven BLEVINS MA  : Melony DOUGLAS   Supportive Care Services: Mariela TELLO LMSW    Diagnosis: renal cell carcinoma      Follow Up Instructions:   - scan reviewed. Since the scan looks good and you are having all of this diarrhea, we will decrease your cabozantinib. The new dosage should be delivered to you soon. Let us know if you haven't heard from the pharmacy within a few days  - stool culture for the diarrhea  - brain MRI due to balance issues, altered mental status       Treatment Summary has been discussed and given to patient:No      Current Labs:   Hospital Outpatient Visit on 05/23/2024   Component Date Value Ref Range Status    Sodium 05/23/2024 140  136 - 145 mmol/L Final    Potassium 05/23/2024 3.6  3.5 - 5.1 mmol/L Final    Chloride 05/23/2024 103  98 - 107 mmol/L Final    CO2 05/23/2024 29 (H)  20 - 28 mmol/L Final    Anion Gap 05/23/2024 8 (L)  9 - 18 mmol/L Final    Glucose 05/23/2024 88  70 - 99 mg/dL Final    Comment: <70 mg/dL Consistent with, but not fully diagnostic of hypoglycemia.  100 - 125 mg/dL Impaired fasting glucose/consistent with pre-diabetes mellitus.  > 126 mg/dl Fasting glucose consistent with overt diabetes mellitus      BUN 05/23/2024 7  6 - 23 MG/DL Final    Creatinine 05/23/2024 0.71  0.60 - 1.10 MG/DL Final    Est, Glom Filt Rate 05/23/2024 >90  >60 ml/min/1.73m2 Final    Comment:    Pediatric calculator link: https://www.kidney.org/professionals/kdoqi/gfr_calculatorped     These results are not intended for use in patients <18 years of age.     eGFR results are calculated without a race factor using  the 2021 CKD-EPI equation. Careful clinical correlation is recommended, particularly when

## 2024-05-23 NOTE — TELEPHONE ENCOUNTER
----- Message from Anne Lopez MD sent at 5/23/2024  9:03 AM EDT -----  GM Dr Jewell,   [place] pt, saw her today for FU.  Lowering her dose of cabozantinib due to diarrhea.  She noted episodes of hypoglycemia (50s) faisal late at night.  She said she didn't get a chance to review this with you at last apt and was asking if she can come in sooner for any adjustments.      TY   Hope you're well   AG

## 2024-05-23 NOTE — PROGRESS NOTES
Oral Chemotherapy Adherence:     Current Regimen:  Drug Name: cabozantinib  Dose: 60 mg (changing to 40 mg)  Frequency: daily    Barriers to care identified including (financial, physical, psychosocial) : No    Missed doses reported: No    Patient verbalizes understanding of what to do in the event of a missed dose: Yes    Adverse reactions/toxicities reported: diarrhea, decreasing dose to  40 mg daily           
Reported on 4/25/2024) 3 each 11    blood glucose test strips (FREESTYLE TEST STRIPS) strip 1 each by In Vitro route daily As needed. (Patient not taking: Reported on 4/25/2024) 100 each 3     Current Facility-Administered Medications   Medication Dose Route Frequency Provider Last Rate Last Admin    sodium chloride flush 0.9 % injection 10 mL  10 mL IntraVENous BID Anne Lopez MD   10 mL at 05/23/24 1333     OBJECTIVE:  /66   Pulse 62   Temp 97.5 °F (36.4 °C)   Resp 16   Ht 1.689 m (5' 6.5\")   Wt 94.8 kg (209 lb)   LMP  (LMP Unknown)   SpO2 93%   BMI 33.23 kg/m²     ECOG PERFORMANCE STATUS - 0-Fully active, able to carry on all pre-disease performance without restriction.  Pain - 10 - Worst pain ever/10. None/Minimal pain - not affecting QOL  seeing PC for symptom management     Physical Exam  Vitals reviewed. Exam conducted with a chaperone present.   Constitutional:       General: She is not in acute distress.     Appearance: Normal appearance. She is not ill-appearing or toxic-appearing.   HENT:      Head: Normocephalic and atraumatic.      Nose: Nose normal. No congestion.      Mouth/Throat:      Mouth: Mucous membranes are moist.   Eyes:      General: No scleral icterus.     Extraocular Movements: Extraocular movements intact.      Conjunctiva/sclera: Conjunctivae normal.      Pupils: Pupils are equal, round, and reactive to light.   Cardiovascular:      Rate and Rhythm: Normal rate and regular rhythm.      Heart sounds: No murmur heard.  Pulmonary:      Effort: Pulmonary effort is normal. No respiratory distress.   Abdominal:      General: There is no distension.      Palpations: Abdomen is soft. There is no mass.      Tenderness: There is no abdominal tenderness. There is no guarding or rebound.   Musculoskeletal:         General: Normal range of motion.      Cervical back: Normal range of motion.      Right lower leg: No edema.      Left lower leg: No edema.   Lymphadenopathy:

## 2024-05-26 LAB
ADV 40+41 DNA STL QL NAA+NON-PROBE: NORMAL
ASTRO TYP 1-8 RNA STL QL NAA+NON-PROBE: NORMAL
C CAYETANENSIS DNA STL QL NAA+NON-PROBE: NORMAL
C COLI+JEJ+UPSA DNA STL QL NAA+NON-PROBE: NORMAL
C DIF TOX TCDA+TCDB STL QL NAA+NON-PROBE: NORMAL
CRYPTOSP DNA STL QL NAA+NON-PROBE: NORMAL
E COLI O157 DNA STL QL NAA+NON-PROBE: NORMAL
E HISTOLYT DNA STL QL NAA+NON-PROBE: NORMAL
EAEC PAA PLAS AGGR+AATA ST NAA+NON-PRB: NORMAL
EC STX1+STX2 GENES STL QL NAA+NON-PROBE: NORMAL
EPEC EAE GENE STL QL NAA+NON-PROBE: NORMAL
ETEC LTA+ST1A+ST1B TOX ST NAA+NON-PROBE: NORMAL
G LAMBLIA DNA STL QL NAA+NON-PROBE: NORMAL
NOROVIRUS GI+II RNA STL QL NAA+NON-PROBE: NORMAL
P SHIGELLOIDES DNA STL QL NAA+NON-PROBE: NORMAL
RVA RNA STL QL NAA+NON-PROBE: NORMAL
S ENT+BONG DNA STL QL NAA+NON-PROBE: NORMAL
SAPO I+II+IV+V RNA STL QL NAA+NON-PROBE: NORMAL
SHIGELLA SP+EIEC IPAH ST NAA+NON-PROBE: NORMAL
SPECIMEN SOURCE: NORMAL
V CHOL+PARA+VUL DNA STL QL NAA+NON-PROBE: NORMAL
V CHOLERAE DNA STL QL NAA+NON-PROBE: NORMAL
Y ENTEROCOL DNA STL QL NAA+NON-PROBE: NORMAL

## 2024-05-28 DIAGNOSIS — R19.7 DIARRHEA, UNSPECIFIED TYPE: Primary | ICD-10-CM

## 2024-05-31 DIAGNOSIS — Z51.5 ENCOUNTER FOR PALLIATIVE CARE: ICD-10-CM

## 2024-05-31 DIAGNOSIS — F41.9 ANXIETY AND DEPRESSION: ICD-10-CM

## 2024-05-31 DIAGNOSIS — F32.A ANXIETY AND DEPRESSION: ICD-10-CM

## 2024-05-31 RX ORDER — BUSPIRONE HYDROCHLORIDE 10 MG/1
10 TABLET ORAL 2 TIMES DAILY
Qty: 60 TABLET | Refills: 0 | Status: SHIPPED | OUTPATIENT
Start: 2024-05-31

## 2024-06-01 PROBLEM — R41.82 ALTERED MENTAL STATUS: Status: ACTIVE | Noted: 2024-06-01

## 2024-06-01 PROBLEM — R26.89 BALANCE PROBLEM: Status: ACTIVE | Noted: 2024-06-01

## 2024-06-03 ENCOUNTER — OFFICE VISIT (OUTPATIENT)
Dept: INTERNAL MEDICINE CLINIC | Facility: CLINIC | Age: 59
End: 2024-06-03
Payer: COMMERCIAL

## 2024-06-03 VITALS
WEIGHT: 210.4 LBS | SYSTOLIC BLOOD PRESSURE: 129 MMHG | OXYGEN SATURATION: 94 % | RESPIRATION RATE: 20 BRPM | BODY MASS INDEX: 33.02 KG/M2 | DIASTOLIC BLOOD PRESSURE: 84 MMHG | HEIGHT: 67 IN | HEART RATE: 78 BPM | TEMPERATURE: 97.8 F

## 2024-06-03 DIAGNOSIS — C64.2 RENAL CELL CARCINOMA OF LEFT KIDNEY (HCC): ICD-10-CM

## 2024-06-03 DIAGNOSIS — M79.605 PAIN IN BOTH LOWER EXTREMITIES: Primary | ICD-10-CM

## 2024-06-03 DIAGNOSIS — E11.649 HYPOGLYCEMIA ASSOCIATED WITH TYPE 2 DIABETES MELLITUS (HCC): ICD-10-CM

## 2024-06-03 DIAGNOSIS — M79.604 PAIN IN BOTH LOWER EXTREMITIES: Primary | ICD-10-CM

## 2024-06-03 LAB
ANION GAP SERPL CALC-SCNC: 8 MMOL/L (ref 9–18)
BUN SERPL-MCNC: 6 MG/DL (ref 6–23)
CALCIUM SERPL-MCNC: 9.2 MG/DL (ref 8.8–10.2)
CHLORIDE SERPL-SCNC: 104 MMOL/L (ref 98–107)
CO2 SERPL-SCNC: 29 MMOL/L (ref 20–28)
CREAT SERPL-MCNC: 0.65 MG/DL (ref 0.6–1.1)
D DIMER PPP FEU-MCNC: 1.04 UG/ML(FEU)
GLUCOSE SERPL-MCNC: 76 MG/DL (ref 70–99)
MAGNESIUM SERPL-MCNC: 2.1 MG/DL (ref 1.8–2.4)
POTASSIUM SERPL-SCNC: 3.9 MMOL/L (ref 3.5–5.1)
SODIUM SERPL-SCNC: 141 MMOL/L (ref 136–145)

## 2024-06-03 PROCEDURE — 99214 OFFICE O/P EST MOD 30 MIN: CPT | Performed by: NURSE PRACTITIONER

## 2024-06-03 PROCEDURE — 3044F HG A1C LEVEL LT 7.0%: CPT | Performed by: NURSE PRACTITIONER

## 2024-06-03 SDOH — ECONOMIC STABILITY: FOOD INSECURITY: WITHIN THE PAST 12 MONTHS, YOU WORRIED THAT YOUR FOOD WOULD RUN OUT BEFORE YOU GOT MONEY TO BUY MORE.: SOMETIMES TRUE

## 2024-06-03 SDOH — ECONOMIC STABILITY: INCOME INSECURITY: HOW HARD IS IT FOR YOU TO PAY FOR THE VERY BASICS LIKE FOOD, HOUSING, MEDICAL CARE, AND HEATING?: NOT VERY HARD

## 2024-06-03 SDOH — ECONOMIC STABILITY: FOOD INSECURITY: WITHIN THE PAST 12 MONTHS, THE FOOD YOU BOUGHT JUST DIDN'T LAST AND YOU DIDN'T HAVE MONEY TO GET MORE.: SOMETIMES TRUE

## 2024-06-03 ASSESSMENT — ENCOUNTER SYMPTOMS
VOMITING: 0
DIARRHEA: 1
BACK PAIN: 0
NAUSEA: 0

## 2024-06-03 NOTE — PROGRESS NOTES
6/3/2024 2:55 PM  Location:[facility] PHYSICIAN SERVICES  [facility] INTERNAL MEDICINE  [place]  Patient #:  [id number]  YOB: 1965          YOUR LAST HEMOGLOBIN A1CS:   No results found for: \"HBA1C\", \"[id number]\"    YOUR LAST LIPID PROFILE:   Lab Results   Component Value Date/Time    CHOL 185 10/25/2022 02:58 PM    HDL 38 10/25/2022 02:58 PM    .2 10/25/2022 02:58 PM    VLDL 45.8 10/25/2022 02:58 PM         Lab Results   Component Value Date/Time    BUN 7 05/23/2024 07:29 AM     05/23/2024 07:29 AM    K 3.6 05/23/2024 07:29 AM     05/23/2024 07:29 AM    CO2 29 05/23/2024 07:29 AM           History of Present Illness     Chief Complaint   Patient presents with    Diabetes     Patient presents in office today to follow up diabetes       Ms. Hall is a 58 y.o. female  who presents for the above mentioned complaints.  Ms. Hall presents with lower blood sugars and feet burning and leg pain. Was started on Gabapentin 2 weeks ago, is helping.   Reports that she is taking an oral chemo pill for renal cell cancer, recently was decreased due to diarrhea. Continues diarrhea, which has not changed.   Reports BGLs have been in the 40-50 range.   Last A1c was 6.2 in April.   She was changed from metformin to Jardiance late last year due to diarrhea as well.  Admits to decreased appetite, eats maybe once daily.   Wearing a CGM.   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  Is thinking about short-term disability soon.           Allergies   Allergen Reactions    Latex Hives, Itching, Shortness Of Breath and Other (See Comments)    Bee Venom Shortness Of Breath and Hives    Penicillins Anaphylaxis    Sulfa Antibiotics Hives, Itching and Rash    Wasp Venom Protein Hives and Shortness Of Breath    Penicillin G     Sulfamethoxazole-Trimethoprim Hives    Wellbutrin [Bupropion] Other (See Comments)     suicidal     Past Medical History:   Diagnosis Date    Anxiety 2000    Cancer

## 2024-06-04 ENCOUNTER — TELEPHONE (OUTPATIENT)
Dept: INTERNAL MEDICINE CLINIC | Facility: CLINIC | Age: 59
End: 2024-06-04

## 2024-06-04 ENCOUNTER — HOSPITAL ENCOUNTER (OUTPATIENT)
Dept: ULTRASOUND IMAGING | Age: 59
Discharge: HOME | End: 2024-06-07
Payer: COMMERCIAL

## 2024-06-04 DIAGNOSIS — M79.604 PAIN IN BOTH LOWER EXTREMITIES: ICD-10-CM

## 2024-06-04 DIAGNOSIS — R79.89 ELEVATED D-DIMER: ICD-10-CM

## 2024-06-04 DIAGNOSIS — M79.605 PAIN IN BOTH LOWER EXTREMITIES: ICD-10-CM

## 2024-06-04 DIAGNOSIS — M79.604 PAIN IN BOTH LOWER EXTREMITIES: Primary | ICD-10-CM

## 2024-06-04 DIAGNOSIS — M79.605 PAIN IN BOTH LOWER EXTREMITIES: Primary | ICD-10-CM

## 2024-06-04 PROCEDURE — 93970 EXTREMITY STUDY: CPT

## 2024-06-04 NOTE — TELEPHONE ENCOUNTER
Pt informed.  Spoke with scheduling, and they are able to do it at 3:30 PM, arrival time 3:00 PM today, at [address].      Called patient back to inform her, at which she agreed to go at that time.

## 2024-06-04 NOTE — TELEPHONE ENCOUNTER
Ms. Hall-  The labs show no electrolyte abnormality. However, the d-dimer test is elevated. I have ordered ultrasounds of your legs to rule out blood clots to be done urgently.   I will reach out once I get these results.  Katelynn    Bilateral venous duplex lower legs ordered to be done urgently. Thanks for arranging!

## 2024-06-05 ENCOUNTER — HOSPITAL ENCOUNTER (OUTPATIENT)
Dept: SLEEP CENTER | Age: 59
Discharge: HOME | End: 2024-06-08
Payer: COMMERCIAL

## 2024-06-05 DIAGNOSIS — C64.2 RENAL CELL CARCINOMA OF LEFT KIDNEY (HCC): Primary | ICD-10-CM

## 2024-06-05 PROCEDURE — 95810 POLYSOM 6/> YRS 4/> PARAM: CPT

## 2024-06-07 ENCOUNTER — CLINICAL DOCUMENTATION (OUTPATIENT)
Dept: ONCOLOGY | Age: 59
End: 2024-06-07

## 2024-06-07 NOTE — PROGRESS NOTES
Fax received from [facility] in [place] requesting completion of a PA through [employer] for Dronabinol 5mg capsules.  Attempted to complete the PA through the [employer] portal; however, I received a message that the request has been closed.  Call placed to [facility] at #[phone].  Spoke with Lesley.  She stated that a PA is still needed for the medication. I informed her that I would submit a request through [employer] since the [employer] PA request is listed as being closed.    Prior authorization request for Dronabinol 5mg capules entered into [employer] under Key Code [id number].  Patient demographics and clinical information submitted through the portal and sent to [facility] for medical review.  The most recent palliative care office visit note dated 5/23/24 was uploaded to the portal for medical review.  Pending PA Case ID [id number].    Approval letter received and indexed under the media tab in Epic.    Name of medication: Dronabinol 5mg capsules  Quantity Approved: 90 capsules / 30 day supply  Validity Dates: 6/7/24 - 12/7/24  Authorization #[id number]

## 2024-06-10 ENCOUNTER — TELEPHONE (OUTPATIENT)
Dept: ENT CLINIC | Age: 59
End: 2024-06-10

## 2024-06-10 NOTE — TELEPHONE ENCOUNTER
LM with patient to schedule an appt to be seen with Kami.  Patient is having drainage from incision site.

## 2024-06-12 ENCOUNTER — TELEPHONE (OUTPATIENT)
Dept: SLEEP MEDICINE | Age: 59
End: 2024-06-12

## 2024-06-12 DIAGNOSIS — G47.34 NOCTURNAL HYPOXEMIA: ICD-10-CM

## 2024-06-12 DIAGNOSIS — G47.33 OSA (OBSTRUCTIVE SLEEP APNEA): Primary | ICD-10-CM

## 2024-06-12 NOTE — TELEPHONE ENCOUNTER
Called the patient and reviewed her sleep study results.  She has moderate sleep apnea with an AHI of 23.9 and lowest oxygen saturation of 80%.  Her baseline oxygen saturation was 88% and 1 L of oxygen had to be added during the night.  She did spend 216.5 minutes of the study with oxygen saturations less than 89%.  She does report that she was recently hospitalized with pneumonia and they had problems with her oxygen saturations during that hospitalization as well.  She is scheduled to see pulmonology and does have a complete PFT ordered.  She is in agreement with having a CPAP titration done urgently with oxygen added if needed.  Will order the titration study today.    Orders Placed This Encounter    Ambulatory Referral to Sleep Studies     Referral Priority:   Urgent     Referral Type:   Consult for Advice and Opinion     Referral Reason:   Specialty Services Required     Number of Visits Requested:   1

## 2024-06-17 ENCOUNTER — OFFICE VISIT (OUTPATIENT)
Dept: ENT CLINIC | Age: 59
End: 2024-06-17
Payer: COMMERCIAL

## 2024-06-17 VITALS
BODY MASS INDEX: 33.46 KG/M2 | DIASTOLIC BLOOD PRESSURE: 80 MMHG | SYSTOLIC BLOOD PRESSURE: 138 MMHG | HEIGHT: 66 IN | WEIGHT: 208.2 LBS

## 2024-06-17 DIAGNOSIS — D49.0 NEOPLASM OF SUBMANDIBULAR GLAND: Primary | ICD-10-CM

## 2024-06-17 PROCEDURE — 99213 OFFICE O/P EST LOW 20 MIN: CPT | Performed by: OTOLARYNGOLOGY

## 2024-06-17 RX ORDER — CLINDAMYCIN HYDROCHLORIDE 300 MG/1
300 CAPSULE ORAL 3 TIMES DAILY
Qty: 21 CAPSULE | Refills: 0 | Status: SHIPPED | OUTPATIENT
Start: 2024-06-17 | End: 2024-06-24

## 2024-06-17 ASSESSMENT — ENCOUNTER SYMPTOMS
RESPIRATORY NEGATIVE: 1
EYES NEGATIVE: 1
GASTROINTESTINAL NEGATIVE: 1
ALLERGIC/IMMUNOLOGIC NEGATIVE: 1

## 2024-06-17 NOTE — PROGRESS NOTES
mouth daily    EPINEPHrine (EPIPEN 2-MARY) 0.3 MG/0.3ML SOAJ injection Inject 0.3 mLs into the muscle as needed (allergic reaction) Use as directed for allergic reaction    Continuous Blood Gluc Sensor (DEXCOM G7 SENSOR) MISC Use daily. Change sensor every 10 days    blood glucose test strips (FREESTYLE TEST STRIPS) strip 1 each by In Vitro route daily As needed.    cetirizine (ZYRTEC) 10 MG tablet Take 1 tablet by mouth at bedtime    albuterol sulfate HFA (PROAIR HFA) 108 (90 Base) MCG/ACT inhaler Inhale 2 puffs into the lungs every 6 hours as needed for Wheezing    Acetaminophen (TYLENOL) 325 MG CAPS every 6 hours as needed As needed    clonazePAM (KLONOPIN) 1 MG tablet Take 1 tablet by mouth 2 times daily as needed for Anxiety for up to 90 days. Max Daily Amount: 2 mg     Current Facility-Administered Medications   Medication Dose Route Frequency    sodium chloride flush 0.9 % injection 10 mL  10 mL IntraVENous BID     Past Medical History:   Diagnosis Date    Anxiety 2000    Cancer (HCC)     [place] [facility] [place]    Diabetes mellitus (HCC)     Headache 1999    Hx of blood clots     treated with Eliquis x 1 year discontinued in 01/2024    Hypertension     Neoplasm of submandibular gland     Sleep apnea 2002    no c pap     Social History     Tobacco Use    Smoking status: Every Day     Current packs/day: 1.50     Average packs/day: 1.5 packs/day for 45.5 years (68.2 ttl pk-yrs)     Types: Cigarettes     Start date: 1979     Passive exposure: Past    Smokeless tobacco: Never    Tobacco comments:     Smoking since I was 14   Substance Use Topics    Alcohol use: Not Currently     Past Surgical History:   Procedure Laterality Date    CHOLECYSTECTOMY  2003    CT NEEDLE BIOPSY LUNG PERCUTANEOUS  09/28/2023    CT NEEDLE BIOPSY LUNG PERCUTANEOUS 9/28/2023 [facility] CT SCAN    IR PORT PLACEMENT EQUAL OR GREATER THAN 5 YEARS  11/30/2022    IR PORT PLACEMENT EQUAL OR GREATER THAN 5 YEARS

## 2024-06-20 ENCOUNTER — HOSPITAL ENCOUNTER (OUTPATIENT)
Dept: LAB | Age: 59
Discharge: HOME | End: 2024-06-20
Payer: COMMERCIAL

## 2024-06-20 ENCOUNTER — HOSPITAL ENCOUNTER (OUTPATIENT)
Dept: INFUSION THERAPY | Age: 59
Setting detail: INFUSION SERIES
Discharge: HOME | End: 2024-06-20
Payer: COMMERCIAL

## 2024-06-20 ENCOUNTER — OFFICE VISIT (OUTPATIENT)
Dept: ONCOLOGY | Age: 59
End: 2024-06-20
Payer: COMMERCIAL

## 2024-06-20 ENCOUNTER — OFFICE VISIT (OUTPATIENT)
Dept: PALLATIVE CARE | Age: 59
End: 2024-06-20
Payer: COMMERCIAL

## 2024-06-20 VITALS
WEIGHT: 203.9 LBS | HEIGHT: 67 IN | BODY MASS INDEX: 32 KG/M2 | RESPIRATION RATE: 20 BRPM | TEMPERATURE: 97.6 F | SYSTOLIC BLOOD PRESSURE: 94 MMHG | OXYGEN SATURATION: 94 % | HEART RATE: 69 BPM | DIASTOLIC BLOOD PRESSURE: 72 MMHG

## 2024-06-20 DIAGNOSIS — R19.7 DIARRHEA, UNSPECIFIED TYPE: ICD-10-CM

## 2024-06-20 DIAGNOSIS — G89.3 CANCER ASSOCIATED PAIN: Primary | ICD-10-CM

## 2024-06-20 DIAGNOSIS — C64.2 RENAL CELL CARCINOMA OF LEFT KIDNEY (HCC): Primary | ICD-10-CM

## 2024-06-20 DIAGNOSIS — F32.0 CURRENT MILD EPISODE OF MAJOR DEPRESSIVE DISORDER, UNSPECIFIED WHETHER RECURRENT (HCC): ICD-10-CM

## 2024-06-20 DIAGNOSIS — R11.2 NAUSEA AND VOMITING, UNSPECIFIED VOMITING TYPE: ICD-10-CM

## 2024-06-20 DIAGNOSIS — Z79.899 HIGH RISK MEDICATION USE: ICD-10-CM

## 2024-06-20 DIAGNOSIS — R79.89 ELEVATED LFTS: ICD-10-CM

## 2024-06-20 DIAGNOSIS — C64.2 RENAL CELL CARCINOMA OF LEFT KIDNEY (HCC): ICD-10-CM

## 2024-06-20 DIAGNOSIS — F41.9 ANXIETY AND DEPRESSION: ICD-10-CM

## 2024-06-20 DIAGNOSIS — E86.0 DEHYDRATION: ICD-10-CM

## 2024-06-20 DIAGNOSIS — Z51.5 ENCOUNTER FOR PALLIATIVE CARE: ICD-10-CM

## 2024-06-20 DIAGNOSIS — F32.A ANXIETY AND DEPRESSION: ICD-10-CM

## 2024-06-20 LAB
ALBUMIN SERPL-MCNC: 3.8 G/DL (ref 3.5–5)
ALBUMIN/GLOB SERPL: 1.5 (ref 1–1.9)
ALP SERPL-CCNC: 94 U/L (ref 35–104)
ALT SERPL-CCNC: 54 U/L (ref 12–65)
ANION GAP SERPL CALC-SCNC: 11 MMOL/L (ref 9–18)
AST SERPL-CCNC: 43 U/L (ref 15–37)
BASOPHILS # BLD: 0 K/UL (ref 0–0.2)
BASOPHILS NFR BLD: 0 % (ref 0–2)
BILIRUB SERPL-MCNC: 0.7 MG/DL (ref 0–1.2)
BUN SERPL-MCNC: 8 MG/DL (ref 6–23)
CALCIUM SERPL-MCNC: 9 MG/DL (ref 8.8–10.2)
CHLORIDE SERPL-SCNC: 104 MMOL/L (ref 98–107)
CO2 SERPL-SCNC: 25 MMOL/L (ref 20–28)
CREAT SERPL-MCNC: 0.65 MG/DL (ref 0.6–1.1)
DIFFERENTIAL METHOD BLD: ABNORMAL
EOSINOPHIL # BLD: 0.2 K/UL (ref 0–0.8)
EOSINOPHIL NFR BLD: 2 % (ref 0.5–7.8)
ERYTHROCYTE [DISTWIDTH] IN BLOOD BY AUTOMATED COUNT: 19.3 % (ref 11.9–14.6)
GLOBULIN SER CALC-MCNC: 2.6 G/DL (ref 2.3–3.5)
GLUCOSE SERPL-MCNC: 106 MG/DL (ref 70–99)
HCT VFR BLD AUTO: 50.8 % (ref 35.8–46.3)
HGB BLD-MCNC: 16.4 G/DL (ref 11.7–15.4)
IMM GRANULOCYTES # BLD AUTO: 0.1 K/UL (ref 0–0.5)
IMM GRANULOCYTES NFR BLD AUTO: 1 % (ref 0–5)
LYMPHOCYTES # BLD: 3.2 K/UL (ref 0.5–4.6)
LYMPHOCYTES NFR BLD: 41 % (ref 13–44)
MAGNESIUM SERPL-MCNC: 1.9 MG/DL (ref 1.8–2.4)
MCH RBC QN AUTO: 28.4 PG (ref 26.1–32.9)
MCHC RBC AUTO-ENTMCNC: 32.3 G/DL (ref 31.4–35)
MCV RBC AUTO: 88 FL (ref 82–102)
MONOCYTES # BLD: 0.5 K/UL (ref 0.1–1.3)
MONOCYTES NFR BLD: 6 % (ref 4–12)
NEUTS SEG # BLD: 3.9 K/UL (ref 1.7–8.2)
NEUTS SEG NFR BLD: 50 % (ref 43–78)
NRBC # BLD: 0 K/UL (ref 0–0.2)
PLATELET # BLD AUTO: 177 K/UL (ref 150–450)
PMV BLD AUTO: 10.1 FL (ref 9.4–12.3)
POTASSIUM SERPL-SCNC: 3.7 MMOL/L (ref 3.5–5.1)
PROT SERPL-MCNC: 6.4 G/DL (ref 6.3–8.2)
RBC # BLD AUTO: 5.77 M/UL (ref 4.05–5.2)
SODIUM SERPL-SCNC: 140 MMOL/L (ref 136–145)
WBC # BLD AUTO: 7.9 K/UL (ref 4.3–11.1)

## 2024-06-20 PROCEDURE — 80053 COMPREHEN METABOLIC PANEL: CPT

## 2024-06-20 PROCEDURE — 96360 HYDRATION IV INFUSION INIT: CPT

## 2024-06-20 PROCEDURE — 87507 IADNA-DNA/RNA PROBE TQ 12-25: CPT

## 2024-06-20 PROCEDURE — 2580000003 HC RX 258: Performed by: INTERNAL MEDICINE

## 2024-06-20 PROCEDURE — 99215 OFFICE O/P EST HI 40 MIN: CPT

## 2024-06-20 PROCEDURE — 83735 ASSAY OF MAGNESIUM: CPT

## 2024-06-20 PROCEDURE — 36415 COLL VENOUS BLD VENIPUNCTURE: CPT

## 2024-06-20 PROCEDURE — 85025 COMPLETE CBC W/AUTO DIFF WBC: CPT

## 2024-06-20 PROCEDURE — 2580000003 HC RX 258

## 2024-06-20 RX ORDER — SODIUM CHLORIDE 0.9 % (FLUSH) 0.9 %
5-40 SYRINGE (ML) INJECTION PRN
OUTPATIENT
Start: 2024-06-20

## 2024-06-20 RX ORDER — 0.9 % SODIUM CHLORIDE 0.9 %
1000 INTRAVENOUS SOLUTION INTRAVENOUS ONCE
Status: COMPLETED | OUTPATIENT
Start: 2024-06-20 | End: 2024-06-20

## 2024-06-20 RX ORDER — 0.9 % SODIUM CHLORIDE 0.9 %
1000 INTRAVENOUS SOLUTION INTRAVENOUS ONCE
Start: 2024-06-20 | End: 2024-06-20

## 2024-06-20 RX ORDER — CLONAZEPAM 1 MG/1
1 TABLET ORAL 2 TIMES DAILY PRN
Qty: 60 TABLET | Refills: 2 | Status: SHIPPED | OUTPATIENT
Start: 2024-06-20 | End: 2024-09-18

## 2024-06-20 RX ORDER — HEPARIN 100 UNIT/ML
500 SYRINGE INTRAVENOUS PRN
OUTPATIENT
Start: 2024-06-20

## 2024-06-20 RX ORDER — DRONABINOL 5 MG/1
5 CAPSULE ORAL
Qty: 19 CAPSULE | Refills: 0 | Status: SHIPPED | OUTPATIENT
Start: 2024-06-20 | End: 2024-09-18

## 2024-06-20 RX ORDER — OXYCODONE HYDROCHLORIDE 10 MG/1
10 TABLET ORAL EVERY 4 HOURS PRN
Qty: 180 TABLET | Refills: 0 | Status: SHIPPED | OUTPATIENT
Start: 2024-06-20 | End: 2024-07-20

## 2024-06-20 RX ORDER — 0.9 % SODIUM CHLORIDE 0.9 %
1000 INTRAVENOUS SOLUTION INTRAVENOUS ONCE
Status: CANCELLED
Start: 2024-06-20

## 2024-06-20 RX ORDER — SODIUM CHLORIDE 0.9 % (FLUSH) 0.9 %
5-40 SYRINGE (ML) INJECTION PRN
Status: DISCONTINUED | OUTPATIENT
Start: 2024-06-20 | End: 2024-06-21 | Stop reason: HOSPADM

## 2024-06-20 RX ORDER — SODIUM CHLORIDE 9 MG/ML
5-250 INJECTION, SOLUTION INTRAVENOUS PRN
OUTPATIENT
Start: 2024-06-20

## 2024-06-20 RX ADMIN — SODIUM CHLORIDE, PRESERVATIVE FREE 10 ML: 5 INJECTION INTRAVENOUS at 11:15

## 2024-06-20 RX ADMIN — SODIUM CHLORIDE 1000 ML: 9 INJECTION, SOLUTION INTRAVENOUS at 11:20

## 2024-06-20 ASSESSMENT — PATIENT HEALTH QUESTIONNAIRE - PHQ9
SUM OF ALL RESPONSES TO PHQ QUESTIONS 1-9: 0
SUM OF ALL RESPONSES TO PHQ QUESTIONS 1-9: 0
SUM OF ALL RESPONSES TO PHQ9 QUESTIONS 1 & 2: 0
2. FEELING DOWN, DEPRESSED OR HOPELESS: NOT AT ALL
1. LITTLE INTEREST OR PLEASURE IN DOING THINGS: NOT AT ALL
SUM OF ALL RESPONSES TO PHQ QUESTIONS 1-9: 0
SUM OF ALL RESPONSES TO PHQ QUESTIONS 1-9: 0

## 2024-06-20 ASSESSMENT — ENCOUNTER SYMPTOMS
NAUSEA: 1
DIARRHEA: 1
BACK PAIN: 1
ABDOMINAL PAIN: 1

## 2024-06-20 NOTE — PROGRESS NOTES
Arrived to the [facility]fusion [facility].  1 L NS  completed.   Patient instructed to report any side effects to ordering provider.  Patient tolerated well.   Discharged in wheelchair.

## 2024-06-20 NOTE — PROGRESS NOTES
Absolute 3.9 1.7 - 8.2 K/UL    Lymphocytes Absolute 3.2 0.5 - 4.6 K/UL    Monocytes Absolute 0.5 0.1 - 1.3 K/UL    Eosinophils Absolute 0.2 0.0 - 0.8 K/UL    Basophils Absolute 0.0 0.0 - 0.2 K/UL    Immature Granulocytes Absolute 0.1 0.0 - 0.5 K/UL   Magnesium    Collection Time: 06/20/24  8:45 AM   Result Value Ref Range    Magnesium 1.9 1.8 - 2.4 mg/dL       Imaging:  No valid procedures specified.      ASSESSMENT:   Diagnosis Orders   1. Cancer associated pain  oxyCODONE HCl (OXY-IR) 10 MG immediate release tablet      2. Anxiety and depression  clonazePAM (KLONOPIN) 1 MG tablet      3. Current mild episode of major depressive disorder, unspecified whether recurrent (HCC)        4. Nausea and vomiting, unspecified vomiting type  droNABinol (MARINOL) 5 MG capsule      5. Renal cell carcinoma of left kidney (HCC)  clonazePAM (KLONOPIN) 1 MG tablet    droNABinol (MARINOL) 5 MG capsule      6. Encounter for palliative care  clonazePAM (KLONOPIN) 1 MG tablet    droNABinol (MARINOL) 5 MG capsule    oxyCODONE HCl (OXY-IR) 10 MG immediate release tablet          PLAN:  Lab studies and imaging studies were personally reviewed.     Pertinent old records were reviewed from [facility] [place], ED, PCP, and [facility].     Case discussed with Charla Hendrix NP.    Nausea with vomiting: Continue Marinol 5mg Q6-8hr PRN; reordered today. I found a small supply in stock at a local [facility] and notified pt via [employer] that I ordered it there. Can continue Phenergan at night (cannot use during day due to sedation). Previously had no relief with Zofran or Zyprexa.   Cancer associated pain: Continue Oxycodone 10mg Q4hr PRN. Continue Oxycontin 15mg Q12hr.  Anxiety and depression: Continue Zoloft 150mg QHS (per PCP), Klonopin 1mg BID PRN, and Buspar 10mg BID.  Anorexia: Improved with Marinol, continue. Pt's weight is stable.  Diarrhea: Stool panel ordered given recent antibiotic use and large volume of watery stool.  Neuropathy: Continue Gabapentin 300mg

## 2024-06-21 LAB — GASTROINTESTINAL PATHOGEN PANEL: NORMAL

## 2024-06-22 NOTE — PROGRESS NOTES
[facility] Hematology and Oncology: Established patient - follow up     Chief Complaint   Patient presents with    Follow-up     Dx; RCC - bilateral with likely met to adrenal +/- lungs   S/p resection on left per details below - [facility]     Fam hx: father - prostate cancer   Paternal aunt - hx of breast cancer     History of Present Illness:  Ms. Hall is a 58 y.o. female who presents today for FU regarding RCC.  The past medical history is significant for anxiety, HAs and sleep apnea, cervical dysplasia but no carcinoma, current smoker (~1/2PPD), lap liv and tubal reversal, I&D of left breast abscess.  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  Despite attending physical therapy since the accident, she continued to experience the same low back pain.  She was seen at [facility] on 10/4/22.  MRI of the lumbar spine on 10/15/22 showed mild degenerative disc disease at L3-4 through L5-S1 and a 5.7 x 6.1 x 7 cm lower pole solid left renal mass.  Urgent referral was placed to [facility], who referred the patient to [facility] for uro/onc evaluation and treatment of renal mass.  CT AP on 10/26/22 showed bilateral enhancing renal masses, concerning for renal cell carcinoma with the left lower pole renal mass measuring up to 6.5 cm and extending along Gerota's fascia and the right midpole renal mass measuring up to 4.6 cm. Also noted was an enhancing 1.5 cm right adrenal nodule, concerning for a metastatic nodule.  No recent wt loss.  S/p left renal biopsy on 11/2022, Nidia grade 2 of 4, clear cell RCC.  CT chest on 11/7 showed multiple nodules that appeared to be calcified, most c/w granulomatous disease, but she's aware that metastatic disease cannot be ruled out.  Not on AC.  Cr 0.89.    - Pt presented for consultation regarding systemic therapy.  She was here with her sister and friend.

## 2024-06-24 LAB — GASTROINTESTINAL PATHOGEN PANEL: NORMAL

## 2024-06-28 DIAGNOSIS — Z51.5 ENCOUNTER FOR PALLIATIVE CARE: ICD-10-CM

## 2024-06-28 DIAGNOSIS — F41.9 ANXIETY AND DEPRESSION: ICD-10-CM

## 2024-06-28 DIAGNOSIS — F32.A ANXIETY AND DEPRESSION: ICD-10-CM

## 2024-06-28 RX ORDER — BUSPIRONE HYDROCHLORIDE 10 MG/1
10 TABLET ORAL 2 TIMES DAILY
Qty: 60 TABLET | Refills: 0 | Status: SHIPPED | OUTPATIENT
Start: 2024-06-28

## 2024-07-01 ENCOUNTER — OFFICE VISIT (OUTPATIENT)
Dept: ENT CLINIC | Age: 59
End: 2024-07-01
Payer: COMMERCIAL

## 2024-07-01 VITALS — HEIGHT: 66 IN | WEIGHT: 203.93 LBS | BODY MASS INDEX: 32.77 KG/M2

## 2024-07-01 DIAGNOSIS — K11.6 SIALOCELE: Primary | ICD-10-CM

## 2024-07-01 PROCEDURE — 99213 OFFICE O/P EST LOW 20 MIN: CPT | Performed by: OTOLARYNGOLOGY

## 2024-07-01 ASSESSMENT — ENCOUNTER SYMPTOMS
SHORTNESS OF BREATH: 0
ABDOMINAL PAIN: 0
SORE THROAT: 0

## 2024-07-01 NOTE — PROGRESS NOTES
Chief Complaint   Patient presents with    Post-Op Check     2wk wound check, still having clear drainage       HPI:  Mechelle Hall is a 58 y.o. female seen in follow-up for her left neck.  She underwent left submandibular and excision back on 2/8/2024, and the pathology was consistent with metastatic renal cell carcinoma.  She did develop a postop neck hematoma which required evacuation, and she was kept overnight for observation.  I had seen her recently for an intermittent sticky, thick drainage from the midportion of the incision which has been occurring daily for the past month.  I started her on clindamycin at her last visit, but unfortunately, she continues to complain of this intermittent drainage.  There is no odor at all, and there has not been any bleeding from the site.  She reports some localized pain on palpation as well.  No other new complaints.      Past Medical History, Past Surgical History, Family history, Social History, and Medications were all reviewed with the patient today and updated as necessary.     Allergies   Allergen Reactions    Latex Hives, Itching, Shortness Of Breath and Other (See Comments)    Bee Venom Shortness Of Breath and Hives    Penicillins Anaphylaxis    Sulfa Antibiotics Hives, Itching and Rash    Wasp Venom Protein Hives and Shortness Of Breath    Penicillin G     Sulfamethoxazole-Trimethoprim Hives    Wellbutrin [Bupropion] Other (See Comments)     suicidal     Patient Active Problem List   Diagnosis    Renal cell carcinoma of left kidney (HCC)    Mass of right adrenal gland (HCC)    Port-A-Cath in place    Encounter for monitoring cardiotoxic drug therapy    Occlusive thrombus    Acute internal jugular vein thrombosis, right (HCC)    Obesity (BMI 30.0-34.9)    Bilateral renal masses    Elevated LFTs    Type 2 diabetes mellitus    High risk medication use    Constipation    Hypokalemia    Dehydration    Neoplasm of submandibular salivary gland    RBC

## 2024-07-09 ENCOUNTER — HOSPITAL ENCOUNTER (EMERGENCY)
Age: 59
Discharge: HOME | End: 2024-07-09
Payer: COMMERCIAL

## 2024-07-09 ENCOUNTER — APPOINTMENT (OUTPATIENT)
Dept: GENERAL RADIOLOGY | Age: 59
End: 2024-07-09
Payer: COMMERCIAL

## 2024-07-09 ENCOUNTER — NURSE ONLY (OUTPATIENT)
Dept: PULMONOLOGY | Age: 59
End: 2024-07-09

## 2024-07-09 VITALS
DIASTOLIC BLOOD PRESSURE: 69 MMHG | SYSTOLIC BLOOD PRESSURE: 124 MMHG | OXYGEN SATURATION: 94 % | BODY MASS INDEX: 32.28 KG/M2 | RESPIRATION RATE: 16 BRPM | WEIGHT: 203 LBS | HEART RATE: 65 BPM | TEMPERATURE: 97.5 F

## 2024-07-09 DIAGNOSIS — S63.501A WRIST SPRAIN, RIGHT, INITIAL ENCOUNTER: ICD-10-CM

## 2024-07-09 DIAGNOSIS — R05.3 CHRONIC COUGH: Primary | ICD-10-CM

## 2024-07-09 DIAGNOSIS — S20.211A RIB CONTUSION, RIGHT, INITIAL ENCOUNTER: Primary | ICD-10-CM

## 2024-07-09 PROCEDURE — 73110 X-RAY EXAM OF WRIST: CPT

## 2024-07-09 PROCEDURE — 99283 EMERGENCY DEPT VISIT LOW MDM: CPT

## 2024-07-09 PROCEDURE — 71046 X-RAY EXAM CHEST 2 VIEWS: CPT

## 2024-07-09 ASSESSMENT — PAIN SCALES - GENERAL: PAINLEVEL_OUTOF10: 6

## 2024-07-09 ASSESSMENT — PAIN - FUNCTIONAL ASSESSMENT: PAIN_FUNCTIONAL_ASSESSMENT: 0-10

## 2024-07-09 NOTE — ED PROVIDER NOTES
Emergency Department Provider Note       PCP: Dawna Jewell MD   Age: 58 y.o.   Sex: female     DISPOSITION Discharge - Pending Orders Complete 07/09/2024 12:02:04 PM       ICD-10-CM    1. Rib contusion, right, initial encounter  S20.211A       2. Wrist sprain, right, initial encounter  S63.501A           Medical Decision Making     Patient is a 58-year-old female presenting after mechanical fall 3 days ago with right-sided rib pain and right wrist pain.  She is afebrile nontoxic in appearance, vital signs within appropriate limits.  She has tenderness to palpation along the right anterior lower ribs however lungs clear to auscultation.  Full range of motion of her right wrist without any obvious deformity.  X-rays of the chest and the wrist were performed did not show any fractures.  All results discussed at length with patient.  She will be sent home with incentive spirometer and instructed on use.  She has pain medication she is prescribed for her renal cell carcinoma therefore does not require any further medication for her discomfort.  Discussed follow-up as well as reasons to return immediately to the ER.  Patient agreeable plan     1 acute, uncomplicated illness or injury.  Over the counter drug management performed.  Shared medical decision making was utilized in creating the patients health plan today.    I independently ordered and reviewed each unique test.       I interpreted the X-rays x-rays of the chest without pneumothorax or obvious rib fracture.  X-rays of the right wrist without any obvious fracture..              History     Patient is a 58-year-old female who presents with complaint of right-sided rib pain and right wrist pain after trip and fall.  She states Saturday she was at a campsite got up in the middle the night to use the bathroom.  The area was not well lit and she stepped down without realizing there was a small step causing her to lose her balance and fall onto her right

## 2024-07-09 NOTE — DISCHARGE INSTRUCTIONS
The x-rays of your chest did not show any obvious fractures, however a contusion to your rib can be just as painful as a broken bone.  Therefore we are sending you home with an incentive spirometer recommended to use it 5 times an hour at least 4-5 times throughout the day.  Continue taking your previously prescribed pain medication for any aches and pains and follow-up with your doctor within 1 week for reevaluation of your symptoms.  Return immediately to the ER with any difficulty breathing severely worsening pain fevers or concerning symptoms.

## 2024-07-09 NOTE — PROGRESS NOTES
Patient came in for CPFTs but had fallen at home 3 days prior and thinks she broke some ribs. I explained that we would have to reschedule these tests as she will not be able to give her best effort if she has broken ribs. I also recommended that she go to urgent care or the ED. She stated that she was going to go to the ED straight from here. Test rescheduled 6 weeks out just before her return appointment with Dr. Reardon. EVELYNE

## 2024-07-09 NOTE — ED NOTES
Patient left before given discharge instructions and paperwork.     Nora Gudino LPN  07/09/24 1257

## 2024-07-09 NOTE — ED TRIAGE NOTES
Pt c/o rt rib pain after fall onto campground concrete over the weekend. Denies LOC, hitting head. No thinners.

## 2024-07-18 ENCOUNTER — HOSPITAL ENCOUNTER (OUTPATIENT)
Dept: CT IMAGING | Age: 59
Discharge: HOME | End: 2024-07-18
Attending: OTOLARYNGOLOGY
Payer: COMMERCIAL

## 2024-07-18 DIAGNOSIS — R31.9 HEMATURIA, UNSPECIFIED TYPE: ICD-10-CM

## 2024-07-18 DIAGNOSIS — C64.2 RENAL CELL CARCINOMA OF LEFT KIDNEY (HCC): Primary | ICD-10-CM

## 2024-07-18 DIAGNOSIS — K11.6 SIALOCELE: ICD-10-CM

## 2024-07-18 PROCEDURE — 6360000004 HC RX CONTRAST MEDICATION: Performed by: OTOLARYNGOLOGY

## 2024-07-18 PROCEDURE — 70491 CT SOFT TISSUE NECK W/DYE: CPT

## 2024-07-18 RX ADMIN — IOPAMIDOL 80 ML: 755 INJECTION, SOLUTION INTRAVENOUS at 08:30

## 2024-07-23 NOTE — PROGRESS NOTES
[facility] Hematology and Oncology: Established patient - follow up     Chief Complaint   Patient presents with    Follow-up     Dx; RCC - bilateral with likely met to adrenal +/- lungs   S/p resection on left per details below - [facility] [place]     Fam hx: father - prostate cancer   Paternal aunt - hx of breast cancer     History of Present Illness:  Ms. Hall is a 58 y.o. female who presents today for FU regarding RCC.  The past medical history is significant for anxiety, HAs and sleep apnea, cervical dysplasia but no carcinoma, current smoker (~1/2PPD), lap liv and tubal reversal, I&D of left breast abscess.  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  Despite attending physical therapy since the accident, she continued to experience the same low back pain.  She was seen at [facility] [place] on 10/4/22.  MRI of the lumbar spine on 10/15/22 showed mild degenerative disc disease at L3-4 through L5-S1 and a 5.7 x 6.1 x 7 cm lower pole solid left renal mass.  Urgent referral was placed to [facility], who referred the patient to [facility] for uro/onc evaluation and treatment of renal mass.  CT AP on 10/26/22 showed bilateral enhancing renal masses, concerning for renal cell carcinoma with the left lower pole renal mass measuring up to 6.5 cm and extending along Gerota's fascia and the right midpole renal mass measuring up to 4.6 cm. Also noted was an enhancing 1.5 cm right adrenal nodule, concerning for a metastatic nodule.  No recent wt loss.  S/p left renal biopsy on 11/2022, Nidia grade 2 of 4, clear cell RCC.  CT chest on 11/7 showed multiple nodules that appeared to be calcified, most c/w granulomatous disease, but she's aware that metastatic disease cannot be ruled out.  Not on AC.  Cr 0.89.    - Pt presented for consultation regarding systemic therapy.  She was here with her sister and friend.

## 2024-07-25 ENCOUNTER — TELEPHONE (OUTPATIENT)
Dept: ENT CLINIC | Age: 59
End: 2024-07-25

## 2024-07-25 NOTE — TELEPHONE ENCOUNTER
Called patient with results of CT scan which reveals thickening of the soft tissue at the site of the neck drainage as well has concern for a possible fluid collection.  There is no residual left submandibular gland and just some postsurgical changes in the neck otherwise.  She remains symptomatic with continued drainage from the small draining salivary tract.  She has failed medical therapy with several rounds of oral antibiotics.  At this point, I recommend proceeding with incision and drainage of her left neck wound.  I reviewed all the risks of surgery, and she would like to proceed.

## 2024-07-26 ENCOUNTER — OFFICE VISIT (OUTPATIENT)
Dept: PALLATIVE CARE | Age: 59
End: 2024-07-26
Payer: COMMERCIAL

## 2024-07-26 ENCOUNTER — OFFICE VISIT (OUTPATIENT)
Dept: ONCOLOGY | Age: 59
End: 2024-07-26
Payer: COMMERCIAL

## 2024-07-26 ENCOUNTER — HOSPITAL ENCOUNTER (OUTPATIENT)
Dept: LAB | Age: 59
Discharge: HOME | End: 2024-07-26
Payer: COMMERCIAL

## 2024-07-26 VITALS
HEART RATE: 64 BPM | TEMPERATURE: 97.8 F | RESPIRATION RATE: 16 BRPM | DIASTOLIC BLOOD PRESSURE: 73 MMHG | WEIGHT: 204 LBS | OXYGEN SATURATION: 97 % | BODY MASS INDEX: 32.78 KG/M2 | HEIGHT: 66 IN | SYSTOLIC BLOOD PRESSURE: 130 MMHG

## 2024-07-26 DIAGNOSIS — G89.3 CANCER ASSOCIATED PAIN: Primary | ICD-10-CM

## 2024-07-26 DIAGNOSIS — R11.2 NAUSEA AND VOMITING, UNSPECIFIED VOMITING TYPE: ICD-10-CM

## 2024-07-26 DIAGNOSIS — F41.9 ANXIETY AND DEPRESSION: ICD-10-CM

## 2024-07-26 DIAGNOSIS — N28.89 BILATERAL RENAL MASSES: ICD-10-CM

## 2024-07-26 DIAGNOSIS — F32.A ANXIETY AND DEPRESSION: ICD-10-CM

## 2024-07-26 DIAGNOSIS — R19.7 DIARRHEA, UNSPECIFIED TYPE: ICD-10-CM

## 2024-07-26 DIAGNOSIS — Z79.899 HIGH RISK MEDICATION USE: ICD-10-CM
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
## 2024-07-26 DIAGNOSIS — R31.9 HEMATURIA, UNSPECIFIED TYPE: ICD-10-CM

## 2024-07-26 DIAGNOSIS — C64.2 RENAL CELL CARCINOMA OF LEFT KIDNEY (HCC): ICD-10-CM

## 2024-07-26 DIAGNOSIS — C64.2 RENAL CELL CARCINOMA OF LEFT KIDNEY (HCC): Primary | ICD-10-CM

## 2024-07-26 DIAGNOSIS — Z51.5 ENCOUNTER FOR PALLIATIVE CARE: ICD-10-CM

## 2024-07-26 DIAGNOSIS — R23.8 SKIN IRRITATION: ICD-10-CM

## 2024-07-26 DIAGNOSIS — G62.9 NEUROPATHY: ICD-10-CM

## 2024-07-26 PROBLEM — K13.79 MOUTH PAIN: Status: RESOLVED | Noted: 2024-04-02 | Resolved: 2024-07-26

## 2024-07-26 PROBLEM — R41.82 ALTERED MENTAL STATUS: Status: RESOLVED | Noted: 2024-06-01 | Resolved: 2024-07-26

## 2024-07-26 LAB
ALBUMIN SERPL-MCNC: 3.4 G/DL (ref 3.5–5)
ALBUMIN/GLOB SERPL: 1.3 (ref 1–1.9)
ALP SERPL-CCNC: 94 U/L (ref 35–104)
ALT SERPL-CCNC: 50 U/L (ref 12–65)
ANION GAP SERPL CALC-SCNC: 10 MMOL/L (ref 9–18)
APPEARANCE UR: ABNORMAL
AST SERPL-CCNC: 41 U/L (ref 15–37)
BASOPHILS # BLD: 0 K/UL (ref 0–0.2)
BASOPHILS NFR BLD: 0 % (ref 0–2)
BILIRUB SERPL-MCNC: 0.4 MG/DL (ref 0–1.2)
BILIRUB UR QL: ABNORMAL
BUN SERPL-MCNC: 9 MG/DL (ref 6–23)
CALCIUM SERPL-MCNC: 9.5 MG/DL (ref 8.8–10.2)
CHLORIDE SERPL-SCNC: 103 MMOL/L (ref 98–107)
CO2 SERPL-SCNC: 27 MMOL/L (ref 20–28)
COLOR UR: YELLOW
CREAT SERPL-MCNC: 0.65 MG/DL (ref 0.6–1.1)
DIFFERENTIAL METHOD BLD: ABNORMAL
EOSINOPHIL # BLD: 0.1 K/UL (ref 0–0.8)
EOSINOPHIL NFR BLD: 2 % (ref 0.5–7.8)
ERYTHROCYTE [DISTWIDTH] IN BLOOD BY AUTOMATED COUNT: 16.3 % (ref 11.9–14.6)
GLOBULIN SER CALC-MCNC: 2.7 G/DL (ref 2.3–3.5)
GLUCOSE SERPL-MCNC: 98 MG/DL (ref 70–99)
GLUCOSE UR STRIP.AUTO-MCNC: NEGATIVE MG/DL
HCT VFR BLD AUTO: 45.6 % (ref 35.8–46.3)
HGB BLD-MCNC: 15.1 G/DL (ref 11.7–15.4)
HGB UR QL STRIP: NEGATIVE
IMM GRANULOCYTES # BLD AUTO: 0 K/UL (ref 0–0.5)
IMM GRANULOCYTES NFR BLD AUTO: 0 % (ref 0–5)
KETONES UR QL STRIP.AUTO: NEGATIVE MG/DL
LEUKOCYTE ESTERASE UR QL STRIP.AUTO: NEGATIVE
LYMPHOCYTES # BLD: 3.4 K/UL (ref 0.5–4.6)
LYMPHOCYTES NFR BLD: 40 % (ref 13–44)
MCH RBC QN AUTO: 30.8 PG (ref 26.1–32.9)
MCHC RBC AUTO-ENTMCNC: 33.1 G/DL (ref 31.4–35)
MCV RBC AUTO: 92.9 FL (ref 82–102)
MONOCYTES # BLD: 0.4 K/UL (ref 0.1–1.3)
MONOCYTES NFR BLD: 5 % (ref 4–12)
NEUTS SEG # BLD: 4.6 K/UL (ref 1.7–8.2)
NEUTS SEG NFR BLD: 53 % (ref 43–78)
NITRITE UR QL STRIP.AUTO: NEGATIVE
NRBC # BLD: 0 K/UL (ref 0–0.2)
PH UR STRIP: 6 (ref 5–9)
PLATELET # BLD AUTO: 207 K/UL (ref 150–450)
PMV BLD AUTO: 10.5 FL (ref 9.4–12.3)
POTASSIUM SERPL-SCNC: 4.1 MMOL/L (ref 3.5–5.1)
PROT SERPL-MCNC: 6.1 G/DL (ref 6.3–8.2)
PROT UR STRIP-MCNC: NEGATIVE MG/DL
RBC # BLD AUTO: 4.91 M/UL (ref 4.05–5.2)
SODIUM SERPL-SCNC: 140 MMOL/L (ref 136–145)
SP GR UR REFRACTOMETRY: 1.02 (ref 1–1.02)
UROBILINOGEN UR QL STRIP.AUTO: 1 EU/DL
WBC # BLD AUTO: 8.6 K/UL (ref 4.3–11.1)

## 2024-07-26 PROCEDURE — 80053 COMPREHEN METABOLIC PANEL: CPT

## 2024-07-26 PROCEDURE — 36415 COLL VENOUS BLD VENIPUNCTURE: CPT

## 2024-07-26 PROCEDURE — 99215 OFFICE O/P EST HI 40 MIN: CPT | Performed by: INTERNAL MEDICINE

## 2024-07-26 PROCEDURE — 87086 URINE CULTURE/COLONY COUNT: CPT

## 2024-07-26 PROCEDURE — 85025 COMPLETE CBC W/AUTO DIFF WBC: CPT

## 2024-07-26 PROCEDURE — 81003 URINALYSIS AUTO W/O SCOPE: CPT

## 2024-07-26 PROCEDURE — 99215 OFFICE O/P EST HI 40 MIN: CPT

## 2024-07-26 RX ORDER — BUSPIRONE HYDROCHLORIDE 10 MG/1
10 TABLET ORAL 2 TIMES DAILY
Qty: 60 TABLET | Refills: 3 | Status: SHIPPED | OUTPATIENT
Start: 2024-07-26

## 2024-07-26 RX ORDER — OXYCODONE HYDROCHLORIDE 15 MG/1
1 TABLET, FILM COATED, EXTENDED RELEASE ORAL EVERY 12 HOURS
Qty: 60 TABLET | Refills: 0 | Status: SHIPPED | OUTPATIENT
Start: 2024-07-26 | End: 2024-08-25

## 2024-07-26 RX ORDER — LOPERAMIDE HYDROCHLORIDE 2 MG/1
2 CAPSULE ORAL EVERY 6 HOURS PRN
Qty: 30 CAPSULE | Refills: 3 | Status: SHIPPED | OUTPATIENT
Start: 2024-07-26

## 2024-07-26 RX ORDER — DRONABINOL 5 MG/1
5 CAPSULE ORAL
Qty: 90 CAPSULE | Refills: 0 | Status: SHIPPED | OUTPATIENT
Start: 2024-07-26 | End: 2024-10-24

## 2024-07-26 RX ORDER — OXYCODONE HYDROCHLORIDE 10 MG/1
10 TABLET ORAL EVERY 4 HOURS PRN
Qty: 180 TABLET | Refills: 0 | Status: SHIPPED | OUTPATIENT
Start: 2024-07-26 | End: 2024-08-25

## 2024-07-26 RX ORDER — GABAPENTIN 300 MG/1
300 CAPSULE ORAL 3 TIMES DAILY
Qty: 270 CAPSULE | Refills: 3 | Status: SHIPPED | OUTPATIENT
Start: 2024-07-26 | End: 2025-07-21

## 2024-07-26 ASSESSMENT — PATIENT HEALTH QUESTIONNAIRE - PHQ9
SUM OF ALL RESPONSES TO PHQ QUESTIONS 1-9: 0
2. FEELING DOWN, DEPRESSED OR HOPELESS: NOT AT ALL
SUM OF ALL RESPONSES TO PHQ9 QUESTIONS 1 & 2: 0
SUM OF ALL RESPONSES TO PHQ QUESTIONS 1-9: 0
SUM OF ALL RESPONSES TO PHQ QUESTIONS 1-9: 0
1. LITTLE INTEREST OR PLEASURE IN DOING THINGS: NOT AT ALL
SUM OF ALL RESPONSES TO PHQ QUESTIONS 1-9: 0

## 2024-07-26 ASSESSMENT — ENCOUNTER SYMPTOMS
ABDOMINAL PAIN: 1
NAUSEA: 1
BACK PAIN: 1
DIARRHEA: 1

## 2024-07-26 NOTE — PATIENT INSTRUCTIONS
Patient Information from Today's Visit    The members of your [facility] are listed below:    Physician Provider: Anne Lopez Medical Oncologist  Advanced Practice Clinician: Charla Anglin NP  Registered Nurse: Mirtha FREDERICK RN  Navigator: N/A  Medical Assistant: Raven BLEVINS MA  : Melony DOUGLAS   Supportive Care Services: Mariela TELLO LMSW    Diagnosis: Renal Cell Carcinoma      Follow Up Instructions: About 1 month.    Labs reviewed.  Symptoms reviewed.  CT scan of chest, abdomen, and pelvis due now.  You can call to schedule this at [phone].  Order for x-ray of the right hand.  You can do this by walk in across the street.  Increase protein intake.    Treatment Summary has been discussed and given to patient:N/A      Current Labs:   Hospital Outpatient Visit on 07/26/2024   Component Date Value Ref Range Status    Color, UA 07/26/2024 YELLOW    Final    Appearance 07/26/2024 SLIGHTLY CLOUDY    Final    Specific Gravity, UA 07/26/2024 1.025 (H)  1.001 - 1.023   Final    pH, Urine 07/26/2024 6.0  5.0 - 9.0   Final    Protein, UA 07/26/2024 Negative  NEG mg/dL Final    Glucose, Ur 07/26/2024 Negative  mg/dL Final    Ketones, Urine 07/26/2024 Negative  mg/dL Final    Bilirubin, Urine 07/26/2024 SMALL (A)  NEG   Final    Positive, unable to confirm with Ictotest.    Blood, Urine 07/26/2024 Negative  NEG   Final    Urobilinogen, Urine 07/26/2024 1.0  EU/dL Final    Nitrite, Urine 07/26/2024 Negative    Final    Leukocyte Esterase, Urine 07/26/2024 Negative    Final    Sodium 07/26/2024 140  136 - 145 mmol/L Final    Potassium 07/26/2024 4.1  3.5 - 5.1 mmol/L Final    Chloride 07/26/2024 103  98 - 107 mmol/L Final    CO2 07/26/2024 27  20 - 28 mmol/L Final    Anion Gap 07/26/2024 10  9 - 18 mmol/L Final    Glucose 07/26/2024 98  70 - 99 mg/dL Final    Comment: <70 mg/dL Consistent with, but not fully diagnostic of hypoglycemia.  100 - 125 mg/dL Impaired fasting glucose/consistent with

## 2024-07-26 NOTE — PROGRESS NOTES
Outpatient Palliative Care at the  [facility][note 3]: Office Visit Established Patient    Diagnosis: Metastatic renal cell carcinoma     Treatment Plan: axitinib/pebrolizumab -> lenvatinib/everolimus -> cabozantinib     Treatment Intent: Palliative     Medical Oncologist: Dr. Lopez, Dr. Mccloud @ [facility] [place]     Radiation Oncologist: None     Navigator: None      Chief Complaint:    Chief Complaint   Patient presents with    Follow-up       History of Present Illness:  Ms. Hall is a 58 y.o. female who presents today for evaluation regarding establishing care with palliative care. 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  Despite attending physical therapy since the accident, she continued to experience the same low back pain.  She was seen at [facility] [place] on 10/4/22.  MRI of the lumbar spine on 10/15/22 showed mild degenerative disc disease at L3-4 through L5-S1 and a 5.7 x 6.1 x 7 cm lower pole solid left renal mass. Urgent referral was placed to [facility], who referred the patient to [facility] for uro/onc evaluation and treatment of renal mass.  CT AP on 10/26/22 showed bilateral enhancing renal masses, concerning for renal cell carcinoma Also noted was an enhancing 1.5 cm right adrenal nodule, concerning for a metastatic nodule.  S/p left renal biopsy on 11/2022, clear cell RCC.  She was started on pembrolizumab and axitinib - could not c/w immunotherapy due to LFT abnormalities. Started lenvatinib/everolimus. She had left partial nephrectomy at [facility] on 12/8/23.  Plan for right nephrectomy pending.   Her PMHx includes anxiety, tobacco abuse, cervical dysplasia, DDD, sleep apnea and lap liv. She is employed at [employer] as a CNA. She lives at home with her son, who she states has drug addiction issues.     Interval History:  Pt seen today in follow-up in coordination with

## 2024-07-27 ENCOUNTER — PATIENT MESSAGE (OUTPATIENT)
Dept: ENT CLINIC | Age: 59
End: 2024-07-27

## 2024-07-28 LAB
BACTERIA SPEC CULT: NORMAL
BACTERIA SPEC CULT: NORMAL
SERVICE CMNT-IMP: NORMAL

## 2024-07-29 ENCOUNTER — TELEPHONE (OUTPATIENT)
Dept: ENT CLINIC | Age: 59
End: 2024-07-29

## 2024-07-29 ENCOUNTER — PREP FOR PROCEDURE (OUTPATIENT)
Dept: ENT CLINIC | Age: 59
End: 2024-07-29

## 2024-07-29 DIAGNOSIS — D49.0 NEOPLASM OF SUBMANDIBULAR GLAND: Primary | ICD-10-CM

## 2024-07-29 DIAGNOSIS — H70.092: ICD-10-CM

## 2024-07-29 DIAGNOSIS — G89.18 POST-OPERATIVE PAIN: ICD-10-CM

## 2024-07-29 DIAGNOSIS — L02.11: ICD-10-CM

## 2024-07-29 RX ORDER — CEPHALEXIN 500 MG/1
500 CAPSULE ORAL 4 TIMES DAILY
Qty: 28 CAPSULE | Refills: 0 | Status: CANCELLED | OUTPATIENT
Start: 2024-07-29 | End: 2024-08-05

## 2024-07-29 RX ORDER — ONDANSETRON 4 MG/1
8 TABLET, FILM COATED ORAL EVERY 8 HOURS PRN
Qty: 8 TABLET | Refills: 0 | Status: SHIPPED | OUTPATIENT
Start: 2024-07-29

## 2024-07-29 RX ORDER — HYDROCODONE BITARTRATE AND ACETAMINOPHEN 5; 325 MG/1; MG/1
1 TABLET ORAL EVERY 4 HOURS PRN
Qty: 30 TABLET | Refills: 0 | Status: SHIPPED | OUTPATIENT
Start: 2024-07-29 | End: 2024-08-03

## 2024-07-29 RX ORDER — CLINDAMYCIN HYDROCHLORIDE 300 MG/1
300 CAPSULE ORAL 3 TIMES DAILY
Qty: 21 CAPSULE | Refills: 0 | Status: SHIPPED | OUTPATIENT
Start: 2024-07-29 | End: 2024-07-29

## 2024-07-29 NOTE — TELEPHONE ENCOUNTER
Giacomo at [employer] called regarding the [employer] states pt is already taking the oxycodone and OxyContin wants to know if you still want her to take this. # [phone]

## 2024-07-30 ENCOUNTER — HOSPITAL ENCOUNTER (OUTPATIENT)
Dept: SLEEP CENTER | Age: 59
Discharge: HOME | End: 2024-08-02
Payer: COMMERCIAL

## 2024-07-30 PROCEDURE — 95811 POLYSOM 6/>YRS CPAP 4/> PARM: CPT

## 2024-07-31 ENCOUNTER — ANESTHESIA EVENT (OUTPATIENT)
Dept: SURGERY | Age: 59
End: 2024-07-31
Payer: COMMERCIAL

## 2024-08-02 ENCOUNTER — PATIENT MESSAGE (OUTPATIENT)
Dept: SLEEP MEDICINE | Age: 59
End: 2024-08-02

## 2024-08-02 ENCOUNTER — ANESTHESIA (OUTPATIENT)
Dept: SURGERY | Age: 59
End: 2024-08-02
Payer: COMMERCIAL

## 2024-08-02 ENCOUNTER — HOSPITAL ENCOUNTER (OUTPATIENT)
Age: 59
Setting detail: OUTPATIENT SURGERY
Discharge: HOME | End: 2024-08-02
Attending: OTOLARYNGOLOGY | Admitting: OTOLARYNGOLOGY
Payer: COMMERCIAL

## 2024-08-02 VITALS
OXYGEN SATURATION: 92 % | WEIGHT: 202.8 LBS | SYSTOLIC BLOOD PRESSURE: 118 MMHG | TEMPERATURE: 97.5 F | RESPIRATION RATE: 12 BRPM | DIASTOLIC BLOOD PRESSURE: 59 MMHG | HEIGHT: 66 IN | BODY MASS INDEX: 32.59 KG/M2 | HEART RATE: 58 BPM

## 2024-08-02 DIAGNOSIS — G47.33 OSA (OBSTRUCTIVE SLEEP APNEA): Primary | ICD-10-CM

## 2024-08-02 DIAGNOSIS — L02.11: ICD-10-CM

## 2024-08-02 DIAGNOSIS — G47.34 NOCTURNAL HYPOXEMIA: ICD-10-CM

## 2024-08-02 DIAGNOSIS — H70.092: ICD-10-CM

## 2024-08-02 LAB
GLUCOSE BLD STRIP.AUTO-MCNC: 126 MG/DL (ref 65–100)
SERVICE CMNT-IMP: ABNORMAL

## 2024-08-02 PROCEDURE — 6360000002 HC RX W HCPCS

## 2024-08-02 PROCEDURE — 6370000000 HC RX 637 (ALT 250 FOR IP): Performed by: ANESTHESIOLOGY

## 2024-08-02 PROCEDURE — 3700000000 HC ANESTHESIA ATTENDED CARE: Performed by: OTOLARYNGOLOGY

## 2024-08-02 PROCEDURE — 3700000001 HC ADD 15 MINUTES (ANESTHESIA): Performed by: OTOLARYNGOLOGY

## 2024-08-02 PROCEDURE — 88305 TISSUE EXAM BY PATHOLOGIST: CPT

## 2024-08-02 PROCEDURE — 2500000003 HC RX 250 WO HCPCS: Performed by: OTOLARYNGOLOGY

## 2024-08-02 PROCEDURE — 21501 I&D DP ABSC/HMTMA SFT TS NCK: CPT | Performed by: OTOLARYNGOLOGY

## 2024-08-02 PROCEDURE — 7100000001 HC PACU RECOVERY - ADDTL 15 MIN: Performed by: OTOLARYNGOLOGY

## 2024-08-02 PROCEDURE — 7100000010 HC PHASE II RECOVERY - FIRST 15 MIN: Performed by: OTOLARYNGOLOGY

## 2024-08-02 PROCEDURE — 2500000003 HC RX 250 WO HCPCS

## 2024-08-02 PROCEDURE — 3600000002 HC SURGERY LEVEL 2 BASE: Performed by: OTOLARYNGOLOGY

## 2024-08-02 PROCEDURE — 3600000012 HC SURGERY LEVEL 2 ADDTL 15MIN: Performed by: OTOLARYNGOLOGY

## 2024-08-02 PROCEDURE — 2709999900 HC NON-CHARGEABLE SUPPLY: Performed by: OTOLARYNGOLOGY

## 2024-08-02 PROCEDURE — 82962 GLUCOSE BLOOD TEST: CPT

## 2024-08-02 PROCEDURE — 7100000000 HC PACU RECOVERY - FIRST 15 MIN: Performed by: OTOLARYNGOLOGY

## 2024-08-02 PROCEDURE — 7100000011 HC PHASE II RECOVERY - ADDTL 15 MIN: Performed by: OTOLARYNGOLOGY

## 2024-08-02 PROCEDURE — 2580000003 HC RX 258: Performed by: ANESTHESIOLOGY

## 2024-08-02 RX ORDER — ROCURONIUM BROMIDE 10 MG/ML
INJECTION, SOLUTION INTRAVENOUS PRN
Status: DISCONTINUED | OUTPATIENT
Start: 2024-08-02 | End: 2024-08-02 | Stop reason: SDUPTHER

## 2024-08-02 RX ORDER — HYDROMORPHONE HYDROCHLORIDE 2 MG/ML
0.25 INJECTION, SOLUTION INTRAMUSCULAR; INTRAVENOUS; SUBCUTANEOUS EVERY 5 MIN PRN
Status: DISCONTINUED | OUTPATIENT
Start: 2024-08-02 | End: 2024-08-02 | Stop reason: HOSPADM

## 2024-08-02 RX ORDER — ACETAMINOPHEN 500 MG
1000 TABLET ORAL ONCE
Status: COMPLETED | OUTPATIENT
Start: 2024-08-02 | End: 2024-08-02

## 2024-08-02 RX ORDER — SUCCINYLCHOLINE/SOD CL,ISO/PF 200MG/10ML
SYRINGE (ML) INTRAVENOUS PRN
Status: DISCONTINUED | OUTPATIENT
Start: 2024-08-02 | End: 2024-08-02 | Stop reason: SDUPTHER

## 2024-08-02 RX ORDER — EPHEDRINE SULFATE 5 MG/ML
INJECTION INTRAVENOUS PRN
Status: DISCONTINUED | OUTPATIENT
Start: 2024-08-02 | End: 2024-08-02 | Stop reason: SDUPTHER

## 2024-08-02 RX ORDER — SODIUM CHLORIDE 0.9 % (FLUSH) 0.9 %
5-40 SYRINGE (ML) INJECTION EVERY 12 HOURS SCHEDULED
Status: DISCONTINUED | OUTPATIENT
Start: 2024-08-02 | End: 2024-08-02 | Stop reason: HOSPADM

## 2024-08-02 RX ORDER — LIDOCAINE HYDROCHLORIDE 20 MG/ML
INJECTION, SOLUTION EPIDURAL; INFILTRATION; INTRACAUDAL; PERINEURAL PRN
Status: DISCONTINUED | OUTPATIENT
Start: 2024-08-02 | End: 2024-08-02 | Stop reason: SDUPTHER

## 2024-08-02 RX ORDER — SODIUM CHLORIDE, SODIUM LACTATE, POTASSIUM CHLORIDE, CALCIUM CHLORIDE 600; 310; 30; 20 MG/100ML; MG/100ML; MG/100ML; MG/100ML
INJECTION, SOLUTION INTRAVENOUS CONTINUOUS
Status: DISCONTINUED | OUTPATIENT
Start: 2024-08-02 | End: 2024-08-02 | Stop reason: HOSPADM

## 2024-08-02 RX ORDER — ONDANSETRON 2 MG/ML
INJECTION INTRAMUSCULAR; INTRAVENOUS PRN
Status: DISCONTINUED | OUTPATIENT
Start: 2024-08-02 | End: 2024-08-02 | Stop reason: SDUPTHER

## 2024-08-02 RX ORDER — FENTANYL CITRATE 50 UG/ML
INJECTION, SOLUTION INTRAMUSCULAR; INTRAVENOUS PRN
Status: DISCONTINUED | OUTPATIENT
Start: 2024-08-02 | End: 2024-08-02 | Stop reason: SDUPTHER

## 2024-08-02 RX ORDER — PROCHLORPERAZINE EDISYLATE 5 MG/ML
5 INJECTION INTRAMUSCULAR; INTRAVENOUS
Status: DISCONTINUED | OUTPATIENT
Start: 2024-08-02 | End: 2024-08-02 | Stop reason: HOSPADM

## 2024-08-02 RX ORDER — OXYCODONE HYDROCHLORIDE 5 MG/1
10 TABLET ORAL PRN
Status: DISCONTINUED | OUTPATIENT
Start: 2024-08-02 | End: 2024-08-02 | Stop reason: HOSPADM

## 2024-08-02 RX ORDER — MIDAZOLAM HYDROCHLORIDE 2 MG/2ML
2 INJECTION, SOLUTION INTRAMUSCULAR; INTRAVENOUS
Status: DISCONTINUED | OUTPATIENT
Start: 2024-08-02 | End: 2024-08-02 | Stop reason: HOSPADM

## 2024-08-02 RX ORDER — NALOXONE HYDROCHLORIDE 0.4 MG/ML
INJECTION, SOLUTION INTRAMUSCULAR; INTRAVENOUS; SUBCUTANEOUS PRN
Status: DISCONTINUED | OUTPATIENT
Start: 2024-08-02 | End: 2024-08-02 | Stop reason: HOSPADM

## 2024-08-02 RX ORDER — LIDOCAINE HYDROCHLORIDE 10 MG/ML
1 INJECTION, SOLUTION INFILTRATION; PERINEURAL
Status: DISCONTINUED | OUTPATIENT
Start: 2024-08-02 | End: 2024-08-02 | Stop reason: HOSPADM

## 2024-08-02 RX ORDER — PROPOFOL 10 MG/ML
INJECTION, EMULSION INTRAVENOUS PRN
Status: DISCONTINUED | OUTPATIENT
Start: 2024-08-02 | End: 2024-08-02 | Stop reason: SDUPTHER

## 2024-08-02 RX ORDER — SODIUM CHLORIDE 9 MG/ML
INJECTION, SOLUTION INTRAVENOUS PRN
Status: DISCONTINUED | OUTPATIENT
Start: 2024-08-02 | End: 2024-08-02 | Stop reason: HOSPADM

## 2024-08-02 RX ORDER — SODIUM CHLORIDE 0.9 % (FLUSH) 0.9 %
5-40 SYRINGE (ML) INJECTION PRN
Status: DISCONTINUED | OUTPATIENT
Start: 2024-08-02 | End: 2024-08-02 | Stop reason: HOSPADM

## 2024-08-02 RX ORDER — OXYCODONE HYDROCHLORIDE 5 MG/1
5 TABLET ORAL PRN
Status: DISCONTINUED | OUTPATIENT
Start: 2024-08-02 | End: 2024-08-02 | Stop reason: HOSPADM

## 2024-08-02 RX ORDER — ONDANSETRON 2 MG/ML
4 INJECTION INTRAMUSCULAR; INTRAVENOUS
Status: DISCONTINUED | OUTPATIENT
Start: 2024-08-02 | End: 2024-08-02 | Stop reason: HOSPADM

## 2024-08-02 RX ORDER — DEXAMETHASONE SODIUM PHOSPHATE 10 MG/ML
INJECTION INTRAMUSCULAR; INTRAVENOUS PRN
Status: DISCONTINUED | OUTPATIENT
Start: 2024-08-02 | End: 2024-08-02 | Stop reason: SDUPTHER

## 2024-08-02 RX ORDER — HYDROMORPHONE HYDROCHLORIDE 2 MG/ML
0.5 INJECTION, SOLUTION INTRAMUSCULAR; INTRAVENOUS; SUBCUTANEOUS EVERY 5 MIN PRN
Status: DISCONTINUED | OUTPATIENT
Start: 2024-08-02 | End: 2024-08-02 | Stop reason: HOSPADM

## 2024-08-02 RX ADMIN — DEXAMETHASONE SODIUM PHOSPHATE 4 MG: 10 INJECTION INTRAMUSCULAR; INTRAVENOUS at 12:55

## 2024-08-02 RX ADMIN — PROPOFOL 50 MG: 10 INJECTION, EMULSION INTRAVENOUS at 12:49

## 2024-08-02 RX ADMIN — FENTANYL CITRATE 50 MCG: 50 INJECTION, SOLUTION INTRAMUSCULAR; INTRAVENOUS at 12:50

## 2024-08-02 RX ADMIN — ACETAMINOPHEN 1000 MG: 500 TABLET, FILM COATED ORAL at 10:43

## 2024-08-02 RX ADMIN — PROPOFOL 200 MG: 10 INJECTION, EMULSION INTRAVENOUS at 12:34

## 2024-08-02 RX ADMIN — Medication 2 G: at 12:46

## 2024-08-02 RX ADMIN — Medication 160 MG: at 12:34

## 2024-08-02 RX ADMIN — LIDOCAINE HYDROCHLORIDE 40 MG: 20 INJECTION, SOLUTION EPIDURAL; INFILTRATION; INTRACAUDAL; PERINEURAL at 12:34

## 2024-08-02 RX ADMIN — SODIUM CHLORIDE, POTASSIUM CHLORIDE, SODIUM LACTATE AND CALCIUM CHLORIDE: 600; 310; 30; 20 INJECTION, SOLUTION INTRAVENOUS at 10:55

## 2024-08-02 RX ADMIN — EPHEDRINE SULFATE 10 MG: 5 INJECTION INTRAVENOUS at 12:59

## 2024-08-02 RX ADMIN — FENTANYL CITRATE 50 MCG: 50 INJECTION, SOLUTION INTRAMUSCULAR; INTRAVENOUS at 12:34

## 2024-08-02 RX ADMIN — EPHEDRINE SULFATE 10 MG: 5 INJECTION INTRAVENOUS at 12:49

## 2024-08-02 RX ADMIN — ROCURONIUM BROMIDE 10 MG: 10 INJECTION, SOLUTION INTRAVENOUS at 12:34

## 2024-08-02 RX ADMIN — ONDANSETRON 4 MG: 2 INJECTION INTRAMUSCULAR; INTRAVENOUS at 12:55

## 2024-08-02 ASSESSMENT — PAIN - FUNCTIONAL ASSESSMENT: PAIN_FUNCTIONAL_ASSESSMENT: 0-10

## 2024-08-02 ASSESSMENT — LIFESTYLE VARIABLES: SMOKING_STATUS: 1

## 2024-08-02 NOTE — DISCHARGE INSTRUCTIONS
-There are steri-strips in place over your neck incision. All of the sutures are deep to these steri strips. You may shower and bathe as long as these steri strips remain clean and dry  -No strenuous activity or heavy lifting for 1 week  -Please start with soft foods and advance diet      After general anesthesia or intravenous sedation, for 24 hours or while taking prescription Narcotics:  Limit your activities  Some people will feel drowsy or dizzy for up to a few hours after waking up.  A responsible adult needs to be with you for the next 24 hours  Do not drive and operate hazardous machinery  Do not make important personal or business decisions  Do not drink alcoholic beverages  If you have not urinated within 8 hours after discharge, and you are experiencing discomfort from urinary retention, please go to the nearest ED.  If you have sleep apnea and have a CPAP machine, please use it for all naps and sleeping.  Please use caution when taking narcotics and any of your home medications that may cause drowsiness.  *  Please give a list of your current medications to your Primary Care Provider.  *  Please update this list whenever your medications are discontinued, doses are      changed, or new medications (including over-the-counter products) are added.  *  Please carry medication information at all times in case of emergency situations.    These are general instructions for a healthy lifestyle:  No smoking/ No tobacco products/ Avoid exposure to second hand smoke  Surgeon General's Warning:  Quitting smoking now greatly reduces serious risk to your health.  Obesity, smoking, and sedentary lifestyle greatly increases your risk for illness  A healthy diet, regular physical exercise & weight monitoring are important for maintaining a healthy lifestyle    You may be retaining fluid if you have a history of heart failure or if you experience any of the following symptoms:  Weight gain of 3 pounds or more overnight

## 2024-08-02 NOTE — TELEPHONE ENCOUNTER
Orders Placed This Encounter    DME - DURABLE MEDICAL EQUIPMENT     [facility] SLEEP CENTER [facility]  Phone: [address] [place] [address]  Dept: [phone]      Patient Name: Mechelle Hall  : 1965  Gender: female  Address: [address] [place] [address] [place] [address]  Patient phone number: [phone] (home) [phone] (work)      Primary Insurance: Payor: BCBS / Plan: [employer] [place] / Product Type: *No Product type* /   Subscriber ID: [id number] - ([place] [employer])      AMB Supply Order  Order Details     DME Location:  [facility]   Order Date: 2024   The primary encounter diagnosis was RADHA (obstructive sleep apnea). A diagnosis of Nocturnal hypoxemia was also pertinent to this visit.          (  X   )New Set-Up     CPAP machine   ( x    ) CPAP Unit  (     ) Auto CPAP Unit  (     ) BiLevel Unit  (     ) Auto BiLevel Unit  (     ) ASV   (     ) Bilevel ST    ( x    ) Oxygen Concentrator         Length of need: 12 months    Pressure: 9  cmH20 with 2 L oxygen bleed in  EPR: 0     Starting Ramp Pressure:   cm H20  Ramp Time: min      Patient had a diagnostic Apnea Hypopnea Index (AHI) of :  23.9    *SUPPLIES* Replace all as needed, or per coverage guidelines     Masks Type:    (  x   ) -Full Face Mask (1 per 3 mon)  ( x    ) -Full Mask (1 per month) Interface/Cushion      (  x   ) -Nasal Mask (1 per 3 mon)  (  x   ) - Nasal Mask (1 per month) Interface/Cushion  ( x    ) -Pillow (2 per mon)  (  x   ) -Voeaccqml (1 per 6 mon)      _________________________________________________________________          Other Supplies:    (  X   )-Jhkqewgj (1 per 6 mon)  ( X    )-Jfxkpv Tubing (1 per 3 mon)  (  X   )- Disposable Filter (2 per mon)  (   X  )-Tlqprf Humidifier (1 per year)     (  x   )-Tmbqvmuwe (sometimes used with Full Face Mask) (1 per 6 mos)  (     )-Tubing-without heat (1 per

## 2024-08-02 NOTE — ANESTHESIA PRE PROCEDURE
Poor sleep hygiene Z72.821   • Hypersomnia G47.10   • Non-restorative sleep G47.8   • Witnessed apneic spells R06.81   • Snoring R06.83   • RADHA (obstructive sleep apnea) G47.33   • Balance problem R26.89   • Nocturnal hypoxemia G47.34   • Current mild episode of major depressive disorder, unspecified whether recurrent (HCC) F32.0   • Acute mastoiditis with abscess of neck, left H70.092, L02.11       Past Medical History:        Diagnosis Date   • Anxiety 2000   • Cancer (HCC)     Bilateral  renal cell see Dr Lopez and [facility]   • Diabetes mellitus (HCC)     taken off meds because bs was too low, fasting blood surgars 50-80, feels hypoglycemic below 70,  last A1C 6.2 4/2024   • Headache 1999   • Hx of blood clots     treated with Eliquis x 1 year discontinued in 01/2024   • Hyperlipidemia    • Hypertension    • Neoplasm of submandibular gland    • Sleep apnea 2002    no c pap       Past Surgical History:        Procedure Laterality Date   • CHOLECYSTECTOMY  2003   • CT NEEDLE BIOPSY LUNG PERCUTANEOUS  09/28/2023    CT NEEDLE BIOPSY LUNG PERCUTANEOUS 9/28/2023 [facility] RADIOLOGY CT SCAN   • IR PORT PLACEMENT EQUAL OR GREATER THAN 5 YEARS  11/30/2022    IR PORT PLACEMENT EQUAL OR GREATER THAN 5 YEARS 11/30/2022 [facility] RADIOLOGY SPECIALS   • PARTIAL NEPHRECTOMY Left 12/08/2023   • SALIVARY GLAND SURGERY Left 2/8/2024    LEFT SUBMANDIBULAR GLAND EXCISION performed by Melvin Mcclure MD at [facility] MAIN OR   • SUBMANDIBULAR GLAND EXCISION Left 02/08/2024    Kami   • TUBAL LIGATION  3/91   • US BIOPSY OF SALIVARY GLAND  09/21/2023    US BIOPSY OF SALIVARY GLAND 9/21/2023 Kisha Newberry PA-C [facility] RADIOLOGY US       Social History:    Social History     Tobacco Use   • Smoking status: Every Day     Current packs/day: 1.00     Average packs/day: 1 pack/day for 45.6 years (45.6 ttl pk-yrs)     Types: Cigarettes     Start date: 1979     Passive exposure: Past   • Smokeless tobacco: Never   • Tobacco comments:     Smoking since I was

## 2024-08-02 NOTE — ANESTHESIA POSTPROCEDURE EVALUATION
Department of Anesthesiology  Postprocedure Note    Patient: Mechelle Hall  MRN: [id number]  YOB: 1965  Date of evaluation: 8/2/2024    Procedure Summary       Date: 08/02/24 Room / Location: [facility] MAIN OR  / [facility] MAIN OR    Anesthesia Start: 1230 Anesthesia Stop: 1330    Procedure: NECK INCISION AND DRAINAGE (Left: Neck) Diagnosis:       Acute mastoiditis with abscess of neck, left      (Acute mastoiditis with abscess of neck, left [H70.092, L02.11])    Providers: Melvin Mcclure MD Responsible Provider: ALEX Huang MD    Anesthesia Type: General ASA Status: 3            Anesthesia Type: General    Andrey Phase I: Andrey Score: 8    Andrey Phase II:      Anesthesia Post Evaluation    Patient location during evaluation: PACU  Patient participation: complete - patient participated  Level of consciousness: awake and alert  Airway patency: patent  Nausea & Vomiting: no nausea and no vomiting  Cardiovascular status: hemodynamically stable  Respiratory status: acceptable  Hydration status: euvolemic  Comments: Blood pressure (!) 118/59, pulse 58, temperature 97.5 °F (36.4 °C), temperature source Temporal, resp. rate 12, height 1.676 m (5' 6\"), weight 92 kg (202 lb 12.8 oz), SpO2 92 %.    No apparent anesthetic complications.  Pt stable for discharge from PACU  Multimodal analgesia pain management approach  Pain management: adequate    No notable events documented.

## 2024-08-02 NOTE — OP NOTE
Operative Note      Patient: Mechelle Hall  YOB: 1965  MRN: [id number]    Date of Procedure: 8/2/2024    Pre-op diagnosis: Left neck abscess    Post-Op Diagnosis: Left neck abscess       Procedure: Incision and drainage of left neck abscess    Operative Surgeon: Melvin Mcclure MD    Assistant: None    Anesthesia: General endotracheal    Anesthesiologist: Jona Huang MD    Operative findings: There was a small draining fistulous tract through the midportion of her previous left submandibular gland incision.  This tract extended up towards an area of inflamed, indurated tissue, but there was no acute purulence or sialocele noted.    IV fluid: 800 cc    Estimated Blood Loss (mL): 5 cc    Complications: None    Specimens:   ID Type Source Tests Collected by Time Destination   A : left neck mass Tissue Neck SURGICAL PATHOLOGY Melvin Mcclure MD 8/2/2024 1305        Implants: None      Drains: None     Disposition: PACU, then home    Condition: Stable    Brief history: Ms. Hall is a 58-year-old female who underwent excision of the left submandibular gland mass back in early February.  The pathology was consistent with metastatic renal cell carcinoma.  She did well for 3 months postoperatively, but then developed drainage from the midportion of her incision.  There appeared to be a small fistulous tract, and the drainage persisted despite several rounds of antibiotic therapy.  A CT scan was obtained which revealed concern for fluid collection, possibly a sialocele within the superficial tissue of the left neck.  The decision was made to take her to the operating room for incision and drainage of this fluid collection.    Detailed Description of Procedure:   The patient was brought back to the operating room and placed on the table in a supine position.  General endotracheal anesthesia was inducted without any complications.  Once the patient was adequate sedated, a total of 6 cc of 1% lidocaine  with 1-100,000 epinephrine was injected along the planned incision line.  She was then sterilely prepped and draped in usual fashion.  I began by designing a small ellipse around the midportion of her previous left neck incision.  This ellipse incorporated a small draining fistulous tract which was probed using a lacrimal probe.  I incised through the skin with a 15 blade and then used careful blunt and sharp dissection to dissect out this fistulous tract as it extended up towards her left upper neck near the site of her previous submandibular gland.  The tissue was scarred down with moderate inflammation, which made dissection difficult.  I was able to dissected superiorly until I encountered some thickened indurated tissue which was very inflamed.  No acute purulence or collection of saliva was noted.  I excised this underlying mass of tissue which incorporated the overlying skin and fistulous tract en bloc, and it was passed off for permanent pathology.    I irrigated out the wound bed and ensured adequate hemostasis using bipolar electrocautery.  Once hemostasis was ensured, I closed the incision using 3-0 undyed Vicryl deep sutures to re-approximate the platysma and dermis followed by Steri-Strips and Mastisol for the skin.  The patient was then turned back to the anesthesia team, extubated, and taken to the PACU in stable condition afterwards.    Electronically signed by Melvin Mcclure MD on 8/2/2024 at 1:16 PM

## 2024-08-02 NOTE — H&P
58-year-old female who underwent left submandibular and excision back on 2/8/2024, and the pathology was consistent with metastatic renal cell carcinoma.  She did develop a postop neck hematoma which required evacuation, and she was kept overnight for observation.  Over the past month, she has been doing with an intermittent sticky, thick drainage from the midportion of her neck incision which has been occurring daily.  I treated her with clindamycin and there has been no improvement at all.  I obtained a CT scan which revealed concern for small fluid collection within the superficial neck tissues.  She remains very symptomatic with continued drainage.    Past Medical History:   Diagnosis Date    Anxiety 2000    Cancer (HCC)     Bilateral  renal cell see Dr Lopez and [facility]    Diabetes mellitus (HCC)     taken off meds because bs was too low, fasting blood surgars 50-80, feels hypoglycemic below 70,  last A1C 6.2 4/2024    Headache 1999    Hx of blood clots     treated with Eliquis x 1 year discontinued in 01/2024    Hyperlipidemia     Hypertension     Neoplasm of submandibular gland     Sleep apnea 2002    no c pap     Past Surgical History:   Procedure Laterality Date    CHOLECYSTECTOMY  2003    CT NEEDLE BIOPSY LUNG PERCUTANEOUS  09/28/2023    CT NEEDLE BIOPSY LUNG PERCUTANEOUS 9/28/2023 [facility] RADIOLOGY CT SCAN    IR PORT PLACEMENT EQUAL OR GREATER THAN 5 YEARS  11/30/2022    IR PORT PLACEMENT EQUAL OR GREATER THAN 5 YEARS 11/30/2022 [facility] RADIOLOGY SPECIALS    PARTIAL NEPHRECTOMY Left 12/08/2023    SALIVARY GLAND SURGERY Left 2/8/2024    LEFT SUBMANDIBULAR GLAND EXCISION performed by Melvin Mcclure MD at [facility] MAIN OR    SUBMANDIBULAR GLAND EXCISION Left 02/08/2024    Kami    TUBAL LIGATION  3/91    US BIOPSY OF SALIVARY GLAND  09/21/2023    US BIOPSY OF SALIVARY GLAND 9/21/2023 Kisha Newberry PA-C [facility] RADIOLOGY US     Social History     Socioeconomic History    Marital status:      Spouse name: Not  s-malate (CABOMETYX) 40 MG TABS chemo tablet Take 1 tablet by mouth daily 30 tablet 3    Magic Mouthwash (MIRACLE MOUTHWASH) Swish and spit 5 mLs 4 times daily as needed for Irritation 480 mL 1    ondansetron (ZOFRAN) 4 MG tablet Take 1 tablet by mouth 3 times daily as needed for Nausea or Vomiting (Patient not taking: Reported on 7/26/2024) 15 tablet 2    Urea (CARMOL) 40 % cream Apply to hands and feet as needed. 120 g 0    sertraline (ZOLOFT) 100 MG tablet Take 1.5 tablets by mouth at bedtime At nights 45 tablet 11    hyoscyamine (LEVSIN/SL) 125 MCG sublingual tablet DISSOLVE 1 TABLET ON TONGUE EVERY 6 HOURS AS NEEDED FOR STOMACH PAIN      pantoprazole (PROTONIX) 40 MG tablet Take 1 tablet by mouth in the morning and at bedtime      promethazine (PHENERGAN) 25 MG tablet Take 1 tablet by mouth every 6 hours as needed for Nausea 60 tablet 1    Continuous Blood Gluc Sensor (DEXCOM G7 SENSOR) MISC Use daily. Change sensor every 10 days (Patient not taking: Reported on 7/26/2024) 3 each 11    blood glucose test strips (FREESTYLE TEST STRIPS) strip 1 each by In Vitro route daily As needed. (Patient not taking: Reported on 7/26/2024) 100 each 3    cetirizine (ZYRTEC) 10 MG tablet Take 1 tablet by mouth at bedtime      albuterol sulfate HFA (PROAIR HFA) 108 (90 Base) MCG/ACT inhaler Inhale 2 puffs into the lungs every 6 hours as needed for Wheezing 18 g 3    Acetaminophen (TYLENOL) 325 MG CAPS every 6 hours as needed As needed       EXAM:  BP (!) 140/75   Pulse 59   Temp 97.8 °F (36.6 °C) (Oral)   Resp 20   Ht 1.676 m (5' 6\")   Wt 92 kg (202 lb 12.8 oz)   LMP  (LMP Unknown)   SpO2 94%   BMI 32.73 kg/m²   General: NAD, well-appearing  Neuro: No gross neuro deficits. No facial weakness.  Eyes: No periorbital edema/ecchymosis. No nystagmus.  Skin: No facial erythema, rashes or concerning lesions.  Nose: No external deviations or saddling. Intranasally, septum is midline without perforations, nasal mucosa appears

## 2024-08-05 ENCOUNTER — OFFICE VISIT (OUTPATIENT)
Dept: INTERNAL MEDICINE CLINIC | Facility: CLINIC | Age: 59
End: 2024-08-05
Payer: COMMERCIAL

## 2024-08-05 VITALS
DIASTOLIC BLOOD PRESSURE: 67 MMHG | HEART RATE: 67 BPM | WEIGHT: 201.4 LBS | SYSTOLIC BLOOD PRESSURE: 103 MMHG | BODY MASS INDEX: 32.37 KG/M2 | OXYGEN SATURATION: 96 % | RESPIRATION RATE: 18 BRPM | HEIGHT: 66 IN | TEMPERATURE: 97.5 F

## 2024-08-05 DIAGNOSIS — C64.2 RENAL CELL CARCINOMA OF LEFT KIDNEY (HCC): ICD-10-CM

## 2024-08-05 DIAGNOSIS — E11.9 TYPE 2 DIABETES MELLITUS WITHOUT COMPLICATION, WITHOUT LONG-TERM CURRENT USE OF INSULIN (HCC): ICD-10-CM

## 2024-08-05 DIAGNOSIS — L02.11: ICD-10-CM

## 2024-08-05 DIAGNOSIS — I10 PRIMARY HYPERTENSION: ICD-10-CM

## 2024-08-05 DIAGNOSIS — D49.0: ICD-10-CM

## 2024-08-05 DIAGNOSIS — H70.092: ICD-10-CM

## 2024-08-05 DIAGNOSIS — G47.33 OSA (OBSTRUCTIVE SLEEP APNEA): ICD-10-CM

## 2024-08-05 DIAGNOSIS — E11.9 TYPE 2 DIABETES MELLITUS WITHOUT COMPLICATION, WITHOUT LONG-TERM CURRENT USE OF INSULIN (HCC): Primary | ICD-10-CM

## 2024-08-05 LAB
CREAT UR-MCNC: 197 MG/DL (ref 28–217)
EST. AVERAGE GLUCOSE BLD GHB EST-MCNC: 126 MG/DL
HBA1C MFR BLD: 6 % (ref 0–5.6)
MICROALBUMIN UR-MCNC: 2.79 MG/DL (ref 0–20)
MICROALBUMIN/CREAT UR-RTO: 14 MG/G (ref 0–30)

## 2024-08-05 PROCEDURE — 3074F SYST BP LT 130 MM HG: CPT | Performed by: NURSE PRACTITIONER

## 2024-08-05 PROCEDURE — 99214 OFFICE O/P EST MOD 30 MIN: CPT | Performed by: NURSE PRACTITIONER

## 2024-08-05 PROCEDURE — 3078F DIAST BP <80 MM HG: CPT | Performed by: NURSE PRACTITIONER

## 2024-08-05 PROCEDURE — 3044F HG A1C LEVEL LT 7.0%: CPT | Performed by: NURSE PRACTITIONER

## 2024-08-05 RX ORDER — AMLODIPINE BESYLATE 5 MG/1
5 TABLET ORAL DAILY
COMMUNITY
End: 2024-08-05 | Stop reason: SDUPTHER

## 2024-08-05 RX ORDER — AMLODIPINE BESYLATE 5 MG/1
5 TABLET ORAL DAILY PRN
Qty: 30 TABLET | Refills: 0
Start: 2024-08-05

## 2024-08-05 ASSESSMENT — ENCOUNTER SYMPTOMS
VOMITING: 0
SHORTNESS OF BREATH: 0
NAUSEA: 0
DIARRHEA: 0

## 2024-08-06 ENCOUNTER — PATIENT MESSAGE (OUTPATIENT)
Dept: ENT CLINIC | Age: 59
End: 2024-08-06

## 2024-08-07 NOTE — TELEPHONE ENCOUNTER
From: Mechelle Hall  To: Dr. Melvin Mcclure  Sent: 8/6/2024 4:51 PM EDT  Subject: Pain    I'm in a lot of pain last night and today. I've tried I've packs, I've taken my oxyicotin er 15 , also 4 hrs later took my oxycodone 10 mg. No relief

## 2024-08-11 ENCOUNTER — APPOINTMENT (OUTPATIENT)
Dept: CT IMAGING | Age: 59
End: 2024-08-11
Payer: COMMERCIAL

## 2024-08-11 ENCOUNTER — HOSPITAL ENCOUNTER (EMERGENCY)
Age: 59
Discharge: HOME | End: 2024-08-11
Attending: EMERGENCY MEDICINE
Payer: COMMERCIAL

## 2024-08-11 VITALS
SYSTOLIC BLOOD PRESSURE: 140 MMHG | TEMPERATURE: 97.8 F | DIASTOLIC BLOOD PRESSURE: 76 MMHG | BODY MASS INDEX: 32.3 KG/M2 | WEIGHT: 201 LBS | RESPIRATION RATE: 16 BRPM | HEIGHT: 66 IN | OXYGEN SATURATION: 93 % | HEART RATE: 62 BPM

## 2024-08-11 DIAGNOSIS — C64.2 RENAL CELL CARCINOMA OF LEFT KIDNEY (HCC): ICD-10-CM

## 2024-08-11 DIAGNOSIS — L03.90 CELLULITIS, UNSPECIFIED CELLULITIS SITE: Primary | ICD-10-CM

## 2024-08-11 LAB
ANION GAP SERPL CALC-SCNC: 10 MMOL/L (ref 9–18)
BASOPHILS # BLD: 0 K/UL (ref 0–0.2)
BASOPHILS NFR BLD: 0 % (ref 0–2)
BUN SERPL-MCNC: 5 MG/DL (ref 6–23)
CALCIUM SERPL-MCNC: 8.6 MG/DL (ref 8.8–10.2)
CHLORIDE SERPL-SCNC: 105 MMOL/L (ref 98–107)
CO2 SERPL-SCNC: 24 MMOL/L (ref 20–28)
CREAT SERPL-MCNC: 0.63 MG/DL (ref 0.6–1.1)
DIFFERENTIAL METHOD BLD: ABNORMAL
EOSINOPHIL # BLD: 0.2 K/UL (ref 0–0.8)
EOSINOPHIL NFR BLD: 3 % (ref 0.5–7.8)
ERYTHROCYTE [DISTWIDTH] IN BLOOD BY AUTOMATED COUNT: 14.9 % (ref 11.9–14.6)
GLUCOSE SERPL-MCNC: 106 MG/DL (ref 70–99)
HCT VFR BLD AUTO: 46.5 % (ref 35.8–46.3)
HGB BLD-MCNC: 15.1 G/DL (ref 11.7–15.4)
IMM GRANULOCYTES # BLD AUTO: 0 K/UL (ref 0–0.5)
IMM GRANULOCYTES NFR BLD AUTO: 0 % (ref 0–5)
LYMPHOCYTES # BLD: 2.3 K/UL (ref 0.5–4.6)
LYMPHOCYTES NFR BLD: 32 % (ref 13–44)
MCH RBC QN AUTO: 31.5 PG (ref 26.1–32.9)
MCHC RBC AUTO-ENTMCNC: 32.5 G/DL (ref 31.4–35)
MCV RBC AUTO: 96.9 FL (ref 82–102)
MONOCYTES # BLD: 0.6 K/UL (ref 0.1–1.3)
MONOCYTES NFR BLD: 8 % (ref 4–12)
NEUTS SEG # BLD: 4 K/UL (ref 1.7–8.2)
NEUTS SEG NFR BLD: 57 % (ref 43–78)
NRBC # BLD: 0 K/UL (ref 0–0.2)
PLATELET # BLD AUTO: 201 K/UL (ref 150–450)
PMV BLD AUTO: 10.7 FL (ref 9.4–12.3)
POTASSIUM SERPL-SCNC: 4.4 MMOL/L (ref 3.5–5.1)
RBC # BLD AUTO: 4.8 M/UL (ref 4.05–5.2)
SODIUM SERPL-SCNC: 140 MMOL/L (ref 136–145)
WBC # BLD AUTO: 7.2 K/UL (ref 4.3–11.1)

## 2024-08-11 PROCEDURE — 6360000004 HC RX CONTRAST MEDICATION: Performed by: EMERGENCY MEDICINE

## 2024-08-11 PROCEDURE — 71260 CT THORAX DX C+: CPT

## 2024-08-11 PROCEDURE — 80048 BASIC METABOLIC PNL TOTAL CA: CPT

## 2024-08-11 PROCEDURE — 6360000002 HC RX W HCPCS: Performed by: EMERGENCY MEDICINE

## 2024-08-11 PROCEDURE — 70491 CT SOFT TISSUE NECK W/DYE: CPT

## 2024-08-11 PROCEDURE — 96365 THER/PROPH/DIAG IV INF INIT: CPT

## 2024-08-11 PROCEDURE — 99285 EMERGENCY DEPT VISIT HI MDM: CPT

## 2024-08-11 PROCEDURE — 85025 COMPLETE CBC W/AUTO DIFF WBC: CPT

## 2024-08-11 RX ORDER — CLINDAMYCIN PHOSPHATE 900 MG/50ML
900 INJECTION, SOLUTION INTRAVENOUS ONCE
Status: COMPLETED | OUTPATIENT
Start: 2024-08-11 | End: 2024-08-11

## 2024-08-11 RX ORDER — HEPARIN SODIUM,PORCINE/PF 10 UNIT/ML
3 SYRINGE (ML) INTRAVENOUS ONCE
Status: DISCONTINUED | OUTPATIENT
Start: 2024-08-11 | End: 2024-08-11

## 2024-08-11 RX ORDER — CLINDAMYCIN HYDROCHLORIDE 300 MG/1
300 CAPSULE ORAL 4 TIMES DAILY
Qty: 40 CAPSULE | Refills: 0 | Status: SHIPPED | OUTPATIENT
Start: 2024-08-11 | End: 2024-08-21

## 2024-08-11 RX ORDER — HEPARIN 100 UNIT/ML
300 SYRINGE INTRAVENOUS PRN
Status: DISCONTINUED | OUTPATIENT
Start: 2024-08-11 | End: 2024-08-11 | Stop reason: HOSPADM

## 2024-08-11 RX ADMIN — CLINDAMYCIN PHOSPHATE 900 MG: 900 INJECTION, SOLUTION INTRAVENOUS at 14:20

## 2024-08-11 RX ADMIN — HEPARIN 300 UNITS: 100 SYRINGE at 15:40

## 2024-08-11 RX ADMIN — IOPAMIDOL 100 ML: 755 INJECTION, SOLUTION INTRAVENOUS at 11:48

## 2024-08-11 ASSESSMENT — PAIN SCALES - GENERAL: PAINLEVEL_OUTOF10: 9

## 2024-08-11 ASSESSMENT — PAIN - FUNCTIONAL ASSESSMENT: PAIN_FUNCTIONAL_ASSESSMENT: 0-10

## 2024-08-11 ASSESSMENT — PAIN DESCRIPTION - LOCATION: LOCATION: NECK

## 2024-08-11 ASSESSMENT — PAIN DESCRIPTION - ORIENTATION: ORIENTATION: ANTERIOR

## 2024-08-11 NOTE — ED PROVIDER NOTES
from [facility], [facility]    Abuse Screen     Feels Unsafe at Home or Work/School: no     Feels Threatened by Someone: no     Does Anyone Try to Keep You From Having Contact with Others or Doing Things Outside Your Home?: no     Physical Signs of Abuse Present: no   Housing Stability: Low Risk  (6/3/2024)    Housing Stability Vital Sign     Unable to Pay for Housing in the Last Year: No     Number of Places Lived in the Last Year: 1     Unstable Housing in the Last Year: No        Discharge Medication List as of 8/11/2024  3:19 PM        CONTINUE these medications which have NOT CHANGED    Details   amLODIPine (NORVASC) 5 MG tablet Take 1 tablet by mouth daily as needed (if BP is over 140), Disp-30 tablet, R-0NO PRINT      !! ondansetron (ZOFRAN) 4 MG tablet Take 2 tablets by mouth every 8 hours as needed for Nausea or Vomiting, Disp-8 tablet, R-0Normal      busPIRone (BUSPAR) 10 MG tablet Take 1 tablet by mouth 2 times daily, Disp-60 tablet, R-3Normal      loperamide (IMODIUM) 2 MG capsule Take 1 capsule by mouth every 6 hours as needed for Diarrhea, Disp-30 capsule, R-3Normal      gabapentin (NEURONTIN) 300 MG capsule Take 1 capsule by mouth 3 times daily for 360 days. Intended supply: 90 days, Disp-270 capsule, R-3Normal      oxyCODONE (OXYCONTIN) 15 MG T12A extended release tablet Take 1 tablet by mouth in the morning and 1 tablet in the evening. Do all this for 30 days. Max Daily Amount: 30 mg., Disp-60 tablet, R-0Normal      oxyCODONE HCl (OXY-IR) 10 MG immediate release tablet Take 1 tablet by mouth every 4 hours as needed for Pain for up to 30 days. Intended supply: 30 days Max Daily Amount: 60 mg, Disp-180 tablet, R-0Normal      droNABinol (MARINOL) 5 MG capsule Take 1 capsule by mouth every 6-8 hours as needed (nausea) for up to 90 days. Max Daily Amount: 20 mg, Disp-90 capsule, R-0Normal      clonazePAM (KLONOPIN) 1 MG tablet Take 1 tablet by mouth 2

## 2024-08-11 NOTE — ED NOTES
Pt requesting port to be accessed stating it is accessed anytime she needs a CT. Patient informed of increased risk of infection and it is preferred to get peripheral access over port access. Pt refused peripheral access and still requesting port to be accessed. Provider aware and giving order to access port. Charge nurse aware.      Dory Ji, RN  08/11/24 0989

## 2024-08-11 NOTE — ED TRIAGE NOTES
Pt arrives ambulatory to triage with c/o worsening neck pain with redness at incision since Sunday. States she touched base with provider and was told to come to ER if continues to worsens. States she was called in clindamycin and stated taking last night. Reports having n/v.

## 2024-08-12 ENCOUNTER — OFFICE VISIT (OUTPATIENT)
Dept: ENT CLINIC | Age: 59
End: 2024-08-12

## 2024-08-12 ENCOUNTER — PATIENT MESSAGE (OUTPATIENT)
Dept: ENT CLINIC | Age: 59
End: 2024-08-12

## 2024-08-12 VITALS — BODY MASS INDEX: 32.31 KG/M2 | RESPIRATION RATE: 14 BRPM | HEIGHT: 66 IN | WEIGHT: 201.06 LBS

## 2024-08-12 DIAGNOSIS — S11.90XA OPEN NECK WOUND, INITIAL ENCOUNTER: Primary | ICD-10-CM

## 2024-08-12 PROCEDURE — 99024 POSTOP FOLLOW-UP VISIT: CPT | Performed by: OTOLARYNGOLOGY

## 2024-08-12 RX ORDER — MOXIFLOXACIN HYDROCHLORIDE 400 MG/1
400 TABLET ORAL DAILY
Qty: 10 TABLET | Refills: 0 | Status: SHIPPED | OUTPATIENT
Start: 2024-08-12 | End: 2024-08-12 | Stop reason: RX

## 2024-08-12 RX ORDER — AZITHROMYCIN 500 MG/1
500 TABLET, FILM COATED ORAL DAILY
Qty: 10 TABLET | Refills: 0 | Status: SHIPPED | OUTPATIENT
Start: 2024-08-12 | End: 2024-08-22

## 2024-08-12 NOTE — PROGRESS NOTES
Mechelle Hall is a 58 y.o. female seen today now 10 days post-op after undergoing incision and drainage of a left neck abscess back on 8/2/2024.  I had spoken to her last week, as her Steri-Strips came off early, and she had noted a small draining hole.  She did well until Friday when she noted increased swelling and purulent drainage from this incision.  She had spoken to my partner over the weekend and was started on Clindamycin and Bactroban ointment.  Her pain and swelling worsened, and she ended up in the ED yesterday and a CT scan was obtained which revealed concern for a recurrent neck abscess.  She received a dose of IV clindamycin in the ED, and just recently filled her oral prescription.  She still reports localized pain and some continued drainage.  She is currently wearing a gauze dressing to collect some of the drainage.  She denies any fevers or other systemic symptoms.    Resp 14   Ht 1.676 m (5' 5.98\")   Wt 91.2 kg (201 lb 1 oz)   LMP  (LMP Unknown)   BMI 32.47 kg/m²    -Significant erythema of the surrounding skin, and there is 1 cm open area of the neck incision with some tan-colored purulent drainage.  I copiously irrigated out this open abscess cavity and applied a wet-to-dry dressing.    A/P:   Diagnosis Orders   1. Open neck wound, initial encounter  Culture, Wound        She has developed a postop wound infection after her recent incision and drainage.  There was an open cavity with active drainage, and I took a culture and irrigated out this incision.  I placed a wet-to-dry dressing, and taught her how to replace this dressing twice daily.  I will check in on her tomorrow morning and she will come back to the office on Wednesday for reevaluation.  If her symptoms worsen at all, she may need admission for IV antibiotics.    Melvin Mcclure MD

## 2024-08-13 ENCOUNTER — CARE COORDINATION (OUTPATIENT)
Dept: CARE COORDINATION | Facility: CLINIC | Age: 59
End: 2024-08-13

## 2024-08-13 NOTE — CARE COORDINATION
CCM assessment call deferred at this time for ED visit on 8/11/2024 due to patient is being case managed by oncology and palliative care   Case closed and no outreach call scheduled at this time

## 2024-08-14 ENCOUNTER — OFFICE VISIT (OUTPATIENT)
Dept: ENT CLINIC | Age: 59
End: 2024-08-14
Payer: COMMERCIAL

## 2024-08-14 VITALS — RESPIRATION RATE: 12 BRPM | BODY MASS INDEX: 32.31 KG/M2 | WEIGHT: 201.06 LBS | HEIGHT: 66 IN

## 2024-08-14 DIAGNOSIS — L02.11 NECK ABSCESS: Primary | ICD-10-CM

## 2024-08-14 PROCEDURE — 99213 OFFICE O/P EST LOW 20 MIN: CPT | Performed by: OTOLARYNGOLOGY

## 2024-08-14 NOTE — PROGRESS NOTES
Mechelle Hall is a 58 y.o. female seen today now almost 2 weeks post-op after undergoing incision and drainage of a left neck abscess back on 8/2/2024.  Her incision opened up last week and has been draining since then.  I started doing wet-to-dry dressing changes on Monday and she was able to change it out twice herself yesterday.  She continues to have some purulent drainage from the incision, but reports less pain overall.  She has been on clindamycin and azithromycin and tolerating these antibiotics well.  She denies any fevers or other systemic symptoms.    Resp 12   Ht 1.676 m (5' 5.98\")   Wt 91.2 kg (201 lb 1 oz)   LMP  (LMP Unknown)   BMI 32.47 kg/m²    -There is left neck depressed incision with 1 cm open area centrally.  Abscess cavity is granulating in well with just mild purulence.  Wet-to-dry dressing applied.    Culture    LIGHT Staphylococcus aureus SENSITIVITY TO FOLLOW Abnormal  P     A/P:   Diagnosis Orders   1. Neck abscess          Her left neck incision looks better today, and the abscess cavity appears to be granulating in.  I applied a new wet-to-dry dressing and she will continue dressing changes at least twice daily, and she will continue her current antibiotic regimen. I will see her back on Friday morning for another wound check.    Melvin Mcclure MD

## 2024-08-15 DIAGNOSIS — C64.2 RENAL CELL CARCINOMA OF LEFT KIDNEY (HCC): Primary | ICD-10-CM

## 2024-08-15 LAB
BACTERIA SPEC CULT: ABNORMAL
BACTERIA SPEC CULT: ABNORMAL
GRAM STN SPEC: ABNORMAL
GRAM STN SPEC: ABNORMAL
SERVICE CMNT-IMP: ABNORMAL

## 2024-08-15 RX ORDER — RIFAMPIN 300 MG/1
300 CAPSULE ORAL 2 TIMES DAILY
Qty: 20 CAPSULE | Refills: 0 | Status: SHIPPED | OUTPATIENT
Start: 2024-08-15 | End: 2024-08-25

## 2024-08-16 ENCOUNTER — TELEPHONE (OUTPATIENT)
Dept: ONCOLOGY | Age: 59
End: 2024-08-16

## 2024-08-16 ENCOUNTER — OFFICE VISIT (OUTPATIENT)
Dept: ENT CLINIC | Age: 59
End: 2024-08-16

## 2024-08-16 VITALS — RESPIRATION RATE: 12 BRPM | WEIGHT: 201.06 LBS | HEIGHT: 66 IN | BODY MASS INDEX: 32.31 KG/M2

## 2024-08-16 DIAGNOSIS — L02.11 NECK ABSCESS: Primary | ICD-10-CM

## 2024-08-16 PROCEDURE — 99024 POSTOP FOLLOW-UP VISIT: CPT | Performed by: OTOLARYNGOLOGY

## 2024-08-16 NOTE — TELEPHONE ENCOUNTER
Called and informed Mrs. Hall her forms are ready to be picked up. They will be at the  on the second floor. She needs to sign them so I can't send them in. Patient VU and appreciated the call.

## 2024-08-16 NOTE — PROGRESS NOTES
Mechelle Hall is a 58 y.o. female seen today now 2 weeks post-op after undergoing incision and drainage of left neck abscess back on 8/2/2024.  She has developed a postop wound infection with a draining area of the incision centrally.  She was seen on Monday and Wednesday earlier this week and has been performing wet-to-dry dressing changes as instructed.  I changed her antibiotics yesterday to rifampin, as her culture grew out MRSA which was resistant to clindamycin.  She is doing slightly better since Wednesday with less pain overall.  She has been changing out the dressing at least twice daily.  She denies any fevers or systemic symptoms.    Resp 12   Ht 1.676 m (5' 5.98\")   Wt 91.2 kg (201 lb 1 oz)   LMP  (LMP Unknown)   BMI 32.47 kg/m²    -Left neck incision with open central area, healthy granulation tissue and no acute purulence noted.  New wet-to-dry dressing placed.  Less erythema of skin overall, although still fairly edematous superiorly.    CULTURES: Wound- 8/12/24    Culture    LIGHT Methicillin Resistant Staphylococcus aureus Abnormal      A/P:   Diagnosis Orders   1. Neck abscess          She continues to improve clinically, and I changed her antibiotic regimen from clindamycin to rifampin due to her culture results.  She will continue twice daily wet-to-dry dressing changes and I will see her back on Monday for another wound check.    Melvin Mcclure MD

## 2024-08-19 ENCOUNTER — OFFICE VISIT (OUTPATIENT)
Dept: ENT CLINIC | Age: 59
End: 2024-08-19

## 2024-08-19 VITALS — HEIGHT: 66 IN | WEIGHT: 201.06 LBS | BODY MASS INDEX: 32.31 KG/M2

## 2024-08-19 DIAGNOSIS — L02.11 NECK ABSCESS: Primary | ICD-10-CM

## 2024-08-19 PROCEDURE — 99024 POSTOP FOLLOW-UP VISIT: CPT | Performed by: OTOLARYNGOLOGY

## 2024-08-19 NOTE — PROGRESS NOTES
Mechelle Hall is a 58 y.o. female seen today now over 2 weeks post-op after undergoing incision and drainage of left neck abscess back on 8/2/2024.  She has developed a postop wound infection with a draining area of the incision centrally.  She has been doing daily wet-to-dry dressing changes and has improved significantly since her last visit on Friday.  There has been just minimal drainage from the incision, and she has much less pain as well.  No fevers or other systemic symptoms.  She is tolerating her antibiotics well (Rifampin, Azithromycin).    Ht 1.676 m (5' 5.98\")   Wt 91.2 kg (201 lb 1 oz)   LMP  (LMP Unknown)   BMI 32.47 kg/m²    -Neck incision much improved with central area still open with healthy granulation tissue.  No purulence noted.  New wet-to-dry dressing placed  -No further skin erythema, but mild induration supra incisional, especially on the left side    A/P:   Diagnosis Orders   1. Neck abscess          Her neck incision looks much improved with no further cellulitic changes.  There is still a small healing absces cavity, and I placed a new wet-to-dry dressing today.  RTC in 1 week for another wound check.    Melvin Mcclure MD

## 2024-08-21 ENCOUNTER — PATIENT MESSAGE (OUTPATIENT)
Dept: ENT CLINIC | Age: 59
End: 2024-08-21

## 2024-08-23 ENCOUNTER — OFFICE VISIT (OUTPATIENT)
Dept: ONCOLOGY | Age: 59
End: 2024-08-23
Payer: COMMERCIAL

## 2024-08-23 ENCOUNTER — NURSE ONLY (OUTPATIENT)
Dept: PULMONOLOGY | Age: 59
End: 2024-08-23

## 2024-08-23 ENCOUNTER — OFFICE VISIT (OUTPATIENT)
Dept: PALLATIVE CARE | Age: 59
End: 2024-08-23
Payer: COMMERCIAL

## 2024-08-23 VITALS
OXYGEN SATURATION: 95 % | TEMPERATURE: 97.7 F | HEIGHT: 66 IN | HEART RATE: 75 BPM | SYSTOLIC BLOOD PRESSURE: 114 MMHG | RESPIRATION RATE: 16 BRPM | BODY MASS INDEX: 31.82 KG/M2 | DIASTOLIC BLOOD PRESSURE: 67 MMHG | WEIGHT: 198 LBS

## 2024-08-23 DIAGNOSIS — C64.2 RENAL CELL CARCINOMA OF LEFT KIDNEY (HCC): ICD-10-CM

## 2024-08-23 DIAGNOSIS — F41.9 ANXIETY AND DEPRESSION: ICD-10-CM

## 2024-08-23 DIAGNOSIS — C64.2 RENAL CELL CARCINOMA OF LEFT KIDNEY (HCC): Primary | ICD-10-CM

## 2024-08-23 DIAGNOSIS — K11.8 SUBMANDIBULAR GLAND MASS: ICD-10-CM

## 2024-08-23 DIAGNOSIS — G89.3 CANCER ASSOCIATED PAIN: Primary | ICD-10-CM

## 2024-08-23 DIAGNOSIS — R91.8 PULMONARY NODULES: ICD-10-CM

## 2024-08-23 DIAGNOSIS — Z51.5 ENCOUNTER FOR PALLIATIVE CARE: ICD-10-CM

## 2024-08-23 DIAGNOSIS — N28.89 BILATERAL RENAL MASSES: ICD-10-CM

## 2024-08-23 DIAGNOSIS — E27.8 MASS OF RIGHT ADRENAL GLAND (HCC): ICD-10-CM

## 2024-08-23 DIAGNOSIS — Z79.899 HIGH RISK MEDICATION USE: ICD-10-CM

## 2024-08-23 DIAGNOSIS — R05.3 CHRONIC COUGH: Primary | ICD-10-CM

## 2024-08-23 DIAGNOSIS — F32.A ANXIETY AND DEPRESSION: ICD-10-CM

## 2024-08-23 LAB
ALBUMIN SERPL-MCNC: 3.5 G/DL (ref 3.2–4.6)
ALBUMIN/GLOB SERPL: 1.3 (ref 1–1.9)
ALP SERPL-CCNC: 73 U/L (ref 35–104)
ALT SERPL-CCNC: 15 U/L (ref 12–65)
ANION GAP SERPL CALC-SCNC: 10 MMOL/L (ref 9–18)
AST SERPL-CCNC: 17 U/L (ref 15–37)
BASOPHILS # BLD: 0 K/UL (ref 0–0.2)
BASOPHILS NFR BLD: 0 % (ref 0–2)
BILIRUB SERPL-MCNC: 0.3 MG/DL (ref 0–1.2)
BUN SERPL-MCNC: 10 MG/DL (ref 8–23)
CALCIUM SERPL-MCNC: 9.1 MG/DL (ref 8.8–10.2)
CHLORIDE SERPL-SCNC: 107 MMOL/L (ref 98–107)
CO2 SERPL-SCNC: 26 MMOL/L (ref 20–28)
CREAT SERPL-MCNC: 0.58 MG/DL (ref 0.6–1.1)
DIFFERENTIAL METHOD BLD: NORMAL
EOSINOPHIL # BLD: 0.3 K/UL (ref 0–0.8)
EOSINOPHIL NFR BLD: 3 % (ref 0.5–7.8)
ERYTHROCYTE [DISTWIDTH] IN BLOOD BY AUTOMATED COUNT: 14.6 % (ref 11.9–14.6)
FEV 1 , POC: 2.59 L
FEV1 % PRED, POC: 98 %
FEV1/FVC, POC: NORMAL
FVC % PRED, POC: 89 %
FVC, POC: NORMAL
GLOBULIN SER CALC-MCNC: 2.7 G/DL (ref 2.3–3.5)
GLUCOSE SERPL-MCNC: 128 MG/DL (ref 70–99)
HCT VFR BLD AUTO: 44.2 % (ref 35.8–46.3)
HGB BLD-MCNC: 14.4 G/DL (ref 11.7–15.4)
IMM GRANULOCYTES # BLD AUTO: 0 K/UL (ref 0–0.5)
IMM GRANULOCYTES NFR BLD AUTO: 0 % (ref 0–5)
LYMPHOCYTES # BLD: 2.5 K/UL (ref 0.5–4.6)
LYMPHOCYTES NFR BLD: 34 % (ref 13–44)
MCH RBC QN AUTO: 31.9 PG (ref 26.1–32.9)
MCHC RBC AUTO-ENTMCNC: 32.6 G/DL (ref 31.4–35)
MCV RBC AUTO: 98 FL (ref 82–102)
MONOCYTES # BLD: 0.5 K/UL (ref 0.1–1.3)
MONOCYTES NFR BLD: 7 % (ref 4–12)
NEUTS SEG # BLD: 4.1 K/UL (ref 1.7–8.2)
NEUTS SEG NFR BLD: 56 % (ref 43–78)
NRBC # BLD: 0 K/UL (ref 0–0.2)
PLATELET # BLD AUTO: 231 K/UL (ref 150–450)
PMV BLD AUTO: 10.6 FL (ref 9.4–12.3)
POTASSIUM SERPL-SCNC: 3.9 MMOL/L (ref 3.5–5.1)
PROT SERPL-MCNC: 6.2 G/DL (ref 6.3–8.2)
RBC # BLD AUTO: 4.51 M/UL (ref 4.05–5.2)
SODIUM SERPL-SCNC: 143 MMOL/L (ref 136–145)
WBC # BLD AUTO: 7.4 K/UL (ref 4.3–11.1)

## 2024-08-23 PROCEDURE — 99214 OFFICE O/P EST MOD 30 MIN: CPT

## 2024-08-23 RX ORDER — OXYCODONE HYDROCHLORIDE 15 MG/1
1 TABLET, FILM COATED, EXTENDED RELEASE ORAL EVERY 12 HOURS
Qty: 60 TABLET | Refills: 0 | Status: SHIPPED | OUTPATIENT
Start: 2024-08-28 | End: 2024-09-27

## 2024-08-23 RX ORDER — OXYCODONE HYDROCHLORIDE 10 MG/1
10 TABLET ORAL EVERY 4 HOURS PRN
Qty: 180 TABLET | Refills: 0 | Status: SHIPPED | OUTPATIENT
Start: 2024-08-25 | End: 2024-09-24

## 2024-08-23 RX ORDER — CLONAZEPAM 1 MG/1
1 TABLET ORAL 2 TIMES DAILY PRN
Qty: 60 TABLET | Refills: 2 | Status: SHIPPED | OUTPATIENT
Start: 2024-08-24 | End: 2024-11-22

## 2024-08-23 ASSESSMENT — ENCOUNTER SYMPTOMS
BACK PAIN: 1
ABDOMINAL PAIN: 1
DIARRHEA: 1
NAUSEA: 1

## 2024-08-23 ASSESSMENT — PULMONARY FUNCTION TESTS
FVC_PERCENT_PREDICTED_POC: 89
FEV1_PERCENT_PREDICTED_POC: 98

## 2024-08-23 NOTE — PROGRESS NOTES
for Diarrhea 30 capsule 3    gabapentin (NEURONTIN) 300 MG capsule Take 1 capsule by mouth 3 times daily for 360 days. Intended supply: 90 days 270 capsule 3    oxyCODONE (OXYCONTIN) 15 MG T12A extended release tablet Take 1 tablet by mouth in the morning and 1 tablet in the evening. Do all this for 30 days. Max Daily Amount: 30 mg. 60 tablet 0    oxyCODONE HCl (OXY-IR) 10 MG immediate release tablet Take 1 tablet by mouth every 4 hours as needed for Pain for up to 30 days. Intended supply: 30 days Max Daily Amount: 60 mg 180 tablet 0    droNABinol (MARINOL) 5 MG capsule Take 1 capsule by mouth every 6-8 hours as needed (nausea) for up to 90 days. Max Daily Amount: 20 mg 90 capsule 0    clonazePAM (KLONOPIN) 1 MG tablet Take 1 tablet by mouth 2 times daily as needed for Anxiety for up to 90 days. Max Daily Amount: 2 mg 60 tablet 2    cabozantinib s-malate (CABOMETYX) 40 MG TABS chemo tablet Take 1 tablet by mouth daily 30 tablet 3    Magic Mouthwash (MIRACLE MOUTHWASH) Swish and spit 5 mLs 4 times daily as needed for Irritation 480 mL 1    ondansetron (ZOFRAN) 4 MG tablet Take 1 tablet by mouth 3 times daily as needed for Nausea or Vomiting 15 tablet 2    Urea (CARMOL) 40 % cream Apply to hands and feet as needed. 120 g 0    sertraline (ZOLOFT) 100 MG tablet Take 1.5 tablets by mouth at bedtime At nights 45 tablet 11    hyoscyamine (LEVSIN/SL) 125 MCG sublingual tablet DISSOLVE 1 TABLET ON TONGUE EVERY 6 HOURS AS NEEDED FOR STOMACH PAIN      pantoprazole (PROTONIX) 40 MG tablet Take 1 tablet by mouth in the morning and at bedtime      promethazine (PHENERGAN) 25 MG tablet Take 1 tablet by mouth every 6 hours as needed for Nausea 60 tablet 1    Continuous Blood Gluc Sensor (DEXCOM G7 SENSOR) MISC Use daily. Change sensor every 10 days 3 each 11    blood glucose test strips (FREESTYLE TEST STRIPS) strip 1 each by In Vitro route daily As needed. 100 each 3    cetirizine (ZYRTEC) 10 MG tablet Take 1 tablet by mouth at

## 2024-08-26 ENCOUNTER — PATIENT MESSAGE (OUTPATIENT)
Dept: ENT CLINIC | Age: 59
End: 2024-08-26

## 2024-08-26 ENCOUNTER — OFFICE VISIT (OUTPATIENT)
Dept: ENT CLINIC | Age: 59
End: 2024-08-26

## 2024-08-26 VITALS — WEIGHT: 197.97 LBS | BODY MASS INDEX: 31.82 KG/M2 | HEIGHT: 66 IN

## 2024-08-26 DIAGNOSIS — L02.11 NECK ABSCESS: Primary | ICD-10-CM

## 2024-08-26 PROCEDURE — 99024 POSTOP FOLLOW-UP VISIT: CPT | Performed by: OTOLARYNGOLOGY

## 2024-08-26 RX ORDER — CABOZANTINIB 40 MG/1
TABLET ORAL
Qty: 30 TABLET | Refills: 3 | OUTPATIENT
Start: 2024-08-26

## 2024-08-26 NOTE — PROGRESS NOTES
Mechelle Hall is a 59 y.o. female seen today for a wound check.  She is now 3 weeks postop after undergoing incision and drainage of left neck abscess back on 8/2/2024.  She developed drainage from the central portion of her incision about 10 days postoperatively and has been applying wet-to-dry dressing changes to this area since then.  Last seen back on 8/19/24, and she continues to improve clinically.  She finished both courses of antibiotics, and has only been having to change out her dressing once daily.  She denies any further pain, and there have been no fevers or other systemic symptoms.    Ht 1.676 m (5' 5.98\")   Wt 89.8 kg (197 lb 15.6 oz)   LMP  (LMP Unknown)   BMI 31.97 kg/m²    -Neck incision healing well with just a central open area with healthy granulation tissue, no judah purulence noted.  No further erythema or cellulitic changes of the skin    A/P:   Diagnosis Orders   1. Neck abscess          Her neck looks much improved since last week with healthy granulation tissue within the central depressed area of the incision.  I anticipate a full recovery, and she will continue with her daily dressing changes, but I see no further need for antibiotics at this time.  RTC in 2-3 weeks for reevaluation.    Melvin Mcclure MD

## 2024-08-27 ENCOUNTER — OFFICE VISIT (OUTPATIENT)
Dept: INTERNAL MEDICINE CLINIC | Facility: CLINIC | Age: 59
End: 2024-08-27

## 2024-08-27 VITALS
SYSTOLIC BLOOD PRESSURE: 120 MMHG | BODY MASS INDEX: 31.85 KG/M2 | TEMPERATURE: 97.2 F | OXYGEN SATURATION: 97 % | DIASTOLIC BLOOD PRESSURE: 78 MMHG | HEART RATE: 69 BPM | WEIGHT: 198.2 LBS | HEIGHT: 66 IN

## 2024-08-27 DIAGNOSIS — Z01.419 ENCOUNTER FOR BREAST AND PELVIC EXAMINATION: ICD-10-CM

## 2024-08-27 DIAGNOSIS — N89.8 VAGINA ITCHING: ICD-10-CM

## 2024-08-27 DIAGNOSIS — Z12.31 ENCOUNTER FOR SCREENING MAMMOGRAM FOR MALIGNANT NEOPLASM OF BREAST: ICD-10-CM

## 2024-08-27 DIAGNOSIS — R35.0 FREQUENCY OF MICTURITION: ICD-10-CM

## 2024-08-27 DIAGNOSIS — B37.9 CANDIDIASIS: ICD-10-CM

## 2024-08-27 DIAGNOSIS — Z11.4 ENCOUNTER FOR SCREENING FOR HIV: ICD-10-CM

## 2024-08-27 DIAGNOSIS — Z01.419 WELL WOMAN EXAM WITH ROUTINE GYNECOLOGICAL EXAM: Primary | ICD-10-CM

## 2024-08-27 LAB
APPEARANCE UR: CLEAR
BACTERIA URNS QL MICRO: NEGATIVE /HPF
BILIRUB UR QL: NEGATIVE
CASTS URNS QL MICRO: 0 /LPF
COLOR UR: NORMAL
CRYSTALS URNS QL MICRO: 0 /LPF
EPI CELLS #/AREA URNS HPF: NORMAL /HPF (ref 0–5)
GLUCOSE UR STRIP.AUTO-MCNC: NEGATIVE MG/DL
HGB UR QL STRIP: NEGATIVE
HIV 1+2 AB+HIV1 P24 AG SERPL QL IA: NONREACTIVE
HIV 1/2 RESULT COMMENT: NORMAL
HYALINE CASTS URNS QL MICRO: NORMAL /LPF
KETONES UR QL STRIP.AUTO: NEGATIVE MG/DL
LEUKOCYTE ESTERASE UR QL STRIP.AUTO: NEGATIVE
MUCOUS THREADS URNS QL MICRO: 0 /LPF
NITRITE UR QL STRIP.AUTO: NEGATIVE
PH UR STRIP: 6.5 (ref 5–9)
PROT UR STRIP-MCNC: NEGATIVE MG/DL
RBC #/AREA URNS HPF: NORMAL /HPF (ref 0–5)
SP GR UR REFRACTOMETRY: 1.01 (ref 1–1.02)
URINE CULTURE IF INDICATED: NORMAL
UROBILINOGEN UR QL STRIP.AUTO: 0.2 EU/DL (ref 0.2–1)
WBC URNS QL MICRO: NORMAL /HPF (ref 0–4)

## 2024-08-27 RX ORDER — FLUCONAZOLE 150 MG/1
150 TABLET ORAL
Qty: 2 TABLET | Refills: 0 | Status: SHIPPED | OUTPATIENT
Start: 2024-08-27 | End: 2024-09-02

## 2024-08-27 ASSESSMENT — ENCOUNTER SYMPTOMS
NAUSEA: 0
CHEST TIGHTNESS: 0
COUGH: 0
ABDOMINAL DISTENTION: 0
DIARRHEA: 0
VOMITING: 0
ABDOMINAL PAIN: 0
SHORTNESS OF BREATH: 0
CONSTIPATION: 0

## 2024-08-27 NOTE — PROGRESS NOTES
swelling.   Skin:  Positive for wound.   Neurological:  Negative for dizziness, weakness and headaches.   Psychiatric/Behavioral:  Negative for sleep disturbance.         Physical Exam:   Physical Exam  Vitals reviewed. Exam conducted with a chaperone present.   Constitutional:       Appearance: Normal appearance. She is obese.   HENT:      Head: Normocephalic and atraumatic.      Right Ear: Tympanic membrane, ear canal and external ear normal. There is no impacted cerumen.      Left Ear: Tympanic membrane, ear canal and external ear normal. There is no impacted cerumen.      Mouth/Throat:      Mouth: Mucous membranes are moist.   Eyes:      Extraocular Movements: Extraocular movements intact.      Conjunctiva/sclera: Conjunctivae normal.   Cardiovascular:      Rate and Rhythm: Normal rate and regular rhythm.   Pulmonary:      Effort: Pulmonary effort is normal.   Chest:      Chest wall: No mass.   Breasts:     Breasts are symmetrical.      Right: Normal. No inverted nipple, mass, nipple discharge, skin change or tenderness.      Left: No inverted nipple, mass, nipple discharge, skin change or tenderness.   Abdominal:      General: Abdomen is flat. Bowel sounds are normal. There is no distension.      Palpations: Abdomen is soft.   Genitourinary:     Pubic Area: Rash present.      Urethra: Prolapse present.      Vagina: Vaginal discharge and erythema present.      Cervix: No erythema.      Uterus: Normal.       Adnexa: Right adnexa normal.      Rectum: Normal. Guaiac result negative.      Comments: Erythema to labia and vagina with thick white discharge. Cervix firm to palp  Musculoskeletal:         General: No signs of injury. Normal range of motion.      Cervical back: Normal range of motion.   Lymphadenopathy:      Upper Body:      Right upper body: No supraclavicular, axillary or pectoral adenopathy.      Left upper body: No supraclavicular, axillary or pectoral adenopathy.   Skin:     General: Skin is warm and  dry.   Neurological:      General: No focal deficit present.      Mental Status: She is alert and oriented to person, place, and time.      Cranial Nerves: Cranial nerves 2-12 are intact.      Sensory: Sensation is intact.      Motor: Motor function is intact.      Gait: Gait is intact.   Psychiatric:         Attention and Perception: Attention and perception normal.         Mood and Affect: Mood and affect normal.         Speech: Speech normal.         Behavior: Behavior normal. Behavior is cooperative.         Thought Content: Thought content normal.         Cognition and Memory: Cognition and memory normal.         Judgment: Judgment normal.            Plan:     1. Well woman exam with routine gynecological exam    2. Encounter for breast and pelvic examination  -     PAP IG, CT-NG-TV, Aptima HPV and rfx 16/18,45([id number]); Future    3. Encounter for screening mammogram for malignant neoplasm of breast  -     IZAIAH DIGITAL SCREEN W OR WO CAD BILATERAL; Future    4. Encounter for screening for HIV  -     HIV 1/2 Ag/Ab, 4TH Generation,W Rflx Confirm; Future    5. Vagina itching  -     Urinalysis with Reflex to Culture; Future  -     fluconazole (DIFLUCAN) 150 MG tablet; Take 1 tablet by mouth every 72 hours for 6 days, Disp-2 tablet, R-0Normal    6. Frequency of micturition  -     Urinalysis with Reflex to Culture; Future    7. Candidiasis  -     fluconazole (DIFLUCAN) 150 MG tablet; Take 1 tablet by mouth every 72 hours for 6 days, Disp-2 tablet, R-0Normal       Follow up:  Return for As scheduled with PCP.     TTS: On this date 08/27/24  have spent 46 minutes reviewing previous notes, test results and face to face with the patient. Over 50% of today's office visit was spent in face to face time in counseling reviewing test results, importance of compliance, education about disease process, benefits and side-effects of medications and follow-up plan.     Signed By: SPIKE Albert - NP     August 27, 2024

## 2024-08-30 ENCOUNTER — OFFICE VISIT (OUTPATIENT)
Dept: PULMONOLOGY | Age: 59
End: 2024-08-30
Payer: COMMERCIAL

## 2024-08-30 VITALS
RESPIRATION RATE: 20 BRPM | TEMPERATURE: 98 F | SYSTOLIC BLOOD PRESSURE: 120 MMHG | DIASTOLIC BLOOD PRESSURE: 80 MMHG | BODY MASS INDEX: 32.14 KG/M2 | OXYGEN SATURATION: 98 % | HEART RATE: 66 BPM | HEIGHT: 66 IN | WEIGHT: 200 LBS

## 2024-08-30 DIAGNOSIS — Z87.891 HISTORY OF CIGARETTE SMOKING: ICD-10-CM

## 2024-08-30 DIAGNOSIS — R91.8 PULMONARY NODULES: ICD-10-CM

## 2024-08-30 DIAGNOSIS — C64.2 RENAL CELL CARCINOMA OF LEFT KIDNEY (HCC): ICD-10-CM

## 2024-08-30 DIAGNOSIS — R05.3 CHRONIC COUGH: Primary | ICD-10-CM

## 2024-08-30 PROCEDURE — 99214 OFFICE O/P EST MOD 30 MIN: CPT | Performed by: INTERNAL MEDICINE

## 2024-08-30 PROCEDURE — 99406 BEHAV CHNG SMOKING 3-10 MIN: CPT | Performed by: INTERNAL MEDICINE

## 2024-08-30 RX ORDER — ALBUTEROL SULFATE 90 UG/1
2 AEROSOL, METERED RESPIRATORY (INHALATION) EVERY 6 HOURS PRN
Qty: 1 EACH | Refills: 11 | Status: CANCELLED | OUTPATIENT
Start: 2024-08-30

## 2024-08-30 ASSESSMENT — ENCOUNTER SYMPTOMS
VOICE CHANGE: 0
TROUBLE SWALLOWING: 0
SHORTNESS OF BREATH: 1
VOMITING: 0
CHEST TIGHTNESS: 0
HEMOPTYSIS: 0
NAUSEA: 0
DIARRHEA: 0
ABDOMINAL DISTENTION: 0
ABDOMINAL PAIN: 0
SORE THROAT: 0
WHEEZING: 0
SCLERAL ICTERUS: 0
BACK PAIN: 1
CONSTIPATION: 0
BLOOD IN STOOL: 0

## 2024-08-30 NOTE — PROGRESS NOTES
Name:  Mechelle Hall  YOB: 1965   MRN: [id number]      Office Visit: 8/30/2024        ASSESSMENT AND PLAN:  (Medical Decision Making)    Impression: 59 y.o. 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 Chronic cough  Essentially resolved.  Using albuterol as needed, but rarely.    2. Pulmonary nodules  Being followed by oncology for her metastatic renal cell.  There does not seem to be anything that it is specifically unresponsive.  Can be followed here as needed if something new comes up.    3. Renal cell carcinoma of left kidney (HCC)  Per oncology.    4. History of cigarette smoking 3 minutes  Advised patient to quit and offered support.  Educational material provided to patient.  Asked patient to set a quit date and inform me when they have done so.  - CA TOBACCO USE CESSATION INTERMEDIATE 3-10 MINUTES    No orders of the defined types were placed in this encounter.        Procedures    CA TOBACCO USE CESSATION INTERMEDIATE 3-10 MINUTES     Follow-up and Dispositions    Return if symptoms worsen or fail to improve.     Trish Reardon MD    No specialty comments available.  _________________________________________________________________________    HISTORY OF PRESENT ILLNESS:    Ms. Mechelle Hall is a 59 y.o. 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  She was referred to us for recent pneumonia, shortness of breath, cough and ongoing smoking.  She has recovered well from pneumonia and feels well currently.  She rarely uses albuterol.  Does not seem to have actually a pulmonary complaint currently.  She is not interested in quitting smoking.  She still smoking 1 and half packs a day and has been  GASTRIC  EMPTYING FOR SOLIDS.    PFTs: normal spirometry      Latest Ref Rng & Units 5/10/2024    12:00 AM   Office Spirometry Results   FVC L 2.76    FEV1 L 2.32    FEV1 %Pred-Pre % 85    FVC %Pred-Pre % 80    FEV1/FVC % 84      Results for orders placed or performed in visit on 08/23/24   AMB POC SPIROMETRY W/BRONCHODILATOR   Result Value Ref Range    FEV 1 , POC 2.59 L    FEV1 % Pred POC 98 %    FVC, POC 2.99 L     FVC % Pred POC 89 %    FEV1/FVC, POC 87%     No results found for this or any previous visit.    FeNO: No results found for this or any previous visit.  FeNO and Likelihood of Eosinophilic Asthma   Unlikely Intermediate Likely   <25 ppb 25-50 ppb >50ppb     Exercise Oximetry:    Echo:   TRANSTHORACIC ECHOCARDIOGRAM (TTE) COMPLETE (CONTRAST/BUBBLE/3D PRN) 12/12/2022    Interpretation Summary    Left Ventricle: Normal left ventricular systolic function with a visually estimated EF of 55 - 60%. Left ventricle size is normal. Mildly increased wall thickness. Global longitudinal strain is normal with a value of -18.1%.    [facility] Reference Info:                                                                                                                Immunization History   Administered Date(s) Administered    Measles 06/09/1980    TD 2LF, TDVAX, (age 7y+), IM, 0.5mL 06/09/1980     Past Medical History:   Diagnosis Date    Anxiety 2000    Cancer (HCC)     Bilateral  renal cell see Dr Lopez and [facility]    Diabetes mellitus (HCC)     taken off meds because bs was too low, fasting blood surgars 50-80, feels hypoglycemic below 70,  last A1C 6.2 4/2024    Headache 1999    Hx of blood clots     treated with Eliquis x 1 year discontinued in 01/2024    Hyperlipidemia     Hypertension     Neoplasm of submandibular gland     Sleep apnea 2002    no c pap    Type 2 diabetes mellitus without complication (HCC) 2023        Tobacco Use      Smoking status: Every Day        Packs/day: 1.00        Years: 1 pack/day for 45.7

## 2024-08-30 NOTE — PROGRESS NOTES
[facility]: Established patient - follow up     Chief Complaint   Patient presents with    Follow-up     Dx; RCC - bilateral with likely met to adrenal +/- lungs   S/p resection on left per details below - [facility]     Fam hx: father - prostate cancer   Paternal aunt - hx of breast cancer     History of Present Illness:  Ms. Hall is a 59 y.o. female who presents today for FU regarding RCC.  The past medical history is significant for anxiety, HAs and sleep apnea, cervical dysplasia but no carcinoma, current smoker (~1/2PPD), lap liv and tubal reversal, I&D of left breast abscess.  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  Despite attending physical therapy since the accident, she continued to experience the same low back pain.  She was seen at [facility] on 10/4/22.  MRI of the lumbar spine on 10/15/22 showed mild degenerative disc disease at L3-4 through L5-S1 and a 5.7 x 6.1 x 7 cm lower pole solid left renal mass.  Urgent referral was placed to [facility], who referred the patient to [facility] for uro/onc evaluation and treatment of renal mass.  CT AP on 10/26/22 showed bilateral enhancing renal masses, concerning for renal cell carcinoma with the left lower pole renal mass measuring up to 6.5 cm and extending along Gerota's fascia and the right midpole renal mass measuring up to 4.6 cm. Also noted was an enhancing 1.5 cm right adrenal nodule, concerning for a metastatic nodule.  No recent wt loss.  S/p left renal biopsy on 11/2022, Nidia grade 2 of 4, clear cell RCC.  CT chest on 11/7 showed multiple nodules that appeared to be calcified, most c/w granulomatous disease, but she's aware that metastatic disease cannot be ruled out.  Not on AC.  Cr 0.89.    - Pt presented for consultation regarding systemic therapy.  She was here with her sister and friend.   had this in the past and been on Diflucan so this sent in as well. She will return to see Dr. Lopez in two days.   - here for follow-up.  Nausea and vomiting have improved on increased dose of Zofran and nightly Phenergan.  She has not had any vomiting but was queasy earlier today.  Discussed possibility of Zyprexa use in the future but she declined as she gained significant weight when on it previously for bipolar disease.  She continues to work.  Discussed the next steps in reimaging and she will be due for CT chest abdomen pelvis in September.  Due to recent onset nausea vomiting and headaches likely from dehydration, but due to baseline malignancy we will proceed with brain MRI at this time.  CT neck to reevaluate thrombus reviewed with patient and we can lower her Eliquis to 2.5 mg twice daily per her preference.  RN send a note to IR to determine if the port needs to be adjusted.  The patient has previously been reviewed and it works/can remain in place.  She has been referred to ENT for the submandibular lymph node.  She also underwent a gastric emptying study and that was within normal limits.  She did see orthopedics for left knee fracture and is on observation.  She is supposed to wear a brace but does not wear one.  She continues to smoke.  Labs reviewed w pt .    - here for VV follow-up.  She is feeling a little better in terms of nausea.  Seeing Dr Jewell - started on Ativan to help with nausea.   + pain - will refer to PC with her approval for symptom management.  Saw ENT re submandibular LN - bx planned 9/21.  Discussed recent scans - POD concern.  Will plan for bx of LLL lesion.  She is doing ok otherwise.  She will c/w tx.    -  here today for follow-up after recent biopsies.  She underwent a submandibular lymph node biopsy on 9/21 but cytology was nondiagnostic.  ENT felt that this could be repeated or we could wait for repeat imaging and reassess.  Pt wishes to wait and repeat imaging.  Left

## 2024-09-04 LAB
C TRACH RRNA CVX QL NAA+PROBE: NEGATIVE
COLLECTION METHOD: NORMAL
CYTOLOGIST CVX/VAG CYTO: NORMAL
CYTOLOGY CVX/VAG DOC THIN PREP: NORMAL
HPV APTIMA: NEGATIVE
HPV GENOTYPE REFLEX: NORMAL
Lab: NORMAL
N GONORRHOEA RRNA CVX QL NAA+PROBE: NEGATIVE
PAP SOURCE: NORMAL
PATH REPORT.FINAL DX SPEC: NORMAL
STAT OF ADQ CVX/VAG CYTO-IMP: NORMAL
T VAGINALIS RRNA SPEC QL NAA+PROBE: NEGATIVE

## 2024-09-07 ENCOUNTER — PATIENT MESSAGE (OUTPATIENT)
Dept: ONCOLOGY | Age: 59
End: 2024-09-07

## 2024-09-07 DIAGNOSIS — G89.3 CANCER ASSOCIATED PAIN: ICD-10-CM

## 2024-09-07 DIAGNOSIS — Z51.5 ENCOUNTER FOR PALLIATIVE CARE: ICD-10-CM

## 2024-09-09 DIAGNOSIS — C64.2 RENAL CELL CARCINOMA OF LEFT KIDNEY (HCC): Primary | ICD-10-CM

## 2024-09-09 RX ORDER — CABOZANTINIB 40 MG/1
TABLET ORAL
Qty: 30 TABLET | Refills: 3 | OUTPATIENT
Start: 2024-09-09

## 2024-09-10 NOTE — PATIENT INSTRUCTIONS
Patient Instructions from Today's Visit    Reason for Visit:  Follow up     Diagnosis Information:  [email or web address]      Plan: Your pathology does kidney cancer. It was given a Nidia grade of 2 of 4. The nodules in your lungs appear to be calcified granulomas, which is typically benign however we will need to repeat imaging in a few months. -options for treatment are to proceed with chemotherapy/immunotherapy. The potential goal is to treat you as if you had confirmed metastatic disease. Second options is surgery for a partial nephrectomy. There is a risk of course that the whole kidney will need to removed.   -additional information on the surgery are included in this printout   Follow Up:      Recent Lab Results:  N/a    Treatment Summary has been discussed and given to patient: n/a        -------------------------------------------------------------------------------------------------------------------    Patient does express an interest in My Chart. My Chart log in information explained on the after visit summary printout at the [place] [address] desk. JUAN Barrios          NEPHRECTOMY    PRE & POST OPERATIVE INSTRUCTIONS    PRE-OP INSTRUCTIONS:    Stop all blood thinners 5 to 10 days prior to your scheduled surgery. See attached sheet in pre-op packet. Nothing to eat or drink after midnight unless instructed differently by the hospital nurse when you call in for your check in time. Day of surgery-Shower, do not apply any creams, body oils, lotions, perfumes, deodorants, makeup, lipstick, nail polish or any other cosmetic product to the skin or nails. You may brush your teeth; rinse your mouth, but do not swallow the water. Leave all jewelry (including wedding and all other rings) and valuables (including money and credit cards) at home.   If you wear contact lenses, glasses or hearing aids, please bring a case or container, and solution for contacts, to protect them while you are in surgery. POST OPERATIVE INSTRUCTIONS:    No heavy lifting (over 10 pounds) for the next 6 weeks  No driving a car until you are off all your pain medications and have your first post-operative visit with your physician. Stairs -Take one step at a time, and have assistance available. Showers are okay after surgery, avoid tub baths, hot tubs and any soaking until incision is well healed. Regular light activity is encouraged several times a day. Drink plenty of fluids. Your urine should be a pale yellow in color, if not increase your fluid intake. Resume a regular diet at home. Take high calorie supplement if appetite is poor. If you have a special diet secondary to Diabetes, Renal Disease, Liver Disease, etc. you may resume eating when tolerated. Please take medications as prescribed by your physician at the time of discharge. Do not strain when having a bowel movement. Expect irregular bowel habits after surgery until fully recovered. Take your stool softener as prescribed at the time of discharge. If you feel constipated, you may try warm prune juice, Milk of Magnesia, or Miralax. SURGERY INSTRUCTIONS / BLOOD THINNERS      IF YOU HAVE ANY HEART ISSUES, HISTORY OF BLOOD CLOTS, STROKE OR OTHER MEDICAL CONDITIONS THAT REQUIRE YOU TO BE ON BLOOD THINNING AGENTS, PLEASE ALERT OUR STAFF. YOU ALSO MAY NEED TO CONTACT YOUR PRESCRIBING PHYSICIAN / CADIOLOGIST FOR THEIR RECOMMENDATION REGARDING THESE MEDICATIONS.     ANTI-INFLAMMATORY DRUGS: (SHOULD BE STOPPED 5 DAYS) If you are taking these for a specific medical condition, please alert office for a possible alternative medication         Some examples: Advil, Aleve, Ansaid, Anaprox, Clinoril, Daypro, Feldene, Ibuprofen, Indocin, Midol, Motrin, Nalfon, Naprosyn, Orudis, Ponstil, Tolectin, Toradol    IF YOUR ARE CURRENTLY ON COUMADIN (WARFARIN), PERSANTINE, PLAVIX, HEPARIN, Genella Dys, ELIQUIS OR TICLID:  Please discuss the use of these medications with your doctor as when to discontinue/continue use of these medications. VITAMINS & HERBAL MEDICATIONS : (STOP 5 DAYS PRIOR TO SURGERY)    ASPIRIN PRODUCTS:. If you are taking a full dose 325 mg aspirin, change to 81 mg baby aspirin 5 days prior to surgery. Stop other aspirin products 5 days before surgery. Some examples: Fatmata [place], Ascriptin, Bufferin, Cama [place] Tablets, Ecotrin, Empirin, Equagesic, Excedrin, Florinol, Four way cold tablets, Midol, Norgesic, Nuprin, Pamprin, Synalgos, Triaminicin, Vanquish, Zorprin      This is only a partial list.  Please check with your physician or pharmacist regarding any other medications. If you have any questions or concerns, please call the Urology Nurses at [address] [place] [address]    Whether you travel home by car or airplane, at least every hour stretch your legs and walk (a) to promote blood flow in your legs and (b) to prevent pooling of blood that could lead to clot formation in the leg veins. Blood clots in the legs can travel to the lungs and cause pulmonary emboli. Pulmonary emboli can be life threatening. Elevate legs as much as possible. This risk extended to 3 months post-operatively. Suggested exercises:  Keep heels on the floor and raise toes up and down. Keep toes on the floor and raise heels up and down. Stand and raise knees to waist level one at a time. Stand and raise yourself up and down on your toes. Wear the support hose (white leg stockings) until you are up and walking regularly - then you may remove them. Stay well hydrated during your journey especially in hot weather. Drink plenty of liquids once you are home and recuperating. Even after you are home, if you travel by car or airplane within the first 3 months, you should remember to walk and stretch your legs every hour.     After leaving the hospital, if you develop any of the following:  chest pain, pain while breathing, shortness of breath, unexplained fever, leg pain, leg tenderness or swelling, you may be developing a leg vein clot (thrombosis) and / or have clots in the lungs (pulmonary emboli). Please seek immediate physician care at the nearest hospital emergency room. Skin rashes occur from time to time in people with and without known allergies. They can be associated particularly with antibiotics or antibacterials. If you get a rash, stop your antibiotic and call your physician, as he may be able to prescribe an antihistamine to help alleviate the rash symptoms and start you on a new antibiotic. You need to walk every day, but if at anytime you feel your heart racing or pounding hard in your chest, rest immediately. Take it easier on your next walk. Movement is encouraged to prevent the development of clots in your leg veins, which could become like-threatening pulmonary emboli as described in comment #1. If you have any questions or concerns, please call the office [phone]. Is This A New Presentation, Or A Follow-Up?: Skin Lesion What Type Of Note Output Would You Prefer (Optional)?: Bullet Format How Severe Is Your Skin Lesion?: moderate Has Your Skin Lesion Been Treated?: not been treated Is This A New Presentation, Or A Follow-Up?: Skin Lesions Additional History: Patient has been scratching “bumps”.

## 2024-09-11 ENCOUNTER — CLINICAL DOCUMENTATION (OUTPATIENT)
Dept: ONCOLOGY | Age: 59
End: 2024-09-11

## 2024-09-13 ENCOUNTER — OFFICE VISIT (OUTPATIENT)
Dept: ENT CLINIC | Age: 59
End: 2024-09-13

## 2024-09-13 VITALS
SYSTOLIC BLOOD PRESSURE: 118 MMHG | BODY MASS INDEX: 31.82 KG/M2 | WEIGHT: 198 LBS | HEIGHT: 66 IN | DIASTOLIC BLOOD PRESSURE: 78 MMHG

## 2024-09-13 DIAGNOSIS — L02.11 NECK ABSCESS: Primary | ICD-10-CM

## 2024-09-13 PROCEDURE — 99024 POSTOP FOLLOW-UP VISIT: CPT | Performed by: OTOLARYNGOLOGY

## 2024-09-17 ENCOUNTER — TELEPHONE (OUTPATIENT)
Dept: ONCOLOGY | Age: 59
End: 2024-09-17

## 2024-09-18 ENCOUNTER — PATIENT MESSAGE (OUTPATIENT)
Dept: ONCOLOGY | Age: 59
End: 2024-09-18

## 2024-09-18 ENCOUNTER — HOSPITAL ENCOUNTER (OUTPATIENT)
Dept: PET IMAGING | Age: 59
Discharge: HOME | End: 2024-09-21
Payer: COMMERCIAL

## 2024-09-18 DIAGNOSIS — N28.89 BILATERAL RENAL MASSES: ICD-10-CM

## 2024-09-18 DIAGNOSIS — K11.8 SUBMANDIBULAR GLAND MASS: ICD-10-CM

## 2024-09-18 DIAGNOSIS — C64.2 RENAL CELL CARCINOMA OF LEFT KIDNEY (HCC): ICD-10-CM

## 2024-09-18 DIAGNOSIS — E27.8 MASS OF RIGHT ADRENAL GLAND (HCC): ICD-10-CM

## 2024-09-18 DIAGNOSIS — R91.8 PULMONARY NODULES: ICD-10-CM

## 2024-09-18 LAB
GLUCOSE BLD STRIP.AUTO-MCNC: 93 MG/DL (ref 65–100)
SERVICE CMNT-IMP: NORMAL

## 2024-09-18 PROCEDURE — 3430000000 HC RX DIAGNOSTIC RADIOPHARMACEUTICAL

## 2024-09-18 PROCEDURE — 6360000004 HC RX CONTRAST MEDICATION

## 2024-09-18 PROCEDURE — 2580000003 HC RX 258

## 2024-09-18 PROCEDURE — 78815 PET IMAGE W/CT SKULL-THIGH: CPT

## 2024-09-18 PROCEDURE — 82962 GLUCOSE BLOOD TEST: CPT

## 2024-09-18 PROCEDURE — A9609 HC RX DIAGNOSTIC RADIOPHARMACEUTICAL: HCPCS

## 2024-09-18 RX ORDER — FLUDEOXYGLUCOSE F 18 200 MCI/ML
12.44 INJECTION, SOLUTION INTRAVENOUS
Status: COMPLETED | OUTPATIENT
Start: 2024-09-18 | End: 2024-09-18

## 2024-09-18 RX ORDER — SODIUM CHLORIDE 0.9 % (FLUSH) 0.9 %
20 SYRINGE (ML) INJECTION AS NEEDED
Status: DISCONTINUED | OUTPATIENT
Start: 2024-09-18 | End: 2024-09-22 | Stop reason: HOSPADM

## 2024-09-18 RX ORDER — DIATRIZOATE MEGLUMINE AND DIATRIZOATE SODIUM 660; 100 MG/ML; MG/ML
10 SOLUTION ORAL; RECTAL
Status: DISCONTINUED | OUTPATIENT
Start: 2024-09-18 | End: 2024-09-22 | Stop reason: HOSPADM

## 2024-09-18 RX ADMIN — DIATRIZOATE MEGLUMINE AND DIATRIZOATE SODIUM 10 ML: 660; 100 LIQUID ORAL; RECTAL at 13:35

## 2024-09-18 RX ADMIN — SODIUM CHLORIDE, PRESERVATIVE FREE 20 ML: 5 INJECTION INTRAVENOUS at 13:35

## 2024-09-18 RX ADMIN — FLUDEOXYGLUCOSE F 18 12.44 MILLICURIE: 200 INJECTION, SOLUTION INTRAVENOUS at 13:35

## 2024-09-20 ENCOUNTER — HOSPITAL ENCOUNTER (OUTPATIENT)
Dept: LAB | Age: 59
Discharge: HOME | End: 2024-09-23
Payer: COMMERCIAL

## 2024-09-20 ENCOUNTER — OFFICE VISIT (OUTPATIENT)
Dept: ONCOLOGY | Age: 59
End: 2024-09-20
Payer: COMMERCIAL

## 2024-09-20 ENCOUNTER — OFFICE VISIT (OUTPATIENT)
Dept: PALLATIVE CARE | Age: 59
End: 2024-09-20
Payer: COMMERCIAL

## 2024-09-20 VITALS
HEART RATE: 61 BPM | TEMPERATURE: 97.5 F | DIASTOLIC BLOOD PRESSURE: 72 MMHG | HEIGHT: 66 IN | BODY MASS INDEX: 33.43 KG/M2 | WEIGHT: 208 LBS | OXYGEN SATURATION: 94 % | RESPIRATION RATE: 16 BRPM | SYSTOLIC BLOOD PRESSURE: 117 MMHG

## 2024-09-20 DIAGNOSIS — G89.3 CANCER ASSOCIATED PAIN: ICD-10-CM

## 2024-09-20 DIAGNOSIS — R41.3 MEMORY CHANGES: ICD-10-CM

## 2024-09-20 DIAGNOSIS — R11.0 NAUSEA: Primary | ICD-10-CM

## 2024-09-20 DIAGNOSIS — R26.89 BALANCE PROBLEMS: ICD-10-CM

## 2024-09-20 DIAGNOSIS — G62.9 NEUROPATHY: ICD-10-CM

## 2024-09-20 DIAGNOSIS — C64.2 RENAL CELL CARCINOMA OF LEFT KIDNEY (HCC): Primary | ICD-10-CM

## 2024-09-20 DIAGNOSIS — F32.A ANXIETY AND DEPRESSION: ICD-10-CM

## 2024-09-20 DIAGNOSIS — C64.2 RENAL CELL CARCINOMA OF LEFT KIDNEY (HCC): ICD-10-CM

## 2024-09-20 DIAGNOSIS — F41.9 ANXIETY AND DEPRESSION: ICD-10-CM

## 2024-09-20 DIAGNOSIS — Z51.5 ENCOUNTER FOR PALLIATIVE CARE: ICD-10-CM

## 2024-09-20 LAB
ALBUMIN SERPL-MCNC: 3.8 G/DL (ref 3.2–4.6)
ALBUMIN/GLOB SERPL: 1.3 (ref 1–1.9)
ALP SERPL-CCNC: 80 U/L (ref 35–104)
ALT SERPL-CCNC: 39 U/L (ref 12–65)
ANION GAP SERPL CALC-SCNC: 9 MMOL/L (ref 9–18)
AST SERPL-CCNC: 29 U/L (ref 15–37)
BASOPHILS # BLD: 0 K/UL (ref 0–0.2)
BASOPHILS NFR BLD: 0 % (ref 0–2)
BILIRUB SERPL-MCNC: 0.2 MG/DL (ref 0–1.2)
BUN SERPL-MCNC: 11 MG/DL (ref 8–23)
CALCIUM SERPL-MCNC: 9.8 MG/DL (ref 8.8–10.2)
CHLORIDE SERPL-SCNC: 100 MMOL/L (ref 98–107)
CO2 SERPL-SCNC: 31 MMOL/L (ref 20–28)
CREAT SERPL-MCNC: 0.7 MG/DL (ref 0.6–1.1)
DIFFERENTIAL METHOD BLD: NORMAL
EOSINOPHIL # BLD: 0.2 K/UL (ref 0–0.8)
EOSINOPHIL NFR BLD: 3 % (ref 0.5–7.8)
ERYTHROCYTE [DISTWIDTH] IN BLOOD BY AUTOMATED COUNT: 14 % (ref 11.9–14.6)
GLOBULIN SER CALC-MCNC: 2.9 G/DL (ref 2.3–3.5)
GLUCOSE SERPL-MCNC: 123 MG/DL (ref 70–99)
HCT VFR BLD AUTO: 46.3 % (ref 35.8–46.3)
HGB BLD-MCNC: 15 G/DL (ref 11.7–15.4)
IMM GRANULOCYTES # BLD AUTO: 0 K/UL (ref 0–0.5)
IMM GRANULOCYTES NFR BLD AUTO: 0 % (ref 0–5)
LYMPHOCYTES # BLD: 3.1 K/UL (ref 0.5–4.6)
LYMPHOCYTES NFR BLD: 40 % (ref 13–44)
MCH RBC QN AUTO: 31.2 PG (ref 26.1–32.9)
MCHC RBC AUTO-ENTMCNC: 32.4 G/DL (ref 31.4–35)
MCV RBC AUTO: 96.3 FL (ref 82–102)
MONOCYTES # BLD: 0.5 K/UL (ref 0.1–1.3)
MONOCYTES NFR BLD: 7 % (ref 4–12)
NEUTS SEG # BLD: 3.8 K/UL (ref 1.7–8.2)
NEUTS SEG NFR BLD: 50 % (ref 43–78)
NRBC # BLD: 0 K/UL (ref 0–0.2)
PLATELET # BLD AUTO: 205 K/UL (ref 150–450)
PMV BLD AUTO: 10.4 FL (ref 9.4–12.3)
POTASSIUM SERPL-SCNC: 4.5 MMOL/L (ref 3.5–5.1)
PROT SERPL-MCNC: 6.6 G/DL (ref 6.3–8.2)
RBC # BLD AUTO: 4.81 M/UL (ref 4.05–5.2)
SODIUM SERPL-SCNC: 140 MMOL/L (ref 136–145)
WBC # BLD AUTO: 7.6 K/UL (ref 4.3–11.1)

## 2024-09-20 PROCEDURE — 36415 COLL VENOUS BLD VENIPUNCTURE: CPT

## 2024-09-20 PROCEDURE — 80053 COMPREHEN METABOLIC PANEL: CPT

## 2024-09-20 PROCEDURE — 99214 OFFICE O/P EST MOD 30 MIN: CPT | Performed by: INTERNAL MEDICINE

## 2024-09-20 PROCEDURE — 85025 COMPLETE CBC W/AUTO DIFF WBC: CPT

## 2024-09-20 PROCEDURE — 99215 OFFICE O/P EST HI 40 MIN: CPT

## 2024-09-20 RX ORDER — OXYCODONE HYDROCHLORIDE 15 MG/1
1 TABLET, FILM COATED, EXTENDED RELEASE ORAL EVERY 12 HOURS
Qty: 60 TABLET | Refills: 0 | Status: SHIPPED | OUTPATIENT
Start: 2024-10-09 | End: 2024-11-08

## 2024-09-20 RX ORDER — OXYCODONE HYDROCHLORIDE 15 MG/1
1 TABLET, FILM COATED, EXTENDED RELEASE ORAL EVERY 12 HOURS
Qty: 60 TABLET | Refills: 0 | OUTPATIENT
Start: 2024-09-20 | End: 2024-10-20

## 2024-09-20 RX ORDER — CLONAZEPAM 1 MG/1
1 TABLET ORAL 2 TIMES DAILY PRN
Qty: 60 TABLET | Refills: 2 | Status: SHIPPED | OUTPATIENT
Start: 2024-10-07 | End: 2025-01-05

## 2024-09-20 RX ORDER — OXYCODONE HYDROCHLORIDE 10 MG/1
10 TABLET ORAL EVERY 4 HOURS PRN
Qty: 180 TABLET | Refills: 0 | Status: SHIPPED | OUTPATIENT
Start: 2024-09-25 | End: 2024-10-25

## 2024-09-20 ASSESSMENT — PATIENT HEALTH QUESTIONNAIRE - PHQ9
SUM OF ALL RESPONSES TO PHQ QUESTIONS 1-9: 0
1. LITTLE INTEREST OR PLEASURE IN DOING THINGS: NOT AT ALL
2. FEELING DOWN, DEPRESSED OR HOPELESS: NOT AT ALL
SUM OF ALL RESPONSES TO PHQ9 QUESTIONS 1 & 2: 0
SUM OF ALL RESPONSES TO PHQ QUESTIONS 1-9: 0

## 2024-09-20 ASSESSMENT — ENCOUNTER SYMPTOMS
NAUSEA: 1
DIARRHEA: 1
ABDOMINAL PAIN: 1
BACK PAIN: 1

## 2024-10-11 ENCOUNTER — HOSPITAL ENCOUNTER (OUTPATIENT)
Dept: MAMMOGRAPHY | Age: 59
Discharge: HOME | End: 2024-10-14
Payer: COMMERCIAL

## 2024-10-11 DIAGNOSIS — Z12.31 ENCOUNTER FOR SCREENING MAMMOGRAM FOR MALIGNANT NEOPLASM OF BREAST: ICD-10-CM

## 2024-10-11 PROCEDURE — 77063 BREAST TOMOSYNTHESIS BI: CPT

## 2024-10-14 ENCOUNTER — HOSPITAL ENCOUNTER (OUTPATIENT)
Dept: MRI IMAGING | Age: 59
Discharge: HOME | End: 2024-10-17
Attending: INTERNAL MEDICINE
Payer: COMMERCIAL

## 2024-10-14 DIAGNOSIS — C64.2 RENAL CELL CARCINOMA OF LEFT KIDNEY (HCC): ICD-10-CM

## 2024-10-14 DIAGNOSIS — R41.3 MEMORY CHANGES: ICD-10-CM

## 2024-10-14 DIAGNOSIS — R26.89 BALANCE PROBLEMS: ICD-10-CM

## 2024-10-14 PROCEDURE — 6360000004 HC RX CONTRAST MEDICATION: Performed by: INTERNAL MEDICINE

## 2024-10-14 PROCEDURE — 70553 MRI BRAIN STEM W/O & W/DYE: CPT

## 2024-10-14 PROCEDURE — A9579 GAD-BASE MR CONTRAST NOS,1ML: HCPCS | Performed by: INTERNAL MEDICINE

## 2024-10-14 RX ADMIN — GADOTERIDOL 20 ML: 279.3 INJECTION, SOLUTION INTRAVENOUS at 09:08

## 2024-10-18 ENCOUNTER — TELEMEDICINE (OUTPATIENT)
Dept: PALLATIVE CARE | Age: 59
End: 2024-10-18
Payer: COMMERCIAL

## 2024-10-18 DIAGNOSIS — G89.3 CANCER ASSOCIATED PAIN: Primary | ICD-10-CM

## 2024-10-18 DIAGNOSIS — Z51.5 ENCOUNTER FOR PALLIATIVE CARE: ICD-10-CM

## 2024-10-18 DIAGNOSIS — C64.2 RENAL CELL CARCINOMA OF LEFT KIDNEY (HCC): ICD-10-CM

## 2024-10-18 DIAGNOSIS — F32.A ANXIETY AND DEPRESSION: ICD-10-CM

## 2024-10-18 DIAGNOSIS — F41.9 ANXIETY AND DEPRESSION: ICD-10-CM

## 2024-10-18 DIAGNOSIS — R11.2 NAUSEA AND VOMITING, UNSPECIFIED VOMITING TYPE: ICD-10-CM

## 2024-10-18 PROCEDURE — 99213 OFFICE O/P EST LOW 20 MIN: CPT

## 2024-10-18 RX ORDER — DRONABINOL 5 MG/1
5 CAPSULE ORAL
Qty: 90 CAPSULE | Refills: 0 | Status: SHIPPED | OUTPATIENT
Start: 2024-10-18 | End: 2024-11-17

## 2024-10-18 ASSESSMENT — ENCOUNTER SYMPTOMS
BACK PAIN: 1
DIARRHEA: 1
ABDOMINAL PAIN: 1
NAUSEA: 1

## 2024-10-18 NOTE — PROGRESS NOTES
specified.      ASSESSMENT:   Diagnosis Orders   1. Cancer associated pain        2. Nausea and vomiting, unspecified vomiting type  droNABinol (MARINOL) 5 MG capsule      3. Anxiety and depression        4. Renal cell carcinoma of left kidney (HCC)  droNABinol (MARINOL) 5 MG capsule      5. Encounter for palliative care  droNABinol (MARINOL) 5 MG capsule            PLAN:  Lab studies and imaging studies were personally reviewed.     Pertinent old records were reviewed from [facility], ED, PCP, and [facility].     Nausea: Continue Marinol 5mg Q6-8hr PRN. Pt mentioned not having as much relief with 5mg Marinol and instructed pt she can take 10mg. Can continue Phenergan at night (cannot use during day due to sedation). Previously had no relief with Zofran or Zyprexa.   Cancer associated pain: Continue Oxycodone 10mg Q4hr PRN. Continue Oxycontin 15mg Q12hr.   Anxiety and depression: Continue Zoloft 150mg QHS (per PCP), Klonopin 1mg BID PRN, and Buspar 10mg BID. Klonopin ordered today to be p/u 30 days from previous fill, with refills added.  Anorexia: Improved with Marinol, continue. Pt's weight is stable.  Diarrhea: Much improved since taking Cabometyx at night.  Neuropathy: Continue Gabapentin 300mg TID.    Advanced Care Planning addressed: None today. HCP[place] and [place] DNR completed previously and are scanned into pt's chart.    Will follow up in: 4 weeks. Following that visit, we can see pt every 2 months when she is in the office for her [facility] visits. Instructed pt to contact us via [employer] if she needs medication refills.    All questions were asked and answered to the best of my ability. In all, I spent 25 minutes in the care of the patient today, over 50% of which was in direct counseling and coordination of care as documented above.    I have reviewed the patient's controlled substance prescription history, as maintained in the [place] prescription monitoring program, so that the prescriptions(s) for a controlled

## 2024-10-21 ENCOUNTER — CLINICAL DOCUMENTATION (OUTPATIENT)
Dept: ONCOLOGY | Age: 59
End: 2024-10-21

## 2024-10-21 ENCOUNTER — OFFICE VISIT (OUTPATIENT)
Dept: ENT CLINIC | Age: 59
End: 2024-10-21

## 2024-10-21 VITALS — RESPIRATION RATE: 12 BRPM | WEIGHT: 207.89 LBS | BODY MASS INDEX: 33.41 KG/M2 | HEIGHT: 66 IN

## 2024-10-21 DIAGNOSIS — C64.9 METASTATIC RENAL CELL CARCINOMA, UNSPECIFIED LATERALITY (HCC): Primary | ICD-10-CM

## 2024-10-21 PROCEDURE — 99024 POSTOP FOLLOW-UP VISIT: CPT | Performed by: OTOLARYNGOLOGY

## 2024-10-21 ASSESSMENT — ENCOUNTER SYMPTOMS
ALLERGIC/IMMUNOLOGIC NEGATIVE: 1
EYES NEGATIVE: 1
GASTROINTESTINAL NEGATIVE: 1
RESPIRATORY NEGATIVE: 1

## 2024-10-21 NOTE — PROGRESS NOTES
Chief Complaint   Patient presents with    Follow-up     6 week wound check        HPI:  Mechelle Hall is a 59 y.o. female seen in follow-up for her left neck.  She underwent incision and drainage of left neck abscess back on 8/2/2024, and then developed drainage from the central portion of her incision about 10 days postoperatively which was able to be treated with local wound care.  She has a history of metastatic renal cell carcinoma and I had previously taken out her left submandibular gland back in February.  Last seen back on 9/13/2024.  Doing much better since then with no further pain or tenderness along the incision line.  There has not been any crusting, drainage, or swelling.  She has been using a CPAP mask over the past month, and did wake up feeling very congested and almost smothered.  She has used simply saline sprays in the past but no other current nasal sprays.    Past Medical History, Past Surgical History, Family history, Social History, and Medications were all reviewed with the patient today and updated as necessary.     Allergies   Allergen Reactions    Latex Hives, Itching, Shortness Of Breath and Other (See Comments)    Bee Venom Shortness Of Breath and Hives    Penicillins Anaphylaxis    Sulfa Antibiotics Hives, Itching and Rash    Wasp Venom Protein Hives and Shortness Of Breath    Penicillin G     Sulfamethoxazole-Trimethoprim Hives    Wellbutrin [Bupropion] Other (See Comments)     suicidal     Patient Active Problem List   Diagnosis    Renal cell carcinoma of left kidney (HCC)    Mass of right adrenal gland (HCC)    Port-A-Cath in place    Encounter for monitoring cardiotoxic drug therapy    Occlusive thrombus    Acute internal jugular vein thrombosis, right (HCC)    Obesity (BMI 30.0-34.9)    Bilateral renal masses    Elevated LFTs    Type 2 diabetes mellitus    High risk medication use    Constipation    Hypokalemia    Dehydration    Neoplasm of submandibular salivary gland

## 2024-10-22 NOTE — PROGRESS NOTES
Prior authorization request for Dronabinol 5 mg capsule received through [employer] under Key Code [id number].  Patient demographics and clinical information submitted through the portal and sent to [employer] for medical review.  The most recent palliative care office visit note dated 10/18/24 was uploaded to the portal for medical review.  Pending PA Case ID [id number] for Rx #[id number].    Approval letter received and indexed under the media tab in Epic.    Name of medication: Dronabinol 5 mg capsules  Quantity Approved: 90 capsules / 23 day supply  Validity Dates: 10/21/24 - 4/21/25  Authorization # PA Case ID [id number]

## 2024-10-28 NOTE — PROGRESS NOTES
[facility]  [address]  [place] [address] [phone]    Patient Name:  Mechelle Hall  YOB: 1965    Mechelle Hall, was evaluated through a synchronous (real-time) audio-video encounter. The patient (or guardian if applicable) is aware that this is a billable service, which includes applicable co-pays. This Virtual Visit was conducted with patient's (and/or legal guardian's) consent. Patient identification was verified, and a caregiver was present when appropriate.   The patient was located at Home: [address] [place] [address]  [place] [address]  Provider was located at Facility (Appt Dept): [address]  [place] [address]  Confirm you are appropriately licensed, registered, or certified to deliver care in the state where the patient is located as indicated above. If you are not or unsure, please re-schedule the visit: Yes, I confirm.          --WERNER Evans on 10/29/2024 at 4:10 PM          Patient Name:  Mechelle Hall  YOB: 1965      Office Visit 10/29/2024    CHIEF COMPLAINT:    Chief Complaint   Patient presents with    Sleep Study    Results    Sleep Apnea    Follow-up         HISTORY OF PRESENT ILLNESS:  Patient is an 59 y.o. female seen today for follow up of RADHA. Pt had a PSG/HST on 6/5/24 with an AHI of 23.9/hr with desaturations to 80%. Pt had desaturations <89% for 216.5 mins of the study. Pt is on CPAP 9 cm H2O with 2L O2 bled in.   Pt reports that she is doing ok with PAP therapy. She is using a full face mask. She is sleeping better and some nights will sleep through the night. Often times, she will wake up at least once to use the restroom. She does feel like her energy is better during the day.   She has not had any issues getting supplies. She still has low energy but she has renal cell carcinoma and is on PO chemo daily. She is also on multiple other medications that contribute to

## 2024-10-29 ENCOUNTER — TELEMEDICINE (OUTPATIENT)
Dept: SLEEP MEDICINE | Age: 59
End: 2024-10-29
Payer: COMMERCIAL

## 2024-10-29 DIAGNOSIS — G47.34 NOCTURNAL HYPOXEMIA: ICD-10-CM

## 2024-10-29 DIAGNOSIS — G47.10 HYPERSOMNIA: ICD-10-CM

## 2024-10-29 DIAGNOSIS — G47.33 OSA (OBSTRUCTIVE SLEEP APNEA): Primary | ICD-10-CM

## 2024-10-29 PROCEDURE — 99214 OFFICE O/P EST MOD 30 MIN: CPT | Performed by: PHYSICIAN ASSISTANT

## 2024-11-03 ENCOUNTER — HOSPITAL ENCOUNTER (EMERGENCY)
Age: 59
Discharge: HOME | End: 2024-11-03
Payer: COMMERCIAL

## 2024-11-03 ENCOUNTER — APPOINTMENT (OUTPATIENT)
Dept: GENERAL RADIOLOGY | Age: 59
End: 2024-11-03
Payer: COMMERCIAL

## 2024-11-03 VITALS
HEART RATE: 66 BPM | HEIGHT: 66 IN | OXYGEN SATURATION: 92 % | WEIGHT: 190 LBS | BODY MASS INDEX: 30.53 KG/M2 | TEMPERATURE: 97.9 F | SYSTOLIC BLOOD PRESSURE: 128 MMHG | RESPIRATION RATE: 15 BRPM | DIASTOLIC BLOOD PRESSURE: 78 MMHG

## 2024-11-03 DIAGNOSIS — S93.401A SPRAIN OF RIGHT ANKLE, UNSPECIFIED LIGAMENT, INITIAL ENCOUNTER: Primary | ICD-10-CM

## 2024-11-03 DIAGNOSIS — S93.602A FOOT SPRAIN, LEFT, INITIAL ENCOUNTER: ICD-10-CM

## 2024-11-03 PROCEDURE — 73610 X-RAY EXAM OF ANKLE: CPT

## 2024-11-03 PROCEDURE — 73630 X-RAY EXAM OF FOOT: CPT

## 2024-11-03 PROCEDURE — 99283 EMERGENCY DEPT VISIT LOW MDM: CPT

## 2024-11-03 ASSESSMENT — PAIN DESCRIPTION - ORIENTATION: ORIENTATION: RIGHT;LEFT

## 2024-11-03 ASSESSMENT — PAIN DESCRIPTION - LOCATION: LOCATION: ANKLE;FOOT

## 2024-11-03 ASSESSMENT — PAIN SCALES - GENERAL: PAINLEVEL_OUTOF10: 3

## 2024-11-03 NOTE — DISCHARGE INSTRUCTIONS
Ice and elevate to help with pain or swelling.     You can take your regularly prescribed pain medications.

## 2024-11-03 NOTE — ED NOTES
I have reviewed discharge instructions with the patient.  The patient verbalized understanding.    Patient left ED via Discharge Method: wheelchair to Home with friend.    Opportunity for questions and clarification provided.       Patient given 0 scripts.         To continue your aftercare when you leave the hospital, you may receive an automated call from our care team to check in on how you are doing.  This is a free service and part of our promise to provide the best care and service to meet your aftercare needs.” If you have questions, or wish to unsubscribe from this service please call [phone].  Thank you for Choosing our [facility] Emergency Department.        Nandini Rao LPN  11/03/24 8911

## 2024-11-03 NOTE — ED TRIAGE NOTES
Pt arrrives to the ER with c/o mechanical fall resulting in bilateral foot/ ankle pain. Pt denies loc or thinners

## 2024-11-03 NOTE — ED PROVIDER NOTES
Emergency Department Provider Note       PCP: Dawna Jewell MD   Age: 59 y.o.   Sex: female     DISPOSITION Decision To Discharge 11/03/2024 04:11:03 PM    ICD-10-CM    1. Sprain of right ankle, unspecified ligament, initial encounter  S93.401A [facility], [facility]      2. Foot sprain, left, initial encounter  S93.602A           Medical Decision Making     59-year-old female presenting to the emergency department today for evaluation of right ankle and foot pain and left foot pain after rolling both feet unfortunately when she was walking down her outdoor steps today.  On exam she does have some swelling over the right lateral malleolus as well as the left lateral foot.  No obvious ecchymosis at this time and neurovascularly intact in both feet.  X-rays are negative for fracture, likely injuries due to to sprain.  Discussed reassuring results with patient.  She states that pain in her right foot and ankle are worse than the left so I recommended a walking boot which patient actually has at home from a previous injury so she will use this when she gets home.  She also does have access to crutches at home if needed.  I encouraged her to ice and elevate both lower extremities, can use her chronic pain medication as needed/prescribed.  She would like a referral to orthopedics which was ordered today.  She will return for any new or worsening symptoms.     1 acute, uncomplicated illness or injury.  Shared medical decision making was utilized in creating the patients health plan today.  I independently ordered and reviewed each unique test.    I reviewed external records: provider visit note from outside specialist.       I interpreted the X-rays I independently reviewed x-ray imaging, no acute fracture of the right foot, left foot, right ankle.              History     60 yo female presenting for right ankle and foot pain, left foot pain after she was walking down outdoor 
Ears: no ear pain and no hearing problems.Nose: no nasal congestion and no nasal drainage.Mouth/Throat: no dysphagia, no hoarseness and no throat pain.Neck: no lumps, no pain, no stiffness and no swollen glands.

## 2024-11-05 ENCOUNTER — CARE COORDINATION (OUTPATIENT)
Dept: CARE COORDINATION | Facility: CLINIC | Age: 59
End: 2024-11-05

## 2024-11-05 DIAGNOSIS — K12.30 MUCOSITIS: ICD-10-CM

## 2024-11-05 NOTE — CARE COORDINATION
[facility] assessment outreach for ED visit on 11/3/2024 deferred at this time due to patient is being case managed by oncology  Case closed

## 2024-11-11 ENCOUNTER — OFFICE VISIT (OUTPATIENT)
Dept: ORTHOPEDIC SURGERY | Age: 59
End: 2024-11-11
Payer: COMMERCIAL

## 2024-11-11 ENCOUNTER — TELEPHONE (OUTPATIENT)
Dept: ONCOLOGY | Age: 59
End: 2024-11-11

## 2024-11-11 DIAGNOSIS — Z72.0 TOBACCO ABUSE: ICD-10-CM

## 2024-11-11 DIAGNOSIS — S93.401A SPRAIN OF RIGHT ANKLE, UNSPECIFIED LIGAMENT, INITIAL ENCOUNTER: ICD-10-CM

## 2024-11-11 DIAGNOSIS — R52 PAIN: Primary | ICD-10-CM

## 2024-11-11 DIAGNOSIS — S92.355A NONDISPLACED FRACTURE OF FIFTH METATARSAL BONE, LEFT FOOT, INITIAL ENCOUNTER FOR CLOSED FRACTURE: ICD-10-CM

## 2024-11-11 PROCEDURE — 99406 BEHAV CHNG SMOKING 3-10 MIN: CPT | Performed by: PHYSICIAN ASSISTANT

## 2024-11-11 PROCEDURE — 99204 OFFICE O/P NEW MOD 45 MIN: CPT | Performed by: PHYSICIAN ASSISTANT

## 2024-11-11 PROCEDURE — M5009 MISC POST OP SHOE: HCPCS | Performed by: PHYSICIAN ASSISTANT

## 2024-11-11 RX ORDER — ERGOCALCIFEROL 1.25 MG/1
50000 CAPSULE, LIQUID FILLED ORAL WEEKLY
Qty: 8 CAPSULE | Refills: 0 | Status: SHIPPED | OUTPATIENT
Start: 2024-11-11 | End: 2024-12-31

## 2024-11-11 NOTE — PROGRESS NOTES
Patient was fitted and instructed on a Post Op Shoe for the left foot. Patient read and signed documenting they understand and agree to [facility]'s current DME return policy.

## 2024-11-11 NOTE — TELEPHONE ENCOUNTER
Physician provider: Dr. Lopez  Reason for today's call (Please detail here patients chief complaint): med question  Last office visit:n/a  Patient Callback Number: [phone]  Was callback number verified?: Yes  Preferred pharmacy (If refill request): n/a  Calls to office within the last 48 hours?:No    Patient notified that their information will be routed to the [facility] clinical triage team for review. Patient is advised that they will receive a phone call from the triage department. If symptom related and symptoms worsen before receiving a call back, the patient has been advised to proceed to the nearest ED.          Tennille Garcias from [facility] would like to know if pt can take Vitamin D 50,000 units with her chemo pill.    Pt is in the [facility] office    [phone]

## 2024-11-11 NOTE — TELEPHONE ENCOUNTER
Cross referenced Cabometyx with Vitamin D in Up To Date.  Per Up To Date, no interaction noted.  Returned call and informed office.

## 2024-11-11 NOTE — PROGRESS NOTES
Name: Mechelle Hall  YOB: 1965  Gender: female  MRN: [id number]    Summary:Left Zone 1 base 5th Metatarsal fracture: Closed treatment of fracture without manipulation  Right ankle sprain    Stage IV kidney cancer and bilateral kidneys-on oral chemo    Placed in postop sandal on left lower extremity today.  May WBAT in the sandal  Patient already has CAM boot and will wear this on the right lower extremity.  May WBAT in the boot  Vitamin D 50,000 units/weekly x 8 weeks-will check with her oncologist to confirm this is ok to give with her oral chemo  Work note: May work in boot and postop sandal until next follow-up    I discussed tobacco use with the patient for 3 minutes. The patient currently uses cigarettes.  I discussed tobacco is associated with low bone mineral density, delayed fracture union, karen-implant bone loss, and implant failure.  It also negatively affects blood flow and thus negatively affects wound healing.  I discussed how smokers have increased pain and lower overall patient satisfaction, and significantly increased rates of wound healing complication.  I recommend complete cessation of smoking.  However, I understand it is very difficult to quite smoking with out time and often help.  The patient has decided to denied offered prescription medication and cessation materials      F/u 4 weeks w/  left foot  Xray-transition to ASO brace and carbon fiber insert at this time       CC: left lateral foot pain    Mechelle Hall is a 59-year-old female who presents with left lateral foot pain and right ankle pain after a fall that occurred approximately 1 week ago.  Patient states that she suffered a mechanical fall while walking down her outdoor steps and hitting a loose board.  Patient states that she rolled her right ankle and was able to ambulate after but had significant pain in both feet.  She went to the ED but they did not see a fracture.  She continues to have pain and her

## 2024-11-13 ENCOUNTER — HOSPITAL ENCOUNTER (OUTPATIENT)
Dept: CT IMAGING | Age: 59
Discharge: HOME | End: 2024-11-16
Attending: INTERNAL MEDICINE
Payer: COMMERCIAL

## 2024-11-13 DIAGNOSIS — C64.2 RENAL CELL CARCINOMA OF LEFT KIDNEY (HCC): ICD-10-CM

## 2024-11-13 LAB — CREAT BLD-MCNC: 0.68 MG/DL (ref 0.8–1.5)

## 2024-11-13 PROCEDURE — 71260 CT THORAX DX C+: CPT

## 2024-11-13 PROCEDURE — 82565 ASSAY OF CREATININE: CPT

## 2024-11-13 PROCEDURE — 6360000004 HC RX CONTRAST MEDICATION: Performed by: INTERNAL MEDICINE

## 2024-11-13 RX ORDER — IOPAMIDOL 755 MG/ML
100 INJECTION, SOLUTION INTRAVASCULAR
Status: COMPLETED | OUTPATIENT
Start: 2024-11-13 | End: 2024-11-13

## 2024-11-13 RX ADMIN — IOPAMIDOL 100 ML: 755 INJECTION, SOLUTION INTRAVENOUS at 08:17

## 2024-11-19 DIAGNOSIS — C64.2 RENAL CELL CARCINOMA OF LEFT KIDNEY (HCC): Primary | ICD-10-CM

## 2024-11-20 ENCOUNTER — HOSPITAL ENCOUNTER (OUTPATIENT)
Dept: LAB | Age: 59
Discharge: HOME | End: 2024-11-20
Payer: COMMERCIAL

## 2024-11-20 ENCOUNTER — OFFICE VISIT (OUTPATIENT)
Dept: PALLATIVE CARE | Age: 59
End: 2024-11-20
Payer: COMMERCIAL

## 2024-11-20 ENCOUNTER — OFFICE VISIT (OUTPATIENT)
Dept: ONCOLOGY | Age: 59
End: 2024-11-20
Payer: COMMERCIAL

## 2024-11-20 VITALS
HEIGHT: 66 IN | BODY MASS INDEX: 34.72 KG/M2 | HEART RATE: 61 BPM | SYSTOLIC BLOOD PRESSURE: 131 MMHG | WEIGHT: 216 LBS | DIASTOLIC BLOOD PRESSURE: 79 MMHG | RESPIRATION RATE: 16 BRPM | OXYGEN SATURATION: 94 % | TEMPERATURE: 97.4 F

## 2024-11-20 DIAGNOSIS — Z79.899 HIGH RISK MEDICATION USE: ICD-10-CM

## 2024-11-20 DIAGNOSIS — Z51.5 ENCOUNTER FOR PALLIATIVE CARE: ICD-10-CM

## 2024-11-20 DIAGNOSIS — R19.7 DIARRHEA, UNSPECIFIED TYPE: ICD-10-CM

## 2024-11-20 DIAGNOSIS — K59.03 THERAPEUTIC OPIOID-INDUCED CONSTIPATION (OIC): ICD-10-CM

## 2024-11-20 DIAGNOSIS — R53.83 FATIGUE DUE TO TREATMENT: ICD-10-CM

## 2024-11-20 DIAGNOSIS — C64.2 RENAL CELL CARCINOMA OF LEFT KIDNEY (HCC): Primary | ICD-10-CM

## 2024-11-20 DIAGNOSIS — F32.A ANXIETY AND DEPRESSION: ICD-10-CM

## 2024-11-20 DIAGNOSIS — F41.9 ANXIETY AND DEPRESSION: ICD-10-CM

## 2024-11-20 DIAGNOSIS — T40.2X5A THERAPEUTIC OPIOID-INDUCED CONSTIPATION (OIC): ICD-10-CM

## 2024-11-20 DIAGNOSIS — G89.3 CANCER ASSOCIATED PAIN: ICD-10-CM

## 2024-11-20 DIAGNOSIS — C64.2 RENAL CELL CARCINOMA OF LEFT KIDNEY (HCC): ICD-10-CM

## 2024-11-20 DIAGNOSIS — R11.0 NAUSEA: ICD-10-CM

## 2024-11-20 LAB
ALBUMIN SERPL-MCNC: 3.5 G/DL (ref 3.2–4.6)
ALBUMIN/GLOB SERPL: 1.2 (ref 1–1.9)
ALP SERPL-CCNC: 78 U/L (ref 35–104)
ALT SERPL-CCNC: 30 U/L (ref 8–45)
ANION GAP SERPL CALC-SCNC: 10 MMOL/L (ref 7–16)
AST SERPL-CCNC: 31 U/L (ref 15–37)
BASOPHILS # BLD: 0 K/UL (ref 0–0.2)
BASOPHILS NFR BLD: 0 % (ref 0–2)
BILIRUB SERPL-MCNC: 0.4 MG/DL (ref 0–1.2)
BUN SERPL-MCNC: 10 MG/DL (ref 8–23)
CALCIUM SERPL-MCNC: 8.8 MG/DL (ref 8.8–10.2)
CHLORIDE SERPL-SCNC: 104 MMOL/L (ref 98–107)
CO2 SERPL-SCNC: 25 MMOL/L (ref 20–29)
CREAT SERPL-MCNC: 0.73 MG/DL (ref 0.6–1.1)
DIFFERENTIAL METHOD BLD: ABNORMAL
EOSINOPHIL # BLD: 0.1 K/UL (ref 0–0.8)
EOSINOPHIL NFR BLD: 2 % (ref 0.5–7.8)
ERYTHROCYTE [DISTWIDTH] IN BLOOD BY AUTOMATED COUNT: 14.7 % (ref 11.9–14.6)
GLOBULIN SER CALC-MCNC: 3 G/DL (ref 2.3–3.5)
GLUCOSE SERPL-MCNC: 121 MG/DL (ref 70–99)
HCT VFR BLD AUTO: 43.9 % (ref 35.8–46.3)
HGB BLD-MCNC: 14.6 G/DL (ref 11.7–15.4)
IMM GRANULOCYTES # BLD AUTO: 0 K/UL (ref 0–0.5)
IMM GRANULOCYTES NFR BLD AUTO: 0 % (ref 0–5)
LYMPHOCYTES # BLD: 3.3 K/UL (ref 0.5–4.6)
LYMPHOCYTES NFR BLD: 34 % (ref 13–44)
MCH RBC QN AUTO: 30.9 PG (ref 26.1–32.9)
MCHC RBC AUTO-ENTMCNC: 33.3 G/DL (ref 31.4–35)
MCV RBC AUTO: 93 FL (ref 82–102)
MONOCYTES # BLD: 0.6 K/UL (ref 0.1–1.3)
MONOCYTES NFR BLD: 6 % (ref 4–12)
NEUTS SEG # BLD: 5.6 K/UL (ref 1.7–8.2)
NEUTS SEG NFR BLD: 58 % (ref 43–78)
NRBC # BLD: 0 K/UL (ref 0–0.2)
PLATELET # BLD AUTO: 240 K/UL (ref 150–450)
PMV BLD AUTO: 10.2 FL (ref 9.4–12.3)
POTASSIUM SERPL-SCNC: 3.8 MMOL/L (ref 3.5–5.1)
PROT SERPL-MCNC: 6.5 G/DL (ref 6.3–8.2)
RBC # BLD AUTO: 4.72 M/UL (ref 4.05–5.2)
SODIUM SERPL-SCNC: 139 MMOL/L (ref 136–145)
WBC # BLD AUTO: 9.6 K/UL (ref 4.3–11.1)

## 2024-11-20 PROCEDURE — 99213 OFFICE O/P EST LOW 20 MIN: CPT | Performed by: PHYSICIAN ASSISTANT

## 2024-11-20 PROCEDURE — 2580000003 HC RX 258: Performed by: INTERNAL MEDICINE

## 2024-11-20 PROCEDURE — 99214 OFFICE O/P EST MOD 30 MIN: CPT | Performed by: NURSE PRACTITIONER

## 2024-11-20 PROCEDURE — 36415 COLL VENOUS BLD VENIPUNCTURE: CPT

## 2024-11-20 PROCEDURE — 80053 COMPREHEN METABOLIC PANEL: CPT

## 2024-11-20 PROCEDURE — 36591 DRAW BLOOD OFF VENOUS DEVICE: CPT

## 2024-11-20 PROCEDURE — 85025 COMPLETE CBC W/AUTO DIFF WBC: CPT

## 2024-11-20 PROCEDURE — 6360000002 HC RX W HCPCS: Performed by: INTERNAL MEDICINE

## 2024-11-20 RX ORDER — HEPARIN 100 UNIT/ML
300 SYRINGE INTRAVENOUS PRN
Status: ACTIVE | OUTPATIENT
Start: 2024-11-20 | End: 2024-11-20

## 2024-11-20 RX ORDER — OXYCODONE HYDROCHLORIDE 15 MG/1
1 TABLET, FILM COATED, EXTENDED RELEASE ORAL EVERY 12 HOURS
Qty: 60 TABLET | Refills: 0 | Status: SHIPPED | OUTPATIENT
Start: 2024-11-20 | End: 2024-12-20

## 2024-11-20 RX ORDER — OXYCODONE HYDROCHLORIDE 10 MG/1
10 TABLET ORAL EVERY 4 HOURS PRN
Qty: 180 TABLET | Refills: 0 | Status: SHIPPED | OUTPATIENT
Start: 2024-11-20 | End: 2024-12-20

## 2024-11-20 RX ORDER — SODIUM CHLORIDE 0.9 % (FLUSH) 0.9 %
5-40 SYRINGE (ML) INJECTION PRN
Status: ACTIVE | OUTPATIENT
Start: 2024-11-20 | End: 2024-11-20

## 2024-11-20 RX ADMIN — SODIUM CHLORIDE, PRESERVATIVE FREE 20 ML: 5 INJECTION INTRAVENOUS at 09:09

## 2024-11-20 RX ADMIN — HEPARIN 300 UNITS: 100 SYRINGE at 09:10

## 2024-11-20 ASSESSMENT — ENCOUNTER SYMPTOMS
SHORTNESS OF BREATH: 0
BLOOD IN STOOL: 0
NAUSEA: 1
HEMOPTYSIS: 0
SORE THROAT: 0
BACK PAIN: 1
SCLERAL ICTERUS: 0
DIARRHEA: 0
ABDOMINAL PAIN: 1
NAUSEA: 0
VOICE CHANGE: 0
VOMITING: 0
WHEEZING: 0
CHEST TIGHTNESS: 0
CONSTIPATION: 0
DIARRHEA: 1
ABDOMINAL DISTENTION: 0
BACK PAIN: 1
TROUBLE SWALLOWING: 0
ABDOMINAL PAIN: 0

## 2024-11-20 ASSESSMENT — PATIENT HEALTH QUESTIONNAIRE - PHQ9
SUM OF ALL RESPONSES TO PHQ QUESTIONS 1-9: 0
SUM OF ALL RESPONSES TO PHQ QUESTIONS 1-9: 0
1. LITTLE INTEREST OR PLEASURE IN DOING THINGS: NOT AT ALL
SUM OF ALL RESPONSES TO PHQ9 QUESTIONS 1 & 2: 0
2. FEELING DOWN, DEPRESSED OR HOPELESS: NOT AT ALL
SUM OF ALL RESPONSES TO PHQ QUESTIONS 1-9: 0
SUM OF ALL RESPONSES TO PHQ QUESTIONS 1-9: 0

## 2024-11-20 NOTE — PROGRESS NOTES
Patient to port lab for port access and flush. Port accessed using 20g 0.75\" velasquez needle without difficulty. Sluggish blood return noted, flushed without difficulty, locked with Heparin. Port de-accessed, dressing applied. Patient tolerated well. Discharged ambulatory.

## 2024-11-20 NOTE — PROGRESS NOTES
[facility]: Established patient - follow up     Chief Complaint   Patient presents with    Follow-up     Dx; RCC - bilateral with likely met to adrenal +/- lungs   S/p resection on left per details below - [facility]     Fam hx: father - prostate cancer   Paternal aunt - hx of breast cancer     History of Present Illness:  Ms. Hall is a 59 y.o. female who presents today for FU regarding RCC.  The past medical history is significant for anxiety, HAs and sleep apnea, cervical dysplasia but no carcinoma, current smoker (~1/2PPD), lap liv and tubal reversal, I&D of left breast abscess.  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  Despite attending physical therapy since the accident, she continued to experience the same low back pain.  She was seen at [facility] on 10/4/22.  MRI of the lumbar spine on 10/15/22 showed mild degenerative disc disease at L3-4 through L5-S1 and a 5.7 x 6.1 x 7 cm lower pole solid left renal mass.  Urgent referral was placed to [facility], who referred the patient to [facility] for uro/onc evaluation and treatment of renal mass.  CT AP on 10/26/22 showed bilateral enhancing renal masses, concerning for renal cell carcinoma with the left lower pole renal mass measuring up to 6.5 cm and extending along Gerota's fascia and the right midpole renal mass measuring up to 4.6 cm. Also noted was an enhancing 1.5 cm right adrenal nodule, concerning for a metastatic nodule.  No recent wt loss.  S/p left renal biopsy on 11/2022, Nidia grade 2 of 4, clear cell RCC.  CT chest on 11/7 showed multiple nodules that appeared to be calcified, most c/w granulomatous disease, but she's aware that metastatic disease cannot be ruled out.  Not on AC.  Cr 0.89.    - Pt presented for consultation regarding systemic therapy.  She was here with her sister and friend.

## 2024-11-20 NOTE — PROGRESS NOTES
139 136 - 145 mmol/L    Potassium 3.8 3.5 - 5.1 mmol/L    Chloride 104 98 - 107 mmol/L    CO2 25 20 - 29 mmol/L    Anion Gap 10 7 - 16 mmol/L    Glucose 121 (H) 70 - 99 mg/dL    BUN 10 8 - 23 MG/DL    Creatinine 0.73 0.60 - 1.10 MG/DL    Est, Glom Filt Rate >90 >60 ml/min/1.73m2    Calcium 8.8 8.8 - 10.2 MG/DL    Total Bilirubin 0.4 0.0 - 1.2 MG/DL    ALT 30 8 - 45 U/L    AST 31 15 - 37 U/L    Alk Phosphatase 78 35 - 104 U/L    Total Protein 6.5 6.3 - 8.2 g/dL    Albumin 3.5 3.2 - 4.6 g/dL    Globulin 3.0 2.3 - 3.5 g/dL    Albumin/Globulin Ratio 1.2 1.0 - 1.9     CBC with Auto Differential    Collection Time: 11/20/24  7:11 AM   Result Value Ref Range    WBC 9.6 4.3 - 11.1 K/uL    RBC 4.72 4.05 - 5.2 M/uL    Hemoglobin 14.6 11.7 - 15.4 g/dL    Hematocrit 43.9 35.8 - 46.3 %    MCV 93.0 82.0 - 102.0 FL    MCH 30.9 26.1 - 32.9 PG    MCHC 33.3 31.4 - 35.0 g/dL    RDW 14.7 (H) 11.9 - 14.6 %    Platelets 240 150 - 450 K/uL    MPV 10.2 9.4 - 12.3 FL    nRBC 0.00 0.0 - 0.2 K/uL    Differential Type AUTOMATED      Neutrophils % 58 43 - 78 %    Lymphocytes % 34 13 - 44 %    Monocytes % 6 4.0 - 12.0 %    Eosinophils % 2 0.5 - 7.8 %    Basophils % 0 0.0 - 2.0 %    Immature Granulocytes % 0 0.0 - 5.0 %    Neutrophils Absolute 5.6 1.7 - 8.2 K/UL    Lymphocytes Absolute 3.3 0.5 - 4.6 K/UL    Monocytes Absolute 0.6 0.1 - 1.3 K/UL    Eosinophils Absolute 0.1 0.0 - 0.8 K/UL    Basophils Absolute 0.0 0.0 - 0.2 K/UL    Immature Granulocytes Absolute 0.0 0.0 - 0.5 K/UL         Imaging:  No valid procedures specified.      ASSESSMENT:   Diagnosis Orders   1. Renal cell carcinoma of left kidney (HCC)  oxyCODONE (OXYCONTIN) 15 MG T12A extended release tablet    oxyCODONE HCl (OXY-IR) 10 MG immediate release tablet      2. Encounter for palliative care  oxyCODONE (OXYCONTIN) 15 MG T12A extended release tablet    oxyCODONE HCl (OXY-IR) 10 MG immediate release tablet      3. Cancer associated pain  oxyCODONE (OXYCONTIN) 15 MG T12A extended

## 2024-12-02 ENCOUNTER — TELEPHONE (OUTPATIENT)
Dept: ORTHOPEDIC SURGERY | Age: 59
End: 2024-12-02

## 2024-12-02 NOTE — TELEPHONE ENCOUNTER
Attempted to call pt to r/s Appt from 12/9 to alternate date. If pt calls back, please reschedule for a Monday or Wednesday; ok to overbook at pt's convenience.

## 2024-12-06 ENCOUNTER — TELEPHONE (OUTPATIENT)
Dept: ORTHOPEDIC SURGERY | Age: 59
End: 2024-12-06

## 2024-12-06 NOTE — TELEPHONE ENCOUNTER
Left 2nd VM to r/s pt's appt on 12/9 as provider will be in the OR. If pt calls back, please r/s appt for later date. Ok to overbook for convenience.

## 2024-12-12 ENCOUNTER — OFFICE VISIT (OUTPATIENT)
Dept: ORTHOPEDIC SURGERY | Age: 59
End: 2024-12-12
Payer: COMMERCIAL

## 2024-12-12 DIAGNOSIS — S92.355D NONDISPLACED FRACTURE OF FIFTH METATARSAL BONE, LEFT FOOT, SUBSEQUENT ENCOUNTER FOR FRACTURE WITH ROUTINE HEALING: Primary | ICD-10-CM

## 2024-12-12 DIAGNOSIS — R52 PAIN: ICD-10-CM

## 2024-12-12 DIAGNOSIS — S93.401D SPRAIN OF RIGHT ANKLE, UNSPECIFIED LIGAMENT, SUBSEQUENT ENCOUNTER: ICD-10-CM

## 2024-12-12 PROCEDURE — 99214 OFFICE O/P EST MOD 30 MIN: CPT | Performed by: PHYSICIAN ASSISTANT

## 2024-12-12 PROCEDURE — M5016 MISC CARBON FIBER: HCPCS | Performed by: PHYSICIAN ASSISTANT

## 2024-12-12 RX ORDER — ERGOCALCIFEROL 1.25 MG/1
50000 CAPSULE, LIQUID FILLED ORAL WEEKLY
Qty: 8 CAPSULE | Refills: 0 | Status: SHIPPED | OUTPATIENT
Start: 2024-12-12 | End: 2025-01-31

## 2024-12-12 NOTE — PROGRESS NOTES
Patient was fitted and instructed on a Carbon Fiber Insert for the left foot.  The patient was prescribed a Wraptor brace for the patient's rightfoot. The patient wears a size 10 shoe and I fitted the patient with a M brace. I explained how to fit the brace properly by pulling the lace tabs across top of foot first then under arch and lastly pulling the strap up firmly and attaching to the lateral Velcro strip. Thus forming a figure 8 across the ankle joint. Once the figure 8 is completed they are to secure the top (short circumferential) straps to help avoid the straps from loosening with normal wear.  The patient was able to demonstrate proper fitting in office to ensure compliance with device and acknowledged satisfaction with current fit. Patient read and signed documenting they understand and agree to [facility]'s current DME return policy.   
sensor every 10 days (Patient not taking: Reported on 11/20/2024), Disp: 3 each, Rfl: 11    blood glucose test strips (FREESTYLE TEST STRIPS) strip, 1 each by In Vitro route daily As needed. (Patient not taking: Reported on 11/20/2024), Disp: 100 each, Rfl: 3    cetirizine (ZYRTEC) 10 MG tablet, Take 1 tablet by mouth at bedtime, Disp: , Rfl:     albuterol sulfate HFA (PROAIR HFA) 108 (90 Base) MCG/ACT inhaler, Inhale 2 puffs into the lungs every 6 hours as needed for Wheezing, Disp: 18 g, Rfl: 3    Acetaminophen (TYLENOL) 325 MG CAPS, every 6 hours as needed As needed, Disp: , Rfl:

## 2024-12-18 ENCOUNTER — OFFICE VISIT (OUTPATIENT)
Dept: INTERNAL MEDICINE CLINIC | Facility: CLINIC | Age: 59
End: 2024-12-18
Payer: COMMERCIAL

## 2024-12-18 VITALS
HEART RATE: 72 BPM | WEIGHT: 211 LBS | SYSTOLIC BLOOD PRESSURE: 122 MMHG | BODY MASS INDEX: 33.91 KG/M2 | HEIGHT: 66 IN | DIASTOLIC BLOOD PRESSURE: 76 MMHG | TEMPERATURE: 97.4 F | OXYGEN SATURATION: 96 %

## 2024-12-18 DIAGNOSIS — Z72.0 TOBACCO ABUSE: ICD-10-CM

## 2024-12-18 DIAGNOSIS — F32.A ANXIETY AND DEPRESSION: ICD-10-CM

## 2024-12-18 DIAGNOSIS — Z51.5 ENCOUNTER FOR PALLIATIVE CARE: ICD-10-CM

## 2024-12-18 DIAGNOSIS — S92.355A NONDISPLACED FRACTURE OF FIFTH METATARSAL BONE, LEFT FOOT, INITIAL ENCOUNTER FOR CLOSED FRACTURE: ICD-10-CM

## 2024-12-18 DIAGNOSIS — G89.3 CANCER ASSOCIATED PAIN: ICD-10-CM

## 2024-12-18 DIAGNOSIS — I10 PRIMARY HYPERTENSION: ICD-10-CM

## 2024-12-18 DIAGNOSIS — S61.219A LACERATION OF FINGER OF LEFT HAND WITHOUT FOREIGN BODY WITHOUT DAMAGE TO NAIL, UNSPECIFIED FINGER, INITIAL ENCOUNTER: ICD-10-CM

## 2024-12-18 DIAGNOSIS — R11.2 NAUSEA AND VOMITING, UNSPECIFIED VOMITING TYPE: ICD-10-CM

## 2024-12-18 DIAGNOSIS — F41.9 ANXIETY AND DEPRESSION: ICD-10-CM

## 2024-12-18 DIAGNOSIS — C64.2 RENAL CELL CARCINOMA OF LEFT KIDNEY (HCC): ICD-10-CM

## 2024-12-18 DIAGNOSIS — C64.2 RENAL CELL CARCINOMA OF LEFT KIDNEY (HCC): Primary | ICD-10-CM

## 2024-12-18 DIAGNOSIS — E11.9 TYPE 2 DIABETES MELLITUS WITHOUT COMPLICATION, WITHOUT LONG-TERM CURRENT USE OF INSULIN (HCC): Primary | ICD-10-CM

## 2024-12-18 DIAGNOSIS — E66.811 OBESITY (BMI 30.0-34.9): ICD-10-CM

## 2024-12-18 PROCEDURE — 3078F DIAST BP <80 MM HG: CPT | Performed by: INTERNAL MEDICINE

## 2024-12-18 PROCEDURE — 3074F SYST BP LT 130 MM HG: CPT | Performed by: INTERNAL MEDICINE

## 2024-12-18 PROCEDURE — 99214 OFFICE O/P EST MOD 30 MIN: CPT | Performed by: INTERNAL MEDICINE

## 2024-12-18 PROCEDURE — 3044F HG A1C LEVEL LT 7.0%: CPT | Performed by: INTERNAL MEDICINE

## 2024-12-18 RX ORDER — CLONAZEPAM 1 MG/1
1 TABLET ORAL 2 TIMES DAILY PRN
Qty: 60 TABLET | Refills: 2 | Status: SHIPPED | OUTPATIENT
Start: 2025-01-15 | End: 2025-04-15

## 2024-12-18 RX ORDER — OXYCODONE HYDROCHLORIDE 10 MG/1
10 TABLET ORAL EVERY 4 HOURS PRN
Qty: 180 TABLET | Refills: 0 | Status: SHIPPED | OUTPATIENT
Start: 2024-12-20 | End: 2025-01-19

## 2024-12-18 RX ORDER — CABOZANTINIB 40 MG/1
TABLET ORAL
Qty: 30 TABLET | Refills: 3 | OUTPATIENT
Start: 2024-12-18

## 2024-12-18 RX ORDER — BUSPIRONE HYDROCHLORIDE 10 MG/1
10 TABLET ORAL 2 TIMES DAILY
Qty: 60 TABLET | Refills: 3 | Status: SHIPPED | OUTPATIENT
Start: 2024-12-18

## 2024-12-18 RX ORDER — OXYCODONE HYDROCHLORIDE 15 MG/1
1 TABLET, FILM COATED, EXTENDED RELEASE ORAL EVERY 12 HOURS
Qty: 60 TABLET | Refills: 0 | Status: SHIPPED | OUTPATIENT
Start: 2024-12-18 | End: 2025-01-17

## 2024-12-18 RX ORDER — DRONABINOL 5 MG/1
5 CAPSULE ORAL
Qty: 90 CAPSULE | Refills: 0 | Status: SHIPPED | OUTPATIENT
Start: 2024-12-18 | End: 2025-01-17

## 2024-12-18 RX ORDER — MUPIROCIN 20 MG/G
OINTMENT TOPICAL
Qty: 30 G | Status: SHIPPED | OUTPATIENT
Start: 2024-12-18

## 2024-12-18 NOTE — PROGRESS NOTES
I have reviewed the patient's controlled substance prescription history, as maintained in the [place] prescription monitoring program, so that the prescriptions(s) for a controlled substance can be given.  Last Date Reviewed:   12/18/24  Lise Rivero PA-C

## 2024-12-18 NOTE — PROGRESS NOTES
Dispense:  30 g     Refill:  PRN       Medications Discontinued During This Encounter   Medication Reason    vitamin D (ERGOCALCIFEROL) 1.25 MG (36037 UT) CAPS capsule LIST CLEANUP    ondansetron (ZOFRAN) 4 MG tablet LIST CLEANUP    amLODIPine (NORVASC) 5 MG tablet LIST CLEANUP    blood glucose test strips (FREESTYLE TEST STRIPS) strip     Continuous Blood Gluc Sensor (DEXCOM G7 SENSOR) MISC LIST CLEANUP        Diagnosis Orders   1. Type 2 diabetes mellitus without complication, without long-term current use of insulin (HCC)  Hemoglobin A1C      2. Renal cell carcinoma of left kidney (HCC)        3. Nondisplaced fracture of fifth metatarsal bone, left foot, initial encounter for closed fracture        4. Laceration of finger of left hand without foreign body without damage to nail, unspecified finger, initial encounter        5. Tobacco abuse        6. Primary hypertension        7. Obesity (BMI 30.0-34.9)           Is doing fairly well physically and emotionally.  Vitals look good outside of weight.  Will discuss labs over the phone.    Adding these on to lab draw that is upcoming.  Will try the above for split between her 4th and 5th digits.  Cover with nuskin or moleskin.  Knows to keep a low threshold for contacting the office with worsening symptoms.  Be alert to signs and symptoms of infection and contact the office if they develop.  Should have had a stitch.  Advised the patient to quit smoking.  Follow up as documented or earlier as needed.  Reviewed xrays of feet with the patient.  Urged her to follow up with ortho.  Recommended vaccinations that are due but patient is not interested.      Return in about 6 months (around 6/18/2025).          Dawna Jewell MD

## 2024-12-19 ASSESSMENT — ENCOUNTER SYMPTOMS
VOMITING: 0
SHORTNESS OF BREATH: 0
DIARRHEA: 0
ABDOMINAL PAIN: 0
CONSTIPATION: 0
WHEEZING: 0
NAUSEA: 1
COUGH: 0
BLOOD IN STOOL: 0

## 2025-01-13 ENCOUNTER — TELEMEDICINE (OUTPATIENT)
Dept: INTERNAL MEDICINE CLINIC | Facility: CLINIC | Age: 60
End: 2025-01-13

## 2025-01-13 DIAGNOSIS — J02.9 PHARYNGITIS, UNSPECIFIED ETIOLOGY: ICD-10-CM

## 2025-01-13 DIAGNOSIS — R05.1 ACUTE COUGH: ICD-10-CM

## 2025-01-13 DIAGNOSIS — J10.1 INFLUENZA A: Primary | ICD-10-CM

## 2025-01-13 DIAGNOSIS — R50.9 FEVER, UNSPECIFIED FEVER CAUSE: ICD-10-CM

## 2025-01-13 LAB
EXP DATE SOLUTION: NORMAL
EXP DATE SWAB: NORMAL
EXPIRATION DATE: NORMAL
GROUP A STREP ANTIGEN, POC: NEGATIVE
INFLUENZA A ANTIGEN, POC: POSITIVE
INFLUENZA B ANTIGEN, POC: NEGATIVE
LOT NUMBER POC: NORMAL
LOT NUMBER SOLUTION: NORMAL
LOT NUMBER SWAB: NORMAL
RSV RNA, POC: NEGATIVE
SARS-COV-2 RNA, POC: NEGATIVE
VALID INTERNAL CONTROL, POC: YES

## 2025-01-13 RX ORDER — OSELTAMIVIR PHOSPHATE 75 MG/1
75 CAPSULE ORAL 2 TIMES DAILY
Qty: 10 CAPSULE | Refills: 0 | Status: SHIPPED | OUTPATIENT
Start: 2025-01-13 | End: 2025-01-18

## 2025-01-13 ASSESSMENT — ENCOUNTER SYMPTOMS
SHORTNESS OF BREATH: 0
SORE THROAT: 1
CHEST TIGHTNESS: 0
COUGH: 1
CHOKING: 0
DIARRHEA: 1
NAUSEA: 1
VOMITING: 1

## 2025-01-13 NOTE — PROGRESS NOTES
Mechelle Hall, was evaluated through a synchronous (real-time) audio-video encounter. The patient (or guardian if applicable) is aware that this is a billable service, which includes applicable co-pays. This Virtual Visit was conducted with patient's (and/or legal guardian's) consent. Patient identification was verified, and a caregiver was present when appropriate.   The patient was located at Home: [address] [place] [id number]  Provider was located at Facility (Appt Dept): [address] [facility] [place] [address]  Confirm you are appropriately licensed, registered, or certified to deliver care in the state where the patient is located as indicated above. If you are not or unsure, please re-schedule the visit: Yes, I confirm.     Mechelle Hall (:  1965) is a Established patient, presenting virtually for evaluation of the following:    Pt reports vomiting and diarrhea that started on Thursday. Also reports fevers and cough and congestion.       Below is the assessment and plan developed based on review of pertinent history, physical exam, labs, studies, and medications.     Assessment & Plan  Influenza A   Acute condition, new, Antivirals per orders.    Orders:    oseltamivir (TAMIFLU) 75 MG capsule; Take 1 capsule by mouth 2 times daily for 5 days    Acute cough   Acute condition, new,      Orders:    AMB POC COVID-19 COV    AMB POC RAPID STREP A    AMB POC RAPID INFLUENZA TEST    AMB POC RSV    Fever, unspecified fever cause       Orders:    AMB POC COVID-19 COV    AMB POC RAPID STREP A    AMB POC RAPID INFLUENZA TEST    AMB POC RSV    Pharyngitis, unspecified etiology   Acute condition, new, Antivirals per orders.  OTC symptom managment    Orders:    AMB POC RAPID STREP A    AMB POC RAPID INFLUENZA TEST      No follow-ups on file.       Subjective   She has been having diarrhea and vomiting onset Thursday. Temp max 104.6. Marinol and phenergan at  was helpful for

## 2025-01-14 NOTE — PROGRESS NOTES
adenopathy present.      Cervical: No cervical adenopathy.      Upper Body:      Right upper body: No supraclavicular or axillary adenopathy.      Left upper body: No supraclavicular or axillary adenopathy.      Comments: Tender    Skin:     General: Skin is warm and dry.      Coloration: Skin is not jaundiced or pale.   Neurological:      General: No focal deficit present.      Mental Status: She is alert and oriented to person, place, and time.      Gait: Gait normal.   Psychiatric:         Behavior: Behavior normal.         Thought Content: Thought content normal.        Labs:  Hospital Outpatient Visit on 01/23/2025   Component Date Value Ref Range Status    Sodium 01/23/2025 138  136 - 145 mmol/L Final    Potassium 01/23/2025 3.4 (L)  3.5 - 5.1 mmol/L Final    Chloride 01/23/2025 102  98 - 107 mmol/L Final    CO2 01/23/2025 25  20 - 29 mmol/L Final    Anion Gap 01/23/2025 11  7 - 16 mmol/L Final    Glucose 01/23/2025 119 (H)  70 - 99 mg/dL Final    Comment: <70 mg/dL Consistent with, but not fully diagnostic of hypoglycemia.  100 - 125 mg/dL Impaired fasting glucose/consistent with pre-diabetes mellitus.  > 126 mg/dl Fasting glucose consistent with overt diabetes mellitus      BUN 01/23/2025 8  8 - 23 MG/DL Final    Creatinine 01/23/2025 0.65  0.60 - 1.10 MG/DL Final    Est, Glom Filt Rate 01/23/2025 >90  >60 ml/min/1.73m2 Final    Comment:    Pediatric calculator link: https://www.kidney.org/professionals/kdoqi/gfr_calculatorped     These results are not intended for use in patients <18 years of age.     eGFR results are calculated without a race factor using  the 2021 CKD-EPI equation. Careful clinical correlation is recommended, particularly when comparing to results calculated using previous equations.  The CKD-EPI equation is less accurate in patients with extremes of muscle mass, extra-renal metabolism of creatinine, excessive creatine ingestion, or following therapy that affects renal tubular

## 2025-01-15 ENCOUNTER — PATIENT MESSAGE (OUTPATIENT)
Dept: INTERNAL MEDICINE CLINIC | Facility: CLINIC | Age: 60
End: 2025-01-15

## 2025-01-15 RX ORDER — DOXYCYCLINE HYCLATE 100 MG
100 TABLET ORAL 2 TIMES DAILY
Qty: 14 TABLET | Refills: 0 | Status: SHIPPED | OUTPATIENT
Start: 2025-01-15 | End: 2025-01-22

## 2025-01-18 ENCOUNTER — HOSPITAL ENCOUNTER (EMERGENCY)
Age: 60
Discharge: HOME | End: 2025-01-18
Attending: EMERGENCY MEDICINE
Payer: COMMERCIAL

## 2025-01-18 ENCOUNTER — APPOINTMENT (OUTPATIENT)
Dept: GENERAL RADIOLOGY | Age: 60
End: 2025-01-18
Payer: COMMERCIAL

## 2025-01-18 VITALS
OXYGEN SATURATION: 96 % | DIASTOLIC BLOOD PRESSURE: 87 MMHG | WEIGHT: 200 LBS | TEMPERATURE: 97.7 F | HEIGHT: 66 IN | HEART RATE: 70 BPM | BODY MASS INDEX: 32.14 KG/M2 | RESPIRATION RATE: 17 BRPM | SYSTOLIC BLOOD PRESSURE: 130 MMHG

## 2025-01-18 DIAGNOSIS — R11.2 NAUSEA AND VOMITING, UNSPECIFIED VOMITING TYPE: Primary | ICD-10-CM

## 2025-01-18 DIAGNOSIS — J10.1 INFLUENZA A: ICD-10-CM

## 2025-01-18 LAB
ANION GAP SERPL CALC-SCNC: 12 MMOL/L (ref 7–16)
BASOPHILS # BLD: 0.02 K/UL (ref 0–0.2)
BASOPHILS NFR BLD: 0.3 % (ref 0–2)
BUN SERPL-MCNC: 6 MG/DL (ref 8–23)
CALCIUM SERPL-MCNC: 8.9 MG/DL (ref 8.8–10.2)
CHLORIDE SERPL-SCNC: 104 MMOL/L (ref 98–107)
CO2 SERPL-SCNC: 23 MMOL/L (ref 20–29)
CREAT SERPL-MCNC: 0.65 MG/DL (ref 0.6–1.1)
DIFFERENTIAL METHOD BLD: ABNORMAL
EOSINOPHIL # BLD: 0.08 K/UL (ref 0–0.8)
EOSINOPHIL NFR BLD: 1.1 % (ref 0.5–7.8)
ERYTHROCYTE [DISTWIDTH] IN BLOOD BY AUTOMATED COUNT: 14.7 % (ref 11.9–14.6)
GLUCOSE SERPL-MCNC: 91 MG/DL (ref 70–99)
HCT VFR BLD AUTO: 46.5 % (ref 35.8–46.3)
HGB BLD-MCNC: 15.3 G/DL (ref 11.7–15.4)
IMM GRANULOCYTES # BLD AUTO: 0.02 K/UL (ref 0–0.5)
IMM GRANULOCYTES NFR BLD AUTO: 0.3 % (ref 0–5)
LYMPHOCYTES # BLD: 3.16 K/UL (ref 0.5–4.6)
LYMPHOCYTES NFR BLD: 42.2 % (ref 13–44)
MCH RBC QN AUTO: 30.2 PG (ref 26.1–32.9)
MCHC RBC AUTO-ENTMCNC: 32.9 G/DL (ref 31.4–35)
MCV RBC AUTO: 91.7 FL (ref 82–102)
MONOCYTES # BLD: 0.54 K/UL (ref 0.1–1.3)
MONOCYTES NFR BLD: 7.2 % (ref 4–12)
NEUTS SEG # BLD: 3.66 K/UL (ref 1.7–8.2)
NEUTS SEG NFR BLD: 48.9 % (ref 43–78)
NRBC # BLD: 0 K/UL (ref 0–0.2)
PLATELET # BLD AUTO: 257 K/UL (ref 150–450)
PMV BLD AUTO: 10.6 FL (ref 9.4–12.3)
POTASSIUM SERPL-SCNC: 3.5 MMOL/L (ref 3.5–5.1)
RBC # BLD AUTO: 5.07 M/UL (ref 4.05–5.2)
SODIUM SERPL-SCNC: 140 MMOL/L (ref 136–145)
WBC # BLD AUTO: 7.5 K/UL (ref 4.3–11.1)

## 2025-01-18 PROCEDURE — 96374 THER/PROPH/DIAG INJ IV PUSH: CPT

## 2025-01-18 PROCEDURE — 6360000002 HC RX W HCPCS: Performed by: EMERGENCY MEDICINE

## 2025-01-18 PROCEDURE — 71046 X-RAY EXAM CHEST 2 VIEWS: CPT

## 2025-01-18 PROCEDURE — 85025 COMPLETE CBC W/AUTO DIFF WBC: CPT

## 2025-01-18 PROCEDURE — 2580000003 HC RX 258: Performed by: EMERGENCY MEDICINE

## 2025-01-18 PROCEDURE — 80048 BASIC METABOLIC PNL TOTAL CA: CPT

## 2025-01-18 PROCEDURE — 99284 EMERGENCY DEPT VISIT MOD MDM: CPT

## 2025-01-18 RX ORDER — ONDANSETRON 4 MG/1
4 TABLET, ORALLY DISINTEGRATING ORAL 3 TIMES DAILY PRN
Qty: 12 TABLET | Refills: 0 | Status: SHIPPED | OUTPATIENT
Start: 2025-01-18

## 2025-01-18 RX ORDER — 0.9 % SODIUM CHLORIDE 0.9 %
1000 INTRAVENOUS SOLUTION INTRAVENOUS ONCE
Status: COMPLETED | OUTPATIENT
Start: 2025-01-18 | End: 2025-01-18

## 2025-01-18 RX ORDER — ONDANSETRON 2 MG/ML
4 INJECTION INTRAMUSCULAR; INTRAVENOUS
Status: COMPLETED | OUTPATIENT
Start: 2025-01-18 | End: 2025-01-18

## 2025-01-18 RX ADMIN — SODIUM CHLORIDE 1000 ML: 9 INJECTION, SOLUTION INTRAVENOUS at 15:47

## 2025-01-18 RX ADMIN — ONDANSETRON 4 MG: 2 INJECTION INTRAMUSCULAR; INTRAVENOUS at 15:45

## 2025-01-18 NOTE — ED NOTES
Patient mobility status  with no difficulty.     I have reviewed discharge instructions with the patient.  The patient verbalized understanding.    Patient left ED via Discharge Method: ambulatory to Home   Opportunity for questions and clarification provided.     Patient given 1 scripts.           Jemima Juares, RN  01/18/25 8334

## 2025-01-18 NOTE — DISCHARGE INSTRUCTIONS
Clear liquids and advance diet as tolerated.  May use your medications for nausea but with significant vomiting use Zofran ODT given  Contact either your regular doctor or oncology with ongoing issues  Lab work is stable

## 2025-01-18 NOTE — ED TRIAGE NOTES
Patient arrived with a complaint of congestion of chest not getting better. Patient reports of being positive with flu last week. Fever and chills off and on, cough- thick white sputum- patient is a smoker- 1pk per day.

## 2025-01-18 NOTE — ED PROVIDER NOTES
Emergency Department Provider Note       PCP: Dawna Jewell MD   Age: 59 y.o.   Sex: female     DISPOSITION                No diagnosis found.    Medical Decision Making     ***     {Complexity:77897}  {Risk:41644}    I independently ordered and reviewed each unique test.  {external source:81654}   {Historian (state who, why needed, what they said):54464}  {test reviewed:14858}  {EK}  {Admitted or Consultants involved.:47744}  {MIPS URI - Strep - Sinusitis - Pregnant - Head Trauma - Overdose - Agitation:51342}  {SEP1 yes/no:91354}  {Critical Care:24132}    History     Patient here with congestion she is on antibiotics from her primary care doctor her sputum is now white.  She was seen last week and had testing done which was positive for influenza A.  She is stage IV renal cell carcinoma and in palliative care per her report.  She also works for hospice.  Antibiotic she is on for her respiratory issues of doxycycline.  She is a smoker.  Comes in with some nausea and vomiting she has Marinol and Phenergan for her nausea as her baseline medicines.  States that she could not keep these down.  No urinary symptoms.  Patient unwilling to have IV access other than her port.    The history is provided by the patient.       ROS     Review of Systems     Physical Exam     Vitals signs and nursing note reviewed:  Vitals:    25 1158 25 1159   BP: (!) 132/92    Pulse: 73    Resp: 17    Temp: 97.7 °F (36.5 °C)    SpO2: 96%    Weight:  90.7 kg (200 lb)   Height:  1.676 m (5' 6\")      Physical Exam   Procedures     Procedures    No orders of the defined types were placed in this encounter.       Medications given during this emergency department visit:  Medications - No data to display    New Prescriptions    No medications on file        Past Medical History:   Diagnosis Date    Anxiety     Cancer (HCC)     Bilateral  renal cell see Dr Lopez and [facility]    Diabetes mellitus (HCC)     taken off meds

## 2025-01-20 SDOH — ECONOMIC STABILITY: FOOD INSECURITY: WITHIN THE PAST 12 MONTHS, THE FOOD YOU BOUGHT JUST DIDN'T LAST AND YOU DIDN'T HAVE MONEY TO GET MORE.: PATIENT DECLINED

## 2025-01-20 SDOH — ECONOMIC STABILITY: INCOME INSECURITY: IN THE LAST 12 MONTHS, WAS THERE A TIME WHEN YOU WERE NOT ABLE TO PAY THE MORTGAGE OR RENT ON TIME?: NO

## 2025-01-20 SDOH — ECONOMIC STABILITY: FOOD INSECURITY: WITHIN THE PAST 12 MONTHS, YOU WORRIED THAT YOUR FOOD WOULD RUN OUT BEFORE YOU GOT MONEY TO BUY MORE.: PATIENT DECLINED

## 2025-01-20 SDOH — ECONOMIC STABILITY: TRANSPORTATION INSECURITY
IN THE PAST 12 MONTHS, HAS THE LACK OF TRANSPORTATION KEPT YOU FROM MEDICAL APPOINTMENTS OR FROM GETTING MEDICATIONS?: NO

## 2025-01-20 ASSESSMENT — PATIENT HEALTH QUESTIONNAIRE - PHQ9
2. FEELING DOWN, DEPRESSED OR HOPELESS: SEVERAL DAYS
SUM OF ALL RESPONSES TO PHQ QUESTIONS 1-9: 18
8. MOVING OR SPEAKING SO SLOWLY THAT OTHER PEOPLE COULD HAVE NOTICED. OR THE OPPOSITE, BEING SO FIGETY OR RESTLESS THAT YOU HAVE BEEN MOVING AROUND A LOT MORE THAN USUAL: SEVERAL DAYS
SUM OF ALL RESPONSES TO PHQ9 QUESTIONS 1 & 2: 4
4. FEELING TIRED OR HAVING LITTLE ENERGY: NEARLY EVERY DAY
1. LITTLE INTEREST OR PLEASURE IN DOING THINGS: NEARLY EVERY DAY
4. FEELING TIRED OR HAVING LITTLE ENERGY: NEARLY EVERY DAY
7. TROUBLE CONCENTRATING ON THINGS, SUCH AS READING THE NEWSPAPER OR WATCHING TELEVISION: NEARLY EVERY DAY
5. POOR APPETITE OR OVEREATING: NEARLY EVERY DAY
5. POOR APPETITE OR OVEREATING: NEARLY EVERY DAY
9. THOUGHTS THAT YOU WOULD BE BETTER OFF DEAD, OR OF HURTING YOURSELF: NOT AT ALL
3. TROUBLE FALLING OR STAYING ASLEEP: NEARLY EVERY DAY
1. LITTLE INTEREST OR PLEASURE IN DOING THINGS: NEARLY EVERY DAY
SUM OF ALL RESPONSES TO PHQ QUESTIONS 1-9: 18
3. TROUBLE FALLING OR STAYING ASLEEP: NEARLY EVERY DAY
SUM OF ALL RESPONSES TO PHQ QUESTIONS 1-9: 18
SUM OF ALL RESPONSES TO PHQ QUESTIONS 1-9: 18
6. FEELING BAD ABOUT YOURSELF - OR THAT YOU ARE A FAILURE OR HAVE LET YOURSELF OR YOUR FAMILY DOWN: SEVERAL DAYS
2. FEELING DOWN, DEPRESSED OR HOPELESS: SEVERAL DAYS
SUM OF ALL RESPONSES TO PHQ QUESTIONS 1-9: 18
10. IF YOU CHECKED OFF ANY PROBLEMS, HOW DIFFICULT HAVE THESE PROBLEMS MADE IT FOR YOU TO DO YOUR WORK, TAKE CARE OF THINGS AT HOME, OR GET ALONG WITH OTHER PEOPLE: NOT DIFFICULT AT ALL
6. FEELING BAD ABOUT YOURSELF - OR THAT YOU ARE A FAILURE OR HAVE LET YOURSELF OR YOUR FAMILY DOWN: SEVERAL DAYS
9. THOUGHTS THAT YOU WOULD BE BETTER OFF DEAD, OR OF HURTING YOURSELF: NOT AT ALL
10. IF YOU CHECKED OFF ANY PROBLEMS, HOW DIFFICULT HAVE THESE PROBLEMS MADE IT FOR YOU TO DO YOUR WORK, TAKE CARE OF THINGS AT HOME, OR GET ALONG WITH OTHER PEOPLE: NOT DIFFICULT AT ALL
7. TROUBLE CONCENTRATING ON THINGS, SUCH AS READING THE NEWSPAPER OR WATCHING TELEVISION: NEARLY EVERY DAY
8. MOVING OR SPEAKING SO SLOWLY THAT OTHER PEOPLE COULD HAVE NOTICED. OR THE OPPOSITE - BEING SO FIDGETY OR RESTLESS THAT YOU HAVE BEEN MOVING AROUND A LOT MORE THAN USUAL: SEVERAL DAYS

## 2025-01-23 ENCOUNTER — HOSPITAL ENCOUNTER (OUTPATIENT)
Dept: LAB | Age: 60
Discharge: HOME | End: 2025-01-23
Payer: COMMERCIAL

## 2025-01-23 ENCOUNTER — OFFICE VISIT (OUTPATIENT)
Dept: ONCOLOGY | Age: 60
End: 2025-01-23
Payer: COMMERCIAL

## 2025-01-23 ENCOUNTER — OFFICE VISIT (OUTPATIENT)
Dept: INTERNAL MEDICINE CLINIC | Facility: CLINIC | Age: 60
End: 2025-01-23
Payer: COMMERCIAL

## 2025-01-23 ENCOUNTER — CLINICAL DOCUMENTATION (OUTPATIENT)
Dept: ONCOLOGY | Age: 60
End: 2025-01-23

## 2025-01-23 ENCOUNTER — HOSPITAL ENCOUNTER (OUTPATIENT)
Dept: INFUSION THERAPY | Age: 60
Setting detail: INFUSION SERIES
Discharge: HOME | End: 2025-01-23
Payer: COMMERCIAL

## 2025-01-23 ENCOUNTER — OFFICE VISIT (OUTPATIENT)
Dept: ORTHOPEDIC SURGERY | Age: 60
End: 2025-01-23
Payer: COMMERCIAL

## 2025-01-23 ENCOUNTER — OFFICE VISIT (OUTPATIENT)
Dept: PALLATIVE CARE | Age: 60
End: 2025-01-23

## 2025-01-23 VITALS
DIASTOLIC BLOOD PRESSURE: 66 MMHG | OXYGEN SATURATION: 96 % | WEIGHT: 204 LBS | HEART RATE: 80 BPM | HEIGHT: 66 IN | RESPIRATION RATE: 16 BRPM | BODY MASS INDEX: 32.78 KG/M2 | TEMPERATURE: 97.6 F | SYSTOLIC BLOOD PRESSURE: 98 MMHG

## 2025-01-23 VITALS
WEIGHT: 203 LBS | SYSTOLIC BLOOD PRESSURE: 125 MMHG | TEMPERATURE: 98 F | HEART RATE: 64 BPM | HEIGHT: 66 IN | BODY MASS INDEX: 32.62 KG/M2 | DIASTOLIC BLOOD PRESSURE: 82 MMHG | OXYGEN SATURATION: 95 %

## 2025-01-23 DIAGNOSIS — E86.0 DEHYDRATION: ICD-10-CM

## 2025-01-23 DIAGNOSIS — C64.2 RENAL CELL CARCINOMA OF LEFT KIDNEY (HCC): ICD-10-CM

## 2025-01-23 DIAGNOSIS — J98.8 RESPIRATORY INFECTION: Primary | ICD-10-CM

## 2025-01-23 DIAGNOSIS — C64.2 RENAL CELL CARCINOMA OF LEFT KIDNEY (HCC): Primary | ICD-10-CM

## 2025-01-23 DIAGNOSIS — R19.7 DIARRHEA, UNSPECIFIED TYPE: ICD-10-CM

## 2025-01-23 DIAGNOSIS — E87.6 HYPOKALEMIA: ICD-10-CM

## 2025-01-23 DIAGNOSIS — S92.355D NONDISPLACED FRACTURE OF FIFTH METATARSAL BONE, LEFT FOOT, SUBSEQUENT ENCOUNTER FOR FRACTURE WITH ROUTINE HEALING: Primary | ICD-10-CM

## 2025-01-23 DIAGNOSIS — E11.9 TYPE 2 DIABETES MELLITUS WITHOUT COMPLICATION, WITHOUT LONG-TERM CURRENT USE OF INSULIN (HCC): ICD-10-CM

## 2025-01-23 DIAGNOSIS — G89.3 CANCER ASSOCIATED PAIN: ICD-10-CM

## 2025-01-23 DIAGNOSIS — F32.A DEPRESSION, UNSPECIFIED DEPRESSION TYPE: ICD-10-CM

## 2025-01-23 DIAGNOSIS — F41.9 ANXIETY AND DEPRESSION: ICD-10-CM

## 2025-01-23 DIAGNOSIS — R11.2 NAUSEA AND VOMITING, UNSPECIFIED VOMITING TYPE: ICD-10-CM

## 2025-01-23 DIAGNOSIS — Z72.0 TOBACCO ABUSE: ICD-10-CM

## 2025-01-23 DIAGNOSIS — K12.30 MUCOSITIS: Primary | ICD-10-CM

## 2025-01-23 DIAGNOSIS — R26.89 BALANCE PROBLEM: ICD-10-CM

## 2025-01-23 DIAGNOSIS — G93.39 OTHER POST INFECTION AND RELATED FATIGUE SYNDROMES: ICD-10-CM

## 2025-01-23 DIAGNOSIS — Z51.5 ENCOUNTER FOR PALLIATIVE CARE: ICD-10-CM

## 2025-01-23 DIAGNOSIS — S93.401D SPRAIN OF RIGHT ANKLE, UNSPECIFIED LIGAMENT, SUBSEQUENT ENCOUNTER: ICD-10-CM

## 2025-01-23 DIAGNOSIS — K13.70 MOUTH LESION: ICD-10-CM

## 2025-01-23 DIAGNOSIS — F32.A ANXIETY AND DEPRESSION: ICD-10-CM

## 2025-01-23 DIAGNOSIS — R79.89 ELEVATED LFTS: ICD-10-CM

## 2025-01-23 DIAGNOSIS — R52 PAIN: ICD-10-CM

## 2025-01-23 DIAGNOSIS — K21.9 GASTROESOPHAGEAL REFLUX DISEASE WITHOUT ESOPHAGITIS: ICD-10-CM

## 2025-01-23 DIAGNOSIS — R59.9 ENLARGED LYMPH NODES: ICD-10-CM

## 2025-01-23 LAB
ALBUMIN SERPL-MCNC: 3.5 G/DL (ref 3.2–4.6)
ALBUMIN/GLOB SERPL: 1.1 (ref 1–1.9)
ALP SERPL-CCNC: 80 U/L (ref 35–104)
ALT SERPL-CCNC: 42 U/L (ref 8–45)
ANION GAP SERPL CALC-SCNC: 11 MMOL/L (ref 7–16)
AST SERPL-CCNC: 37 U/L (ref 15–37)
BASOPHILS # BLD: 0.02 K/UL (ref 0–0.2)
BASOPHILS NFR BLD: 0.2 % (ref 0–2)
BILIRUB SERPL-MCNC: 0.4 MG/DL (ref 0–1.2)
BUN SERPL-MCNC: 8 MG/DL (ref 8–23)
CALCIUM SERPL-MCNC: 9.1 MG/DL (ref 8.8–10.2)
CHLORIDE SERPL-SCNC: 102 MMOL/L (ref 98–107)
CO2 SERPL-SCNC: 25 MMOL/L (ref 20–29)
CREAT SERPL-MCNC: 0.65 MG/DL (ref 0.6–1.1)
DIFFERENTIAL METHOD BLD: ABNORMAL
EOSINOPHIL # BLD: 0.09 K/UL (ref 0–0.8)
EOSINOPHIL NFR BLD: 0.8 % (ref 0.5–7.8)
ERYTHROCYTE [DISTWIDTH] IN BLOOD BY AUTOMATED COUNT: 14.8 % (ref 11.9–14.6)
EST. AVERAGE GLUCOSE BLD GHB EST-MCNC: 129 MG/DL
GLOBULIN SER CALC-MCNC: 3.2 G/DL (ref 2.3–3.5)
GLUCOSE SERPL-MCNC: 119 MG/DL (ref 70–99)
HBA1C MFR BLD: 6.1 % (ref 0–5.6)
HCT VFR BLD AUTO: 46.7 % (ref 35.8–46.3)
HGB BLD-MCNC: 15.5 G/DL (ref 11.7–15.4)
IMM GRANULOCYTES # BLD AUTO: 0.03 K/UL (ref 0–0.5)
IMM GRANULOCYTES NFR BLD AUTO: 0.3 % (ref 0–5)
LYMPHOCYTES # BLD: 4.24 K/UL (ref 0.5–4.6)
LYMPHOCYTES NFR BLD: 39.6 % (ref 13–44)
MCH RBC QN AUTO: 30.7 PG (ref 26.1–32.9)
MCHC RBC AUTO-ENTMCNC: 33.2 G/DL (ref 31.4–35)
MCV RBC AUTO: 92.5 FL (ref 82–102)
MONOCYTES # BLD: 0.56 K/UL (ref 0.1–1.3)
MONOCYTES NFR BLD: 5.2 % (ref 4–12)
NEUTS SEG # BLD: 5.77 K/UL (ref 1.7–8.2)
NEUTS SEG NFR BLD: 53.9 % (ref 43–78)
NRBC # BLD: 0 K/UL (ref 0–0.2)
PLATELET # BLD AUTO: 289 K/UL (ref 150–450)
PMV BLD AUTO: 10.6 FL (ref 9.4–12.3)
POTASSIUM SERPL-SCNC: 3.4 MMOL/L (ref 3.5–5.1)
PROT SERPL-MCNC: 6.6 G/DL (ref 6.3–8.2)
RBC # BLD AUTO: 5.05 M/UL (ref 4.05–5.2)
SODIUM SERPL-SCNC: 138 MMOL/L (ref 136–145)
WBC # BLD AUTO: 10.7 K/UL (ref 4.3–11.1)

## 2025-01-23 PROCEDURE — 85025 COMPLETE CBC W/AUTO DIFF WBC: CPT

## 2025-01-23 PROCEDURE — 80053 COMPREHEN METABOLIC PANEL: CPT

## 2025-01-23 PROCEDURE — 2500000003 HC RX 250 WO HCPCS: Performed by: INTERNAL MEDICINE

## 2025-01-23 PROCEDURE — 96360 HYDRATION IV INFUSION INIT: CPT

## 2025-01-23 PROCEDURE — 99213 OFFICE O/P EST LOW 20 MIN: CPT | Performed by: PHYSICIAN ASSISTANT

## 2025-01-23 PROCEDURE — 2580000003 HC RX 258: Performed by: PHYSICIAN ASSISTANT

## 2025-01-23 PROCEDURE — 99214 OFFICE O/P EST MOD 30 MIN: CPT | Performed by: NURSE PRACTITIONER

## 2025-01-23 PROCEDURE — 83036 HEMOGLOBIN GLYCOSYLATED A1C: CPT

## 2025-01-23 PROCEDURE — 99215 OFFICE O/P EST HI 40 MIN: CPT | Performed by: INTERNAL MEDICINE

## 2025-01-23 RX ORDER — 0.9 % SODIUM CHLORIDE 0.9 %
500 INTRAVENOUS SOLUTION INTRAVENOUS ONCE
Status: COMPLETED | OUTPATIENT
Start: 2025-01-23 | End: 2025-01-23

## 2025-01-23 RX ORDER — PANTOPRAZOLE SODIUM 40 MG/1
40 TABLET, DELAYED RELEASE ORAL 2 TIMES DAILY
Qty: 180 TABLET | Refills: 3 | Status: SHIPPED | OUTPATIENT
Start: 2025-01-23

## 2025-01-23 RX ORDER — SODIUM CHLORIDE 0.9 % (FLUSH) 0.9 %
5-40 SYRINGE (ML) INJECTION PRN
OUTPATIENT
Start: 2025-01-23

## 2025-01-23 RX ORDER — 0.9 % SODIUM CHLORIDE 0.9 %
500 INTRAVENOUS SOLUTION INTRAVENOUS ONCE
Status: CANCELLED
Start: 2025-01-23 | End: 2025-01-23

## 2025-01-23 RX ORDER — SODIUM CHLORIDE 0.9 % (FLUSH) 0.9 %
5-40 SYRINGE (ML) INJECTION PRN
Status: DISCONTINUED | OUTPATIENT
Start: 2025-01-23 | End: 2025-01-24 | Stop reason: HOSPADM

## 2025-01-23 RX ORDER — SERTRALINE HYDROCHLORIDE 100 MG/1
150 TABLET, FILM COATED ORAL NIGHTLY
Qty: 135 TABLET | Refills: 3 | Status: SHIPPED | OUTPATIENT
Start: 2025-01-23

## 2025-01-23 RX ORDER — SODIUM CHLORIDE 9 MG/ML
5-250 INJECTION, SOLUTION INTRAVENOUS PRN
OUTPATIENT
Start: 2025-01-23

## 2025-01-23 RX ORDER — 0.9 % SODIUM CHLORIDE 0.9 %
500 INTRAVENOUS SOLUTION INTRAVENOUS ONCE
Start: 2025-01-23 | End: 2025-01-23

## 2025-01-23 RX ORDER — CLONAZEPAM 1 MG/1
1 TABLET ORAL 2 TIMES DAILY PRN
Qty: 60 TABLET | Refills: 2 | Status: SHIPPED | OUTPATIENT
Start: 2025-01-23 | End: 2025-04-23

## 2025-01-23 RX ORDER — OXYCODONE HYDROCHLORIDE 15 MG/1
1 TABLET, FILM COATED, EXTENDED RELEASE ORAL EVERY 12 HOURS
Qty: 60 TABLET | Refills: 0 | Status: SHIPPED | OUTPATIENT
Start: 2025-01-23 | End: 2025-02-22

## 2025-01-23 RX ORDER — OXYCODONE HYDROCHLORIDE 10 MG/1
10 TABLET ORAL EVERY 6 HOURS PRN
Qty: 120 TABLET | Refills: 0 | Status: SHIPPED | OUTPATIENT
Start: 2025-01-23 | End: 2025-02-22

## 2025-01-23 RX ORDER — PROMETHAZINE HYDROCHLORIDE 25 MG/1
25 TABLET ORAL EVERY 6 HOURS PRN
Qty: 60 TABLET | Refills: 1 | Status: SHIPPED | OUTPATIENT
Start: 2025-01-23

## 2025-01-23 RX ORDER — HEPARIN 100 UNIT/ML
500 SYRINGE INTRAVENOUS PRN
OUTPATIENT
Start: 2025-01-23

## 2025-01-23 RX ORDER — DRONABINOL 5 MG/1
5 CAPSULE ORAL
Qty: 90 CAPSULE | Refills: 0 | Status: SHIPPED | OUTPATIENT
Start: 2025-01-23 | End: 2025-02-22

## 2025-01-23 RX ADMIN — SODIUM CHLORIDE, PRESERVATIVE FREE 10 ML: 5 INJECTION INTRAVENOUS at 15:41

## 2025-01-23 RX ADMIN — SODIUM CHLORIDE, PRESERVATIVE FREE 20 ML: 5 INJECTION INTRAVENOUS at 13:40

## 2025-01-23 RX ADMIN — SODIUM CHLORIDE 500 ML: 9 INJECTION, SOLUTION INTRAVENOUS at 15:40

## 2025-01-23 SDOH — ECONOMIC STABILITY: FOOD INSECURITY: WITHIN THE PAST 12 MONTHS, YOU WORRIED THAT YOUR FOOD WOULD RUN OUT BEFORE YOU GOT MONEY TO BUY MORE.: PATIENT DECLINED

## 2025-01-23 SDOH — ECONOMIC STABILITY: FOOD INSECURITY: WITHIN THE PAST 12 MONTHS, THE FOOD YOU BOUGHT JUST DIDN'T LAST AND YOU DIDN'T HAVE MONEY TO GET MORE.: PATIENT DECLINED

## 2025-01-23 ASSESSMENT — ENCOUNTER SYMPTOMS
ABDOMINAL PAIN: 0
VOMITING: 1
ABDOMINAL PAIN: 1
COUGH: 1
CHEST TIGHTNESS: 0
NAUSEA: 1
BACK PAIN: 1
DIARRHEA: 1
CONSTIPATION: 0
NAUSEA: 1
VOMITING: 1
ABDOMINAL DISTENTION: 0
SHORTNESS OF BREATH: 0

## 2025-01-23 ASSESSMENT — PAIN DESCRIPTION - PAIN TYPE: TYPE: CHRONIC PAIN

## 2025-01-23 ASSESSMENT — PAIN DESCRIPTION - DESCRIPTORS: DESCRIPTORS: ACHING

## 2025-01-23 ASSESSMENT — PATIENT HEALTH QUESTIONNAIRE - PHQ9
SUM OF ALL RESPONSES TO PHQ QUESTIONS 1-9: 0
2. FEELING DOWN, DEPRESSED OR HOPELESS: NOT AT ALL
SUM OF ALL RESPONSES TO PHQ9 QUESTIONS 1 & 2: 0
1. LITTLE INTEREST OR PLEASURE IN DOING THINGS: NOT AT ALL
SUM OF ALL RESPONSES TO PHQ QUESTIONS 1-9: 0

## 2025-01-23 ASSESSMENT — PAIN DESCRIPTION - LOCATION: LOCATION: BACK

## 2025-01-23 ASSESSMENT — PAIN SCALES - GENERAL: PAINLEVEL_OUTOF10: 4

## 2025-01-23 ASSESSMENT — PAIN - FUNCTIONAL ASSESSMENT: PAIN_FUNCTIONAL_ASSESSMENT: ACTIVITIES ARE NOT PREVENTED

## 2025-01-23 ASSESSMENT — PAIN DESCRIPTION - ONSET: ONSET: ON-GOING

## 2025-01-23 NOTE — PROGRESS NOTES
[facility] Internal Medicine  [address] [address]     Office Visit    Mechelle Hall   1965 01/23/25       Subjective:     Chief Complaint   Patient presents with    Follow-up     1 week Flu follow up- Pt states she is feeling a little better.         History of Present illness:  Ms. Hall is a 59 y.o. female  who presents for one week follow up after being treated for flu A and respiratory infection with Doxycycline. She was seen in the ER on 1/18/25 for nausea and vomiting. She was given IVF for dehydration r/t GI losses. She is still nauseous and vomiting. Coughing, nonproductive continues, but improvement noted but still very fatigued. Wants to sleep all of the time.     She has one more day of Doxycyline. She has f/u with oncology today.     Objective:     Allergies:    Allergies   Allergen Reactions    Latex Hives, Itching, Shortness Of Breath and Other (See Comments)    Bee Venom Shortness Of Breath and Hives    Penicillins Anaphylaxis    Sulfa Antibiotics Hives, Itching and Rash    Wasp Venom Protein Hives and Shortness Of Breath    Penicillin G     Sulfamethoxazole-Trimethoprim Hives    Wellbutrin [Bupropion] Other (See Comments)     suicidal        Medical History:    Past Medical History:   Diagnosis Date    Anxiety 2000    Cancer (HCC)     Bilateral  renal cell see Dr Lopez and [facility]    Diabetes mellitus (HCC)     taken off meds because bs was too low, fasting blood surgars 50-80, feels hypoglycemic below 70,  last A1C 6.2 4/2024    Headache 1999    Hx of blood clots     treated with Eliquis x 1 year discontinued in 01/2024    Hyperlipidemia     Hypertension     Neoplasm of submandibular gland     Sleep apnea 2002    no c pap    Type 2 diabetes mellitus without complication ([facility]) 2023        Family History:    Family History   Problem Relation Age of Onset    Diabetes Mother     High Blood Pressure Mother     High Cholesterol Mother     COPD Mother     Emphysema

## 2025-01-23 NOTE — PROGRESS NOTES
Outpatient Palliative Care at the  [facility]: Office Visit Established Patient    Diagnosis: Metastatic renal cell carcinoma     Treatment Plan: axitinib/pebrolizumab -> lenvatinib/everolimus -> cabozantinib     Treatment Intent: Palliative     Medical Oncologist: Dr. Lopez, Dr. Mccloud @ [facility]     Radiation Oncologist: None     Navigator: None      Chief Complaint:    Chief Complaint   Patient presents with    Follow-up       History of Present Illness:  Ms. Hall is a 59 y.o. female who presents today for evaluation regarding establishing care with palliative care. 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  Despite attending physical therapy since the accident, she continued to experience the same low back pain.  She was seen at [facility] on 10/4/22.  MRI of the lumbar spine on 10/15/22 showed mild degenerative disc disease at L3-4 through L5-S1 and a 5.7 x 6.1 x 7 cm lower pole solid left renal mass. Urgent referral was placed to [facility], who referred the patient to [facility] for uro/onc evaluation and treatment of renal mass.  CT AP on 10/26/22 showed bilateral enhancing renal masses, concerning for renal cell carcinoma Also noted was an enhancing 1.5 cm right adrenal nodule, concerning for a metastatic nodule.  S/p left renal biopsy on 11/2022, clear cell RCC.  She was started on pembrolizumab and axitinib - could not c/w immunotherapy due to LFT abnormalities. Started lenvatinib/everolimus. She had left partial nephrectomy at [facility] on 12/8/23.  Plan for right nephrectomy pending.   Her PMHx includes anxiety, tobacco abuse, cervical dysplasia, DDD, sleep apnea and lap liv. She is employed at [employer] as a CNA. She lives at home with her son, who she states has drug addiction issues.     Interval History:  Pt seen as PC follow-up with oncology OV.  Pt

## 2025-01-23 NOTE — PROGRESS NOTES
Name: Mechelle Hall  YOB: 1965  Gender: female  MRN: [id number]    Summary:Left Zone 1 base 5th Metatarsal fracture: Closed treatment of fracture without manipulation  Right ankle sprain    Stage IV kidney cancer and bilateral kidneys-on oral chemo    Transition out of Carbon fiber insert   Transition out of ASO brace  Begin formal physical therapy      Work note: None    F/u 6-8 weeks w/ no Xray       CC: left lateral foot pain    Mechelle Hall is a 59-year-old female who presents with left lateral foot pain and right ankle pain after a fall that occurred approximately 1 week ago.  Patient states that she suffered a mechanical fall while walking down her outdoor steps and hitting a loose board.  Patient states that she rolled her right ankle and was able to ambulate after but had significant pain in both feet.  She went to the ED but they did not see a fracture.  She continues to have pain and her left foot and right ankle today.  She is not tender in the conservative management.  Patient is in a research program and is on oral chemotherapy for bilateral stage IV kidney cancer.    12/12/2024-patient states that she is doing okay.  She continues to have fair amount of pain in the left lateral foot and right lateral ankle.  She has not been wearing the boot in the [facility] as much because that is very difficult for her to do her job and she is unable to drive in the boot.    1/23/2025-patient states she is doing well in regards to her left foot.  She states that she will have some pain when she does a certain movement but overall she is doing very well.  She continues to note some pain and issues with the right ankle.  She states that she is been sick with the flu recently and so she has been very inactive and out of work.    ROS/Meds/PSH/PMH/FH/SH: below is tobacco and diabetes.  A full history is at the bottom of the note.     Patient Denies fever/chills, headache, visual changes, chest

## 2025-01-23 NOTE — PATIENT INSTRUCTIONS
Patient Information from Today's Visit    The members of your [facility] are listed below:    Physician Provider: Anne Lopez Medical Oncologist  Advanced Practice Clinician: Charla Anglin NP  Registered Nurse: Mirtha FREDERICK RN  Navigator: Melanie HUSAIN RN  Medical Assistant: Raven BLEVINS MA  : Melony DOUGLAS   Supportive Care Services: Mariela TELLO LMSW    Diagnosis: Metastatic Renal Cell Carcinoma      Follow Up Instructions: As scheduled.    Labs reviewed.  Symptoms reviewed.  CT of neck, chest, abdomen, and pelvis recommended.  You can call to schedule this at [phone].  Recommend getting back with whomever does your dentures to evaluate.    Treatment Summary has been discussed and given to patient:N/A      Current Labs:   Hospital Outpatient Visit on 01/23/2025   Component Date Value Ref Range Status    Sodium 01/23/2025 138  136 - 145 mmol/L Final    Potassium 01/23/2025 3.4 (L)  3.5 - 5.1 mmol/L Final    Chloride 01/23/2025 102  98 - 107 mmol/L Final    CO2 01/23/2025 25  20 - 29 mmol/L Final    Anion Gap 01/23/2025 11  7 - 16 mmol/L Final    Glucose 01/23/2025 119 (H)  70 - 99 mg/dL Final    Comment: <70 mg/dL Consistent with, but not fully diagnostic of hypoglycemia.  100 - 125 mg/dL Impaired fasting glucose/consistent with pre-diabetes mellitus.  > 126 mg/dl Fasting glucose consistent with overt diabetes mellitus      BUN 01/23/2025 8  8 - 23 MG/DL Final    Creatinine 01/23/2025 0.65  0.60 - 1.10 MG/DL Final    Est, Glom Filt Rate 01/23/2025 >90  >60 ml/min/1.73m2 Final    Comment:    Pediatric calculator link: https://www.kidney.org/professionals/kdoqi/gfr_calculatorped     These results are not intended for use in patients <18 years of age.     eGFR results are calculated without a race factor using  the 2021 CKD-EPI equation. Careful clinical correlation is recommended, particularly when comparing to results calculated using previous equations.  The CKD-EPI equation is less

## 2025-01-23 NOTE — PROGRESS NOTES
Patient to port lab for port access and lab draw. Port accessed per protocol; using 20g 0.75\" velasquez needle without difficulty. Labs drawn from port and port flushed. Port remains accessed. Patient tolerated well. Discharged ambulatory.

## 2025-01-23 NOTE — PROGRESS NOTES
Arrived to the [facility] ambulatory.  IVF completed. Patient tolerated well.   Any issues or concerns during appointment: none.  Patient aware of next appointment on 3/12/2025 (date) at 0730 (time).  Patient instructed to call provider with temperature of 100.4 or greater or nausea/vomiting/ diarrhea or pain not controlled by medications  Discharged home ambulatory.

## 2025-01-23 NOTE — PATIENT INSTRUCTIONS
[employer] - Financial Resources*  (Call [facility] if you need more resources.)      Utility Assistance     [facility] Low Income Home Energy Assistance Program  They offer: energy assistance.  Contact: [phone]; [email or web address]/city/[facility]  Helpful Info: Must be a [place] resident and need financial assistance with home energy costs.    [employer]   They offer: wide range of services to low and moderate-income residents in [facility]  Contact: [phone]; [address] [place] [address]    [facility]   They offer: basic needs for stability and support services.   Contact: [phone]; [address] [place] [address]     [facility]   They offer: help for families and individuals in need to pay rent and utility bills, purchase clothing, and food.    Contact: [phone]; [address] [place], [place] [address] [employer]   They offer:  assistance with utility bills   Contact:  [phone]; [address] [place] [address]       [employer]   They offer: Bill, clothes, food, and rent assistance.  Case Management and Counseling services available.   Contact: [phone] [facility] [place] [address]    Medical/Financial  [facility] Financial Assistance  They offer: help uninsured patients who do not qualify for government-sponsored health insurance and cannot afford to pay for their medical care. Insured patients may also qualify depending on family income, family size, and medical needs.   Contact: [phone]   Helpful Info: How to apply-  Option 1: Fill out the Financial Assistance Application(FAP). Copies of the Financial Assistance Application and the FAP may be obtained for free by calling the [employer] service department at [phone].   Option 2: download a copy from the [employer] website:

## 2025-01-24 ASSESSMENT — ENCOUNTER SYMPTOMS
COUGH: 1
GASTROINTESTINAL NEGATIVE: 1
RHINORRHEA: 1
SINUS PAIN: 1
SHORTNESS OF BREATH: 1

## 2025-01-24 NOTE — PROGRESS NOTES
Prior authorization request for Oxycodone HCl 10 mg tablets received through [employer] under Key Code [id number].  Patient demographics and clinical information submitted through the portal and sent to [employer] for medical review.  The most recent palliative care office visit note dated 1/23/25 was uploaded to the portal for medical review.  The request was approved.    Approval letter received and indexed under the media tab in Epic.    Name of medication: Oxycodone HCl 10 mg tablets  Quantity Approved: 120 tablets / 30 day supply  Validity Dates: 1/23/25 - 1/23/26  Authorization / Tracking ID [id number]

## 2025-01-28 ENCOUNTER — TELEPHONE (OUTPATIENT)
Dept: ONCOLOGY | Age: 60
End: 2025-01-28

## 2025-01-28 NOTE — TELEPHONE ENCOUNTER
Physician provider: Dr. Lopez  Reason for today's call (Please detail here patients chief complaint): prior auth  Last office visit:n/a  Patient Callback Number: [phone]  Was callback number verified?: Yes  Preferred pharmacy (If refill request): [facility]  Calls to office within the last 48 hours?:No    Patient notified that their information will be routed to the [facility] clinical triage team for review. Patient is advised that they will receive a phone call from the triage department. If symptom related and symptoms worsen before receiving a call back, the patient has been advised to proceed to the nearest ED.          Ly from [facility] need prior auth for Cabometynx. Pt is almost out of medication    [phone]    Cover My Meds Key [id number]

## 2025-02-05 ENCOUNTER — TELEPHONE (OUTPATIENT)
Dept: RADIATION ONCOLOGY | Age: 60
End: 2025-02-05

## 2025-02-05 NOTE — TELEPHONE ENCOUNTER
Physician provider: Dr. Lopez  Reason for today's call (Please detail here patients chief complaint): Calling because pt has been out of Cabometyx 40 mg for a while, due to needing a prior aughotization, she has called several times about this and pt has been out of meds for a few weeks now.   Last office visit:1/23/25  Patient Callback Number: [phone]  Was callback number verified?: Yes  Preferred pharmacy (If refill request): [facility]  Calls to office within the last 48 hours?:No    Patient notified that their information will be routed to the [facility] clinical triage team for review. Patient is advised that they will receive a phone call from the triage department. If symptom related and symptoms worsen before receiving a call back, the patient has been advised to proceed to the nearest ED.

## 2025-02-06 DIAGNOSIS — G89.3 CANCER ASSOCIATED PAIN: Primary | ICD-10-CM

## 2025-02-06 DIAGNOSIS — Z51.5 ENCOUNTER FOR PALLIATIVE CARE: ICD-10-CM

## 2025-02-06 RX ORDER — MORPHINE SULFATE 15 MG/1
15 TABLET, FILM COATED, EXTENDED RELEASE ORAL EVERY 12 HOURS
Qty: 60 TABLET | Refills: 0 | Status: SHIPPED | OUTPATIENT
Start: 2025-02-06 | End: 2025-03-08

## 2025-02-06 NOTE — PROGRESS NOTES
I have reviewed the patient's controlled substance prescription history, as maintained in the [place] prescription monitoring program, so that the prescriptions(s) for a controlled substance can be given.  Last Date Reviewed: 2/6/25    ALMAZ VencesSHERI-BC  Palliative Care

## 2025-02-19 ENCOUNTER — CLINICAL DOCUMENTATION (OUTPATIENT)
Facility: HOSPITAL | Age: 60
End: 2025-02-19

## 2025-02-19 NOTE — PROGRESS NOTES
Patient Assistance    FN spoke w/pt & screened for patient assistance. Pt reported she has an additional benefit for Specialty Medication through her Plan([employer]). Pt was told her medication would be covered along with the use of the copay card & [facility] would ship to her. They requested a new rx 3 month sent to them. Medication will be covered at no cost to pt. FN will provide information to [facility] and discuss    Navigator Type: Oral  Documentation Type: New Patient  Contact Type: Telephone  Status of Patient Insurance Coverage: Patient has active coverage          Additional notes:     Other Drug Name: Cabometyx  Form of PAP Assistance: Other

## 2025-02-19 NOTE — PROGRESS NOTES
Patient Assistance    FN received referral from [facility] to screen for Patient Assistance Cabometyx .  Called pt. No answer. Left vm requesting return call.               Additional notes:

## 2025-02-20 DIAGNOSIS — C64.2 RENAL CELL CARCINOMA OF LEFT KIDNEY (HCC): ICD-10-CM

## 2025-02-21 ENCOUNTER — HOSPITAL ENCOUNTER (OUTPATIENT)
Dept: CT IMAGING | Age: 60
Discharge: HOME | End: 2025-02-24
Attending: INTERNAL MEDICINE
Payer: COMMERCIAL

## 2025-02-21 DIAGNOSIS — C64.2 RENAL CELL CARCINOMA OF LEFT KIDNEY (HCC): ICD-10-CM

## 2025-02-21 DIAGNOSIS — C64.2 RENAL CELL CARCINOMA OF LEFT KIDNEY (HCC): Primary | ICD-10-CM

## 2025-02-21 DIAGNOSIS — R59.9 ENLARGED LYMPH NODES: ICD-10-CM

## 2025-02-21 PROCEDURE — 6360000002 HC RX W HCPCS: Performed by: INTERNAL MEDICINE

## 2025-02-21 PROCEDURE — 70491 CT SOFT TISSUE NECK W/DYE: CPT

## 2025-02-21 PROCEDURE — 6360000004 HC RX CONTRAST MEDICATION: Performed by: INTERNAL MEDICINE

## 2025-02-21 RX ORDER — IOPAMIDOL 755 MG/ML
100 INJECTION, SOLUTION INTRAVASCULAR
Status: COMPLETED | OUTPATIENT
Start: 2025-02-21 | End: 2025-02-21

## 2025-02-21 RX ORDER — HEPARIN 100 UNIT/ML
500 SYRINGE INTRAVENOUS ONCE
Status: DISCONTINUED | OUTPATIENT
Start: 2025-02-21 | End: 2025-02-21

## 2025-02-21 RX ADMIN — IOPAMIDOL 100 ML: 755 INJECTION, SOLUTION INTRAVENOUS at 08:21

## 2025-02-21 RX ADMIN — HEPARIN 500 UNITS: 100 SYRINGE at 08:36

## 2025-02-26 ENCOUNTER — PATIENT MESSAGE (OUTPATIENT)
Dept: ONCOLOGY | Age: 60
End: 2025-02-26

## 2025-02-27 ENCOUNTER — TELEPHONE (OUTPATIENT)
Dept: ONCOLOGY | Age: 60
End: 2025-02-27

## 2025-02-27 DIAGNOSIS — Z51.5 ENCOUNTER FOR PALLIATIVE CARE: Primary | ICD-10-CM

## 2025-02-27 DIAGNOSIS — G89.3 CANCER ASSOCIATED PAIN: ICD-10-CM

## 2025-02-27 RX ORDER — OXYCODONE HYDROCHLORIDE 10 MG/1
10 TABLET ORAL EVERY 6 HOURS PRN
Qty: 120 TABLET | Refills: 0 | Status: SHIPPED | OUTPATIENT
Start: 2025-02-27 | End: 2025-03-29

## 2025-02-27 NOTE — PROGRESS NOTES
I have reviewed the patient's controlled substance prescription history, as maintained in the [place] prescription monitoring program, so that the prescriptions(s) for a controlled substance can be given.  Last Date Reviewed: 2/27/2025    [facility]ony [facility]  Palliative Care

## 2025-02-27 NOTE — TELEPHONE ENCOUNTER
Pt called c/o back pain.  States she took her extended release pain med, but was still having pain.  Pt asked if it is ok to take her \"breakthrough\" pain med.  Advised pt it is ok to go ahead and take oxycodone prn per rx instructions for breakthrough pain.  Advised pt to call back if pain persists or worsens or with any other concerns.  Pt verbalizes understanding.      Fidelina Palencia, APRN - CNP  [facility]

## 2025-03-10 ENCOUNTER — OFFICE VISIT (OUTPATIENT)
Dept: ORTHOPEDIC SURGERY | Age: 60
End: 2025-03-10
Payer: COMMERCIAL

## 2025-03-10 DIAGNOSIS — S92.355G NONDISPLACED FRACTURE OF FIFTH METATARSAL BONE, LEFT FOOT, SUBSEQUENT ENCOUNTER FOR FRACTURE WITH DELAYED HEALING: Primary | ICD-10-CM

## 2025-03-10 PROCEDURE — 99213 OFFICE O/P EST LOW 20 MIN: CPT | Performed by: PHYSICIAN ASSISTANT

## 2025-03-10 ASSESSMENT — ENCOUNTER SYMPTOMS
BLOOD IN STOOL: 0
SHORTNESS OF BREATH: 0
CONSTIPATION: 0
VOMITING: 0
NAUSEA: 0
SCLERAL ICTERUS: 0
ABDOMINAL PAIN: 0
BACK PAIN: 1
TROUBLE SWALLOWING: 0
CHEST TIGHTNESS: 0
WHEEZING: 0
ABDOMINAL DISTENTION: 0
HEMOPTYSIS: 0
SORE THROAT: 0
VOICE CHANGE: 0

## 2025-03-10 NOTE — PROGRESS NOTES
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  I discussed the difference a zone 1,2,3 fracture along with diaphyseal and neck fractures. I discussed how each is treated differently as the healing rates are different as well. I also discussed how surgery can potentially damage the peroneal tendons, damage the sural nerve, and how the we may have to use a plate if the screw damages the bone.  I also discussed potential bone stimulator if they develop a non union.              Past Medical History:   Diagnosis Date    Anxiety 2000    Cancer (HCC)     Bilateral  renal cell see Dr Lopez and [facility]    Diabetes mellitus (HCC)     taken off meds because bs was too low, fasting blood surgars 50-80, feels hypoglycemic below 70,  last A1C 6.2 4/2024    Headache 1999    Hx of blood clots     treated with Eliquis x 1 year discontinued in 01/2024    Hyperlipidemia     Hypertension     Neoplasm of submandibular gland     Sleep apnea 2002    no c pap    Type 2 diabetes mellitus without complication (HCC) 2023     Past Surgical History:   Procedure Laterality Date    CHOLECYSTECTOMY  2003    CT NEEDLE BIOPSY LUNG PERCUTANEOUS W IMAGING GUIDANCE  09/28/2023    CT NEEDLE BIOPSY LUNG PERCUTANEOUS 9/28/2023 [facility] RADIOLOGY CT SCAN    IR PORT PLACEMENT > 5 YEARS  11/30/2022    IR PORT PLACEMENT EQUAL OR GREATER THAN 5 YEARS 11/30/2022 [facility] RADIOLOGY SPECIALS    NECK SURGERY Left 08/02/2024    Incision and drainage of left neck abscess-Mcclure    NECK SURGERY Left 8/2/2024    NECK INCISION AND DRAINAGE performed by Melvin Mcclure MD at [facility] MAIN OR    PARTIAL NEPHRECTOMY Left 12/08/2023    SALIVARY GLAND SURGERY Left 02/08/2024    LEFT SUBMANDIBULAR GLAND EXCISION performed by Melvin Mcclure MD at [facility] MAIN OR    SUBMANDIBULAR GLAND EXCISION Left 02/08/2024    Kami    TUBAL

## 2025-03-10 NOTE — PROGRESS NOTES
able to eat better.  Due to progressive disease and the side effects of the therapy leading to significant fatigue as well as hand-foot syndrome, she will be applying for disability.  Short-term disability at this time.  She can start Voltaren gel on her hands and we will also prescribe her Magic mouthwash for mucositis.  We also reviewed use of urea cream and  discussed how to apply this at night before sleep to minimize the side effects.  She agrees with this plan.  If she has progressive disease we will plan for belzutifan.    here for follow-up.  She is on cabozantinib and unable to be on immunotherapy due to prior reaction/SE.  She is doing relatively okay on cabozantinib except for frequent diarrhea.  We will check stool studies today but will also lowered the dose from 60 mg to 40 of cabozantinib and see if there is an improvement.    - dehydration/dizziness - She will also get 1 L of fluids as feels a bit dizzy.  She also notes increased daytime sleepiness and increasing weakness and balance issues.  Will request stat brain MRI.    - cellulitis of left hand - She is currently on antibiotics, doxycycline, for cellulitis of left hand.  She injured her hand while doing yard work and her PCP prescribed doxycycline.  She is on day 3 of that.    - BG lower levels at night - She has also noticed that her blood sugars running lower than usually.  She has had a few lows in 60s late in the night.  I will send a message to Dr. Jewell to make her aware.  Patient does not have an appointment for a while and wishes to see her sooner.  She is on Jardiance.  - -FU on Cabozantinib. At her last visit, a brain MRI was ordered for increased weakness and balance issues and this was negative for any acute findings. She reports that her legs feel \"wobbly\" at times and she is sometimes dizzy. She has a sleep study coming up for increased daytime sleepiness. She continues to have excessive diarrhea but reports where it used to be

## 2025-03-12 ENCOUNTER — OFFICE VISIT (OUTPATIENT)
Dept: ONCOLOGY | Age: 60
End: 2025-03-12
Payer: COMMERCIAL

## 2025-03-12 ENCOUNTER — OFFICE VISIT (OUTPATIENT)
Dept: PALLATIVE CARE | Age: 60
End: 2025-03-12
Payer: COMMERCIAL

## 2025-03-12 ENCOUNTER — HOSPITAL ENCOUNTER (OUTPATIENT)
Dept: INFUSION THERAPY | Age: 60
Setting detail: INFUSION SERIES
Discharge: HOME | End: 2025-03-12
Payer: COMMERCIAL

## 2025-03-12 ENCOUNTER — HOSPITAL ENCOUNTER (OUTPATIENT)
Dept: LAB | Age: 60
Discharge: HOME | End: 2025-03-12
Payer: COMMERCIAL

## 2025-03-12 VITALS
BODY MASS INDEX: 32.82 KG/M2 | TEMPERATURE: 97.4 F | WEIGHT: 204.2 LBS | DIASTOLIC BLOOD PRESSURE: 78 MMHG | HEART RATE: 61 BPM | RESPIRATION RATE: 16 BRPM | HEIGHT: 66 IN | SYSTOLIC BLOOD PRESSURE: 144 MMHG | OXYGEN SATURATION: 96 %

## 2025-03-12 DIAGNOSIS — Z79.899 HIGH RISK MEDICATION USE: ICD-10-CM

## 2025-03-12 DIAGNOSIS — Z51.5 ENCOUNTER FOR PALLIATIVE CARE: ICD-10-CM

## 2025-03-12 DIAGNOSIS — C64.2 RENAL CELL CARCINOMA OF LEFT KIDNEY (HCC): ICD-10-CM

## 2025-03-12 DIAGNOSIS — R11.2 NAUSEA AND VOMITING, UNSPECIFIED VOMITING TYPE: ICD-10-CM

## 2025-03-12 DIAGNOSIS — C64.2 RENAL CELL CARCINOMA OF LEFT KIDNEY (HCC): Primary | ICD-10-CM

## 2025-03-12 DIAGNOSIS — F41.9 ANXIETY AND DEPRESSION: ICD-10-CM

## 2025-03-12 DIAGNOSIS — R19.7 DIARRHEA, UNSPECIFIED TYPE: ICD-10-CM

## 2025-03-12 DIAGNOSIS — G89.3 CANCER ASSOCIATED PAIN: Primary | ICD-10-CM

## 2025-03-12 DIAGNOSIS — F32.A ANXIETY AND DEPRESSION: ICD-10-CM

## 2025-03-12 DIAGNOSIS — R63.0 ANOREXIA: ICD-10-CM

## 2025-03-12 LAB
ALBUMIN SERPL-MCNC: 3.3 G/DL (ref 3.2–4.6)
ALBUMIN/GLOB SERPL: 1.1 (ref 1–1.9)
ALP SERPL-CCNC: 67 U/L (ref 35–104)
ALT SERPL-CCNC: 40 U/L (ref 8–45)
ANION GAP SERPL CALC-SCNC: 10 MMOL/L (ref 7–16)
AST SERPL-CCNC: 33 U/L (ref 15–37)
BASOPHILS # BLD: 0.02 K/UL (ref 0–0.2)
BASOPHILS NFR BLD: 0.3 % (ref 0–2)
BILIRUB SERPL-MCNC: 0.3 MG/DL (ref 0–1.2)
BUN SERPL-MCNC: 9 MG/DL (ref 8–23)
CALCIUM SERPL-MCNC: 8.9 MG/DL (ref 8.8–10.2)
CHLORIDE SERPL-SCNC: 104 MMOL/L (ref 98–107)
CO2 SERPL-SCNC: 26 MMOL/L (ref 20–29)
CREAT SERPL-MCNC: 0.59 MG/DL (ref 0.6–1.1)
DIFFERENTIAL METHOD BLD: ABNORMAL
EOSINOPHIL # BLD: 0.19 K/UL (ref 0–0.8)
EOSINOPHIL NFR BLD: 2.5 % (ref 0.5–7.8)
ERYTHROCYTE [DISTWIDTH] IN BLOOD BY AUTOMATED COUNT: 14.8 % (ref 11.9–14.6)
GLOBULIN SER CALC-MCNC: 3.1 G/DL (ref 2.3–3.5)
GLUCOSE SERPL-MCNC: 95 MG/DL (ref 70–99)
HCT VFR BLD AUTO: 47.2 % (ref 35.8–46.3)
HGB BLD-MCNC: 15.2 G/DL (ref 11.7–15.4)
IMM GRANULOCYTES # BLD AUTO: 0.02 K/UL (ref 0–0.5)
IMM GRANULOCYTES NFR BLD AUTO: 0.3 % (ref 0–5)
LYMPHOCYTES # BLD: 3.38 K/UL (ref 0.5–4.6)
LYMPHOCYTES NFR BLD: 44.2 % (ref 13–44)
MCH RBC QN AUTO: 30.2 PG (ref 26.1–32.9)
MCHC RBC AUTO-ENTMCNC: 32.2 G/DL (ref 31.4–35)
MCV RBC AUTO: 93.7 FL (ref 82–102)
MONOCYTES # BLD: 0.52 K/UL (ref 0.1–1.3)
MONOCYTES NFR BLD: 6.8 % (ref 4–12)
NEUTS SEG # BLD: 3.51 K/UL (ref 1.7–8.2)
NEUTS SEG NFR BLD: 45.9 % (ref 43–78)
NRBC # BLD: 0 K/UL (ref 0–0.2)
PLATELET # BLD AUTO: 195 K/UL (ref 150–450)
PMV BLD AUTO: 10.8 FL (ref 9.4–12.3)
POTASSIUM SERPL-SCNC: 3.8 MMOL/L (ref 3.5–5.1)
PROT SERPL-MCNC: 6.5 G/DL (ref 6.3–8.2)
RBC # BLD AUTO: 5.04 M/UL (ref 4.05–5.2)
SODIUM SERPL-SCNC: 140 MMOL/L (ref 136–145)
WBC # BLD AUTO: 7.6 K/UL (ref 4.3–11.1)

## 2025-03-12 PROCEDURE — 80053 COMPREHEN METABOLIC PANEL: CPT

## 2025-03-12 PROCEDURE — 6360000002 HC RX W HCPCS: Performed by: NURSE PRACTITIONER

## 2025-03-12 PROCEDURE — 2500000003 HC RX 250 WO HCPCS: Performed by: NURSE PRACTITIONER

## 2025-03-12 PROCEDURE — 85025 COMPLETE CBC W/AUTO DIFF WBC: CPT

## 2025-03-12 PROCEDURE — 2500000003 HC RX 250 WO HCPCS: Performed by: INTERNAL MEDICINE

## 2025-03-12 PROCEDURE — 99214 OFFICE O/P EST MOD 30 MIN: CPT | Performed by: NURSE PRACTITIONER

## 2025-03-12 PROCEDURE — 99214 OFFICE O/P EST MOD 30 MIN: CPT | Performed by: PHYSICIAN ASSISTANT

## 2025-03-12 PROCEDURE — 36593 DECLOT VASCULAR DEVICE: CPT

## 2025-03-12 RX ORDER — DRONABINOL 5 MG/1
5 CAPSULE ORAL
Qty: 90 CAPSULE | Refills: 0 | Status: SHIPPED | OUTPATIENT
Start: 2025-03-12 | End: 2025-04-11

## 2025-03-12 RX ORDER — SODIUM CHLORIDE 0.9 % (FLUSH) 0.9 %
5-40 SYRINGE (ML) INJECTION PRN
Status: DISCONTINUED | OUTPATIENT
Start: 2025-03-12 | End: 2025-03-13 | Stop reason: HOSPADM

## 2025-03-12 RX ORDER — OXYCODONE HYDROCHLORIDE 15 MG/1
1 TABLET, FILM COATED, EXTENDED RELEASE ORAL EVERY 12 HOURS
Qty: 60 TABLET | Refills: 0 | Status: SHIPPED | OUTPATIENT
Start: 2025-03-12 | End: 2025-04-11

## 2025-03-12 RX ORDER — CLONAZEPAM 1 MG/1
1 TABLET ORAL 2 TIMES DAILY PRN
Qty: 60 TABLET | Refills: 2 | Status: SHIPPED | OUTPATIENT
Start: 2025-03-12 | End: 2025-06-10

## 2025-03-12 RX ORDER — OXYCODONE HYDROCHLORIDE 10 MG/1
10 TABLET ORAL EVERY 6 HOURS PRN
Qty: 120 TABLET | Refills: 0 | Status: SHIPPED | OUTPATIENT
Start: 2025-04-01 | End: 2025-05-01

## 2025-03-12 RX ORDER — BUSPIRONE HYDROCHLORIDE 10 MG/1
10 TABLET ORAL 2 TIMES DAILY
Qty: 60 TABLET | Refills: 3 | Status: SHIPPED | OUTPATIENT
Start: 2025-03-12

## 2025-03-12 RX ORDER — DRONABINOL 5 MG/1
5 CAPSULE ORAL
Qty: 90 CAPSULE | Refills: 0 | Status: SHIPPED | OUTPATIENT
Start: 2025-03-12 | End: 2025-03-12

## 2025-03-12 RX ORDER — LOPERAMIDE HYDROCHLORIDE 2 MG/1
4 CAPSULE ORAL 4 TIMES DAILY PRN
Qty: 240 CAPSULE | Refills: 2 | Status: SHIPPED | OUTPATIENT
Start: 2025-03-12 | End: 2025-06-10

## 2025-03-12 RX ORDER — HEPARIN 100 UNIT/ML
500 SYRINGE INTRAVENOUS PRN
Status: DISCONTINUED | OUTPATIENT
Start: 2025-03-12 | End: 2025-03-13 | Stop reason: HOSPADM

## 2025-03-12 RX ADMIN — HEPARIN 500 UNITS: 100 SYRINGE at 10:34

## 2025-03-12 RX ADMIN — SODIUM CHLORIDE, PRESERVATIVE FREE 20 ML: 5 INJECTION INTRAVENOUS at 08:10

## 2025-03-12 RX ADMIN — SODIUM CHLORIDE, PRESERVATIVE FREE 10 ML: 5 INJECTION INTRAVENOUS at 10:33

## 2025-03-12 RX ADMIN — WATER 2 MG: 1 INJECTION INTRAMUSCULAR; INTRAVENOUS; SUBCUTANEOUS at 09:55

## 2025-03-12 ASSESSMENT — ENCOUNTER SYMPTOMS
BACK PAIN: 1
ABDOMINAL PAIN: 1
DIARRHEA: 1
NAUSEA: 1

## 2025-03-12 ASSESSMENT — PATIENT HEALTH QUESTIONNAIRE - PHQ9
1. LITTLE INTEREST OR PLEASURE IN DOING THINGS: SEVERAL DAYS
SUM OF ALL RESPONSES TO PHQ QUESTIONS 1-9: 2
SUM OF ALL RESPONSES TO PHQ QUESTIONS 1-9: 2
2. FEELING DOWN, DEPRESSED OR HOPELESS: SEVERAL DAYS
SUM OF ALL RESPONSES TO PHQ QUESTIONS 1-9: 2
SUM OF ALL RESPONSES TO PHQ QUESTIONS 1-9: 2

## 2025-03-12 NOTE — PROGRESS NOTES
(KLONOPIN) 1 MG tablet    oxyCODONE HCl (OXY-IR) 10 MG immediate release tablet    DISCONTINUED: droNABinol (MARINOL) 5 MG capsule      4. Nausea and vomiting, unspecified vomiting type  droNABinol (MARINOL) 5 MG capsule    DISCONTINUED: droNABinol (MARINOL) 5 MG capsule      5. Anxiety and depression  busPIRone (BUSPAR) 10 MG tablet    clonazePAM (KLONOPIN) 1 MG tablet      6. Anorexia        7. Diarrhea, unspecified type                    PLAN:  Lab studies and imaging studies were personally reviewed.     [place]ertinent [place] [facility], and [facility].     Nausea: Stable.  Marinol 5mg TID PRN reordered.  Uses Phenergan at night due to sedation. Previously had no relief with Zofran or Zyprexa.   Cancer associated pain: Continue Oxycodone 10mg Q4hr PRN. Resume Oxycontin 15mg Q12hr. Refills sent today to [facility]'s in [place].   Anxiety and depression: Continue Zoloft 150mg QHS (per PCP), Klonopin 1mg BID PRN, and Buspar 10mg BID. Klonopin and Buspar refilled today.   Anorexia: Stable on Marinol. Not losing weight.   Diarrhea: Cont Imodium TID, can increase to two pills at a time as needed.   Neuropathy: Continue Gabapentin 300mg TID.    Advanced Care Planning addressed: None today. HCPOA and [place] DNR completed previously and are scanned into pt's chart.    Will follow up in: Next oncology OV in 6 weeks    All questions were asked and answered to the best of my ability. In all, I spent 35 minutes in the care of the patient today, over 50% of which was in direct counseling and coordination of care as documented above.    I have reviewed the patient's controlled substance prescription history, as maintained in the [place] prescription monitoring program, so that the prescriptions(s) for a controlled substance can be given.  Last Date Reviewed: 3/12/25         Lise Rivero PA-C  Outpatient Palliative Care at the  [address] [address]  Office :

## 2025-03-12 NOTE — PROGRESS NOTES
Patient to port lab for port access and lab draw. Port accessed per protocol; using 20g 0.75\" velasquez needle without difficulty. No blood return noted, flushed without difficulty.  Labs drawn peripherally. Port remains accessed. Patient tolerated well. Discharged ambulatory.

## 2025-03-12 NOTE — PROGRESS NOTES
Patient arrived to infusion center ambulatory. No blood return via port. Cath yennifer instilled. Brisk blood return noted after 30 minutes of cath yennifer instillation. Port flushed and heparin locked and de accessed.   Patient discharged ambulatory.

## 2025-03-13 ENCOUNTER — CLINICAL DOCUMENTATION (OUTPATIENT)
Dept: ONCOLOGY | Age: 60
End: 2025-03-13

## 2025-03-13 NOTE — PROGRESS NOTES
Prior authorization request for Dronabinol 5 mg capsules received through [employer] under Key Code [id number].  According to the message in the ePA portal, the PA request can be completed by calling [employer] at #[phone].  Call placed to the number.  Per [employer] representative, the PA request can be initiated by visiting [email or web address], accessing the provider sectipon of the website and then selecting the option to initiate a prior authorization.    Prior authorization request for Dronabinol 5 mg capsules quantity of 90 for a 30 day supply initiated via the [employer] portal. Clinical questions were answered and patient demographics and clinical information submitted through the portal.  The most recent oncology and palliative care office visit notes dated 3/12/25 were uploaded to the portal for medical review.  Pending EOC number [id number] was assigned to the case.

## 2025-03-17 ENCOUNTER — TELEPHONE (OUTPATIENT)
Dept: ORTHOPEDIC SURGERY | Age: 60
End: 2025-03-17

## 2025-03-21 ENCOUNTER — HOSPITAL ENCOUNTER (OUTPATIENT)
Dept: CT IMAGING | Age: 60
Discharge: HOME | End: 2025-03-23
Payer: COMMERCIAL

## 2025-03-21 DIAGNOSIS — S92.355G NONDISPLACED FRACTURE OF FIFTH METATARSAL BONE, LEFT FOOT, SUBSEQUENT ENCOUNTER FOR FRACTURE WITH DELAYED HEALING: ICD-10-CM

## 2025-03-21 PROCEDURE — 73700 CT LOWER EXTREMITY W/O DYE: CPT

## 2025-03-24 ENCOUNTER — TELEPHONE (OUTPATIENT)
Dept: ORTHOPEDIC SURGERY | Age: 60
End: 2025-03-24

## 2025-03-24 RX ORDER — CABOZANTINIB 40 MG/1
TABLET ORAL
Qty: 30 TABLET | OUTPATIENT
Start: 2025-03-24

## 2025-03-24 NOTE — TELEPHONE ENCOUNTER
Attempted to call patient regarding left foot CT scan.  Left voicemail for patient to return to call.  Will try again tomorrow.

## 2025-03-25 ENCOUNTER — TELEPHONE (OUTPATIENT)
Dept: ORTHOPEDIC SURGERY | Age: 60
End: 2025-03-25

## 2025-03-25 DIAGNOSIS — S92.355K CLOSED NONDISPLACED FRACTURE OF FIFTH METATARSAL BONE OF LEFT FOOT WITH NONUNION, SUBSEQUENT ENCOUNTER: Primary | ICD-10-CM

## 2025-03-25 NOTE — TELEPHONE ENCOUNTER
Spoke with patient regarding her CT scan of her left foot.  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  She is still having a fair amount of pain in this area and would benefit from a bone stimulator for completion of healing.  Her original injury was on 11/3/2024.  Her first appointment with me was on 11/11/2024.      She is also complaining of pain in her plantar fascia region.  She was instructed to come to the office tomorrow morning and we will fit her with bilateral night splints, bilateral plantar fascia straps and give her plantar fascia home exercise program.    We will order a bone stimulator and they will get in touch with her about setting this up.    Left fifth metatarsal base partial union.    CT FOOT LEFT WO CONTRAST  Result Date: 3/24/2025  EXAMINATION: CT FOOT LEFT WO CONTRAST 3/21/2025 7:44 AM ACCESSION NUMBER: [id number] COMPARISON: No prior CT. Prior radiographs dated 1/23/2025 and 12/12/2024 INDICATION: Nondisplaced fracture of the fifth metatarsal TECHNIQUE: Multiple-row detector 2D helical CT examination of the left foot without intravenous contrast.  Multiplanar reformatting was provided. Radiation dose reduction techniques were used for this study. Our CT scanners use one or all of the following: Automated exposure control, adjustment of the mA and/or kV according to patient size, iterative reconstruction.  FINDINGS: BONES: Redemonstrated subacute/chronic appearing, nondisplaced, avulsion fracture of the fifth proximal metatarsal base with partial osseous bridging (approximately 50%). No other fracture. No dislocation. Hypertrophy and widening of the anterior process of the calcaneus may be degenerative or chronic posttraumatic. Small to moderate dorsal and plantar calcaneal enthesophytes. Normal bone mineralization. No aggressive lytic or blastic lesion. Turret exostosis of the first distal phalanx. JOINTS: Moderate to severe

## 2025-03-26 DIAGNOSIS — S92.355K CLOSED NONDISPLACED FRACTURE OF FIFTH METATARSAL BONE OF LEFT FOOT WITH NONUNION, SUBSEQUENT ENCOUNTER: Primary | ICD-10-CM

## 2025-03-26 DIAGNOSIS — S92.355G NONDISPLACED FRACTURE OF FIFTH METATARSAL BONE, LEFT FOOT, SUBSEQUENT ENCOUNTER FOR FRACTURE WITH DELAYED HEALING: ICD-10-CM

## 2025-03-26 DIAGNOSIS — R52 PAIN: ICD-10-CM

## 2025-03-26 NOTE — PROGRESS NOTES
Printed Exogen order, CT scan results, CT results phone call with KF, first office note, ER note and last office note. Left at  for Maritza Sellers

## 2025-04-16 DIAGNOSIS — Z51.5 ENCOUNTER FOR PALLIATIVE CARE: ICD-10-CM

## 2025-04-16 DIAGNOSIS — C64.2 RENAL CELL CARCINOMA OF LEFT KIDNEY (HCC): ICD-10-CM

## 2025-04-16 RX ORDER — OXYCODONE HYDROCHLORIDE 15 MG/1
1 TABLET, FILM COATED, EXTENDED RELEASE ORAL EVERY 12 HOURS
Qty: 60 TABLET | Refills: 0 | Status: SHIPPED | OUTPATIENT
Start: 2025-04-16 | End: 2025-05-16

## 2025-04-16 NOTE — PROGRESS NOTES
Medication Requested: Oxy ER    Is this medication a narcotic: YES    Is the patient's pain controlled? YES    Date of Last OV: 3/12/25    Date of Next OV: 4/25/25    Date last prescribed: 3/12/25    Last Rx fill in per [facility] site: 3/12/25    Out Early: NO    Took Extra: NO    Not out early but not enough to make it until next appointment: yes    Requested Pharmacy: [facility]    Action Taken: Chart reviewed, refill pended and routed for signature.      I have reviewed the patient’s controlled substance prescription history, as maintained in the [place] prescription monitoring program, so that the prescription(s) for a controlled substance can be given.

## 2025-04-25 ENCOUNTER — OFFICE VISIT (OUTPATIENT)
Dept: ONCOLOGY | Age: 60
End: 2025-04-25
Payer: COMMERCIAL

## 2025-04-25 ENCOUNTER — HOSPITAL ENCOUNTER (OUTPATIENT)
Dept: LAB | Age: 60
Discharge: HOME | End: 2025-04-25
Payer: COMMERCIAL

## 2025-04-25 ENCOUNTER — OFFICE VISIT (OUTPATIENT)
Dept: PALLATIVE CARE | Age: 60
End: 2025-04-25

## 2025-04-25 VITALS
SYSTOLIC BLOOD PRESSURE: 134 MMHG | RESPIRATION RATE: 12 BRPM | HEART RATE: 59 BPM | WEIGHT: 198 LBS | BODY MASS INDEX: 31.82 KG/M2 | DIASTOLIC BLOOD PRESSURE: 76 MMHG | HEIGHT: 66 IN | TEMPERATURE: 98.7 F | OXYGEN SATURATION: 96 %

## 2025-04-25 DIAGNOSIS — R53.83 FATIGUE DUE TO TREATMENT: ICD-10-CM

## 2025-04-25 DIAGNOSIS — G62.9 NEUROPATHY: ICD-10-CM

## 2025-04-25 DIAGNOSIS — G89.3 CANCER RELATED PAIN: ICD-10-CM

## 2025-04-25 DIAGNOSIS — C64.2 RENAL CELL CARCINOMA OF LEFT KIDNEY (HCC): Primary | ICD-10-CM

## 2025-04-25 DIAGNOSIS — R11.2 NAUSEA AND VOMITING, UNSPECIFIED VOMITING TYPE: ICD-10-CM

## 2025-04-25 DIAGNOSIS — T45.1X5A CHEMOTHERAPY-INDUCED NEUROPATHY: ICD-10-CM

## 2025-04-25 DIAGNOSIS — C64.2 RENAL CELL CARCINOMA OF LEFT KIDNEY (HCC): ICD-10-CM

## 2025-04-25 DIAGNOSIS — Z51.5 ENCOUNTER FOR PALLIATIVE CARE: ICD-10-CM

## 2025-04-25 DIAGNOSIS — G89.3 CANCER ASSOCIATED PAIN: Primary | ICD-10-CM

## 2025-04-25 DIAGNOSIS — R19.7 DIARRHEA, UNSPECIFIED TYPE: ICD-10-CM

## 2025-04-25 DIAGNOSIS — G62.0 CHEMOTHERAPY-INDUCED NEUROPATHY: ICD-10-CM

## 2025-04-25 LAB
ALBUMIN SERPL-MCNC: 3.2 G/DL (ref 3.2–4.6)
ALBUMIN/GLOB SERPL: 1.1 (ref 1–1.9)
ALP SERPL-CCNC: 69 U/L (ref 35–104)
ALT SERPL-CCNC: 33 U/L (ref 8–45)
ANION GAP SERPL CALC-SCNC: 11 MMOL/L (ref 7–16)
AST SERPL-CCNC: 32 U/L (ref 15–37)
BASOPHILS # BLD: 0.01 K/UL (ref 0–0.2)
BASOPHILS NFR BLD: 0.1 % (ref 0–2)
BILIRUB SERPL-MCNC: 0.3 MG/DL (ref 0–1.2)
BUN SERPL-MCNC: 7 MG/DL (ref 8–23)
CALCIUM SERPL-MCNC: 8.7 MG/DL (ref 8.8–10.2)
CHLORIDE SERPL-SCNC: 104 MMOL/L (ref 98–107)
CO2 SERPL-SCNC: 25 MMOL/L (ref 20–29)
CREAT SERPL-MCNC: 0.62 MG/DL (ref 0.6–1.1)
DIFFERENTIAL METHOD BLD: ABNORMAL
EOSINOPHIL # BLD: 0.14 K/UL (ref 0–0.8)
EOSINOPHIL NFR BLD: 2.1 % (ref 0.5–7.8)
ERYTHROCYTE [DISTWIDTH] IN BLOOD BY AUTOMATED COUNT: 15.4 % (ref 11.9–14.6)
GLOBULIN SER CALC-MCNC: 2.9 G/DL (ref 2.3–3.5)
GLUCOSE SERPL-MCNC: 107 MG/DL (ref 70–99)
HCT VFR BLD AUTO: 44 % (ref 35.8–46.3)
HGB BLD-MCNC: 14.2 G/DL (ref 11.7–15.4)
IMM GRANULOCYTES # BLD AUTO: 0.01 K/UL (ref 0–0.5)
IMM GRANULOCYTES NFR BLD AUTO: 0.1 % (ref 0–5)
LYMPHOCYTES # BLD: 2.59 K/UL (ref 0.5–4.6)
LYMPHOCYTES NFR BLD: 38.1 % (ref 13–44)
MCH RBC QN AUTO: 30.4 PG (ref 26.1–32.9)
MCHC RBC AUTO-ENTMCNC: 32.3 G/DL (ref 31.4–35)
MCV RBC AUTO: 94.2 FL (ref 82–102)
MONOCYTES # BLD: 0.42 K/UL (ref 0.1–1.3)
MONOCYTES NFR BLD: 6.2 % (ref 4–12)
NEUTS SEG # BLD: 3.62 K/UL (ref 1.7–8.2)
NEUTS SEG NFR BLD: 53.4 % (ref 43–78)
NRBC # BLD: 0 K/UL (ref 0–0.2)
PLATELET # BLD AUTO: 194 K/UL (ref 150–450)
PMV BLD AUTO: 10.9 FL (ref 9.4–12.3)
POTASSIUM SERPL-SCNC: 3.9 MMOL/L (ref 3.5–5.1)
PROT SERPL-MCNC: 6.1 G/DL (ref 6.3–8.2)
RBC # BLD AUTO: 4.67 M/UL (ref 4.05–5.2)
SODIUM SERPL-SCNC: 140 MMOL/L (ref 136–145)
WBC # BLD AUTO: 6.8 K/UL (ref 4.3–11.1)

## 2025-04-25 PROCEDURE — 99214 OFFICE O/P EST MOD 30 MIN: CPT | Performed by: NURSE PRACTITIONER

## 2025-04-25 PROCEDURE — 6360000002 HC RX W HCPCS: Performed by: INTERNAL MEDICINE

## 2025-04-25 PROCEDURE — 80053 COMPREHEN METABOLIC PANEL: CPT

## 2025-04-25 PROCEDURE — 85025 COMPLETE CBC W/AUTO DIFF WBC: CPT

## 2025-04-25 PROCEDURE — 2500000003 HC RX 250 WO HCPCS: Performed by: INTERNAL MEDICINE

## 2025-04-25 RX ORDER — GABAPENTIN 400 MG/1
400 CAPSULE ORAL 3 TIMES DAILY
Qty: 270 CAPSULE | Refills: 3 | Status: SHIPPED | OUTPATIENT
Start: 2025-04-25 | End: 2026-04-20

## 2025-04-25 RX ORDER — DRONABINOL 2.5 MG/1
5 CAPSULE ORAL 3 TIMES DAILY PRN
Qty: 180 CAPSULE | Refills: 0 | Status: SHIPPED | OUTPATIENT
Start: 2025-04-25 | End: 2025-05-25

## 2025-04-25 RX ORDER — SODIUM CHLORIDE 0.9 % (FLUSH) 0.9 %
5-40 SYRINGE (ML) INJECTION PRN
Status: DISCONTINUED | OUTPATIENT
Start: 2025-04-25 | End: 2025-04-26 | Stop reason: HOSPADM

## 2025-04-25 RX ORDER — HEPARIN 100 UNIT/ML
300 SYRINGE INTRAVENOUS PRN
Status: DISCONTINUED | OUTPATIENT
Start: 2025-04-25 | End: 2025-04-26 | Stop reason: HOSPADM

## 2025-04-25 RX ADMIN — SODIUM CHLORIDE, PRESERVATIVE FREE 20 ML: 5 INJECTION INTRAVENOUS at 09:50

## 2025-04-25 RX ADMIN — HEPARIN 300 UNITS: 100 SYRINGE at 09:50

## 2025-04-25 ASSESSMENT — ENCOUNTER SYMPTOMS
VOMITING: 0
VOICE CHANGE: 0
SORE THROAT: 0
WHEEZING: 0
ABDOMINAL PAIN: 1
NAUSEA: 1
BACK PAIN: 1
SHORTNESS OF BREATH: 0
ABDOMINAL PAIN: 0
ABDOMINAL DISTENTION: 0
DIARRHEA: 1
CHEST TIGHTNESS: 0
BACK PAIN: 1
TROUBLE SWALLOWING: 0
HEMOPTYSIS: 0
SCLERAL ICTERUS: 0
BLOOD IN STOOL: 0
NAUSEA: 1
CONSTIPATION: 0
DIARRHEA: 1

## 2025-04-25 ASSESSMENT — PATIENT HEALTH QUESTIONNAIRE - PHQ9
2. FEELING DOWN, DEPRESSED OR HOPELESS: SEVERAL DAYS
SUM OF ALL RESPONSES TO PHQ QUESTIONS 1-9: 1
1. LITTLE INTEREST OR PLEASURE IN DOING THINGS: NOT AT ALL
SUM OF ALL RESPONSES TO PHQ QUESTIONS 1-9: 1

## 2025-04-25 NOTE — PROGRESS NOTES
(MARINOL) 2.5 MG capsule          PLAN:  Lab studies and imaging studies were personally reviewed.     Pertinent old records were reviewed from [facility], ED, PCP, and [facility].     Nausea: Marinol 5mg TID PRN reordered. Note that this is on national backorder and has been difficult to find. 2.5mg ordered with pt instructed to take two. Previously had no relief with Zofran or Zyprexa.   Cancer associated pain: Continue Oxycodone 10mg Q4hr PRN and Oxycontin 15mg Q12hr.  Anxiety and depression: Continue Zoloft 150mg QHS (per PCP), Klonopin 1mg BID PRN, and Buspar 10mg BID.  Diarrhea: Continue Imodium PRN.  Neuropathy: Worsening. Increase Gabapentin to 400mg TID. Can also consider increasing nighttime dose at next visit.    Advanced Care Planning addressed: None today. HCPOA and [place] DNR completed previously and are scanned into pt's chart.    Will follow up in: 6 weeks. Pt to have scan prior to that visit.    All questions were asked and answered to the best of my ability. In all, I spent 60 minutes in the care of the patient today, over 50% of which was in direct counseling and coordination of care as documented above.    I have reviewed the patient's controlled substance prescription history, as maintained in the [place] prescription monitoring program, so that the prescriptions(s) for a controlled substance can be given.  Last Date Reviewed: 4/25/25         SPIKE García - NP  Outpatient [place] [address]  Office : [phone]  Fax : [phone]

## 2025-04-25 NOTE — PROGRESS NOTES
restarted. She has two more days of antibiotics. She has been packing the wound and she reports that the wound is not as deep and drainage is now more serous as it was green. She denies any fevers. This week she has started using 2L bled into her CPAP at night and she had PFTs done today. She reports less diarrhea since holding cabo. VS and labs reviewed. She also had a CT CAP which was done on 8/11 that showed her LLL pulm nodule was stable, R kidney mets stable, stable L kidney lesion, and stable adrenal mets with a recommendation for PET scan. I will order this today. She has never had a pet scan per patient. Her CT scan also showed a fat filled ventral hernia. She reports occasional pain but no bowel issues.    All questions were asked and answered to the best of my ability.  The patient verbalized understanding and agrees with the plan above.        Batsheva Contreras) BEN Crystal  [facility] Hematology and Oncology  [address] [place] [address]  Office : [phone]  Fax : [phone]

## 2025-04-30 ENCOUNTER — E-VISIT (OUTPATIENT)
Dept: INTERNAL MEDICINE CLINIC | Facility: CLINIC | Age: 60
End: 2025-04-30
Payer: COMMERCIAL

## 2025-04-30 DIAGNOSIS — L03.115 CELLULITIS OF RIGHT LOWER EXTREMITY: Primary | ICD-10-CM

## 2025-04-30 PROCEDURE — 99422 OL DIG E/M SVC 11-20 MIN: CPT | Performed by: NURSE PRACTITIONER

## 2025-04-30 RX ORDER — CEPHALEXIN 500 MG/1
500 CAPSULE ORAL 2 TIMES DAILY
Qty: 20 CAPSULE | Refills: 0 | Status: SHIPPED | OUTPATIENT
Start: 2025-04-30 | End: 2025-05-10

## 2025-04-30 NOTE — PROGRESS NOTES
Mechelle Hall (1965) initiated an asynchronous digital communication through [employer].    HPI: per patient questionnaire     Exam: not applicable    Diagnoses and all orders for this visit:  There are no diagnoses linked to this encounter.      15 minutes were spent on the digital evaluation and management of this patient.    SPIKE Albert - NP

## 2025-05-07 ENCOUNTER — OFFICE VISIT (OUTPATIENT)
Dept: INTERNAL MEDICINE CLINIC | Facility: CLINIC | Age: 60
End: 2025-05-07
Payer: COMMERCIAL

## 2025-05-07 VITALS
BODY MASS INDEX: 31.34 KG/M2 | HEART RATE: 66 BPM | DIASTOLIC BLOOD PRESSURE: 71 MMHG | RESPIRATION RATE: 18 BRPM | HEIGHT: 66 IN | TEMPERATURE: 97.3 F | WEIGHT: 195 LBS | SYSTOLIC BLOOD PRESSURE: 108 MMHG | OXYGEN SATURATION: 96 %

## 2025-05-07 DIAGNOSIS — L03.115 CELLULITIS OF RIGHT LOWER EXTREMITY: Primary | ICD-10-CM

## 2025-05-07 DIAGNOSIS — C64.2 RENAL CELL CARCINOMA OF LEFT KIDNEY (HCC): ICD-10-CM

## 2025-05-07 DIAGNOSIS — F32.A ANXIETY AND DEPRESSION: ICD-10-CM

## 2025-05-07 DIAGNOSIS — F41.9 ANXIETY AND DEPRESSION: ICD-10-CM

## 2025-05-07 DIAGNOSIS — Z51.5 ENCOUNTER FOR PALLIATIVE CARE: ICD-10-CM

## 2025-05-07 PROCEDURE — 99214 OFFICE O/P EST MOD 30 MIN: CPT | Performed by: NURSE PRACTITIONER

## 2025-05-07 RX ORDER — BUSPIRONE HYDROCHLORIDE 10 MG/1
20 TABLET ORAL 2 TIMES DAILY
Qty: 60 TABLET | Refills: 3
Start: 2025-05-07

## 2025-05-07 RX ORDER — CEPHALEXIN 500 MG/1
500 CAPSULE ORAL 2 TIMES DAILY
Qty: 10 CAPSULE | Refills: 0 | Status: SHIPPED | OUTPATIENT
Start: 2025-05-07 | End: 2025-05-12

## 2025-05-07 RX ORDER — OXYCODONE HYDROCHLORIDE 10 MG/1
10 TABLET ORAL
COMMUNITY
Start: 2025-04-10

## 2025-05-07 ASSESSMENT — ENCOUNTER SYMPTOMS
VOMITING: 0
NAUSEA: 0
COLOR CHANGE: 0

## 2025-05-07 NOTE — PROGRESS NOTES
5/7/2025 8:51 AM  Location:[facility] PHYSICIAN SERVICES  [facility] INTERNAL MEDICINE  [place]  Patient #:  [id number]  YOB: 1965    Reason for Visit  Wound Check Patient presents in the office today for a follow up on wound of R lower extremity.  Patient reports improvement since starting abx.      History of Present Illness  The patient is a 59-year-old female who presents for follow-up on cellulitis. She did an e-visit on 04/30/2025, presenting with cellulitis to her right lower extremity, for which Keflex was prescribed. She has a history of renal cell carcinoma, type 2 diabetes, and is currently on chemotherapy.    The patient reports an improvement in her condition. She is currently on day 8 of her 10-day course of Keflex. She reports no systemic symptoms such as fevers, chills, nausea, vomiting, or diarrhea. She has no history of MRSA but has had two previous episodes of arm cellulitis from a sore, both managed outpatient with antibiotics without the need for wound care. The current episode of cellulitis was precipitated by a trip and fall on 04/27/2025, during which she landed on her right leg. She did not sustain any scrapes or cuts but noticed a worsening of her symptoms on 04/30/2025. Despite attempts to manage the condition with triple antibiotic ointment and Optifoam, there was no improvement. She has been using mupirocin ointment, which was previously prescribed for her arm cellulitis.    The patient has a history of bilateral kidney cancer and is currently undergoing chemotherapy with Cabometyx. She paused her chemotherapy while on antibiotics. Her first line of treatment included Keytruda and Inlyta, followed by everolimus and Lenvima. She is now on her third line of treatment with Cabometyx and has remained stable. If her upcoming CT scan in May 2025 shows stability, it will sana 15 months of being stable on Cabometyx. She used to have monthly check-ups but now goes every two

## 2025-05-14 ENCOUNTER — OFFICE VISIT (OUTPATIENT)
Dept: INTERNAL MEDICINE CLINIC | Facility: CLINIC | Age: 60
End: 2025-05-14
Payer: COMMERCIAL

## 2025-05-14 ENCOUNTER — TELEPHONE (OUTPATIENT)
Dept: INTERNAL MEDICINE CLINIC | Facility: CLINIC | Age: 60
End: 2025-05-14

## 2025-05-14 VITALS
SYSTOLIC BLOOD PRESSURE: 125 MMHG | HEART RATE: 79 BPM | OXYGEN SATURATION: 96 % | BODY MASS INDEX: 31.18 KG/M2 | WEIGHT: 194 LBS | DIASTOLIC BLOOD PRESSURE: 71 MMHG | HEIGHT: 66 IN | TEMPERATURE: 97.7 F

## 2025-05-14 DIAGNOSIS — J02.9 PHARYNGITIS, UNSPECIFIED ETIOLOGY: Primary | ICD-10-CM

## 2025-05-14 DIAGNOSIS — B37.0 ORAL CANDIDIASIS: ICD-10-CM

## 2025-05-14 DIAGNOSIS — R50.9 FEVER, UNSPECIFIED FEVER CAUSE: ICD-10-CM

## 2025-05-14 DIAGNOSIS — J02.9 SORE THROAT: ICD-10-CM

## 2025-05-14 DIAGNOSIS — K13.79 MOUTH PAIN: Primary | ICD-10-CM

## 2025-05-14 DIAGNOSIS — B34.9 VIRAL ILLNESS: ICD-10-CM

## 2025-05-14 LAB
BASOPHILS # BLD: 0.01 K/UL (ref 0–0.2)
BASOPHILS NFR BLD: 0.1 % (ref 0–2)
DIFFERENTIAL METHOD BLD: ABNORMAL
EOSINOPHIL # BLD: 0.03 K/UL (ref 0–0.8)
EOSINOPHIL NFR BLD: 0.4 % (ref 0.5–7.8)
ERYTHROCYTE [DISTWIDTH] IN BLOOD BY AUTOMATED COUNT: 15.7 % (ref 11.9–14.6)
EXP DATE SOLUTION: NORMAL
EXP DATE SWAB: NORMAL
EXPIRATION DATE: NORMAL
GROUP A STREP ANTIGEN, POC: NEGATIVE
HCT VFR BLD AUTO: 46.2 % (ref 35.8–46.3)
HETEROPH AB BLD QL IA: NEGATIVE
HGB BLD-MCNC: 14.8 G/DL (ref 11.7–15.4)
IMM GRANULOCYTES # BLD AUTO: 0.02 K/UL (ref 0–0.5)
IMM GRANULOCYTES NFR BLD AUTO: 0.3 % (ref 0–5)
INFLUENZA A RNA, POC: NORMAL
INFLUENZA B RNA, POC: NORMAL
LOT NUMBER POC: NORMAL
LOT NUMBER SOLUTION: NORMAL
LOT NUMBER SWAB: NORMAL
LYMPHOCYTES # BLD: 1.77 K/UL (ref 0.5–4.6)
LYMPHOCYTES NFR BLD: 23.1 % (ref 13–44)
MCH RBC QN AUTO: 30.7 PG (ref 26.1–32.9)
MCHC RBC AUTO-ENTMCNC: 32 G/DL (ref 31.4–35)
MCV RBC AUTO: 95.9 FL (ref 82–102)
MONOCYTES # BLD: 0.61 K/UL (ref 0.1–1.3)
MONOCYTES NFR BLD: 8 % (ref 4–12)
NEUTS SEG # BLD: 5.23 K/UL (ref 1.7–8.2)
NEUTS SEG NFR BLD: 68.1 % (ref 43–78)
NRBC # BLD: 0 K/UL (ref 0–0.2)
PLATELET # BLD AUTO: 222 K/UL (ref 150–450)
PMV BLD AUTO: 11 FL (ref 9.4–12.3)
RBC # BLD AUTO: 4.82 M/UL (ref 4.05–5.2)
SARS-COV-2 RNA, POC: NEGATIVE
VALID INTERNAL CONTROL, POC: YES
VALID INTERNAL CONTROL: YES
WBC # BLD AUTO: 7.7 K/UL (ref 4.3–11.1)

## 2025-05-14 PROCEDURE — 99213 OFFICE O/P EST LOW 20 MIN: CPT | Performed by: NURSE PRACTITIONER

## 2025-05-14 PROCEDURE — 87880 STREP A ASSAY W/OPTIC: CPT | Performed by: NURSE PRACTITIONER

## 2025-05-14 PROCEDURE — 87636 SARSCOV2 & INF A&B AMP PRB: CPT | Performed by: NURSE PRACTITIONER

## 2025-05-14 RX ORDER — FLUCONAZOLE 150 MG/1
TABLET ORAL
Qty: 6 TABLET | Refills: 0 | Status: SHIPPED | OUTPATIENT
Start: 2025-05-14

## 2025-05-14 ASSESSMENT — ENCOUNTER SYMPTOMS
ABDOMINAL DISTENTION: 0
VOMITING: 0
COUGH: 0
SORE THROAT: 1
SINUS PRESSURE: 0
DIARRHEA: 0
CONSTIPATION: 0
CHEST TIGHTNESS: 0
NAUSEA: 0
SHORTNESS OF BREATH: 0
SINUS PAIN: 0
ABDOMINAL PAIN: 0

## 2025-05-15 ENCOUNTER — RESULTS FOLLOW-UP (OUTPATIENT)
Dept: INTERNAL MEDICINE CLINIC | Facility: CLINIC | Age: 60
End: 2025-05-15

## 2025-05-20 DIAGNOSIS — G89.3 CANCER ASSOCIATED PAIN: Primary | ICD-10-CM

## 2025-05-20 DIAGNOSIS — Z51.5 ENCOUNTER FOR PALLIATIVE CARE: ICD-10-CM

## 2025-05-20 RX ORDER — OXYCODONE HYDROCHLORIDE 10 MG/1
10 TABLET ORAL EVERY 6 HOURS PRN
Qty: 120 TABLET | Refills: 0 | Status: SHIPPED | OUTPATIENT
Start: 2025-05-20 | End: 2025-06-19

## 2025-05-21 ENCOUNTER — APPOINTMENT (OUTPATIENT)
Dept: MRI IMAGING | Age: 60
End: 2025-05-21
Payer: COMMERCIAL

## 2025-05-21 ENCOUNTER — APPOINTMENT (OUTPATIENT)
Dept: CT IMAGING | Age: 60
End: 2025-05-21
Payer: COMMERCIAL

## 2025-05-21 ENCOUNTER — HOSPITAL ENCOUNTER (EMERGENCY)
Age: 60
Discharge: HOME | End: 2025-05-21
Payer: COMMERCIAL

## 2025-05-21 VITALS
OXYGEN SATURATION: 96 % | HEART RATE: 63 BPM | BODY MASS INDEX: 31.18 KG/M2 | TEMPERATURE: 98.2 F | SYSTOLIC BLOOD PRESSURE: 140 MMHG | DIASTOLIC BLOOD PRESSURE: 62 MMHG | HEIGHT: 66 IN | RESPIRATION RATE: 20 BRPM | WEIGHT: 194 LBS

## 2025-05-21 DIAGNOSIS — G51.0 BELL'S PALSY: Primary | ICD-10-CM

## 2025-05-21 LAB
ALBUMIN SERPL-MCNC: 3.8 G/DL (ref 3.2–4.6)
ALBUMIN/GLOB SERPL: 1.3 (ref 1–1.9)
ALP SERPL-CCNC: 70 U/L (ref 35–104)
ALT SERPL-CCNC: 16 U/L (ref 8–45)
ANION GAP SERPL CALC-SCNC: 12 MMOL/L (ref 7–16)
AST SERPL-CCNC: 22 U/L (ref 15–37)
BASOPHILS # BLD: 0.02 K/UL (ref 0–0.2)
BASOPHILS NFR BLD: 0.2 % (ref 0–2)
BILIRUB SERPL-MCNC: 0.3 MG/DL (ref 0–1.2)
BUN SERPL-MCNC: 9 MG/DL (ref 8–23)
CALCIUM SERPL-MCNC: 9.3 MG/DL (ref 8.8–10.2)
CHLORIDE SERPL-SCNC: 105 MMOL/L (ref 98–107)
CO2 SERPL-SCNC: 25 MMOL/L (ref 20–29)
CREAT SERPL-MCNC: 0.68 MG/DL (ref 0.6–1.1)
DIFFERENTIAL METHOD BLD: ABNORMAL
EKG ATRIAL RATE: 67 BPM
EKG DIAGNOSIS: NORMAL
EKG P AXIS: 70 DEGREES
EKG P-R INTERVAL: 185 MS
EKG Q-T INTERVAL: 410 MS
EKG QRS DURATION: 129 MS
EKG QTC CALCULATION (BAZETT): 436 MS
EKG R AXIS: 37 DEGREES
EKG T AXIS: 249 DEGREES
EKG VENTRICULAR RATE: 68 BPM
EOSINOPHIL # BLD: 0.12 K/UL (ref 0–0.8)
EOSINOPHIL NFR BLD: 1.4 % (ref 0.5–7.8)
ERYTHROCYTE [DISTWIDTH] IN BLOOD BY AUTOMATED COUNT: 15.1 % (ref 11.9–14.6)
GLOBULIN SER CALC-MCNC: 3 G/DL (ref 2.3–3.5)
GLUCOSE BLD STRIP.AUTO-MCNC: 131 MG/DL (ref 65–100)
GLUCOSE SERPL-MCNC: 122 MG/DL (ref 70–99)
HCT VFR BLD AUTO: 43.2 % (ref 35.8–46.3)
HGB BLD-MCNC: 14.3 G/DL (ref 11.7–15.4)
IMM GRANULOCYTES # BLD AUTO: 0.03 K/UL (ref 0–0.5)
IMM GRANULOCYTES NFR BLD AUTO: 0.4 % (ref 0–5)
INR PPP: 0.9
LYMPHOCYTES # BLD: 2.95 K/UL (ref 0.5–4.6)
LYMPHOCYTES NFR BLD: 35 % (ref 13–44)
MCH RBC QN AUTO: 30.4 PG (ref 26.1–32.9)
MCHC RBC AUTO-ENTMCNC: 33.1 G/DL (ref 31.4–35)
MCV RBC AUTO: 91.7 FL (ref 82–102)
MONOCYTES # BLD: 0.49 K/UL (ref 0.1–1.3)
MONOCYTES NFR BLD: 5.8 % (ref 4–12)
NEUTS SEG # BLD: 4.82 K/UL (ref 1.7–8.2)
NEUTS SEG NFR BLD: 57.2 % (ref 43–78)
NRBC # BLD: 0 K/UL (ref 0–0.2)
PLATELET # BLD AUTO: 239 K/UL (ref 150–450)
PMV BLD AUTO: 10.4 FL (ref 9.4–12.3)
POTASSIUM SERPL-SCNC: 4 MMOL/L (ref 3.5–5.1)
PROT SERPL-MCNC: 6.8 G/DL (ref 6.3–8.2)
PROTHROMBIN TIME: 13.3 SEC (ref 11.3–14.9)
RBC # BLD AUTO: 4.71 M/UL (ref 4.05–5.2)
SERVICE CMNT-IMP: ABNORMAL
SODIUM SERPL-SCNC: 141 MMOL/L (ref 136–145)
WBC # BLD AUTO: 8.4 K/UL (ref 4.3–11.1)

## 2025-05-21 PROCEDURE — 6360000004 HC RX CONTRAST MEDICATION: Performed by: NURSE PRACTITIONER

## 2025-05-21 PROCEDURE — A9579 GAD-BASE MR CONTRAST NOS,1ML: HCPCS | Performed by: PSYCHIATRY & NEUROLOGY

## 2025-05-21 PROCEDURE — 85025 COMPLETE CBC W/AUTO DIFF WBC: CPT

## 2025-05-21 PROCEDURE — 6360000004 HC RX CONTRAST MEDICATION: Performed by: PSYCHIATRY & NEUROLOGY

## 2025-05-21 PROCEDURE — 93005 ELECTROCARDIOGRAM TRACING: CPT | Performed by: NURSE PRACTITIONER

## 2025-05-21 PROCEDURE — 70553 MRI BRAIN STEM W/O & W/DYE: CPT

## 2025-05-21 PROCEDURE — 99285 EMERGENCY DEPT VISIT HI MDM: CPT

## 2025-05-21 PROCEDURE — 85610 PROTHROMBIN TIME: CPT

## 2025-05-21 PROCEDURE — 70498 CT ANGIOGRAPHY NECK: CPT

## 2025-05-21 PROCEDURE — 93010 ELECTROCARDIOGRAM REPORT: CPT | Performed by: INTERNAL MEDICINE

## 2025-05-21 PROCEDURE — 99232 SBSQ HOSP IP/OBS MODERATE 35: CPT | Performed by: PSYCHIATRY & NEUROLOGY

## 2025-05-21 PROCEDURE — 80053 COMPREHEN METABOLIC PANEL: CPT

## 2025-05-21 PROCEDURE — 82962 GLUCOSE BLOOD TEST: CPT

## 2025-05-21 PROCEDURE — 70450 CT HEAD/BRAIN W/O DYE: CPT

## 2025-05-21 PROCEDURE — 96374 THER/PROPH/DIAG INJ IV PUSH: CPT

## 2025-05-21 PROCEDURE — 6360000002 HC RX W HCPCS: Performed by: NURSE PRACTITIONER

## 2025-05-21 RX ORDER — DIPHENHYDRAMINE HCL 25 MG
CAPSULE ORAL
Qty: 15 ML | Refills: 0 | Status: SHIPPED | OUTPATIENT
Start: 2025-05-21 | End: 2025-05-21

## 2025-05-21 RX ORDER — PREDNISONE 20 MG/1
60 TABLET ORAL DAILY
Qty: 18 TABLET | Refills: 0 | Status: SHIPPED | OUTPATIENT
Start: 2025-05-21 | End: 2025-05-27

## 2025-05-21 RX ORDER — DIPHENHYDRAMINE HCL 25 MG
CAPSULE ORAL
Qty: 15 ML | Refills: 0 | Status: SHIPPED | OUTPATIENT
Start: 2025-05-21

## 2025-05-21 RX ORDER — PREDNISONE 20 MG/1
60 TABLET ORAL DAILY
Qty: 18 TABLET | Refills: 0 | Status: SHIPPED | OUTPATIENT
Start: 2025-05-21 | End: 2025-05-21

## 2025-05-21 RX ORDER — IOPAMIDOL 755 MG/ML
50 INJECTION, SOLUTION INTRAVASCULAR
Status: COMPLETED | OUTPATIENT
Start: 2025-05-21 | End: 2025-05-21

## 2025-05-21 RX ORDER — VALACYCLOVIR HYDROCHLORIDE 1 G/1
1000 TABLET, FILM COATED ORAL 3 TIMES DAILY
Qty: 21 TABLET | Refills: 0 | Status: SHIPPED | OUTPATIENT
Start: 2025-05-21 | End: 2025-05-21

## 2025-05-21 RX ORDER — MINERAL OIL AND PETROLATUM 150; 830 MG/G; MG/G
OINTMENT OPHTHALMIC
Qty: 3.5 G | Refills: 0 | Status: SHIPPED | OUTPATIENT
Start: 2025-05-21 | End: 2025-05-21

## 2025-05-21 RX ORDER — KETOROLAC TROMETHAMINE 15 MG/ML
15 INJECTION, SOLUTION INTRAMUSCULAR; INTRAVENOUS ONCE
Status: COMPLETED | OUTPATIENT
Start: 2025-05-21 | End: 2025-05-21

## 2025-05-21 RX ORDER — MINERAL OIL AND PETROLATUM 150; 830 MG/G; MG/G
OINTMENT OPHTHALMIC
Qty: 3.5 G | Refills: 0 | Status: SHIPPED | OUTPATIENT
Start: 2025-05-21

## 2025-05-21 RX ORDER — VALACYCLOVIR HYDROCHLORIDE 1 G/1
1000 TABLET, FILM COATED ORAL 3 TIMES DAILY
Qty: 21 TABLET | Refills: 0 | Status: SHIPPED | OUTPATIENT
Start: 2025-05-21 | End: 2025-05-28

## 2025-05-21 RX ADMIN — KETOROLAC TROMETHAMINE 15 MG: 15 INJECTION, SOLUTION INTRAMUSCULAR; INTRAVENOUS at 17:10

## 2025-05-21 RX ADMIN — GADOTERIDOL 17 ML: 279.3 INJECTION, SOLUTION INTRAVENOUS at 16:17

## 2025-05-21 RX ADMIN — IOPAMIDOL 60 ML: 755 INJECTION, SOLUTION INTRAVENOUS at 12:42

## 2025-05-21 ASSESSMENT — PAIN DESCRIPTION - ORIENTATION
ORIENTATION: RIGHT;LEFT
ORIENTATION: LEFT;RIGHT;MID

## 2025-05-21 ASSESSMENT — PAIN DESCRIPTION - LOCATION
LOCATION: HEAD
LOCATION: EAR;HEAD

## 2025-05-21 ASSESSMENT — PAIN SCALES - GENERAL: PAINLEVEL_OUTOF10: 7

## 2025-05-21 ASSESSMENT — PAIN DESCRIPTION - DESCRIPTORS
DESCRIPTORS: ACHING
DESCRIPTORS: ACHING

## 2025-05-21 ASSESSMENT — ENCOUNTER SYMPTOMS: SHORTNESS OF BREATH: 0

## 2025-05-21 NOTE — ED NOTES
Patient mobility status  with mild difficulty.     I have reviewed discharge instructions with the patient.  The patient verbalized understanding.    Patient left ED via Discharge Method: ambulatory to Home with  Family .    Opportunity for questions and clarification provided.     Patient given 4 scripts.            Jared Mack RN  05/21/25 1156

## 2025-05-21 NOTE — DISCHARGE INSTRUCTIONS
Take medication as prescribed.  Use the eye ointment at bedtime and tape your eye shut to help prevent corneal injury.  Follow-up closely with neurology.  Return to the emergency department if you develop any new, worsening, or concerning symptoms.

## 2025-05-21 NOTE — CONSULTS
Consult    Patient: Mechelle Hall MRN: [id number]     YOB: 1965  Age: 59 y.o.  Sex: female      Subjective:      Mechelle Hall is a 59 y.o. female who is being seen for code S..  The patient has a history of renal cell carcinoma.  In the morning, she noticed a facial droop on the left as well as decreased sensation on the left side of the face, ear pain, and headache.  She also has subtle sensory abnormalities on the left side of the body. The patient was last known normal when she went to bed last night.  She is greater than 4.5 hours from onset    The patient presented to [facility] ED.  A code S was called at 12:26 PM. Neurology arrived to the bedside at 12:30 PM. Initial NIHSS was 2. A CT of the head was obtained and does not show acute intracranial abnormalities.  CTA does not show acute intracranial abnormalities..     Past Medical History:   Diagnosis Date    Anxiety 2000    Cancer (HCC)     Bilateral  renal cell see Dr Lopez and [facility]    Diabetes mellitus (HCC)     taken off meds because bs was too low, fasting blood surgars 50-80, feels hypoglycemic below 70,  last A1C 6.2 4/2024    Headache 1999    Hx of blood clots     treated with Eliquis x 1 year discontinued in 01/2024    Hyperlipidemia     Hypertension     Neoplasm of submandibular gland     Sleep apnea 2002    no c pap    Type 2 diabetes mellitus without complication (HCC) 2023     Past Surgical History:   Procedure Laterality Date    CHOLECYSTECTOMY  2003    CT NEEDLE BIOPSY LUNG PERCUTANEOUS W IMAGING GUIDANCE  09/28/2023    CT NEEDLE BIOPSY LUNG PERCUTANEOUS 9/28/2023 [facility] RADIOLOGY CT SCAN    IR PORT PLACEMENT > 5 YEARS  11/30/2022    IR PORT PLACEMENT EQUAL OR GREATER THAN 5 YEARS 11/30/2022 [facility] RADIOLOGY SPECIALS    NECK SURGERY Left 08/02/2024    Incision and drainage of left neck abscess-Kami    NECK SURGERY Left 8/2/2024    NECK INCISION AND DRAINAGE performed by Melvin Mcclure MD at [facility] MAIN OR    PARTIAL

## 2025-05-21 NOTE — ED TRIAGE NOTES
Ambulatory to triage. States awoke around 0700. LKW 0200. Left facial droop noted, with left sided weakness and numbness, decreased sensation in on left side of body. Code S initiated. Neurology present at this time

## 2025-05-21 NOTE — ED PROVIDER NOTES
Emergency Department Provider Note       [facility] EMERGENCY DEPT   PCP: Dawna Jewell MD   Age: 59 y.o.   Sex: female     DISPOSITION Decision To Discharge 05/21/2025 06:00:38 PM    ICD-10-CM    1. Bell's palsy  G51.0 [facility] Neurology [facility]          Medical Decision Making     59-year-old female with a history of renal cell cancer, diabetes, hypertension, and hyperlipidemia presents to the emergency department today with complaint of left-sided facial droop.  Last known well was approximately 1 or 2:00 this morning.  Patient with obvious left facial droop.  Suspect likely Bell's palsy but patient has some left sided sensory deficit and possibly some slurred speech.  Code S called at 1226.  Dr. Rhoades with neurology to see patient in triage.  Patient then taken directly to CT by nursing staff.    CT unremarkable.  No acute abnormalities or large vessel occlusion.  MRI orders placed by Dr. Rhoades.     Patient has had MRI.  Awaiting results. Per Dr. Rhoades, if MRI is normal, patient can be discharged home on prednisone and valacyclovir and follow-up in their office.  If abnormal, likely needs admission.  ED Course as of 05/21/25 1800   Wed May 21, 2025   1359 EKG interpretation: Sinus rhythm, rate of 68, normal axis, diffuse inversion of the T wave, left bundle branch block [BR]      ED Course User Index  [BR] Alexi Kwok R, DO     1 or more acute illnesses that pose a threat to life or bodily function.   Prescription drug management performed.  Shared medical decision making was utilized in creating the patients health plan today.  I independently ordered and reviewed each unique test.    I reviewed external records: provider visit note from PCP.       I interpreted the CT Scan no intracranial hemorrhage.        Critical care procedure note : 37 minutes of critical care time was performed in the emergency department. This was separate from any other procedures listed during the patients

## 2025-05-28 ENCOUNTER — OFFICE VISIT (OUTPATIENT)
Dept: ORTHOPEDIC SURGERY | Age: 60
End: 2025-05-28

## 2025-05-28 DIAGNOSIS — S92.355K CLOSED NONDISPLACED FRACTURE OF FIFTH METATARSAL BONE OF LEFT FOOT WITH NONUNION, SUBSEQUENT ENCOUNTER: Primary | ICD-10-CM

## 2025-05-28 PROCEDURE — M5022 MISC PLANTAR FASCIITIS STRAP: HCPCS | Performed by: PHYSICIAN ASSISTANT

## 2025-05-28 NOTE — PROGRESS NOTES
Name: Mechelle Hall  YOB: 1965  Gender: female  MRN: [id number]    Summary:Left Zone 1 base 5th Metatarsal fracture: Closed treatment of fracture without manipulation  Right ankle sprain    Stage IV kidney cancer and bilateral kidneys-on oral chemo    Continue bone stimulator    Work note: None    Follow-up in 2 months with left foot x-ray       CC: left lateral foot pain    Mechelle Hall is a 59-year-old female who presents with left lateral foot pain and right ankle pain after a fall that occurred approximately 1 week ago.  Patient states that she suffered a mechanical fall while walking down her outdoor steps and hitting a loose board.  Patient states that she rolled her right ankle and was able to ambulate after but had significant pain in both feet.  She went to the ED but they did not see a fracture.  She continues to have pain and her left foot and right ankle today.  She is not tender in the conservative management.  Patient is in a research program and is on oral chemotherapy for bilateral stage IV kidney cancer.    12/12/2024-patient states that she is doing okay.  She continues to have fair amount of pain in the left lateral foot and right lateral ankle.  She has not been wearing the boot in the sandal as much because that is very difficult for her to do her job and she is unable to drive in the boot.    1/23/2025-patient states she is doing well in regards to her left foot.  She states that she will have some pain when she does a certain movement but overall she is doing very well.  She continues to note some pain and issues with the right ankle.  She states that she is been sick with the flu recently and so she has been very inactive and out of work.    3/10/2025-patient states she continues to have significant pain in the left lateral foot.  She notes that her right ankle pain is significantly improved.  She is worried that her foot is not healing.  She is still wearing the

## 2025-05-28 NOTE — PROGRESS NOTES
Patient received these items back in March. She signed for them today so we can charge.     Patient was prescribed a Night splint for the patient's bilateral foot. The patient wears a size NA shoe and I fitted the patient with a Ssize night splint. Additionally, I instructed the patient on proper use and care of device as well as ensuring patient could safely get device on and off. I explained to the patient that wearing the splint, the foot is held in a certain position, called “dorsiflexion “. This means that the fascia is stretched, not allowing it to contract and become tighter overnight. The great thing about a night splint is that the remedy is quite gentle and the fascia will be returned to its proper length over a period of time. Once stretched out, the plantar fascia will become less tense and therefore will cause less pain.   Patient was prescribed a plantar fasciitis strap for the patient's bilateralfoot. I fitted the plantar fasciitis strap and instructed the patient on proper placement of strap to start on the outside of foot and pull up along the inside of the foot/ankle. I explained to the patient that the Plantar Fasciitis Strap is indicated to help relieve pain from inflammation caused by mild to moderate plantar fasciitis by applying pressure and tension over the medial calcaneal tubercle and plantar fascia.    Patient read and signed documenting they understand and agree to [facility]'s current DME return policy.

## 2025-05-29 ENCOUNTER — HOSPITAL ENCOUNTER (OUTPATIENT)
Dept: CT IMAGING | Age: 60
Discharge: HOME | End: 2025-05-31
Payer: COMMERCIAL

## 2025-05-29 ENCOUNTER — TELEPHONE (OUTPATIENT)
Dept: ONCOLOGY | Age: 60
End: 2025-05-29

## 2025-05-29 DIAGNOSIS — Z79.899 HIGH RISK MEDICATION USE: ICD-10-CM

## 2025-05-29 DIAGNOSIS — C64.2 RENAL CELL CARCINOMA OF LEFT KIDNEY (HCC): ICD-10-CM

## 2025-05-29 PROCEDURE — 70491 CT SOFT TISSUE NECK W/DYE: CPT

## 2025-05-29 PROCEDURE — 6360000002 HC RX W HCPCS: Performed by: NURSE PRACTITIONER

## 2025-05-29 PROCEDURE — 6360000004 HC RX CONTRAST MEDICATION: Performed by: NURSE PRACTITIONER

## 2025-05-29 RX ORDER — HEPARIN 100 UNIT/ML
SYRINGE INTRAVENOUS PRN
Status: DISCONTINUED | OUTPATIENT
Start: 2025-05-29 | End: 2025-06-01 | Stop reason: HOSPADM

## 2025-05-29 RX ORDER — IOPAMIDOL 755 MG/ML
100 INJECTION, SOLUTION INTRAVASCULAR
Status: COMPLETED | OUTPATIENT
Start: 2025-05-29 | End: 2025-05-29

## 2025-05-29 RX ADMIN — IOPAMIDOL 100 ML: 755 INJECTION, SOLUTION INTRAVENOUS at 10:02

## 2025-05-29 RX ADMIN — HEPARIN 500 UNITS: 100 SYRINGE at 09:12

## 2025-05-29 NOTE — TELEPHONE ENCOUNTER
Chart reviewed by NP.  Returned call to CT.  Informed, per JAY Hay, That was to look at vessels so I think we should still proceed with neck CT.  Verbalized understanding.

## 2025-05-29 NOTE — TELEPHONE ENCOUNTER
Patient in CT.  Was seen in ER and CTA was completed.  Patient questioning if CT of neck needs to be completed with CAP today.  Will forward question to Dr. Lopez's team.

## 2025-05-29 NOTE — TELEPHONE ENCOUNTER
Physician provider: Dr. Lopez  Reason for today's call (Please detail here patients chief complaint): CT question  Last office visit:n/a  Patient Callback Number: [phone]  Was callback number verified?: Yes  Preferred pharmacy (If refill request): n/a  Veriified that patient confirmed no refills left at pharmacy? :No  Calls to office within the last 48 hours?:No    Warm Transfer to (For Red Words): none    Patient notified that their information will be routed to the [facility] clinical triage team for review. Patient is advised that they will receive a phone call from the triage department. If symptom related and symptoms worsen before receiving a call back, the patient has been advised to proceed to the nearest ED.          Clemencia from D CT Dept has a question about CT. Pt is in the office    [phone]

## 2025-06-03 ENCOUNTER — OFFICE VISIT (OUTPATIENT)
Dept: NEUROLOGY | Age: 60
End: 2025-06-03
Payer: COMMERCIAL

## 2025-06-03 VITALS
WEIGHT: 190.8 LBS | HEART RATE: 72 BPM | BODY MASS INDEX: 30.67 KG/M2 | DIASTOLIC BLOOD PRESSURE: 87 MMHG | OXYGEN SATURATION: 89 % | HEIGHT: 66 IN | SYSTOLIC BLOOD PRESSURE: 143 MMHG

## 2025-06-03 DIAGNOSIS — Z87.39 HISTORY OF TMJ DISORDER: ICD-10-CM

## 2025-06-03 DIAGNOSIS — G51.0 BELL'S PALSY: Primary | ICD-10-CM

## 2025-06-03 DIAGNOSIS — G47.33 OSA ON CPAP: ICD-10-CM

## 2025-06-03 DIAGNOSIS — Z71.6 TOBACCO ABUSE COUNSELING: ICD-10-CM

## 2025-06-03 DIAGNOSIS — G50.0 TRIGEMINAL NEURALGIA OF LEFT SIDE OF FACE: ICD-10-CM

## 2025-06-03 PROCEDURE — 99204 OFFICE O/P NEW MOD 45 MIN: CPT | Performed by: NURSE PRACTITIONER

## 2025-06-03 RX ORDER — OXCARBAZEPINE 150 MG/1
150 TABLET, FILM COATED ORAL 2 TIMES DAILY
Qty: 180 TABLET | Refills: 1 | Status: SHIPPED | OUTPATIENT
Start: 2025-06-03

## 2025-06-03 RX ORDER — OXYCODONE HYDROCHLORIDE 15 MG/1
1 TABLET, FILM COATED, EXTENDED RELEASE ORAL EVERY 12 HOURS
COMMUNITY
End: 2025-06-05 | Stop reason: SDUPTHER

## 2025-06-03 ASSESSMENT — PATIENT HEALTH QUESTIONNAIRE - PHQ9
SUM OF ALL RESPONSES TO PHQ QUESTIONS 1-9: 0
2. FEELING DOWN, DEPRESSED OR HOPELESS: NOT AT ALL
SUM OF ALL RESPONSES TO PHQ QUESTIONS 1-9: 0
1. LITTLE INTEREST OR PLEASURE IN DOING THINGS: NOT AT ALL

## 2025-06-03 ASSESSMENT — ENCOUNTER SYMPTOMS
TROUBLE SWALLOWING: 1
ALLERGIC/IMMUNOLOGIC NEGATIVE: 1
APNEA: 1
BACK PAIN: 1
EYES NEGATIVE: 1
GASTROINTESTINAL NEGATIVE: 1

## 2025-06-03 NOTE — PROGRESS NOTES
bedtime At night, Disp: 135 tablet, Rfl: 3    pantoprazole (PROTONIX) 40 MG tablet, Take 1 tablet by mouth in the morning and at bedtime, Disp: 180 tablet, Rfl: 3    promethazine (PHENERGAN) 25 MG tablet, Take 1 tablet by mouth every 6 hours as needed for Nausea, Disp: 60 tablet, Rfl: 1    ondansetron (ZOFRAN-ODT) 4 MG disintegrating tablet, Take 1 tablet by mouth 3 times daily as needed for Nausea or Vomiting, Disp: 12 tablet, Rfl: 0    vitamin D (ERGOCALCIFEROL) 1.25 MG (40621 UT) CAPS capsule, Take 1 capsule by mouth once a week for 8 doses (Patient not taking: Reported on 5/7/2025), Disp: 8 capsule, Rfl: 0    Misc. Devices (CPAP MACHINE) MISC, by Does not apply route with 2L of oxygen, Disp: , Rfl:     mupirocin (BACTROBAN) 2 % ointment, Apply topically 3 times daily., Disp: 1 each, Rfl: 0    Urea (CARMOL) 40 % cream, Apply to hands and feet as needed., Disp: 120 g, Rfl: 0    hyoscyamine (LEVSIN/SL) 125 MCG sublingual tablet, DISSOLVE 1 TABLET ON TONGUE EVERY 6 HOURS AS NEEDED FOR STOMACH PAIN, Disp: , Rfl:     cetirizine (ZYRTEC) 10 MG tablet, Take 1 tablet by mouth at bedtime, Disp: , Rfl:     albuterol sulfate HFA (PROAIR HFA) 108 (90 Base) MCG/ACT inhaler, Inhale 2 puffs into the lungs every 6 hours as needed for Wheezing, Disp: 18 g, Rfl: 3    Acetaminophen (TYLENOL) 325 MG CAPS, every 6 hours as needed As needed, Disp: , Rfl:     Allergies   Allergen Reactions    Latex Hives, Itching, Shortness Of Breath and Other (See Comments)    Bee Venom Shortness Of Breath and Hives    Penicillins Anaphylaxis    Sulfa Antibiotics Hives, Itching and Rash    Wasp Venom Protein Hives and Shortness Of Breath    Penicillin G     Sulfamethoxazole-Trimethoprim Hives    Wellbutrin [Bupropion] Other (See Comments)     suicidal       Review of Systems   Constitutional: Negative.    HENT:  Positive for ear pain (left ear) and trouble swallowing.    Eyes: Negative.    Respiratory:  Positive for apnea.    Cardiovascular: Negative.

## 2025-06-05 ENCOUNTER — RESULTS FOLLOW-UP (OUTPATIENT)
Dept: ONCOLOGY | Age: 60
End: 2025-06-05

## 2025-06-05 ENCOUNTER — OFFICE VISIT (OUTPATIENT)
Dept: ONCOLOGY | Age: 60
End: 2025-06-05
Payer: COMMERCIAL

## 2025-06-05 ENCOUNTER — OFFICE VISIT (OUTPATIENT)
Dept: PALLATIVE CARE | Age: 60
End: 2025-06-05
Payer: COMMERCIAL

## 2025-06-05 ENCOUNTER — HOSPITAL ENCOUNTER (OUTPATIENT)
Dept: INFUSION THERAPY | Age: 60
Setting detail: INFUSION SERIES
Discharge: HOME | End: 2025-06-05
Payer: COMMERCIAL

## 2025-06-05 ENCOUNTER — HOSPITAL ENCOUNTER (OUTPATIENT)
Dept: LAB | Age: 60
Discharge: HOME | End: 2025-06-05
Payer: COMMERCIAL

## 2025-06-05 VITALS
TEMPERATURE: 97.7 F | OXYGEN SATURATION: 100 % | HEART RATE: 72 BPM | BODY MASS INDEX: 30.63 KG/M2 | HEIGHT: 66 IN | RESPIRATION RATE: 12 BRPM | DIASTOLIC BLOOD PRESSURE: 64 MMHG | WEIGHT: 190.6 LBS | SYSTOLIC BLOOD PRESSURE: 88 MMHG

## 2025-06-05 DIAGNOSIS — C64.2 RENAL CELL CARCINOMA OF LEFT KIDNEY (HCC): ICD-10-CM

## 2025-06-05 DIAGNOSIS — F32.A ANXIETY AND DEPRESSION: ICD-10-CM

## 2025-06-05 DIAGNOSIS — R19.7 DIARRHEA, UNSPECIFIED TYPE: ICD-10-CM

## 2025-06-05 DIAGNOSIS — F41.9 ANXIETY AND DEPRESSION: ICD-10-CM

## 2025-06-05 DIAGNOSIS — G89.3 CANCER ASSOCIATED PAIN: Primary | ICD-10-CM

## 2025-06-05 DIAGNOSIS — R11.2 NAUSEA AND VOMITING, UNSPECIFIED VOMITING TYPE: ICD-10-CM

## 2025-06-05 DIAGNOSIS — T40.2X5A THERAPEUTIC OPIOID-INDUCED CONSTIPATION (OIC): ICD-10-CM

## 2025-06-05 DIAGNOSIS — E86.0 DEHYDRATION: Primary | ICD-10-CM

## 2025-06-05 DIAGNOSIS — R53.83 FATIGUE DUE TO TREATMENT: ICD-10-CM

## 2025-06-05 DIAGNOSIS — Z51.5 ENCOUNTER FOR PALLIATIVE CARE: ICD-10-CM

## 2025-06-05 DIAGNOSIS — C64.2 RENAL CELL CARCINOMA OF LEFT KIDNEY (HCC): Primary | ICD-10-CM

## 2025-06-05 DIAGNOSIS — G62.9 NEUROPATHY: ICD-10-CM

## 2025-06-05 DIAGNOSIS — K59.03 THERAPEUTIC OPIOID-INDUCED CONSTIPATION (OIC): ICD-10-CM

## 2025-06-05 LAB
ALBUMIN SERPL-MCNC: 3.6 G/DL (ref 3.2–4.6)
ALBUMIN/GLOB SERPL: 1.2 (ref 1–1.9)
ALP SERPL-CCNC: 77 U/L (ref 35–104)
ALT SERPL-CCNC: 33 U/L (ref 8–45)
ANION GAP SERPL CALC-SCNC: 13 MMOL/L (ref 7–16)
AST SERPL-CCNC: 27 U/L (ref 15–37)
BASOPHILS # BLD: 0.02 K/UL (ref 0–0.2)
BASOPHILS NFR BLD: 0.2 % (ref 0–2)
BILIRUB SERPL-MCNC: 0.3 MG/DL (ref 0–1.2)
BUN SERPL-MCNC: 8 MG/DL (ref 8–23)
CALCIUM SERPL-MCNC: 8.8 MG/DL (ref 8.8–10.2)
CHLORIDE SERPL-SCNC: 100 MMOL/L (ref 98–107)
CO2 SERPL-SCNC: 24 MMOL/L (ref 20–29)
CREAT SERPL-MCNC: 0.76 MG/DL (ref 0.6–1.1)
DIFFERENTIAL METHOD BLD: ABNORMAL
EOSINOPHIL # BLD: 0.18 K/UL (ref 0–0.8)
EOSINOPHIL NFR BLD: 2.1 % (ref 0.5–7.8)
ERYTHROCYTE [DISTWIDTH] IN BLOOD BY AUTOMATED COUNT: 15.3 % (ref 11.9–14.6)
GLOBULIN SER CALC-MCNC: 2.9 G/DL (ref 2.3–3.5)
GLUCOSE SERPL-MCNC: 143 MG/DL (ref 70–99)
HCT VFR BLD AUTO: 44.3 % (ref 35.8–46.3)
HGB BLD-MCNC: 14.5 G/DL (ref 11.7–15.4)
IMM GRANULOCYTES # BLD AUTO: 0.01 K/UL (ref 0–0.5)
IMM GRANULOCYTES NFR BLD AUTO: 0.1 % (ref 0–5)
LYMPHOCYTES # BLD: 4.29 K/UL (ref 0.5–4.6)
LYMPHOCYTES NFR BLD: 49.1 % (ref 13–44)
MCH RBC QN AUTO: 30.6 PG (ref 26.1–32.9)
MCHC RBC AUTO-ENTMCNC: 32.7 G/DL (ref 31.4–35)
MCV RBC AUTO: 93.5 FL (ref 82–102)
MONOCYTES # BLD: 0.53 K/UL (ref 0.1–1.3)
MONOCYTES NFR BLD: 6.1 % (ref 4–12)
NEUTS SEG # BLD: 3.71 K/UL (ref 1.7–8.2)
NEUTS SEG NFR BLD: 42.4 % (ref 43–78)
NRBC # BLD: 0 K/UL (ref 0–0.2)
PLATELET # BLD AUTO: 187 K/UL (ref 150–450)
PMV BLD AUTO: 10.5 FL (ref 9.4–12.3)
POTASSIUM SERPL-SCNC: 3.8 MMOL/L (ref 3.5–5.1)
PROT SERPL-MCNC: 6.5 G/DL (ref 6.3–8.2)
RBC # BLD AUTO: 4.74 M/UL (ref 4.05–5.2)
SODIUM SERPL-SCNC: 137 MMOL/L (ref 136–145)
WBC # BLD AUTO: 8.7 K/UL (ref 4.3–11.1)

## 2025-06-05 PROCEDURE — 2580000003 HC RX 258: Performed by: NURSE PRACTITIONER

## 2025-06-05 PROCEDURE — 85025 COMPLETE CBC W/AUTO DIFF WBC: CPT

## 2025-06-05 PROCEDURE — 99214 OFFICE O/P EST MOD 30 MIN: CPT | Performed by: NURSE PRACTITIONER

## 2025-06-05 PROCEDURE — 2500000003 HC RX 250 WO HCPCS: Performed by: INTERNAL MEDICINE

## 2025-06-05 PROCEDURE — 80053 COMPREHEN METABOLIC PANEL: CPT

## 2025-06-05 PROCEDURE — 6360000002 HC RX W HCPCS: Performed by: INTERNAL MEDICINE

## 2025-06-05 PROCEDURE — 2500000003 HC RX 250 WO HCPCS: Performed by: NURSE PRACTITIONER

## 2025-06-05 PROCEDURE — 96360 HYDRATION IV INFUSION INIT: CPT

## 2025-06-05 RX ORDER — SODIUM CHLORIDE 0.9 % (FLUSH) 0.9 %
5-40 SYRINGE (ML) INJECTION PRN
Status: DISCONTINUED | OUTPATIENT
Start: 2025-06-05 | End: 2025-06-06 | Stop reason: HOSPADM

## 2025-06-05 RX ORDER — ACETAMINOPHEN 325 MG/1
650 TABLET ORAL
Status: CANCELLED | OUTPATIENT
Start: 2025-06-05

## 2025-06-05 RX ORDER — DIPHENHYDRAMINE HYDROCHLORIDE 50 MG/ML
50 INJECTION, SOLUTION INTRAMUSCULAR; INTRAVENOUS
Status: CANCELLED | OUTPATIENT
Start: 2025-06-05

## 2025-06-05 RX ORDER — SODIUM CHLORIDE 9 MG/ML
INJECTION, SOLUTION INTRAVENOUS CONTINUOUS
Status: CANCELLED | OUTPATIENT
Start: 2025-06-05

## 2025-06-05 RX ORDER — SODIUM CHLORIDE 0.9 % (FLUSH) 0.9 %
5-40 SYRINGE (ML) INJECTION PRN
Status: CANCELLED | OUTPATIENT
Start: 2025-06-05

## 2025-06-05 RX ORDER — EPINEPHRINE 1 MG/ML
0.3 INJECTION, SOLUTION, CONCENTRATE INTRAVENOUS PRN
Status: DISCONTINUED | OUTPATIENT
Start: 2025-06-05 | End: 2025-06-06 | Stop reason: HOSPADM

## 2025-06-05 RX ORDER — DIPHENHYDRAMINE HYDROCHLORIDE 50 MG/ML
50 INJECTION, SOLUTION INTRAMUSCULAR; INTRAVENOUS
Status: DISCONTINUED | OUTPATIENT
Start: 2025-06-05 | End: 2025-06-06 | Stop reason: HOSPADM

## 2025-06-05 RX ORDER — SODIUM CHLORIDE 9 MG/ML
5-250 INJECTION, SOLUTION INTRAVENOUS PRN
OUTPATIENT
Start: 2025-06-05

## 2025-06-05 RX ORDER — ALBUTEROL SULFATE 90 UG/1
4 INHALANT RESPIRATORY (INHALATION) PRN
Status: DISCONTINUED | OUTPATIENT
Start: 2025-06-05 | End: 2025-06-06 | Stop reason: HOSPADM

## 2025-06-05 RX ORDER — SODIUM CHLORIDE 9 MG/ML
5-250 INJECTION, SOLUTION INTRAVENOUS PRN
Status: CANCELLED | OUTPATIENT
Start: 2025-06-05

## 2025-06-05 RX ORDER — 0.9 % SODIUM CHLORIDE 0.9 %
1000 INTRAVENOUS SOLUTION INTRAVENOUS ONCE
Status: DISCONTINUED | OUTPATIENT
Start: 2025-06-05 | End: 2025-06-06 | Stop reason: HOSPADM

## 2025-06-05 RX ORDER — OXYCODONE HYDROCHLORIDE 10 MG/1
10 TABLET ORAL EVERY 6 HOURS PRN
Qty: 120 TABLET | Refills: 0 | Status: SHIPPED | OUTPATIENT
Start: 2025-06-19 | End: 2025-07-19

## 2025-06-05 RX ORDER — HYDROCORTISONE SODIUM SUCCINATE 100 MG/2ML
100 INJECTION INTRAMUSCULAR; INTRAVENOUS
Status: CANCELLED | OUTPATIENT
Start: 2025-06-05

## 2025-06-05 RX ORDER — SODIUM CHLORIDE 9 MG/ML
INJECTION, SOLUTION INTRAVENOUS CONTINUOUS
OUTPATIENT
Start: 2025-06-05

## 2025-06-05 RX ORDER — ALBUTEROL SULFATE 90 UG/1
4 INHALANT RESPIRATORY (INHALATION) PRN
Status: CANCELLED | OUTPATIENT
Start: 2025-06-05

## 2025-06-05 RX ORDER — SODIUM CHLORIDE 9 MG/ML
INJECTION, SOLUTION INTRAVENOUS CONTINUOUS
Status: DISCONTINUED | OUTPATIENT
Start: 2025-06-05 | End: 2025-06-06 | Stop reason: HOSPADM

## 2025-06-05 RX ORDER — DRONABINOL 2.5 MG/1
5 CAPSULE ORAL 3 TIMES DAILY PRN
Qty: 180 CAPSULE | Refills: 0 | Status: SHIPPED | OUTPATIENT
Start: 2025-06-05 | End: 2025-07-05

## 2025-06-05 RX ORDER — OXYCODONE HYDROCHLORIDE 15 MG/1
1 TABLET, FILM COATED, EXTENDED RELEASE ORAL EVERY 12 HOURS
Qty: 60 TABLET | Refills: 0 | Status: SHIPPED | OUTPATIENT
Start: 2025-06-05 | End: 2025-07-05

## 2025-06-05 RX ORDER — HEPARIN 100 UNIT/ML
500 SYRINGE INTRAVENOUS PRN
Status: DISCONTINUED | OUTPATIENT
Start: 2025-06-05 | End: 2025-06-06 | Stop reason: HOSPADM

## 2025-06-05 RX ORDER — HYDROCORTISONE SODIUM SUCCINATE 100 MG/2ML
100 INJECTION INTRAMUSCULAR; INTRAVENOUS
OUTPATIENT
Start: 2025-06-05

## 2025-06-05 RX ORDER — FAMOTIDINE 10 MG/ML
20 INJECTION, SOLUTION INTRAVENOUS
Status: CANCELLED | OUTPATIENT
Start: 2025-06-05

## 2025-06-05 RX ORDER — ONDANSETRON 2 MG/ML
8 INJECTION INTRAMUSCULAR; INTRAVENOUS
Status: DISCONTINUED | OUTPATIENT
Start: 2025-06-05 | End: 2025-06-06 | Stop reason: HOSPADM

## 2025-06-05 RX ORDER — ACETAMINOPHEN 325 MG/1
650 TABLET ORAL
OUTPATIENT
Start: 2025-06-05

## 2025-06-05 RX ORDER — SODIUM CHLORIDE 0.9 % (FLUSH) 0.9 %
5-40 SYRINGE (ML) INJECTION PRN
OUTPATIENT
Start: 2025-06-05

## 2025-06-05 RX ORDER — HEPARIN 100 UNIT/ML
500 SYRINGE INTRAVENOUS PRN
OUTPATIENT
Start: 2025-06-05

## 2025-06-05 RX ORDER — SODIUM CHLORIDE 9 MG/ML
5-250 INJECTION, SOLUTION INTRAVENOUS PRN
Status: DISCONTINUED | OUTPATIENT
Start: 2025-06-05 | End: 2025-06-06 | Stop reason: HOSPADM

## 2025-06-05 RX ORDER — EPINEPHRINE 1 MG/ML
0.3 INJECTION, SOLUTION, CONCENTRATE INTRAVENOUS PRN
Status: CANCELLED | OUTPATIENT
Start: 2025-06-05

## 2025-06-05 RX ORDER — 0.9 % SODIUM CHLORIDE 0.9 %
1000 INTRAVENOUS SOLUTION INTRAVENOUS ONCE
Status: CANCELLED | OUTPATIENT
Start: 2025-06-05 | End: 2025-06-05

## 2025-06-05 RX ORDER — HEPARIN SODIUM (PORCINE) LOCK FLUSH IV SOLN 100 UNIT/ML 100 UNIT/ML
500 SOLUTION INTRAVENOUS PRN
Status: CANCELLED | OUTPATIENT
Start: 2025-06-05

## 2025-06-05 RX ORDER — EPINEPHRINE 1 MG/ML
0.3 INJECTION, SOLUTION, CONCENTRATE INTRAVENOUS PRN
OUTPATIENT
Start: 2025-06-05

## 2025-06-05 RX ORDER — ACETAMINOPHEN 325 MG/1
650 TABLET ORAL
Status: DISCONTINUED | OUTPATIENT
Start: 2025-06-05 | End: 2025-06-06 | Stop reason: HOSPADM

## 2025-06-05 RX ORDER — ONDANSETRON 2 MG/ML
8 INJECTION INTRAMUSCULAR; INTRAVENOUS
Status: CANCELLED | OUTPATIENT
Start: 2025-06-05

## 2025-06-05 RX ORDER — ALBUTEROL SULFATE 90 UG/1
4 INHALANT RESPIRATORY (INHALATION) PRN
OUTPATIENT
Start: 2025-06-05

## 2025-06-05 RX ORDER — HYDROCORTISONE SODIUM SUCCINATE 100 MG/2ML
100 INJECTION INTRAMUSCULAR; INTRAVENOUS
Status: DISCONTINUED | OUTPATIENT
Start: 2025-06-05 | End: 2025-06-06 | Stop reason: HOSPADM

## 2025-06-05 RX ORDER — ONDANSETRON 2 MG/ML
8 INJECTION INTRAMUSCULAR; INTRAVENOUS
OUTPATIENT
Start: 2025-06-05

## 2025-06-05 RX ORDER — DIPHENHYDRAMINE HYDROCHLORIDE 50 MG/ML
50 INJECTION, SOLUTION INTRAMUSCULAR; INTRAVENOUS
OUTPATIENT
Start: 2025-06-05

## 2025-06-05 RX ADMIN — SODIUM CHLORIDE, PRESERVATIVE FREE 10 ML: 5 INJECTION INTRAVENOUS at 10:21

## 2025-06-05 RX ADMIN — SODIUM CHLORIDE, PRESERVATIVE FREE 10 ML: 5 INJECTION INTRAVENOUS at 09:14

## 2025-06-05 RX ADMIN — SODIUM CHLORIDE, PRESERVATIVE FREE 20 ML: 5 INJECTION INTRAVENOUS at 07:15

## 2025-06-05 RX ADMIN — HEPARIN 300 UNITS: 100 SYRINGE at 07:15

## 2025-06-05 RX ADMIN — SODIUM CHLORIDE: 0.9 INJECTION, SOLUTION INTRAVENOUS at 09:15

## 2025-06-05 ASSESSMENT — PATIENT HEALTH QUESTIONNAIRE - PHQ9
SUM OF ALL RESPONSES TO PHQ QUESTIONS 1-9: 0
1. LITTLE INTEREST OR PLEASURE IN DOING THINGS: NOT AT ALL
2. FEELING DOWN, DEPRESSED OR HOPELESS: NOT AT ALL

## 2025-06-05 ASSESSMENT — ENCOUNTER SYMPTOMS
BACK PAIN: 1
ABDOMINAL DISTENTION: 0
SORE THROAT: 0
DIARRHEA: 1
SCLERAL ICTERUS: 0
NAUSEA: 1
ABDOMINAL PAIN: 1
VOICE CHANGE: 0
HEMOPTYSIS: 0
BLOOD IN STOOL: 0
VOMITING: 0
DIARRHEA: 1
CONSTIPATION: 0
TROUBLE SWALLOWING: 0
WHEEZING: 0
CHEST TIGHTNESS: 0
NAUSEA: 1
SHORTNESS OF BREATH: 0
BACK PAIN: 1
ABDOMINAL PAIN: 0

## 2025-06-05 NOTE — PROGRESS NOTES
Procedure Laterality Date    CHOLECYSTECTOMY  2003    CT NEEDLE BIOPSY LUNG PERCUTANEOUS W IMAGING GUIDANCE  09/28/2023    CT NEEDLE BIOPSY LUNG PERCUTANEOUS 9/28/2023 [facility] RADIOLOGY CT SCAN    IR PORT PLACEMENT > 5 YEARS  11/30/2022    IR PORT PLACEMENT EQUAL OR GREATER THAN 5 YEARS 11/30/2022 [facility] RADIOLOGY SPECIALS    NECK SURGERY Left 08/02/2024    Incision and drainage of left neck abscess-Mcclure    NECK SURGERY Left 8/2/2024    NECK INCISION AND DRAINAGE performed by Melvin Mcclure MD at [facility] MAIN OR    PARTIAL NEPHRECTOMY Left 12/08/2023    SALIVARY GLAND SURGERY Left 02/08/2024    LEFT SUBMANDIBULAR GLAND EXCISION performed by Melvin Mcclure MD at [facility] MAIN OR    SUBMANDIBULAR GLAND EXCISION Left 02/08/2024    Kami    TUBAL LIGATION  3/91    US BIOPSY OF SALIVARY GLAND  09/21/2023    US BIOPSY OF SALIVARY GLAND 9/21/2023 Kisha Newberry PA-C [facility] RADIOLOGY US     Family History   Problem Relation Age of Onset    Diabetes Mother     High Blood Pressure Mother     High Cholesterol Mother     COPD Mother     Emphysema Mother     Hypertension Mother     Stroke Mother     Cancer Father         prostate    Diabetes Sister     Hypertension Sister     Stroke Sister     High Cholesterol Brother     Breast Cancer Paternal Aunt      Social History     Socioeconomic History    Marital status:      Spouse name: Not on file    Number of children: Not on file    Years of education: Not on file    Highest education level: Not on file   Occupational History    Occupation: CNA     Employer: [employer]   Tobacco Use    Smoking status: Every Day     Current packs/day: 1.00     Average packs/day: 1.1 packs/day for 60.5 years (67.6 ttl pk-yrs)     Types: Cigarettes     Start date: 1979     Passive exposure: Past    Smokeless tobacco: Never    Tobacco comments:     Smoking since I was 14   Vaping Use    Vaping status: Never Used   Substance and Sexual Activity    Alcohol use: Yes     Alcohol/week: 2.0 standard

## 2025-06-05 NOTE — PROGRESS NOTES
Patient to port lab for port access and lab draw. Port accessed per protocol; using 20g 0.75\" velasquez needle without difficulty. Labs drawn from port and port flushed, locked with Heparin. Port de-accessed, dressing applied. Patient tolerated well. Discharged ambulatory.

## 2025-06-05 NOTE — PROGRESS NOTES
[facility] Hematology and Oncology: Established patient - follow up     Chief Complaint   Patient presents with    Follow-up     Dx; RCC - bilateral with likely met to adrenal +/- lungs   S/p resection on left per details below - [facility]     Fam hx: father - prostate cancer   Paternal aunt - hx of breast cancer     History of Present Illness:  Ms. Hall is a 59 y.o. female who presents today for FU regarding RCC.  The past medical history is significant for anxiety, HAs and sleep apnea, cervical dysplasia but no carcinoma, current smoker (~1/2PPD), lap liv and tubal reversal, I&D of left breast abscess.  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  Despite attending physical therapy since the accident, she continued to experience the same low back pain.  She was seen at [facility] on 10/4/22.  MRI of the lumbar spine on 10/15/22 showed mild degenerative disc disease at L3-4 through L5-S1 and a 5.7 x 6.1 x 7 cm lower pole solid left renal mass.  Urgent referral was placed to [facility], who referred the patient to [facility] for uro/onc evaluation and treatment of renal mass.  CT AP on 10/26/22 showed bilateral enhancing renal masses, concerning for renal cell carcinoma with the left lower pole renal mass measuring up to 6.5 cm and extending along Gerota's fascia and the right midpole renal mass measuring up to 4.6 cm. Also noted was an enhancing 1.5 cm right adrenal nodule, concerning for a metastatic nodule.  No recent wt loss.  S/p left renal biopsy on 11/2022, Nidia grade 2 of 4, clear cell RCC.  CT chest on 11/7 showed multiple nodules that appeared to be calcified, most c/w granulomatous disease, but she's aware that metastatic disease cannot be ruled out.  Not on AC.  Cr 0.89.    - Pt presented for consultation regarding systemic therapy.  She was here with her sister and friend.

## 2025-06-25 ENCOUNTER — OFFICE VISIT (OUTPATIENT)
Dept: PALLATIVE CARE | Age: 60
End: 2025-06-25
Payer: COMMERCIAL

## 2025-06-25 ENCOUNTER — HOSPITAL ENCOUNTER (OUTPATIENT)
Dept: LAB | Age: 60
Discharge: HOME | End: 2025-06-25
Payer: COMMERCIAL

## 2025-06-25 ENCOUNTER — HOSPITAL ENCOUNTER (OUTPATIENT)
Dept: INFUSION THERAPY | Age: 60
Setting detail: INFUSION SERIES
Discharge: HOME | End: 2025-06-25
Payer: COMMERCIAL

## 2025-06-25 ENCOUNTER — OFFICE VISIT (OUTPATIENT)
Dept: ONCOLOGY | Age: 60
End: 2025-06-25
Payer: COMMERCIAL

## 2025-06-25 VITALS
WEIGHT: 190.4 LBS | OXYGEN SATURATION: 98 % | RESPIRATION RATE: 12 BRPM | SYSTOLIC BLOOD PRESSURE: 121 MMHG | HEART RATE: 63 BPM | BODY MASS INDEX: 30.6 KG/M2 | HEIGHT: 66 IN | TEMPERATURE: 98.9 F | DIASTOLIC BLOOD PRESSURE: 77 MMHG

## 2025-06-25 DIAGNOSIS — R11.2 NAUSEA AND VOMITING, UNSPECIFIED VOMITING TYPE: ICD-10-CM

## 2025-06-25 DIAGNOSIS — E86.0 DEHYDRATION: Primary | ICD-10-CM

## 2025-06-25 DIAGNOSIS — C64.2 RENAL CELL CARCINOMA OF LEFT KIDNEY (HCC): ICD-10-CM

## 2025-06-25 DIAGNOSIS — R53.83 FATIGUE DUE TO TREATMENT: ICD-10-CM

## 2025-06-25 DIAGNOSIS — M25.551 ACUTE RIGHT HIP PAIN: Primary | ICD-10-CM

## 2025-06-25 DIAGNOSIS — G89.3 CANCER ASSOCIATED PAIN: Primary | ICD-10-CM

## 2025-06-25 DIAGNOSIS — G62.9 NEUROPATHY: ICD-10-CM

## 2025-06-25 DIAGNOSIS — Z91.81 STATUS POST FALL: ICD-10-CM

## 2025-06-25 DIAGNOSIS — Z51.5 ENCOUNTER FOR PALLIATIVE CARE: ICD-10-CM

## 2025-06-25 LAB
ALBUMIN SERPL-MCNC: 3.4 G/DL (ref 3.2–4.6)
ALBUMIN/GLOB SERPL: 1.1 (ref 1–1.9)
ALP SERPL-CCNC: 78 U/L (ref 35–104)
ALT SERPL-CCNC: 28 U/L (ref 8–45)
ANION GAP SERPL CALC-SCNC: 9 MMOL/L (ref 7–16)
AST SERPL-CCNC: 35 U/L (ref 15–37)
BASOPHILS # BLD: 0.01 K/UL (ref 0–0.2)
BASOPHILS NFR BLD: 0.1 % (ref 0–2)
BILIRUB SERPL-MCNC: 0.2 MG/DL (ref 0–1.2)
BUN SERPL-MCNC: 3 MG/DL (ref 8–23)
CALCIUM SERPL-MCNC: 8.9 MG/DL (ref 8.8–10.2)
CHLORIDE SERPL-SCNC: 104 MMOL/L (ref 98–107)
CO2 SERPL-SCNC: 26 MMOL/L (ref 20–29)
CREAT SERPL-MCNC: 0.66 MG/DL (ref 0.6–1.1)
DIFFERENTIAL METHOD BLD: ABNORMAL
EOSINOPHIL # BLD: 0.1 K/UL (ref 0–0.8)
EOSINOPHIL NFR BLD: 1.3 % (ref 0.5–7.8)
ERYTHROCYTE [DISTWIDTH] IN BLOOD BY AUTOMATED COUNT: 15.4 % (ref 11.9–14.6)
GLOBULIN SER CALC-MCNC: 3 G/DL (ref 2.3–3.5)
GLUCOSE SERPL-MCNC: 137 MG/DL (ref 70–99)
HCT VFR BLD AUTO: 45.1 % (ref 35.8–46.3)
HGB BLD-MCNC: 14.3 G/DL (ref 11.7–15.4)
IMM GRANULOCYTES # BLD AUTO: 0.01 K/UL (ref 0–0.5)
IMM GRANULOCYTES NFR BLD AUTO: 0.1 % (ref 0–5)
LYMPHOCYTES # BLD: 3.44 K/UL (ref 0.5–4.6)
LYMPHOCYTES NFR BLD: 44.3 % (ref 13–44)
MCH RBC QN AUTO: 29.8 PG (ref 26.1–32.9)
MCHC RBC AUTO-ENTMCNC: 31.7 G/DL (ref 31.4–35)
MCV RBC AUTO: 94 FL (ref 82–102)
MONOCYTES # BLD: 0.46 K/UL (ref 0.1–1.3)
MONOCYTES NFR BLD: 5.9 % (ref 4–12)
NEUTS SEG # BLD: 3.74 K/UL (ref 1.7–8.2)
NEUTS SEG NFR BLD: 48.3 % (ref 43–78)
NRBC # BLD: 0 K/UL (ref 0–0.2)
PLATELET # BLD AUTO: 212 K/UL (ref 150–450)
PMV BLD AUTO: 10.5 FL (ref 9.4–12.3)
POTASSIUM SERPL-SCNC: 3.9 MMOL/L (ref 3.5–5.1)
PROT SERPL-MCNC: 6.5 G/DL (ref 6.3–8.2)
RBC # BLD AUTO: 4.8 M/UL (ref 4.05–5.2)
SODIUM SERPL-SCNC: 139 MMOL/L (ref 136–145)
WBC # BLD AUTO: 7.8 K/UL (ref 4.3–11.1)

## 2025-06-25 PROCEDURE — 2500000003 HC RX 250 WO HCPCS: Performed by: INTERNAL MEDICINE

## 2025-06-25 PROCEDURE — 99215 OFFICE O/P EST HI 40 MIN: CPT

## 2025-06-25 PROCEDURE — 2580000003 HC RX 258: Performed by: INTERNAL MEDICINE

## 2025-06-25 PROCEDURE — 99215 OFFICE O/P EST HI 40 MIN: CPT | Performed by: INTERNAL MEDICINE

## 2025-06-25 PROCEDURE — 2500000003 HC RX 250 WO HCPCS: Performed by: NURSE PRACTITIONER

## 2025-06-25 PROCEDURE — 80053 COMPREHEN METABOLIC PANEL: CPT

## 2025-06-25 PROCEDURE — 96360 HYDRATION IV INFUSION INIT: CPT

## 2025-06-25 PROCEDURE — 85025 COMPLETE CBC W/AUTO DIFF WBC: CPT

## 2025-06-25 RX ORDER — EPINEPHRINE 1 MG/ML
0.3 INJECTION, SOLUTION, CONCENTRATE INTRAVENOUS PRN
OUTPATIENT
Start: 2025-06-25

## 2025-06-25 RX ORDER — ONDANSETRON 2 MG/ML
8 INJECTION INTRAMUSCULAR; INTRAVENOUS
OUTPATIENT
Start: 2025-06-25

## 2025-06-25 RX ORDER — OXYCODONE HYDROCHLORIDE 20 MG/1
20 TABLET ORAL EVERY 4 HOURS PRN
Qty: 180 TABLET | Refills: 0 | Status: SHIPPED | OUTPATIENT
Start: 2025-07-05 | End: 2025-08-04

## 2025-06-25 RX ORDER — HEPARIN 100 UNIT/ML
500 SYRINGE INTRAVENOUS PRN
OUTPATIENT
Start: 2025-06-25

## 2025-06-25 RX ORDER — HYDROCORTISONE SODIUM SUCCINATE 100 MG/2ML
100 INJECTION INTRAMUSCULAR; INTRAVENOUS
OUTPATIENT
Start: 2025-06-25

## 2025-06-25 RX ORDER — SODIUM CHLORIDE 0.9 % (FLUSH) 0.9 %
5-40 SYRINGE (ML) INJECTION PRN
Status: DISCONTINUED | OUTPATIENT
Start: 2025-06-25 | End: 2025-06-26 | Stop reason: HOSPADM

## 2025-06-25 RX ORDER — ALBUTEROL SULFATE 90 UG/1
4 INHALANT RESPIRATORY (INHALATION) PRN
OUTPATIENT
Start: 2025-06-25

## 2025-06-25 RX ORDER — DIPHENHYDRAMINE HYDROCHLORIDE 50 MG/ML
50 INJECTION, SOLUTION INTRAMUSCULAR; INTRAVENOUS
OUTPATIENT
Start: 2025-06-25

## 2025-06-25 RX ORDER — DRONABINOL 10 MG/1
10 CAPSULE ORAL
Qty: 60 CAPSULE | Refills: 0 | Status: SHIPPED | OUTPATIENT
Start: 2025-06-25 | End: 2025-07-25

## 2025-06-25 RX ORDER — SODIUM CHLORIDE 9 MG/ML
5-250 INJECTION, SOLUTION INTRAVENOUS PRN
OUTPATIENT
Start: 2025-06-25

## 2025-06-25 RX ORDER — SODIUM CHLORIDE 0.9 % (FLUSH) 0.9 %
5-40 SYRINGE (ML) INJECTION PRN
OUTPATIENT
Start: 2025-06-25

## 2025-06-25 RX ORDER — ACETAMINOPHEN 325 MG/1
650 TABLET ORAL
OUTPATIENT
Start: 2025-06-25

## 2025-06-25 RX ORDER — 0.9 % SODIUM CHLORIDE 0.9 %
500 INTRAVENOUS SOLUTION INTRAVENOUS ONCE
Status: CANCELLED
Start: 2025-06-25 | End: 2025-06-25

## 2025-06-25 RX ORDER — SODIUM CHLORIDE 9 MG/ML
INJECTION, SOLUTION INTRAVENOUS CONTINUOUS
OUTPATIENT
Start: 2025-06-25

## 2025-06-25 RX ORDER — 0.9 % SODIUM CHLORIDE 0.9 %
500 INTRAVENOUS SOLUTION INTRAVENOUS ONCE
Start: 2025-06-25 | End: 2025-06-25

## 2025-06-25 RX ORDER — 0.9 % SODIUM CHLORIDE 0.9 %
500 INTRAVENOUS SOLUTION INTRAVENOUS ONCE
Status: COMPLETED | OUTPATIENT
Start: 2025-06-25 | End: 2025-06-25

## 2025-06-25 RX ADMIN — SODIUM CHLORIDE, PRESERVATIVE FREE 10 ML: 5 INJECTION INTRAVENOUS at 12:42

## 2025-06-25 RX ADMIN — SODIUM CHLORIDE, PRESERVATIVE FREE 10 ML: 5 INJECTION INTRAVENOUS at 10:04

## 2025-06-25 RX ADMIN — SODIUM CHLORIDE 500 ML: 0.9 INJECTION, SOLUTION INTRAVENOUS at 12:42

## 2025-06-25 ASSESSMENT — ENCOUNTER SYMPTOMS
NAUSEA: 1
BACK PAIN: 1
ABDOMINAL PAIN: 1
DIARRHEA: 1

## 2025-06-25 ASSESSMENT — PAIN DESCRIPTION - PAIN TYPE: TYPE: CHRONIC PAIN

## 2025-06-25 ASSESSMENT — PAIN DESCRIPTION - LOCATION: LOCATION: HIP

## 2025-06-25 ASSESSMENT — PAIN DESCRIPTION - DESCRIPTORS: DESCRIPTORS: ACHING

## 2025-06-25 ASSESSMENT — PAIN DESCRIPTION - FREQUENCY: FREQUENCY: CONTINUOUS

## 2025-06-25 ASSESSMENT — PAIN - FUNCTIONAL ASSESSMENT: PAIN_FUNCTIONAL_ASSESSMENT: PREVENTS OR INTERFERES SOME ACTIVE ACTIVITIES AND ADLS

## 2025-06-25 ASSESSMENT — PAIN DESCRIPTION - ONSET: ONSET: ON-GOING

## 2025-06-25 ASSESSMENT — PAIN DESCRIPTION - ORIENTATION: ORIENTATION: RIGHT

## 2025-06-25 ASSESSMENT — PAIN SCALES - GENERAL: PAINLEVEL_OUTOF10: 5

## 2025-06-25 NOTE — PROGRESS NOTES
Patient to port lab for port access and lab draw. Port accessed using 20g 0.75\" velasquez needle without difficulty. Labs drawn from port and port flushed. Port remains accessed. Patient tolerated well. Discharged ambulatory.

## 2025-06-25 NOTE — PROGRESS NOTES
Arrived to the Infusion Center.  Hydration completed. Patient tolerated well.   Any issues or concerns during appointment: none.  Patient instructed to call provider with temperature of 100.4 or greater or nausea/vomiting/ diarrhea or pain not controlled by medications  Discharged ambulatory.

## 2025-06-25 NOTE — PATIENT INSTRUCTIONS
Patient Information from Today's Visit    The members of your [facility] are listed below:    Physician Provider: Dr. Anne Lopez   Advanced Practice Clinician: MARIBEL  Registered Nurse: Mirtha FREDERICK  Nurse Navigator: N/A  Medical Assistant: Raven BLEVINS  : Melony DOUGLAS   Supportive Care Services: Sloantrae OMERMATTEO    Diagnosis (Information Sheet Provided on Day of Diagnosis): Metastatic RCC    Follow Up Instructions: 1 month.    Labs reviewed.  Symptoms reviewed.  MRI of brain recommended.  You can call to schedule this at [phone].  MRI of right hip recommended.  You can call to schedule this at [phone].      Has Treatment Plan Been Finalized? N/A     Current Labs:   Hospital Outpatient Visit on 06/25/2025   Component Date Value Ref Range Status    Sodium 06/25/2025 139  136 - 145 mmol/L Final    Potassium 06/25/2025 3.9  3.5 - 5.1 mmol/L Final    Chloride 06/25/2025 104  98 - 107 mmol/L Final    CO2 06/25/2025 26  20 - 29 mmol/L Final    Anion Gap 06/25/2025 9  7 - 16 mmol/L Final    Glucose 06/25/2025 137 (H)  70 - 99 mg/dL Final    Comment: <70 mg/dL Consistent with, but not fully diagnostic of hypoglycemia.  100 - 125 mg/dL Impaired fasting glucose/consistent with pre-diabetes mellitus.  > 126 mg/dl Fasting glucose consistent with overt diabetes mellitus      BUN 06/25/2025 3 (L)  8 - 23 MG/DL Final    Creatinine 06/25/2025 0.66  0.60 - 1.10 MG/DL Final    Est, Glom Filt Rate 06/25/2025 >90  >60 ml/min/1.73m2 Final    Comment:    Pediatric calculator link: https://www.kidney.org/professionals/kdoqi/gfr_calculatorped     These results are not intended for use in patients <18 years of age.     eGFR results are calculated without a race factor using  the 2021 CKD-EPI equation. Careful clinical correlation is recommended, particularly when comparing to results calculated using previous equations.  The CKD-EPI equation is less accurate in patients with extremes of muscle mass, extra-renal

## 2025-06-25 NOTE — PROGRESS NOTES
Outpatient Palliative Care at the  [facility]: Office Visit Established Patient    Diagnosis: Metastatic renal cell carcinoma     Treatment Plan: axitinib/pebrolizumab -> lenvatinib/everolimus -> cabozantinib     Treatment Intent: Palliative     Medical Oncologist: Dr. Lopez, Dr. Mccloud @ [facility]     Radiation Oncologist: None     Navigator: None      Chief Complaint:    Chief Complaint   Patient presents with    Follow-up       History of Present Illness:  Ms. Hall is a 59 y.o. female who presents today for evaluation regarding establishing care with palliative care. 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  Despite attending physical therapy since the accident, she continued to experience the same low back pain.  She was seen at [facility] on 10/4/22.  MRI of the lumbar spine on 10/15/22 showed mild degenerative disc disease at L3-4 through L5-S1 and a 5.7 x 6.1 x 7 cm lower pole solid left renal mass. Urgent referral was placed to [facility], who referred the patient to [facility] for uro/onc evaluation and treatment of renal mass.  CT AP on 10/26/22 showed bilateral enhancing renal masses, concerning for renal cell carcinoma Also noted was an enhancing 1.5 cm right adrenal nodule, concerning for a metastatic nodule.  S/p left renal biopsy on 11/2022, clear cell RCC.  She was started on pembrolizumab and axitinib - could not c/w immunotherapy due to LFT abnormalities. Started lenvatinib/everolimus. She had left partial nephrectomy at [facility] on 12/8/23.  Plan for right nephrectomy pending.     Her PMHx includes anxiety, tobacco abuse, cervical dysplasia, DDD, sleep apnea and lap liv. She is employed at [employer] as a CNA. She lives at home with her son, who she states has drug addiction issues.     Interval History:  Pt seen in follow-up. Pt reports she has had

## 2025-06-27 NOTE — PROGRESS NOTES
Online referral to [facility] submitted.  
nightly Phenergan.  She has not had any vomiting but was queasy earlier today.  Discussed possibility of Zyprexa use in the future but she declined as she gained significant weight when on it previously for bipolar disease.  She continues to work.  Discussed the next steps in reimaging and she will be due for CT chest abdomen pelvis in September.  Due to recent onset nausea vomiting and headaches likely from dehydration, but due to baseline malignancy we will proceed with brain MRI at this time.  CT neck to reevaluate thrombus reviewed with patient and we can lower her Eliquis to 2.5 mg twice daily per her preference.  RN send a note to IR to determine if the port needs to be adjusted.  The patient has previously been reviewed and it works/can remain in place.  She has been referred to ENT for the submandibular lymph node.  She also underwent a gastric emptying study and that was within normal limits.  She did see orthopedics for left knee fracture and is on observation.  She is supposed to wear a brace but does not wear one.  She continues to smoke.  Labs reviewed w pt .    - here for VV follow-up.  She is feeling a little better in terms of nausea.  Seeing Dr Jewell - started on Ativan to help with nausea.   + pain - will refer to PC with her approval for symptom management.  Saw ENT re submandibular LN - bx planned 9/21.  Discussed recent scans - POD concern.  Will plan for bx of LLL lesion.  She is doing ok otherwise.  She will c/w tx.    -  here today for follow-up after recent biopsies.  She underwent a submandibular lymph node biopsy on 9/21 but cytology was nondiagnostic.  ENT felt that this could be repeated or we could wait for repeat imaging and reassess.  Pt wishes to wait and repeat imaging.  Left lower lung biopsy negative for malignancy.  Brain MRI negative for evidence of lesions.  She continues on lenvatinib/everolimus and tolerating it relatively okay.  She continues to suffer from nausea which is

## 2025-07-05 DIAGNOSIS — F41.9 ANXIETY AND DEPRESSION: ICD-10-CM

## 2025-07-05 DIAGNOSIS — F32.A ANXIETY AND DEPRESSION: ICD-10-CM

## 2025-07-07 DIAGNOSIS — G89.3 CANCER ASSOCIATED PAIN: Primary | ICD-10-CM

## 2025-07-07 DIAGNOSIS — Z51.5 ENCOUNTER FOR PALLIATIVE CARE: ICD-10-CM

## 2025-07-07 RX ORDER — BUSPIRONE HYDROCHLORIDE 10 MG/1
10 TABLET ORAL 2 TIMES DAILY
Qty: 60 TABLET | Refills: 0 | Status: SHIPPED | OUTPATIENT
Start: 2025-07-07

## 2025-07-07 RX ORDER — OXYCODONE HYDROCHLORIDE 15 MG/1
1 TABLET, FILM COATED, EXTENDED RELEASE ORAL EVERY 12 HOURS
Qty: 60 TABLET | Refills: 0 | Status: SHIPPED | OUTPATIENT
Start: 2025-07-07 | End: 2025-08-06

## 2025-07-07 NOTE — PROGRESS NOTES
I have reviewed the patient's controlled substance prescription history, as maintained in the [place] prescription monitoring program, so that the prescriptions(s) for a controlled substance can be given.  Last Date Reviewed: 7/7/2025    Melony Jama [facility]  Palliative Care

## 2025-07-15 ENCOUNTER — HOSPITAL ENCOUNTER (OUTPATIENT)
Dept: MRI IMAGING | Age: 60
Discharge: HOME | End: 2025-07-17
Attending: INTERNAL MEDICINE
Payer: COMMERCIAL

## 2025-07-15 DIAGNOSIS — C64.2 RENAL CELL CARCINOMA OF LEFT KIDNEY (HCC): Primary | ICD-10-CM

## 2025-07-15 DIAGNOSIS — C64.2 RENAL CELL CARCINOMA OF LEFT KIDNEY (HCC): ICD-10-CM

## 2025-07-15 DIAGNOSIS — Z91.81 STATUS POST FALL: ICD-10-CM

## 2025-07-15 DIAGNOSIS — M25.551 ACUTE RIGHT HIP PAIN: ICD-10-CM

## 2025-07-15 PROCEDURE — 6360000004 HC RX CONTRAST MEDICATION: Performed by: INTERNAL MEDICINE

## 2025-07-15 PROCEDURE — 2500000003 HC RX 250 WO HCPCS: Performed by: INTERNAL MEDICINE

## 2025-07-15 PROCEDURE — 73723 MRI JOINT LWR EXTR W/O&W/DYE: CPT

## 2025-07-15 PROCEDURE — A9579 GAD-BASE MR CONTRAST NOS,1ML: HCPCS | Performed by: INTERNAL MEDICINE

## 2025-07-15 PROCEDURE — 6360000002 HC RX W HCPCS: Performed by: INTERNAL MEDICINE

## 2025-07-15 RX ORDER — SODIUM CHLORIDE 0.9 % (FLUSH) 0.9 %
5-40 SYRINGE (ML) INJECTION 2 TIMES DAILY
Status: DISCONTINUED | OUTPATIENT
Start: 2025-07-15 | End: 2025-07-15

## 2025-07-15 RX ORDER — HEPARIN 100 UNIT/ML
500 SYRINGE INTRAVENOUS ONCE
Status: DISCONTINUED | OUTPATIENT
Start: 2025-07-15 | End: 2025-07-15

## 2025-07-15 RX ORDER — GADOTERIDOL 279.3 MG/ML
17 INJECTION INTRAVENOUS
Status: COMPLETED | OUTPATIENT
Start: 2025-07-15 | End: 2025-07-15

## 2025-07-15 RX ADMIN — GADOTERIDOL 17 ML: 279.3 INJECTION, SOLUTION INTRAVENOUS at 09:29

## 2025-07-15 RX ADMIN — SODIUM CHLORIDE, PRESERVATIVE FREE 10 ML: 5 INJECTION INTRAVENOUS at 09:39

## 2025-07-15 RX ADMIN — HEPARIN 500 UNITS: 100 SYRINGE at 09:40

## 2025-07-18 DIAGNOSIS — R93.89 ABNORMAL MRI: ICD-10-CM

## 2025-07-18 DIAGNOSIS — M25.551 ACUTE RIGHT HIP PAIN: Primary | ICD-10-CM

## 2025-07-18 DIAGNOSIS — Z91.81 STATUS POST FALL: ICD-10-CM

## 2025-07-23 ENCOUNTER — OFFICE VISIT (OUTPATIENT)
Dept: PALLATIVE CARE | Age: 60
End: 2025-07-23
Payer: COMMERCIAL

## 2025-07-23 ENCOUNTER — HOSPITAL ENCOUNTER (OUTPATIENT)
Dept: LAB | Age: 60
Discharge: HOME | End: 2025-07-23
Payer: COMMERCIAL

## 2025-07-23 ENCOUNTER — OFFICE VISIT (OUTPATIENT)
Dept: ONCOLOGY | Age: 60
End: 2025-07-23
Payer: COMMERCIAL

## 2025-07-23 VITALS
RESPIRATION RATE: 18 BRPM | DIASTOLIC BLOOD PRESSURE: 87 MMHG | TEMPERATURE: 97.5 F | SYSTOLIC BLOOD PRESSURE: 153 MMHG | BODY MASS INDEX: 31.34 KG/M2 | OXYGEN SATURATION: 98 % | HEIGHT: 66 IN | WEIGHT: 195 LBS | HEART RATE: 63 BPM

## 2025-07-23 DIAGNOSIS — Z51.5 ENCOUNTER FOR PALLIATIVE CARE: ICD-10-CM

## 2025-07-23 DIAGNOSIS — C64.2 RENAL CELL CARCINOMA OF LEFT KIDNEY (HCC): Primary | ICD-10-CM

## 2025-07-23 DIAGNOSIS — K59.03 THERAPEUTIC OPIOID-INDUCED CONSTIPATION (OIC): ICD-10-CM

## 2025-07-23 DIAGNOSIS — Z51.11 ENCOUNTER FOR ANTINEOPLASTIC CHEMOTHERAPY: ICD-10-CM

## 2025-07-23 DIAGNOSIS — E11.9 TYPE 2 DIABETES MELLITUS WITHOUT COMPLICATION, WITHOUT LONG-TERM CURRENT USE OF INSULIN (HCC): ICD-10-CM

## 2025-07-23 DIAGNOSIS — T40.2X5A THERAPEUTIC OPIOID-INDUCED CONSTIPATION (OIC): ICD-10-CM

## 2025-07-23 DIAGNOSIS — R11.2 NAUSEA AND VOMITING, UNSPECIFIED VOMITING TYPE: ICD-10-CM

## 2025-07-23 DIAGNOSIS — G89.3 CANCER RELATED PAIN: ICD-10-CM

## 2025-07-23 DIAGNOSIS — F41.9 ANXIETY AND DEPRESSION: ICD-10-CM

## 2025-07-23 DIAGNOSIS — C64.2 RENAL CELL CARCINOMA OF LEFT KIDNEY (HCC): ICD-10-CM

## 2025-07-23 DIAGNOSIS — L08.9 SUPERFICIAL SKIN INFECTION: ICD-10-CM

## 2025-07-23 DIAGNOSIS — R53.83 FATIGUE DUE TO TREATMENT: ICD-10-CM

## 2025-07-23 DIAGNOSIS — Z79.899 HIGH RISK MEDICATION USE: ICD-10-CM

## 2025-07-23 DIAGNOSIS — F32.A ANXIETY AND DEPRESSION: ICD-10-CM

## 2025-07-23 DIAGNOSIS — G89.3 CANCER ASSOCIATED PAIN: Primary | ICD-10-CM

## 2025-07-23 DIAGNOSIS — K59.00 CONSTIPATION, UNSPECIFIED CONSTIPATION TYPE: ICD-10-CM

## 2025-07-23 LAB
ALBUMIN SERPL-MCNC: 3.5 G/DL (ref 3.2–4.6)
ALBUMIN/GLOB SERPL: 1.1 (ref 1–1.9)
ALP SERPL-CCNC: 76 U/L (ref 35–104)
ALT SERPL-CCNC: 30 U/L (ref 8–45)
ANION GAP SERPL CALC-SCNC: 9 MMOL/L (ref 7–16)
AST SERPL-CCNC: 28 U/L (ref 15–37)
BASOPHILS # BLD: 0.02 K/UL (ref 0–0.2)
BASOPHILS NFR BLD: 0.3 % (ref 0–2)
BILIRUB SERPL-MCNC: 0.3 MG/DL (ref 0–1.2)
BUN SERPL-MCNC: 8 MG/DL (ref 8–23)
CALCIUM SERPL-MCNC: 9.1 MG/DL (ref 8.8–10.2)
CHLORIDE SERPL-SCNC: 102 MMOL/L (ref 98–107)
CO2 SERPL-SCNC: 26 MMOL/L (ref 20–29)
CREAT SERPL-MCNC: 0.54 MG/DL (ref 0.6–1.1)
DIFFERENTIAL METHOD BLD: ABNORMAL
EOSINOPHIL # BLD: 0.12 K/UL (ref 0–0.8)
EOSINOPHIL NFR BLD: 1.6 % (ref 0.5–7.8)
ERYTHROCYTE [DISTWIDTH] IN BLOOD BY AUTOMATED COUNT: 15 % (ref 11.9–14.6)
EST. AVERAGE GLUCOSE BLD GHB EST-MCNC: 124 MG/DL
GLOBULIN SER CALC-MCNC: 3.1 G/DL (ref 2.3–3.5)
GLUCOSE SERPL-MCNC: 97 MG/DL (ref 70–99)
HBA1C MFR BLD: 6 % (ref 0–5.6)
HCT VFR BLD AUTO: 43.8 % (ref 35.8–46.3)
HGB BLD-MCNC: 14.2 G/DL (ref 11.7–15.4)
IMM GRANULOCYTES # BLD AUTO: 0.02 K/UL (ref 0–0.5)
IMM GRANULOCYTES NFR BLD AUTO: 0.3 % (ref 0–5)
LYMPHOCYTES # BLD: 3.18 K/UL (ref 0.5–4.6)
LYMPHOCYTES NFR BLD: 41.2 % (ref 13–44)
MCH RBC QN AUTO: 29.8 PG (ref 26.1–32.9)
MCHC RBC AUTO-ENTMCNC: 32.4 G/DL (ref 31.4–35)
MCV RBC AUTO: 91.8 FL (ref 82–102)
MONOCYTES # BLD: 0.52 K/UL (ref 0.1–1.3)
MONOCYTES NFR BLD: 6.7 % (ref 4–12)
NEUTS SEG # BLD: 3.86 K/UL (ref 1.7–8.2)
NEUTS SEG NFR BLD: 49.9 % (ref 43–78)
NRBC # BLD: 0 K/UL (ref 0–0.2)
PLATELET # BLD AUTO: 172 K/UL (ref 150–450)
PMV BLD AUTO: 11.2 FL (ref 9.4–12.3)
POTASSIUM SERPL-SCNC: 4.3 MMOL/L (ref 3.5–5.1)
PROT SERPL-MCNC: 6.6 G/DL (ref 6.3–8.2)
RBC # BLD AUTO: 4.77 M/UL (ref 4.05–5.2)
SODIUM SERPL-SCNC: 137 MMOL/L (ref 136–145)
WBC # BLD AUTO: 7.7 K/UL (ref 4.3–11.1)

## 2025-07-23 PROCEDURE — 36591 DRAW BLOOD OFF VENOUS DEVICE: CPT

## 2025-07-23 PROCEDURE — 99214 OFFICE O/P EST MOD 30 MIN: CPT | Performed by: NURSE PRACTITIONER

## 2025-07-23 PROCEDURE — 2500000003 HC RX 250 WO HCPCS: Performed by: INTERNAL MEDICINE

## 2025-07-23 PROCEDURE — 83036 HEMOGLOBIN GLYCOSYLATED A1C: CPT

## 2025-07-23 PROCEDURE — 99215 OFFICE O/P EST HI 40 MIN: CPT

## 2025-07-23 PROCEDURE — 80053 COMPREHEN METABOLIC PANEL: CPT

## 2025-07-23 PROCEDURE — 85025 COMPLETE CBC W/AUTO DIFF WBC: CPT

## 2025-07-23 RX ORDER — OXYCODONE HYDROCHLORIDE 20 MG/1
20 TABLET ORAL EVERY 4 HOURS PRN
Qty: 180 TABLET | Refills: 0 | Status: SHIPPED | OUTPATIENT
Start: 2025-08-08 | End: 2025-09-07

## 2025-07-23 RX ORDER — BUSPIRONE HYDROCHLORIDE 10 MG/1
10 TABLET ORAL 2 TIMES DAILY
Qty: 60 TABLET | Refills: 0 | Status: SHIPPED | OUTPATIENT
Start: 2025-07-23

## 2025-07-23 RX ORDER — MUPIROCIN 2 %
OINTMENT (GRAM) TOPICAL
Qty: 1 EACH | Refills: 0 | Status: SHIPPED | OUTPATIENT
Start: 2025-07-23

## 2025-07-23 RX ORDER — CLONAZEPAM 1 MG/1
1 TABLET ORAL 2 TIMES DAILY PRN
Qty: 60 TABLET | Refills: 2 | Status: SHIPPED | OUTPATIENT
Start: 2025-08-06 | End: 2025-11-04

## 2025-07-23 RX ORDER — SODIUM CHLORIDE 0.9 % (FLUSH) 0.9 %
5-40 SYRINGE (ML) INJECTION PRN
Status: DISCONTINUED | OUTPATIENT
Start: 2025-07-23 | End: 2025-07-24 | Stop reason: HOSPADM

## 2025-07-23 RX ORDER — SENNA AND DOCUSATE SODIUM 50; 8.6 MG/1; MG/1
1-2 TABLET, FILM COATED ORAL 2 TIMES DAILY
Qty: 60 TABLET | Refills: 1 | Status: SHIPPED | OUTPATIENT
Start: 2025-07-23

## 2025-07-23 RX ORDER — OXYCODONE HYDROCHLORIDE 15 MG/1
1 TABLET, FILM COATED, EXTENDED RELEASE ORAL EVERY 12 HOURS
Qty: 60 TABLET | Refills: 0 | Status: SHIPPED | OUTPATIENT
Start: 2025-08-06 | End: 2025-09-05

## 2025-07-23 RX ADMIN — SODIUM CHLORIDE, PRESERVATIVE FREE 10 ML: 5 INJECTION INTRAVENOUS at 08:43

## 2025-07-23 ASSESSMENT — ENCOUNTER SYMPTOMS
ABDOMINAL DISTENTION: 0
BLOOD IN STOOL: 0
DIARRHEA: 1
SORE THROAT: 0
HEMOPTYSIS: 0
NAUSEA: 1
SHORTNESS OF BREATH: 0
CONSTIPATION: 1
SCLERAL ICTERUS: 0
WHEEZING: 0
VOMITING: 0
ABDOMINAL PAIN: 1
ABDOMINAL PAIN: 0
CHEST TIGHTNESS: 0
TROUBLE SWALLOWING: 0
BACK PAIN: 1
VOICE CHANGE: 0
BACK PAIN: 1

## 2025-07-23 NOTE — PROGRESS NOTES
Outpatient Palliative Care at the  [facility]: Office Visit Established Patient    Diagnosis: Metastatic renal cell carcinoma     Treatment Plan: axitinib/pebrolizumab -> lenvatinib/everolimus -> cabozantinib     Treatment Intent: Palliative     Medical Oncologist: Dr. Lopez, Dr. Mccloud @ [facility]     Radiation Oncologist: None     Navigator: None      Chief Complaint:    Chief Complaint   Patient presents with    Follow-up       History of Present Illness:  Ms. Hall is a 59 y.o. female who presents today for evaluation regarding establishing care with palliative care. 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  Despite attending physical therapy since the accident, she continued to experience the same low back pain.  She was seen at [facility] on 10/4/22.  MRI of the lumbar spine on 10/15/22 showed mild degenerative disc disease at L3-4 through L5-S1 and a 5.7 x 6.1 x 7 cm lower pole solid left renal mass. Urgent referral was placed to [facility], who referred the patient to [facility] for uro/onc evaluation and treatment of renal mass.  CT AP on 10/26/22 showed bilateral enhancing renal masses, concerning for renal cell carcinoma Also noted was an enhancing 1.5 cm right adrenal nodule, concerning for a metastatic nodule.  S/p left renal biopsy on 11/2022, clear cell RCC.  She was started on pembrolizumab and axitinib - could not c/w immunotherapy due to LFT abnormalities. Started lenvatinib/everolimus. She had left partial nephrectomy at [facility] on 12/8/23.  Plan for right nephrectomy pending.     Her PMHx includes anxiety, tobacco abuse, cervical dysplasia, DDD, sleep apnea and lap liv. She is employed at [employer] as a CNA. She lives at home with her son, who she states has drug addiction issues.     Interval History:  Pt seen today in follow-up. No family is

## 2025-07-23 NOTE — PROGRESS NOTES
at [facility] in early Jan.  Known metastatic disease s/p cytoreductive left partial nephrectomy of 5.6cm mass on 12/8/23.  Path c/w clear cell RCC.  Cr 0.8.  lenvatinib/everolimus was on hold until surgical plan finalized.  She spoke to [facility] and additional sx of right side (likely nephrectomy) is being considered.  Rec CT CAP - pt wants to complete this locally.  She wished to stay off apixiban at this time.  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  She is back at work.  She met with Dr Mcclure (ENT) and he rec excision of mass - planned in early Feb.  Pain management per PC.    - Call to Dr Mccloud - reviewed prelim path from submandibular LN - c/w RCC.  Pt is aware.  plan for cabozantinib 60mg daily.  Unable to do combination therapy as she has hx of immune therapy related hepatitis.  At POD will plan for belzutifan.  Pt is aware of the plan and SE/MOA reviewed per package insert.  She will notify RN once she has the medication so we can sched FU/labs.  She appreciated my call and all qs were answered.    - FU on Cabozantinib. Palliative care is present for OV. She continues to have fatigue but is working full-time. She feels as she \"just works and sleeps\". She is thinking about applying for disability. She is using bag balm and utter cream for HFS. Insurance denied Urea. She continues to use MMW. She reported Marinol working for her N/V. She is having increased diarrhea and we discussed use of Imodium. She was recently diagnosed with PNA and she finished round of antibiotics. Still feeling SOB and with productive cough with green sputum. CXR and sputum culture today. She denies any fevers. She has no peripheral edema. She has a history of RADHA and set up with a CPAP five years ago. She reported not wearing her CPAP and her equipment is old. She reported remembering sleeping better and feeling better throughout the day when she wore her CPAP. Her Hgb has been elevated since February and her RADHA &

## 2025-07-24 ENCOUNTER — OFFICE VISIT (OUTPATIENT)
Dept: INTERNAL MEDICINE CLINIC | Facility: CLINIC | Age: 60
End: 2025-07-24
Payer: COMMERCIAL

## 2025-07-24 VITALS
DIASTOLIC BLOOD PRESSURE: 84 MMHG | BODY MASS INDEX: 30.83 KG/M2 | SYSTOLIC BLOOD PRESSURE: 120 MMHG | TEMPERATURE: 97.3 F | RESPIRATION RATE: 18 BRPM | WEIGHT: 191 LBS | HEART RATE: 71 BPM

## 2025-07-24 DIAGNOSIS — F32.A DEPRESSION, UNSPECIFIED DEPRESSION TYPE: ICD-10-CM

## 2025-07-24 DIAGNOSIS — E11.42 TYPE 2 DIABETES MELLITUS WITH DIABETIC POLYNEUROPATHY, WITHOUT LONG-TERM CURRENT USE OF INSULIN (HCC): ICD-10-CM

## 2025-07-24 DIAGNOSIS — M25.551 RIGHT HIP PAIN: ICD-10-CM

## 2025-07-24 DIAGNOSIS — C64.2 RENAL CELL CARCINOMA OF LEFT KIDNEY (HCC): ICD-10-CM

## 2025-07-24 DIAGNOSIS — E11.42 TYPE 2 DIABETES MELLITUS WITH DIABETIC POLYNEUROPATHY, WITHOUT LONG-TERM CURRENT USE OF INSULIN (HCC): Primary | ICD-10-CM

## 2025-07-24 DIAGNOSIS — E66.811 OBESITY (BMI 30.0-34.9): ICD-10-CM

## 2025-07-24 DIAGNOSIS — L84 CALLUS OF FOOT: ICD-10-CM

## 2025-07-24 LAB
CHOLEST SERPL-MCNC: 200 MG/DL (ref 0–200)
HDLC SERPL-MCNC: 40 MG/DL (ref 40–60)
HDLC SERPL: 5 (ref 0–5)
LDLC SERPL CALC-MCNC: 132 MG/DL (ref 0–100)
TRIGL SERPL-MCNC: 140 MG/DL (ref 0–150)
VLDLC SERPL CALC-MCNC: 28 MG/DL (ref 6–23)

## 2025-07-24 PROCEDURE — 99214 OFFICE O/P EST MOD 30 MIN: CPT | Performed by: INTERNAL MEDICINE

## 2025-07-24 PROCEDURE — G2211 COMPLEX E/M VISIT ADD ON: HCPCS | Performed by: INTERNAL MEDICINE

## 2025-07-24 PROCEDURE — 3044F HG A1C LEVEL LT 7.0%: CPT | Performed by: INTERNAL MEDICINE

## 2025-07-24 RX ORDER — EPINEPHRINE 0.3 MG/.3ML
0.3 INJECTION SUBCUTANEOUS
Qty: 0.3 ML | Refills: 2 | Status: SHIPPED | OUTPATIENT
Start: 2025-07-24

## 2025-07-24 RX ORDER — SERTRALINE HYDROCHLORIDE 100 MG/1
200 TABLET, FILM COATED ORAL NIGHTLY
Qty: 180 TABLET | Refills: 3 | Status: SHIPPED | OUTPATIENT
Start: 2025-07-24

## 2025-07-24 ASSESSMENT — ENCOUNTER SYMPTOMS
CONSTIPATION: 0
NAUSEA: 1
WHEEZING: 0
BLOOD IN STOOL: 0
SHORTNESS OF BREATH: 0
DIARRHEA: 0
COUGH: 0
ABDOMINAL PAIN: 0
VOMITING: 0

## 2025-07-24 ASSESSMENT — PATIENT HEALTH QUESTIONNAIRE - PHQ9
7. TROUBLE CONCENTRATING ON THINGS, SUCH AS READING THE NEWSPAPER OR WATCHING TELEVISION: MORE THAN HALF THE DAYS
8. MOVING OR SPEAKING SO SLOWLY THAT OTHER PEOPLE COULD HAVE NOTICED. OR THE OPPOSITE, BEING SO FIGETY OR RESTLESS THAT YOU HAVE BEEN MOVING AROUND A LOT MORE THAN USUAL: MORE THAN HALF THE DAYS
SUM OF ALL RESPONSES TO PHQ QUESTIONS 1-9: 18
5. POOR APPETITE OR OVEREATING: NEARLY EVERY DAY
4. FEELING TIRED OR HAVING LITTLE ENERGY: NEARLY EVERY DAY
2. FEELING DOWN, DEPRESSED OR HOPELESS: SEVERAL DAYS
6. FEELING BAD ABOUT YOURSELF - OR THAT YOU ARE A FAILURE OR HAVE LET YOURSELF OR YOUR FAMILY DOWN: MORE THAN HALF THE DAYS
SUM OF ALL RESPONSES TO PHQ QUESTIONS 1-9: 18
1. LITTLE INTEREST OR PLEASURE IN DOING THINGS: MORE THAN HALF THE DAYS
3. TROUBLE FALLING OR STAYING ASLEEP: NEARLY EVERY DAY
9. THOUGHTS THAT YOU WOULD BE BETTER OFF DEAD, OR OF HURTING YOURSELF: NOT AT ALL
SUM OF ALL RESPONSES TO PHQ QUESTIONS 1-9: 18
10. IF YOU CHECKED OFF ANY PROBLEMS, HOW DIFFICULT HAVE THESE PROBLEMS MADE IT FOR YOU TO DO YOUR WORK, TAKE CARE OF THINGS AT HOME, OR GET ALONG WITH OTHER PEOPLE: SOMEWHAT DIFFICULT
SUM OF ALL RESPONSES TO PHQ QUESTIONS 1-9: 18

## 2025-07-24 NOTE — PROGRESS NOTES
2025 2:46 PM  Location:[facility] PHYSICIAN SERVICES  [facility] [place]NAL MEDICINE  [place]  Patient #:  [id number]  YOB: 1965              Chief Complaint   Patient presents with    Diabetes       Ms. Hall is a 59 y.o. female  who presents for the above.     History of Present Illness  The patient presents for follow-up on diabetes.    She reports persistent fatigue and constipation from protein shakes. Previously experienced diarrhea and was prescribed medication 3-4 times daily. Lost 5 pounds over the past month and 4 pounds suddenly from yesterday to today. Currently taking Ensure and senna plus.    Experiencing shortness of breath and hoarseness for several weeks, attributed to allergies. Occasional throat pain and mouth discomfort, described as blistered. Uses Magic mouthwash for relief.    Issues on soles of feet, burning sensations, and occasional blisters causing significant pain. Considering diabetic shoes. Currently on gabapentin 300 mg and 400 mg, which provides some relief.    Diagnosed with tendinitis, bursitis, and a minor tear in right hip. Scheduled for brain MRI.    Requesting new EpiPen prescription due to  current one. Known allergy to bees.    On sertraline 150 mg daily.    Social History:  Hobbies: Gardening       Allergies   Allergen Reactions    Latex Hives, Itching, Shortness Of Breath and Other (See Comments)    Bee Venom Shortness Of Breath and Hives    Penicillins Anaphylaxis    Sulfa Antibiotics Hives, Itching and Rash    Wasp Venom Protein Hives and Shortness Of Breath    Penicillin G     Sulfamethoxazole-Trimethoprim Hives    Wellbutrin [Bupropion] Other (See Comments)     suicidal     Past Medical History:   Diagnosis Date    Anxiety     Cancer (HCC)     Bilateral  renal cell see Dr Lopez and [facility]    Cervical disc disorder     Diabetes mellitus (HCC)     taken off meds because bs was too low, fasting blood surgars 50-80, feels hypoglycemic below

## 2025-07-28 ENCOUNTER — OFFICE VISIT (OUTPATIENT)
Dept: ORTHOPEDIC SURGERY | Age: 60
End: 2025-07-28

## 2025-07-28 DIAGNOSIS — G89.29 CHRONIC RIGHT-SIDED LOW BACK PAIN WITHOUT SCIATICA: ICD-10-CM

## 2025-07-28 DIAGNOSIS — M54.50 CHRONIC RIGHT-SIDED LOW BACK PAIN WITHOUT SCIATICA: ICD-10-CM

## 2025-07-28 DIAGNOSIS — M25.551 RIGHT HIP PAIN: Primary | ICD-10-CM

## 2025-07-28 RX ORDER — METHYLPREDNISOLONE ACETATE 40 MG/ML
40 INJECTION, SUSPENSION INTRA-ARTICULAR; INTRALESIONAL; INTRAMUSCULAR; SOFT TISSUE ONCE
Status: COMPLETED | OUTPATIENT
Start: 2025-07-28 | End: 2025-07-28

## 2025-07-28 RX ADMIN — METHYLPREDNISOLONE ACETATE 40 MG: 40 INJECTION, SUSPENSION INTRA-ARTICULAR; INTRALESIONAL; INTRAMUSCULAR; SOFT TISSUE at 09:52

## 2025-07-28 NOTE — PROGRESS NOTES
Name: Mechelle Hall  YOB: 1965  Gender: female  MRN: [id number]    CHIEF COMPLAINT: Right hip pain    HPI:  The pain has been present for a few months.  She does have a complex medical history in which she has a history of renal cell carcinoma treated at [facility].  With complaints of hip pain and MRI was obtained of her right hip prior to and referral here.  There was not an acute injury to the right hip.  The pain is located over the lateral aspect.  It does not hurt to walk too much of a degree.  The pain does radiate down the leg to the thigh on the lateral aspect.  Numbness and tingling are not noted below the knee.  It hurts to lie on the affected hip over the lateral aspect.  Treatment so far has been anti-inflammatory medications.    Past Medical History:    Allergies:  Allergies   Allergen Reactions    Latex Hives, Itching, Shortness Of Breath and Other (See Comments)    Bee Venom Shortness Of Breath and Hives    Penicillins Anaphylaxis    Sulfa Antibiotics Hives, Itching and Rash    Wasp Venom Protein Hives and Shortness Of Breath    Penicillin G     Sulfamethoxazole-Trimethoprim Hives    Wellbutrin [Bupropion] Other (See Comments)     suicidal       Medications:  Current Outpatient Medications   Medication Sig    sertraline (ZOLOFT) 100 MG tablet Take 2 tablets by mouth at bedtime At night    EPINEPHrine (EPIPEN 2-MARY) 0.3 MG/0.3ML SOAJ injection Inject 0.3 mLs into the skin once as needed (bee sting) Use as directed for allergic reaction    busPIRone (BUSPAR) 10 MG tablet Take 1 tablet by mouth 2 times daily    sennosides-docusate sodium (SENOKOT-S) 8.6-50 MG tablet Take 1-2 tablets by mouth 2 times daily    mupirocin (BACTROBAN) 2 % ointment Apply topically 3 times daily.    [START ON 8/8/2025] oxyCODONE (ROXICODONE) 20 MG immediate release tablet Take 1 tablet by mouth every 4 hours as needed for Pain for up to 30 days. Max Daily Amount: 120 mg    [START ON 8/6/2025]

## 2025-07-31 ENCOUNTER — TELEPHONE (OUTPATIENT)
Dept: INTERNAL MEDICINE CLINIC | Facility: CLINIC | Age: 60
End: 2025-07-31

## 2025-08-01 ENCOUNTER — TELEPHONE (OUTPATIENT)
Dept: ORTHOPEDIC SURGERY | Age: 60
End: 2025-08-01

## 2025-08-04 ENCOUNTER — OFFICE VISIT (OUTPATIENT)
Dept: ORTHOPEDIC SURGERY | Age: 60
End: 2025-08-04
Payer: COMMERCIAL

## 2025-08-04 DIAGNOSIS — S92.355K CLOSED NONDISPLACED FRACTURE OF FIFTH METATARSAL BONE OF LEFT FOOT WITH NONUNION, SUBSEQUENT ENCOUNTER: Primary | ICD-10-CM

## 2025-08-04 PROCEDURE — 99213 OFFICE O/P EST LOW 20 MIN: CPT | Performed by: PHYSICIAN ASSISTANT

## 2025-08-05 ENCOUNTER — TELEPHONE (OUTPATIENT)
Dept: INTERNAL MEDICINE CLINIC | Facility: CLINIC | Age: 60
End: 2025-08-05

## 2025-08-07 ENCOUNTER — HOSPITAL ENCOUNTER (OUTPATIENT)
Dept: MRI IMAGING | Age: 60
Discharge: HOME | End: 2025-08-09
Attending: INTERNAL MEDICINE
Payer: COMMERCIAL

## 2025-08-07 PROCEDURE — 70553 MRI BRAIN STEM W/O & W/DYE: CPT

## 2025-08-07 PROCEDURE — A9579 GAD-BASE MR CONTRAST NOS,1ML: HCPCS | Performed by: INTERNAL MEDICINE

## 2025-08-07 PROCEDURE — 6360000004 HC RX CONTRAST MEDICATION: Performed by: INTERNAL MEDICINE

## 2025-08-07 RX ORDER — GADOTERIDOL 279.3 MG/ML
19 INJECTION INTRAVENOUS
Status: COMPLETED | OUTPATIENT
Start: 2025-08-07 | End: 2025-08-07

## 2025-08-07 RX ADMIN — GADOTERIDOL 19 ML: 279.3 INJECTION, SOLUTION INTRAVENOUS at 10:36

## 2025-08-20 ENCOUNTER — OFFICE VISIT (OUTPATIENT)
Dept: ONCOLOGY | Age: 60
End: 2025-08-20
Payer: COMMERCIAL

## 2025-08-20 ENCOUNTER — OFFICE VISIT (OUTPATIENT)
Dept: PALLATIVE CARE | Age: 60
End: 2025-08-20
Payer: COMMERCIAL

## 2025-08-20 ENCOUNTER — HOSPITAL ENCOUNTER (OUTPATIENT)
Dept: LAB | Age: 60
Discharge: HOME | End: 2025-08-20
Payer: COMMERCIAL

## 2025-08-20 ENCOUNTER — TELEPHONE (OUTPATIENT)
Dept: ONCOLOGY | Age: 60
End: 2025-08-20

## 2025-08-20 VITALS
BODY MASS INDEX: 30.86 KG/M2 | OXYGEN SATURATION: 98 % | TEMPERATURE: 97.7 F | SYSTOLIC BLOOD PRESSURE: 115 MMHG | WEIGHT: 192 LBS | HEART RATE: 62 BPM | HEIGHT: 66 IN | RESPIRATION RATE: 16 BRPM | DIASTOLIC BLOOD PRESSURE: 74 MMHG

## 2025-08-20 DIAGNOSIS — R11.2 NAUSEA AND VOMITING, UNSPECIFIED VOMITING TYPE: ICD-10-CM

## 2025-08-20 DIAGNOSIS — Z79.899 HIGH RISK MEDICATION USE: ICD-10-CM

## 2025-08-20 DIAGNOSIS — G89.3 CANCER ASSOCIATED PAIN: Primary | ICD-10-CM

## 2025-08-20 DIAGNOSIS — R10.13 EPIGASTRIC PAIN: ICD-10-CM

## 2025-08-20 DIAGNOSIS — Z51.5 ENCOUNTER FOR PALLIATIVE CARE: ICD-10-CM

## 2025-08-20 DIAGNOSIS — K21.9 GASTROESOPHAGEAL REFLUX DISEASE, UNSPECIFIED WHETHER ESOPHAGITIS PRESENT: ICD-10-CM

## 2025-08-20 DIAGNOSIS — Z95.828 PORT-A-CATH IN PLACE: ICD-10-CM

## 2025-08-20 DIAGNOSIS — R42 DIZZINESS: ICD-10-CM

## 2025-08-20 DIAGNOSIS — F32.A ANXIETY AND DEPRESSION: ICD-10-CM

## 2025-08-20 DIAGNOSIS — C64.2 RENAL CELL CARCINOMA OF LEFT KIDNEY (HCC): ICD-10-CM

## 2025-08-20 DIAGNOSIS — F41.9 ANXIETY AND DEPRESSION: ICD-10-CM

## 2025-08-20 DIAGNOSIS — C64.2 RENAL CELL CARCINOMA OF LEFT KIDNEY (HCC): Primary | ICD-10-CM

## 2025-08-20 DIAGNOSIS — Z95.828 PORT-A-CATH IN PLACE: Primary | ICD-10-CM

## 2025-08-20 LAB
ALBUMIN SERPL-MCNC: 3.3 G/DL (ref 3.2–4.6)
ALBUMIN/GLOB SERPL: 1 (ref 1–1.9)
ALP SERPL-CCNC: 69 U/L (ref 35–104)
ALT SERPL-CCNC: 28 U/L (ref 8–45)
ANION GAP SERPL CALC-SCNC: 13 MMOL/L (ref 7–16)
AST SERPL-CCNC: 25 U/L (ref 15–37)
BASOPHILS # BLD: 0.01 K/UL (ref 0–0.2)
BASOPHILS NFR BLD: 0.1 % (ref 0–2)
BILIRUB SERPL-MCNC: 0.3 MG/DL (ref 0–1.2)
BUN SERPL-MCNC: 7 MG/DL (ref 8–23)
CALCIUM SERPL-MCNC: 9.1 MG/DL (ref 8.8–10.2)
CHLORIDE SERPL-SCNC: 103 MMOL/L (ref 98–107)
CO2 SERPL-SCNC: 23 MMOL/L (ref 20–29)
CREAT SERPL-MCNC: 0.6 MG/DL (ref 0.6–1.1)
DIFFERENTIAL METHOD BLD: NORMAL
EOSINOPHIL # BLD: 0.14 K/UL (ref 0–0.8)
EOSINOPHIL NFR BLD: 1.7 % (ref 0.5–7.8)
ERYTHROCYTE [DISTWIDTH] IN BLOOD BY AUTOMATED COUNT: 14.6 % (ref 11.9–14.6)
GLOBULIN SER CALC-MCNC: 3.3 G/DL (ref 2.3–3.5)
GLUCOSE SERPL-MCNC: 121 MG/DL (ref 70–99)
HCT VFR BLD AUTO: 44.5 % (ref 35.8–46.3)
HGB BLD-MCNC: 14.7 G/DL (ref 11.7–15.4)
IMM GRANULOCYTES # BLD AUTO: 0.01 K/UL (ref 0–0.5)
IMM GRANULOCYTES NFR BLD AUTO: 0.1 % (ref 0–5)
LYMPHOCYTES # BLD: 2.62 K/UL (ref 0.5–4.6)
LYMPHOCYTES NFR BLD: 32 % (ref 13–44)
MCH RBC QN AUTO: 30.2 PG (ref 26.1–32.9)
MCHC RBC AUTO-ENTMCNC: 33 G/DL (ref 31.4–35)
MCV RBC AUTO: 91.6 FL (ref 82–102)
MONOCYTES # BLD: 0.53 K/UL (ref 0.1–1.3)
MONOCYTES NFR BLD: 6.5 % (ref 4–12)
NEUTS SEG # BLD: 4.87 K/UL (ref 1.7–8.2)
NEUTS SEG NFR BLD: 59.6 % (ref 43–78)
NRBC # BLD: 0 K/UL (ref 0–0.2)
PLATELET # BLD AUTO: 225 K/UL (ref 150–450)
PMV BLD AUTO: 10.5 FL (ref 9.4–12.3)
POTASSIUM SERPL-SCNC: 4.1 MMOL/L (ref 3.5–5.1)
PROT SERPL-MCNC: 6.6 G/DL (ref 6.3–8.2)
RBC # BLD AUTO: 4.86 M/UL (ref 4.05–5.2)
SODIUM SERPL-SCNC: 139 MMOL/L (ref 136–145)
WBC # BLD AUTO: 8.2 K/UL (ref 4.3–11.1)

## 2025-08-20 PROCEDURE — 80053 COMPREHEN METABOLIC PANEL: CPT

## 2025-08-20 PROCEDURE — 6360000002 HC RX W HCPCS: Performed by: INTERNAL MEDICINE

## 2025-08-20 PROCEDURE — 2500000003 HC RX 250 WO HCPCS: Performed by: INTERNAL MEDICINE

## 2025-08-20 PROCEDURE — 96523 IRRIG DRUG DELIVERY DEVICE: CPT

## 2025-08-20 PROCEDURE — 99215 OFFICE O/P EST HI 40 MIN: CPT

## 2025-08-20 PROCEDURE — 99215 OFFICE O/P EST HI 40 MIN: CPT | Performed by: INTERNAL MEDICINE

## 2025-08-20 PROCEDURE — 85025 COMPLETE CBC W/AUTO DIFF WBC: CPT

## 2025-08-20 RX ORDER — OXYCODONE HYDROCHLORIDE 20 MG/1
20 TABLET ORAL EVERY 4 HOURS PRN
Qty: 180 TABLET | Refills: 0 | Status: SHIPPED | OUTPATIENT
Start: 2025-09-12 | End: 2025-10-12

## 2025-08-20 RX ORDER — CLONAZEPAM 1 MG/1
1 TABLET ORAL 2 TIMES DAILY PRN
Qty: 60 TABLET | Refills: 2 | Status: SHIPPED | OUTPATIENT
Start: 2025-09-05 | End: 2025-12-04

## 2025-08-20 RX ORDER — HEPARIN 100 UNIT/ML
300 SYRINGE INTRAVENOUS PRN
Status: DISCONTINUED | OUTPATIENT
Start: 2025-08-20 | End: 2025-08-21 | Stop reason: HOSPADM

## 2025-08-20 RX ORDER — SODIUM CHLORIDE 0.9 % (FLUSH) 0.9 %
5-40 SYRINGE (ML) INJECTION PRN
Status: DISCONTINUED | OUTPATIENT
Start: 2025-08-20 | End: 2025-08-21 | Stop reason: HOSPADM

## 2025-08-20 RX ORDER — OXYCODONE HYDROCHLORIDE 15 MG/1
1 TABLET, FILM COATED, EXTENDED RELEASE ORAL EVERY 12 HOURS
Qty: 60 TABLET | Refills: 0 | Status: SHIPPED | OUTPATIENT
Start: 2025-09-05 | End: 2025-10-05

## 2025-08-20 RX ADMIN — HEPARIN 300 UNITS: 100 SYRINGE at 09:20

## 2025-08-20 RX ADMIN — SODIUM CHLORIDE, PRESERVATIVE FREE 20 ML: 5 INJECTION INTRAVENOUS at 07:55

## 2025-08-20 ASSESSMENT — ENCOUNTER SYMPTOMS
BACK PAIN: 1
DIARRHEA: 1
ABDOMINAL PAIN: 1
NAUSEA: 1
CONSTIPATION: 0

## 2025-08-21 DIAGNOSIS — R26.9 GAIT ABNORMALITY: ICD-10-CM

## 2025-08-21 DIAGNOSIS — Z51.5 ENCOUNTER FOR PALLIATIVE CARE: ICD-10-CM

## 2025-08-21 DIAGNOSIS — R42 DIZZINESS: Primary | ICD-10-CM

## 2025-08-21 DIAGNOSIS — L08.9 SUPERFICIAL SKIN INFECTION: ICD-10-CM

## 2025-08-21 RX ORDER — MUPIROCIN 2 %
OINTMENT (GRAM) TOPICAL
Qty: 22 G | Refills: 0 | Status: SHIPPED | OUTPATIENT
Start: 2025-08-21

## 2025-08-27 ENCOUNTER — HOSPITAL ENCOUNTER (OUTPATIENT)
Dept: INTERVENTIONAL RADIOLOGY/VASCULAR | Age: 60
Discharge: HOME | End: 2025-08-29
Attending: INTERNAL MEDICINE
Payer: COMMERCIAL

## 2025-08-27 VITALS
OXYGEN SATURATION: 93 % | DIASTOLIC BLOOD PRESSURE: 71 MMHG | RESPIRATION RATE: 16 BRPM | TEMPERATURE: 98.3 F | SYSTOLIC BLOOD PRESSURE: 128 MMHG | HEART RATE: 65 BPM

## 2025-08-27 DIAGNOSIS — Z95.828 PORT-A-CATH IN PLACE: ICD-10-CM

## 2025-08-27 PROCEDURE — 36598 INJ W/FLUOR EVAL CV DEVICE: CPT | Performed by: RADIOLOGY

## 2025-08-27 PROCEDURE — 6360000004 HC RX CONTRAST MEDICATION: Performed by: PHYSICIAN ASSISTANT

## 2025-08-27 PROCEDURE — 36593 DECLOT VASCULAR DEVICE: CPT

## 2025-08-27 PROCEDURE — 36598 INJ W/FLUOR EVAL CV DEVICE: CPT

## 2025-08-27 PROCEDURE — 6360000002 HC RX W HCPCS: Performed by: PHYSICIAN ASSISTANT

## 2025-08-27 RX ADMIN — IOHEXOL 5 ML: 300 INJECTION, SOLUTION INTRAVENOUS at 10:51

## 2025-08-27 RX ADMIN — ALTEPLASE 4 MG: 2.2 INJECTION, POWDER, LYOPHILIZED, FOR SOLUTION INTRAVENOUS at 12:52

## 2025-09-04 DIAGNOSIS — Z51.5 ENCOUNTER FOR PALLIATIVE CARE: ICD-10-CM

## 2025-09-04 DIAGNOSIS — F32.A ANXIETY AND DEPRESSION: ICD-10-CM

## 2025-09-04 DIAGNOSIS — F41.9 ANXIETY AND DEPRESSION: ICD-10-CM

## 2025-09-05 ENCOUNTER — HOSPITAL ENCOUNTER (OUTPATIENT)
Dept: CT IMAGING | Age: 60
Discharge: HOME | End: 2025-09-05
Attending: INTERNAL MEDICINE
Payer: COMMERCIAL

## 2025-09-05 DIAGNOSIS — Z79.899 HIGH RISK MEDICATION USE: ICD-10-CM

## 2025-09-05 DIAGNOSIS — C64.2 RENAL CELL CARCINOMA OF LEFT KIDNEY (HCC): ICD-10-CM

## 2025-09-05 PROCEDURE — 71260 CT THORAX DX C+: CPT

## 2025-09-05 PROCEDURE — 6360000004 HC RX CONTRAST MEDICATION: Performed by: INTERNAL MEDICINE

## 2025-09-05 RX ORDER — BUSPIRONE HYDROCHLORIDE 10 MG/1
10 TABLET ORAL 2 TIMES DAILY
Qty: 60 TABLET | Refills: 0 | Status: SHIPPED | OUTPATIENT
Start: 2025-09-05

## 2025-09-05 RX ORDER — IOPAMIDOL 755 MG/ML
100 INJECTION, SOLUTION INTRAVASCULAR
Status: COMPLETED | OUTPATIENT
Start: 2025-09-05 | End: 2025-09-05

## 2025-09-05 RX ORDER — LOPERAMIDE HYDROCHLORIDE 2 MG/1
4 CAPSULE ORAL 4 TIMES DAILY PRN
Qty: 240 CAPSULE | Refills: 0 | Status: SHIPPED | OUTPATIENT
Start: 2025-09-05 | End: 2025-12-04

## 2025-09-05 RX ADMIN — IOPAMIDOL 100 ML: 755 INJECTION, SOLUTION INTRAVENOUS at 08:43

## (undated) DEVICE — SUTURE PERMAHAND SZ 2-0 L12X18IN NONABSORBABLE BLK SILK A185H

## (undated) DEVICE — MICRODISSECTION NEEDLE STRAIGHT SLEEVE: Brand: COLORADO

## (undated) DEVICE — TUBING, SUCTION, 1/4" X 10', STRAIGHT: Brand: MEDLINE

## (undated) DEVICE — SUTURE VCRL SZ 3-0 L27IN ABSRB UD L26MM SH 1/2 CIR J416H

## (undated) DEVICE — GLOVE ORANGE PI 7 1/2   MSG9075

## (undated) DEVICE — DRAPE TWL SURG 16X26IN BLU ORB04] ALLCARE INC]

## (undated) DEVICE — SUTURE PERMAHAND SZ 3-0 L18IN NONABSORBABLE BLK SILK BRAID A184H

## (undated) DEVICE — GOWN,ECLIPSE,NONRNF,XL,ST,30/CS: Brand: MEDLINE

## (undated) DEVICE — CANISTER, RIGID, 2000CC: Brand: MEDLINE INDUSTRIES, INC.

## (undated) DEVICE — MASTISOL ADHESIVE LIQ 2/3ML

## (undated) DEVICE — DISPOSABLE BIPOLAR CODE, 12' (3.66 M): Brand: CONMED

## (undated) DEVICE — COVER,LIGHT HANDLE,FLX,2/PK: Brand: MEDLINE INDUSTRIES, INC.

## (undated) DEVICE — ELECTRODE PT RET AD L9FT HI MOIST COND ADH HYDRGEL CORDED

## (undated) DEVICE — DRAPE,TOP,102X53,STERILE: Brand: MEDLINE

## (undated) DEVICE — PAD,NON-ADHERENT,3X8,STERILE,LF,1/PK: Brand: MEDLINE

## (undated) DEVICE — SUTURE CHROMIC GUT SZ 4-0 L27IN ABSRB BRN L17MM RB-1 1/2 U203H

## (undated) DEVICE — GAUZE,SPONGE,8"X4",12PLY,XRAY,STRL,LF: Brand: MEDLINE

## (undated) DEVICE — SOLUTION IRRIG 1000ML 0.9% SOD CHL USP POUR PLAS BTL

## (undated) DEVICE — APPLIER CLP L9.375IN APER 2.1MM CLS L3.8MM 20 SM TI CLP

## (undated) DEVICE — PREMIUM WET SKIN PREP TRAY: Brand: MEDLINE INDUSTRIES, INC.

## (undated) DEVICE — GARMENT,MEDLINE,DVT,INT,CALF,MED, GEN2: Brand: MEDLINE

## (undated) DEVICE — STRIP,CLOSURE,WOUND,MEDI-STRIP,1/2X4: Brand: MEDLINE

## (undated) DEVICE — DRAIN SURG 10FR END PERF 1/8IN SIL RND SMOOTH HEAT POLISHED

## (undated) DEVICE — KIT PROCEDURE SURG HEAD AND NECK TOTE

## (undated) DEVICE — DISPOSABLE STANDARD BIPOLAR FORCEPS, NON-STICK,: Brand: SPETZLER-MALIS

## (undated) DEVICE — CORD ES L12FT BPLR FRCP

## (undated) DEVICE — RESERVOIR,SUCTION,100CC,SILICONE: Brand: MEDLINE

## (undated) DEVICE — PENCIL ES L3M BTTN SWCH HOLSTER W/ BLDE ELECTRD EDGE